# Patient Record
Sex: FEMALE | Race: ASIAN | Employment: FULL TIME | URBAN - METROPOLITAN AREA
[De-identification: names, ages, dates, MRNs, and addresses within clinical notes are randomized per-mention and may not be internally consistent; named-entity substitution may affect disease eponyms.]

---

## 2017-02-27 ENCOUNTER — OFFICE VISIT (OUTPATIENT)
Dept: INTERNAL MEDICINE CLINIC | Facility: CLINIC | Age: 32
End: 2017-02-27

## 2017-02-27 VITALS
DIASTOLIC BLOOD PRESSURE: 82 MMHG | HEIGHT: 63 IN | SYSTOLIC BLOOD PRESSURE: 128 MMHG | WEIGHT: 248 LBS | HEART RATE: 93 BPM | BODY MASS INDEX: 43.94 KG/M2 | RESPIRATION RATE: 18 BRPM | TEMPERATURE: 99.5 F

## 2017-02-27 DIAGNOSIS — E66.01 MORBID OBESITY WITH BMI OF 40.0-44.9, ADULT (HCC): ICD-10-CM

## 2017-02-27 DIAGNOSIS — R52 BODY ACHES: Primary | ICD-10-CM

## 2017-02-27 LAB
FLUAV+FLUBV AG NOSE QL IA.RAPID: NEGATIVE POS/NEG
FLUAV+FLUBV AG NOSE QL IA.RAPID: NEGATIVE POS/NEG
VALID INTERNAL CONTROL?: YES

## 2017-02-27 NOTE — PROGRESS NOTES
HISTORY OF PRESENT ILLNESS  Wyatt Holliday is a 27 y.o. female. HPI  1) Sick. Today is day 3 of body aches - lots of sinus pressure and pain. Cough. Mucus was clear at first and is now yellow. Cough is starting to be productive all day. Temp was normal at the office this morning. Taking Mucinex regularly. 2) Pt has lost 7 lbs in the past 2 months! Has been doing Whole 30 off and on for weight loss. Wt Readings from Last 3 Encounters:   02/27/17 248 lb (112.5 kg)   12/07/16 255 lb (115.7 kg)   07/27/16 241 lb (109.3 kg)         Review of Systems   Constitutional: Positive for malaise/fatigue. Negative for fever. HENT: Positive for congestion. Negative for ear pain. Respiratory: Positive for cough and sputum production. Negative for shortness of breath. Neurological: Positive for headaches. Endo/Heme/Allergies: Negative for environmental allergies. Physical Exam   Constitutional: She is oriented to person, place, and time. She appears well-developed and well-nourished. No distress. HENT:   Head: Normocephalic and atraumatic. Mouth/Throat: Oropharynx is clear and moist.   Bilateral TMs with fluid. No erythema. Nasal mucosa red, inflamed. Eyes: Conjunctivae are normal.   Neck: Neck supple. Cardiovascular: Normal rate, regular rhythm and normal heart sounds. Pulmonary/Chest: Effort normal and breath sounds normal.   Lymphadenopathy:     She has no cervical adenopathy. Neurological: She is alert and oriented to person, place, and time. Skin: Skin is warm and dry. Nursing note and vitals reviewed. ASSESSMENT and PLAN    ICD-10-CM ICD-9-CM    1. Body aches R52 780.96 AMB POC KEVYN INFLUENZA A/B TEST negative  Suspect Sinusitis. Rest, fluids, Mucinex. Call INI Thurs/Friday INI for antibiotics. 2. Morbid obesity with BMI of 40.0-44.9, adult ([facility] [address] E66.01 278.01 Improving! Encouraged pt to continue with her diet changes.      Z68.41 V85.41

## 2017-02-27 NOTE — PROGRESS NOTES
Chief Complaint   Patient presents with    Flu     Pt stated day 3 of body aches, sore throat, headaches     1. Have you been to the ER, urgent care clinic since your last visit? Hospitalized since your last visit? No    2. Have you seen or consulted any other health care providers outside of the [facility] since your last visit? Include any pap smears or colon screening.   No

## 2017-04-19 ENCOUNTER — OFFICE VISIT (OUTPATIENT)
Dept: INTERNAL MEDICINE CLINIC | Facility: CLINIC | Age: 32
End: 2017-04-19

## 2017-04-19 ENCOUNTER — APPOINTMENT (OUTPATIENT)
Dept: CT IMAGING | Age: 32
End: 2017-04-19
Attending: NURSE PRACTITIONER
Payer: COMMERCIAL

## 2017-04-19 ENCOUNTER — HOSPITAL ENCOUNTER (EMERGENCY)
Age: 32
Discharge: HOME | End: 2017-04-19
Attending: EMERGENCY MEDICINE
Payer: COMMERCIAL

## 2017-04-19 VITALS
HEART RATE: 103 BPM | SYSTOLIC BLOOD PRESSURE: 130 MMHG | RESPIRATION RATE: 18 BRPM | DIASTOLIC BLOOD PRESSURE: 83 MMHG | WEIGHT: 246.25 LBS | BODY MASS INDEX: 43.63 KG/M2 | OXYGEN SATURATION: 98 % | HEIGHT: 63 IN | TEMPERATURE: 98.6 F

## 2017-04-19 VITALS
SYSTOLIC BLOOD PRESSURE: 137 MMHG | OXYGEN SATURATION: 98 % | HEIGHT: 63 IN | RESPIRATION RATE: 18 BRPM | WEIGHT: 246.6 LBS | BODY MASS INDEX: 43.7 KG/M2 | HEART RATE: 111 BPM | DIASTOLIC BLOOD PRESSURE: 84 MMHG | TEMPERATURE: 97.1 F

## 2017-04-19 DIAGNOSIS — F41.9 ANXIETY: ICD-10-CM

## 2017-04-19 DIAGNOSIS — M54.16 LUMBAR RADICULOPATHY: Primary | ICD-10-CM

## 2017-04-19 DIAGNOSIS — M25.551 PAIN OF RIGHT HIP JOINT: Primary | ICD-10-CM

## 2017-04-19 LAB
APPEARANCE UR: CLEAR
BACTERIA URNS QL MICRO: NEGATIVE /HPF
BILIRUB UR QL: NEGATIVE
COLOR UR: ABNORMAL
EPITH CASTS URNS QL MICRO: ABNORMAL /LPF
GLUCOSE UR STRIP.AUTO-MCNC: NEGATIVE MG/DL
HCG UR QL: NEGATIVE
HGB UR QL STRIP: NEGATIVE
HYALINE CASTS URNS QL MICRO: ABNORMAL /LPF (ref 0–5)
KETONES UR QL STRIP.AUTO: NEGATIVE MG/DL
LEUKOCYTE ESTERASE UR QL STRIP.AUTO: NEGATIVE
NITRITE UR QL STRIP.AUTO: NEGATIVE
PH UR STRIP: 6 [PH] (ref 5–8)
PROT UR STRIP-MCNC: NEGATIVE MG/DL
RBC #/AREA URNS HPF: ABNORMAL /HPF (ref 0–5)
SP GR UR REFRACTOMETRY: >1.03 (ref 1–1.03)
UROBILINOGEN UR QL STRIP.AUTO: 0.2 EU/DL (ref 0.2–1)
WBC URNS QL MICRO: ABNORMAL /HPF (ref 0–4)

## 2017-04-19 PROCEDURE — 72131 CT LUMBAR SPINE W/O DYE: CPT

## 2017-04-19 PROCEDURE — 74011250636 HC RX REV CODE- 250/636: Performed by: NURSE PRACTITIONER

## 2017-04-19 PROCEDURE — 81025 URINE PREGNANCY TEST: CPT

## 2017-04-19 PROCEDURE — 96374 THER/PROPH/DIAG INJ IV PUSH: CPT

## 2017-04-19 PROCEDURE — 81001 URINALYSIS AUTO W/SCOPE: CPT | Performed by: NURSE PRACTITIONER

## 2017-04-19 PROCEDURE — 99283 EMERGENCY DEPT VISIT LOW MDM: CPT

## 2017-04-19 PROCEDURE — 96361 HYDRATE IV INFUSION ADD-ON: CPT

## 2017-04-19 PROCEDURE — 96375 TX/PRO/DX INJ NEW DRUG ADDON: CPT

## 2017-04-19 RX ORDER — DIAZEPAM 10 MG/2ML
2 INJECTION INTRAMUSCULAR
Status: COMPLETED | OUTPATIENT
Start: 2017-04-19 | End: 2017-04-19

## 2017-04-19 RX ORDER — HYDROMORPHONE HYDROCHLORIDE 1 MG/ML
0.5 INJECTION, SOLUTION INTRAMUSCULAR; INTRAVENOUS; SUBCUTANEOUS ONCE
Status: COMPLETED | OUTPATIENT
Start: 2017-04-19 | End: 2017-04-19

## 2017-04-19 RX ORDER — ESCITALOPRAM OXALATE 20 MG/1
20 TABLET ORAL DAILY
Qty: 90 TAB | Refills: 1 | Status: SHIPPED | OUTPATIENT
Start: 2017-04-19 | End: 2017-04-22 | Stop reason: SDUPTHER

## 2017-04-19 RX ORDER — DEXAMETHASONE SODIUM PHOSPHATE 4 MG/ML
10 INJECTION, SOLUTION INTRA-ARTICULAR; INTRALESIONAL; INTRAMUSCULAR; INTRAVENOUS; SOFT TISSUE
Status: COMPLETED | OUTPATIENT
Start: 2017-04-19 | End: 2017-04-19

## 2017-04-19 RX ORDER — NAPROXEN 500 MG/1
500 TABLET ORAL 2 TIMES DAILY WITH MEALS
Qty: 20 TAB | Refills: 0 | Status: SHIPPED | OUTPATIENT
Start: 2017-04-19 | End: 2017-04-29

## 2017-04-19 RX ORDER — CYCLOBENZAPRINE HCL 10 MG
10 TABLET ORAL
Qty: 15 TAB | Refills: 0 | Status: SHIPPED | OUTPATIENT
Start: 2017-04-19 | End: 2017-04-24

## 2017-04-19 RX ORDER — ONDANSETRON 2 MG/ML
4 INJECTION INTRAMUSCULAR; INTRAVENOUS
Status: DISCONTINUED | OUTPATIENT
Start: 2017-04-19 | End: 2017-04-19 | Stop reason: HOSPADM

## 2017-04-19 RX ORDER — HYDROCODONE BITARTRATE AND ACETAMINOPHEN 7.5; 325 MG/1; MG/1
1 TABLET ORAL
Qty: 20 TAB | Refills: 0 | Status: SHIPPED | OUTPATIENT
Start: 2017-04-19 | End: 2017-08-18

## 2017-04-19 RX ADMIN — DIAZEPAM 2 MG: 5 INJECTION, SOLUTION INTRAMUSCULAR; INTRAVENOUS at 11:42

## 2017-04-19 RX ADMIN — DEXAMETHASONE SODIUM PHOSPHATE 10 MG: 4 INJECTION, SOLUTION INTRAMUSCULAR; INTRAVENOUS at 11:42

## 2017-04-19 RX ADMIN — HYDROMORPHONE HYDROCHLORIDE 0.5 MG: 1 INJECTION, SOLUTION INTRAMUSCULAR; INTRAVENOUS; SUBCUTANEOUS at 11:42

## 2017-04-19 RX ADMIN — SODIUM CHLORIDE 1000 ML: 900 INJECTION, SOLUTION INTRAVENOUS at 11:49

## 2017-04-19 NOTE — DISCHARGE INSTRUCTIONS
Back Pain: Care Instructions  Your Care Instructions    Back pain has many possible causes. It is often related to problems with muscles and ligaments of the back. It may also be related to problems with the nerves, discs, or bones of the back. Moving, lifting, standing, sitting, or sleeping in an awkward way can strain the back. Sometimes you don't notice the injury until later. Arthritis is another common cause of back pain. Although it may hurt a lot, back pain usually improves on its own within several weeks. Most people recover in 12 weeks or less. Using good home treatment and being careful not to stress your back can help you feel better sooner. Follow-up care is a key part of your treatment and safety. Be sure to make and go to all appointments, and call your doctor if you are having problems. Its also a good idea to know your test results and keep a list of the medicines you take. How can you care for yourself at home? · Sit or lie in positions that are most comfortable and reduce your pain. Try one of these positions when you lie down:  ¨ Lie on your back with your knees bent and supported by large pillows. ¨ Lie on the floor with your legs on the seat of a sofa or chair. [place] on your side with your knees and hips bent and a pillow between your legs. ¨ Lie on your stomach if it does not make pain worse. · Do not sit up in bed, and avoid soft couches and twisted positions. Bed rest can help relieve pain at first, but it delays healing. Avoid bed rest after the first day of back pain. · Change positions every 30 minutes. If you must sit for long periods of time, take breaks from sitting. Get up and walk around, or lie in a comfortable position. · Try using a heating pad on a low or medium setting for 15 to 20 minutes every 2 or 3 hours. Try a warm shower in place of one session with the heating pad. · You can also try an ice pack for 10 to 15 minutes every 2 to 3 hours.  Put a thin cloth between the ice pack and your skin. · Take pain medicines exactly as directed. ¨ If the doctor gave you a prescription medicine for pain, take it as prescribed. ¨ If you are not taking a prescription pain medicine, ask your doctor if you can take an over-the-counter medicine. · Take short walks several times a day. You can start with 5 to 10 minutes, 3 or 4 times a day, and work up to longer walks. Walk on level surfaces and avoid hills and stairs until your back is better. · Return to work and other activities as soon as you can. Continued rest without activity is usually not good for your back. · To prevent future back pain, do exercises to stretch and strengthen your back and stomach. Learn how to use good posture, safe lifting techniques, and proper body mechanics. When should you call for help? Call your doctor now or seek immediate medical care if:  · You have new or worsening numbness in your legs. · You have new or worsening weakness in your legs. (This could make it hard to stand up.)  · You lose control of your bladder or bowels. Watch closely for changes in your health, and be sure to contact your doctor if:  · Your pain gets worse. · You are not getting better after 2 weeks. Where can you learn more? Go to http://salvador-tia.info/. Enter I205 in the search box to learn more about \"Back Pain: Care Instructions. \"  Current as of: May 23, 2016  Content Version: 11.2  © 8637-5230 [employer]. Care instructions adapted under license by [employer] (which disclaims liability or warranty for this information). If you have questions about a medical condition or this instruction, always ask your healthcare professional. [address] any warranty or liability for your use of this information. We hope that we have addressed all of your medical concerns.  The examination and treatment you received in the Emergency Department were for an emergent problem and were not intended as complete care. It is important that you follow up with your healthcare provider(s) for ongoing care. If your symptoms worsen or do not improve as expected, and you are unable to reach your usual health care provider(s), you should return to the Emergency Department. Today's healthcare is undergoing tremendous change, and patient satisfaction surveys are one of the many tools to assess the quality of medical care. You may receive a survey from the [facility] regarding your experience in the Emergency Department. I hope that your experience has been completely positive, particularly the medical care that I provided. As such, please participate in the survey; anything less than excellent does not meet my expectations or intentions. [facility] and [facility] participate in nationally recognized quality of care measures. If your blood pressure is greater than 120/80, as reported below, we urge that you seek medical care to address the potential of high blood pressure, commonly known as hypertension. Hypertension can be hereditary or can be caused by certain medical conditions, pain, stress, or \"white coat syndrome. \"       Please make an appointment with your health care provider(s) for follow up of your Emergency Department visit. VITALS:   Patient Vitals for the past 8 hrs:   Temp Pulse Resp BP SpO2   04/19/17 1112 98.9 °F (37.2 °C) (!) 112 18 (!) 146/101 98 %          Thank you for allowing us to provide you with medical care today. We realize that you have many choices for your emergency care needs. Please choose us in the future for any continued health care needs. [address].   Office: [phone]            Recent Results (from the past 24 hour(s))   URINALYSIS W/MICROSCOPIC    Collection Time: 04/19/17 12:36 PM Result Value Ref Range    Color YELLOW/STRAW      Appearance CLEAR CLEAR      Specific gravity >1.030 (H) 1.003 - 1.030    pH (UA) 6.0 5.0 - 8.0      Protein NEGATIVE  NEG mg/dL    Glucose NEGATIVE  NEG mg/dL    Ketone NEGATIVE  NEG mg/dL    Bilirubin NEGATIVE  NEG      Blood NEGATIVE  NEG      Urobilinogen 0.2 0.2 - 1.0 EU/dL    Nitrites NEGATIVE  NEG      Leukocyte Esterase NEGATIVE  NEG      WBC 0-4 0 - 4 /hpf    RBC 0-5 0 - 5 /hpf    Epithelial cells FEW FEW /lpf    Bacteria NEGATIVE  NEG /hpf    Hyaline cast 0-2 0 - 5 /lpf   HCG URINE, QL. - POC    Collection Time: 04/19/17 12:37 PM   Result Value Ref Range    Pregnancy test,urine (POC) NEGATIVE  NEG         Ct Spine Lumb Wo Cont    Result Date: 4/19/2017  INDICATION: Back pain and right hip pain for 3 days. TECHNIQUE: 2.5 mm axial images were obtained through the lumbar spine and reconstructed in the sagittal and coronal planes. CT dose reduction was achieved through use of a standardized protocol tailored for this examination and automatic exposure control for dose modulation. Adaptive statistical iterative reconstruction (ASIR) was utilized. FINDINGS: The lumbar spine alignment is normal. The vertebral bodies are intact. There is no evidence of fracture. The intervertebral disc spaces are well maintained. There is no evidence of spinal stenosis or osseous neural foraminal narrowing at any level. No disc herniation is seen. IMPRESSION: Normal lumbar spine CT.

## 2017-04-19 NOTE — Clinical Note
Follow up with Primary Care Physician in 2 - 3 days Return to the ED if condition worsens and/or new symptoms develop

## 2017-04-19 NOTE — ED TRIAGE NOTES
Triage note: pt started with right hip/back pain on Sunday. Pt took 3 Advil and pain went away. Last night pain began again, going down right leg and feeling like her right foot is cooler than left. Pt saw PCP this am and referred to ED for further workup.

## 2017-04-19 NOTE — PROGRESS NOTES
HISTORY OF PRESENT ILLNESS  Ulisses Mercado is a 27 y.o. female. Chief Complaint   Patient presents with    Hip Pain     Patient states that the pain started on Sunday night. Psatient states the pain went away after she walked for about 1/2 hour. Reoccurred yesterday evening radiating  to leg     HPI  1) Right Hip pain - \"sharp\" pain - worse with movement. Took 3 Advil on Sunday night - walked for 30 minutes - resolved. Yesterday evening, hip pain started while shopping - continuously got worse. Pain is severe right now: 10/10 pain. Hasn't taken anything for pain today. Pain radiating down leg to knee. No injury to hip. No urinary pain/urg/freq. Other joints have been painful: R knuckle and L shoulder. Those pains started at the same time. Today, finger and shoulder don't hurt. No FH RA, but strong FH gout and osteoarthritis. Pt reports that R foot feels colder than L foot. No foot swelling. 2) Pt needs refill of Lexapro. It is working well. Review of Systems   Constitutional: Positive for malaise/fatigue. Genitourinary: Negative for dysuria, flank pain, frequency and urgency. Musculoskeletal: Positive for joint pain and myalgias. Negative for falls. Physical Exam   Constitutional: She is oriented to person, place, and time. She appears well-developed and well-nourished. She appears distressed. HENT:   Head: Normocephalic and atraumatic. Neck: Neck supple. No JVD present. Cardiovascular: Normal rate, regular rhythm and normal heart sounds. Pulmonary/Chest: Effort normal and breath sounds normal. No respiratory distress. Musculoskeletal: She exhibits no edema. ROM hip intact, but very painful. Extremely antalgic gait. Neurological: She is alert and oriented to person, place, and time. Skin: Skin is warm and dry.    Psychiatric: Her behavior is normal. Judgment and thought content normal.   Tearful and obviously in pain   Nursing note and vitals reviewed. ASSESSMENT and PLAN    ICD-10-CM ICD-9-CM    1. Pain of right hip joint M25.551 719.45 Offered to order labs including RF, MELINDA, Sed Rate, x-ray of R hip, and possibly doppler ultrasound R leg, but given the extreme severity of patients symptoms and her understandable reluctance to travel to x-ray location & pharmacy, her need for immediate evaluation and pain relief, and her lack of prior joint pain, pt elects to go to ER. Agree that this is reasonable. Suggested [facility] or [facility] [place].     2. Anxiety F41.9 300.00 escitalopram oxalate (LEXAPRO) 20 mg tablet

## 2017-04-19 NOTE — PROGRESS NOTES
Room 7    Chief Complaint   Patient presents with    Hip Pain     Right Hip Pain. Patient states that the pain started on Sunday night. Psatient states the pain went away after she walked for about 1/2 hour. Reoccurred yesterday evening radiating  to leg     1. Have you been to the ER, urgent care clinic since your last visit? Hospitalized since your last visit? No    2. Have you seen or consulted any other health care providers outside of the [address] [place] [address] since your last visit? Include any pap smears or colon screening.  No     Health Maintenance Due   Topic Date Due    INFLUENZA AGE 9 TO ADULT  08/01/2016

## 2017-04-19 NOTE — ED PROVIDER NOTES
HPI Comments: The patient is a 28-year-old female who presents ambulatory to the emergency room with complaints of right-sided lower back pain radiating down the left hip to the knee. Pain initially began on Sunday although when away after 3 Advil and excessive ambulation. The pain returned last night while she was sleeping. No known trauma. No excessive heavy lifting pushing pulling. No saddle anesthesia. No numbness or tingling. No loss of bowel or bladder function. Pt denies fevers, chills, night sweats, chest pain, pressure, SOB, SEALS, PND, orthopnea, abdominal pain, n/v/d, melena, hematuria, dysuria, constipation, HA, dizziness, and syncope      Past Medical History:  No date: Anxiety  No date: Hives      Comment: as a child - had 1 year of steroids. No date: Hypertension  No date: PCOS (polycystic ovarian syndrome)    Past Surgical History:  No date: HX OTHER SURGICAL      Comment: rectal polyp at 9 yo  No date: HX OTHER SURGICAL      Comment: Sigmoidoscopy - WNL pp ~2013    PCP:  Georgie Lora PA-C      Patient is a 27 y.o. female presenting with back pain. Back Pain    Pertinent negatives include no chest pain, no fever, no headaches, no abdominal pain, no dysuria and no weakness. Past Medical History:   Diagnosis Date    Anxiety     Hives     as a child - had 1 year of steroids.      Hypertension     PCOS (polycystic ovarian syndrome)        Past Surgical History:   Procedure Laterality Date    HX OTHER SURGICAL      rectal polyp at 9 yo    HX OTHER SURGICAL      Sigmoidoscopy - WNL pp ~2013         Family History:   Problem Relation Age of Onset    Diabetes Father     Heart Disease Father      cardiomyopathy, heart transplant    Diabetes Mother     High Cholesterol Mother     Diabetes Maternal Grandmother     Diabetes Maternal Grandfather     Diabetes Paternal Grandmother        Social History     Social History    Marital status: SINGLE     Spouse name: N/A    Number of children: N/A    Years of education: N/A     Occupational History    Optometrist       Social History Main Topics    Smoking status: Light Tobacco Smoker     Last attempt to quit: 1/1/2016    Smokeless tobacco: Never Used      Comment: 6 cig in 2016 (3/2016)    Alcohol use 0.0 oz/week     0 Standard drinks or equivalent per week      Comment: 2-3x/week 1-2 drinks    Drug use: No    Sexual activity: Yes     Partners: Male     Birth control/ protection: IUD     Other Topics Concern    Exercise No     Social History Narrative    Opened new practice in Fall/Winter 2013. Lives with boyfriend Duane Perdomo. No pets. Dating Boyfriend x 8 months in 3/2016. ALLERGIES: Review of patient's allergies indicates no known allergies. Review of Systems   Constitutional: Negative for activity change, appetite change, chills, diaphoresis, fatigue, fever and unexpected weight change. HENT: Negative for congestion, ear pain, rhinorrhea, sinus pressure, sore throat and tinnitus. Eyes: Negative for photophobia, pain, discharge and visual disturbance. Respiratory: Negative for apnea, cough, choking, chest tightness, shortness of breath, wheezing and stridor. Cardiovascular: Negative for chest pain, palpitations and leg swelling. Gastrointestinal: Negative for abdominal pain, constipation, diarrhea, nausea and vomiting. Endocrine: Negative for polydipsia, polyphagia and polyuria. Genitourinary: Negative for decreased urine volume, dyspareunia, dysuria, enuresis, flank pain, frequency, hematuria and urgency. Musculoskeletal: Positive for back pain. Negative for arthralgias, gait problem, myalgias and neck pain. Skin: Negative for color change, pallor, rash and wound. Allergic/Immunologic: Negative for immunocompromised state. Neurological: Negative for dizziness, seizures, syncope, weakness, light-headedness and headaches. Hematological: Does not bruise/bleed easily.    Psychiatric/Behavioral: Negative for agitation and confusion. The patient is not nervous/anxious. Vitals:    04/19/17 1112 04/19/17 1333   BP: (!) 146/101 130/83   Pulse: (!) 112 (!) 103   Resp: 18 18   Temp: 98.9 °F (37.2 °C) 98.6 °F (37 °C)   SpO2: 98% 98%   Weight: 111.7 kg (246 lb 4 oz)    Height: 5' 3\" (1.6 m)             Physical Exam   Constitutional: She is oriented to person, place, and time. She appears well-developed and well-nourished. No distress. HENT:   Head: Normocephalic. Right Ear: External ear normal.   Left Ear: External ear normal.   Mouth/Throat: Oropharynx is clear and moist. No oropharyngeal exudate. Eyes: Conjunctivae and EOM are normal. Pupils are equal, round, and reactive to light. Right eye exhibits no discharge. Left eye exhibits no discharge. No scleral icterus. Neck: Normal range of motion. Neck supple. No JVD present. No tracheal deviation present. No thyromegaly present. Cardiovascular: Normal rate, regular rhythm, normal heart sounds and intact distal pulses. Exam reveals no gallop and no friction rub. No murmur heard. Pulmonary/Chest: Effort normal and breath sounds normal. No stridor. No respiratory distress. She has no wheezes. She has no rales. She exhibits no tenderness. Abdominal: Soft. Bowel sounds are normal. She exhibits no distension and no mass. There is no tenderness. There is no rebound and no guarding. Musculoskeletal: Normal range of motion. She exhibits no edema. Lumbar back: She exhibits tenderness, bony tenderness and pain. Back:    + straight leg test   Lymphadenopathy:     She has no cervical adenopathy. Neurological: She is alert and oriented to person, place, and time. She has normal strength. She displays normal reflexes. No cranial nerve deficit or sensory deficit. She displays a negative Romberg sign. Coordination normal. GCS eye subscore is 4. GCS verbal subscore is 5. GCS motor subscore is 6.    Reflex Scores:       Patellar reflexes are 2+ on the right side and 2+ on the left side. Achilles reflexes are 2+ on the right side and 2+ on the left side. Skin: Skin is warm and dry. No rash noted. She is not diaphoretic. No erythema. No pallor. Psychiatric: She has a normal mood and affect. Her behavior is normal. Judgment and thought content normal.   Nursing note and vitals reviewed. MDM  Number of Diagnoses or Management Options  Lumbar radiculopathy:   Diagnosis management comments:       * analgesia, decadron, and valium   * CT L spine as pain is seemingly out of proportion with exam   * UA       Amount and/or Complexity of Data Reviewed  Clinical lab tests: ordered and reviewed  Tests in the radiology section of CPT®: ordered and reviewed  Review and summarize past medical records: yes    Risk of Complications, Morbidity, and/or Mortality  General comments:    - stable, ambulatory in NAD with pain improved    Patient Progress  Patient progress: stable    ED Course       Procedures           1335  Pt has been reevaluated. There are no new complaints, changes, or physical findings at this time. Medications have been reviewed w/ pt and/or family. Pt and/or family's questions have been answered. Pt and/or family expressed good understanding of the dx/tx/rx and is in agreement with plan of care. Pt instructed and agreed to f/u w/ PCP and to return to ED upon further deterioration. Pt is ready for discharge.     LABORATORY TESTS:  Recent Results (from the past 12 hour(s))   URINALYSIS W/MICROSCOPIC    Collection Time: 04/19/17 12:36 PM   Result Value Ref Range    Color YELLOW/STRAW      Appearance CLEAR CLEAR      Specific gravity >1.030 (H) 1.003 - 1.030    pH (UA) 6.0 5.0 - 8.0      Protein NEGATIVE  NEG mg/dL    Glucose NEGATIVE  NEG mg/dL    Ketone NEGATIVE  NEG mg/dL    Bilirubin NEGATIVE  NEG      Blood NEGATIVE  NEG      Urobilinogen 0.2 0.2 - 1.0 EU/dL    Nitrites NEGATIVE  NEG      Leukocyte Esterase NEGATIVE  NEG      WBC 0-4 0 - 4 /hpf RBC 0-5 0 - 5 /hpf    Epithelial cells FEW FEW /lpf    Bacteria NEGATIVE  NEG /hpf    Hyaline cast 0-2 0 - 5 /lpf   HCG URINE, QL. - POC    Collection Time: 04/19/17 12:37 PM   Result Value Ref Range    Pregnancy test,urine (POC) NEGATIVE  NEG         IMAGING RESULTS:  CT SPINE LUMB WO CONT   Final Result        Ct Spine Lumb Wo Cont    Result Date: 4/19/2017  INDICATION: Back pain and right hip pain for 3 days. TECHNIQUE: 2.5 mm axial images were obtained through the lumbar spine and reconstructed in the sagittal and coronal planes. CT dose reduction was achieved through use of a standardized protocol tailored for this examination and automatic exposure control for dose modulation. Adaptive statistical iterative reconstruction (ASIR) was utilized. FINDINGS: The lumbar spine alignment is normal. The vertebral bodies are intact. There is no evidence of fracture. The intervertebral disc spaces are well maintained. There is no evidence of spinal stenosis or osseous neural foraminal narrowing at any level. No disc herniation is seen. IMPRESSION: Normal lumbar spine CT. MEDICATIONS GIVEN:  Medications   ondansetron (ZOFRAN) injection 4 mg (4 mg IntraVENous Refused 4/19/17 1151)   HYDROmorphone (PF) (DILAUDID) injection 0.5 mg (0.5 mg IntraVENous Given 4/19/17 1142)   dexamethasone (DECADRON) 4 mg/mL injection 10 mg (10 mg IntraVENous Given 4/19/17 1142)   sodium chloride 0.9 % bolus infusion 1,000 mL (0 mL IntraVENous IV Completed 4/19/17 1239)   diazePAM (VALIUM) injection 2 mg (2 mg IntraVENous Given 4/19/17 1142)       IMPRESSION:  1. Lumbar radiculopathy        PLAN:  1. Discharge Medication List as of 4/19/2017  1:33 PM      START taking these medications    Details   HYDROcodone-acetaminophen (NORCO) 7.5-325 mg per tablet Take 1 Tab by mouth every six (6) hours as needed for Pain.  Max Daily Amount: 4 Tabs., Print, Disp-20 Tab, R-0      cyclobenzaprine (FLEXERIL) 10 mg tablet Take 1 Tab by mouth three (3) times daily as needed for Muscle Spasm(s) for up to 5 days. , Print, Disp-15 Tab, R-0      naproxen (NAPROSYN) 500 mg tablet Take 1 Tab by mouth two (2) times daily (with meals) for 10 days. , Print, Disp-20 Tab, R-0         CONTINUE these medications which have NOT CHANGED    Details   escitalopram oxalate (LEXAPRO) 20 mg tablet Take 1 Tab by mouth daily. , Normal, Disp-90 Tab, R-1      levonorgestrel (MIRENA) 20 mcg/24 hr (5 years) IUD 1 Each by IntraUTERine route once., Historical Med      buPROPion XL (WELLBUTRIN XL) 300 mg XL tablet Take 1 Tab by mouth every morning., Normal, Disp-90 Tab, R-2      phentermine (ADIPEX-P) 37.5 mg tablet Take 1 Tab by mouth every morning. Max Daily Amount: 37.5 mg. Take 1 in the morning, then 1/2 tab at 3pm, Print, Disp-45 Tab, R-5      amLODIPine (NORVASC) 5 mg tablet Take 1 Tab by mouth daily. , Normal, Disp-90 Tab, R-1      metoprolol tartrate (LOPRESSOR) 25 mg tablet Take 1 Tab by mouth daily. , Normal, Disp-90 Tab, R-1      triamterene-hydrochlorothiazide (DYAZIDE) 37.5-25 mg per capsule Take 1 Cap by mouth daily. , Normal, Disp-90 Cap, R-1      metFORMIN (GLUCOPHAGE) 500 mg tablet Take 1,000 mg by mouth two (2) times daily (with meals). , Historical Med      ALPRAZolam (XANAX) 0.25 mg tablet Take 0.25 mg by mouth three (3) times daily as needed., Historical Med           2. Follow-up Information     Follow up With Details Comments [address], [place] In 2 days  [address] [facility] [address] [phone]      [facility] [place] [facility]  As needed, If symptoms worsen [address] [facility]  [phone]        3.  Supportive care     Return to ED if worse             Cammie Goins NP  3:34 PM

## 2017-04-22 DIAGNOSIS — F41.9 ANXIETY: ICD-10-CM

## 2017-04-24 RX ORDER — ESCITALOPRAM OXALATE 20 MG/1
20 TABLET ORAL DAILY
Qty: 90 TAB | Refills: 1 | Status: SHIPPED | OUTPATIENT
Start: 2017-04-24 | End: 2017-10-09 | Stop reason: SDUPTHER

## 2017-04-24 NOTE — TELEPHONE ENCOUNTER
From: Kianna Gomez  To: Charmayne Sizer, PA-C  Sent: 4/22/2017 5:45 PM EDT  Subject: Medication Renewal Request    Original authorizing provider: Charmayne Sizer, PA-C Vickie Boehringer would like a refill of the following medications:  escitalopram oxalate (LEXAPRO) 20 mg tablet Charmayne Sizer, PA-C]    Preferred pharmacy: [facility] [address] [place] [address]     Comment:

## 2017-08-18 ENCOUNTER — OFFICE VISIT (OUTPATIENT)
Dept: INTERNAL MEDICINE CLINIC | Facility: CLINIC | Age: 32
End: 2017-08-18

## 2017-08-18 VITALS
TEMPERATURE: 98 F | WEIGHT: 255.2 LBS | SYSTOLIC BLOOD PRESSURE: 139 MMHG | OXYGEN SATURATION: 98 % | DIASTOLIC BLOOD PRESSURE: 84 MMHG | HEART RATE: 79 BPM | BODY MASS INDEX: 45.22 KG/M2 | HEIGHT: 63 IN | RESPIRATION RATE: 18 BRPM

## 2017-08-18 DIAGNOSIS — I10 ESSENTIAL HYPERTENSION: ICD-10-CM

## 2017-08-18 DIAGNOSIS — F41.9 ANXIETY: ICD-10-CM

## 2017-08-18 DIAGNOSIS — E66.01 MORBID OBESITY DUE TO EXCESS CALORIES (HCC): ICD-10-CM

## 2017-08-18 DIAGNOSIS — Z00.00 ANNUAL PHYSICAL EXAM: Primary | ICD-10-CM

## 2017-08-18 DIAGNOSIS — Z23 ENCOUNTER FOR IMMUNIZATION: ICD-10-CM

## 2017-08-18 RX ORDER — PHENTERMINE HYDROCHLORIDE 37.5 MG/1
37.5 TABLET ORAL
Qty: 90 TAB | Refills: 1 | Status: SHIPPED | OUTPATIENT
Start: 2017-08-18 | End: 2018-05-25

## 2017-08-18 RX ORDER — ALPRAZOLAM 0.25 MG/1
0.25 TABLET ORAL
Qty: 30 TAB | Refills: 1 | Status: SHIPPED | OUTPATIENT
Start: 2017-08-18 | End: 2018-05-25

## 2017-08-18 RX ORDER — BUPROPION HYDROCHLORIDE 150 MG/1
150 TABLET ORAL
Qty: 90 TAB | Refills: 1 | Status: SHIPPED | OUTPATIENT
Start: 2017-08-18 | End: 2018-04-20 | Stop reason: SDUPTHER

## 2017-08-18 NOTE — MR AVS SNAPSHOT
Visit Information Date & Time Provider Department Dept. Phone Encounter #  
 8/18/2017  8:15 AM Wilmon Landau, PA-C [facility] Internal Medicine [phone] [id number] Follow-up Instructions Return in about 6 months (around 2/18/2018) for 30 minute Anxiety, HTN, Weight, Cholesterol follow up. Upcoming Health Maintenance Date Due INFLUENZA AGE 9 TO ADULT 8/1/2017 PAP AKA CERVICAL CYTOLOGY 12/1/2017 DTaP/Tdap/Td series (2 - Td) 6/1/2026 Allergies as of 8/18/2017  Review Complete On: 8/18/2017 By: Tyler Yang LPN No Known Allergies Current Immunizations  Never Reviewed Name Date Influenza Vaccine (Quad) PF  Incomplete Not reviewed this visit You Were Diagnosed With   
  
 Codes Comments Annual physical exam    -  Primary ICD-10-CM: Z00.00 ICD-9-CM: V70.0 Encounter for immunization     ICD-10-CM: K89 ICD-9-CM: V03.89 Anxiety     ICD-10-CM: F41.9 ICD-9-CM: 300.00 Morbid obesity due to excess calories (HCC)     ICD-10-CM: E66.01 
ICD-9-CM: 278.01 Essential hypertension     ICD-10-CM: I10 
ICD-9-CM: 401.9 Vitals BP Pulse Temp Resp Height(growth percentile) Weight(growth percentile) 139/84 (BP 1 Location: Left arm, BP Patient Position: Sitting) 79 98 °F (36.7 °C) (Oral) 18 5' 3\" (1.6 m) 255 lb 3.2 oz (115.8 kg) SpO2 BMI OB Status Smoking Status 98% 45.21 kg/m2 Having regular periods Light Tobacco Smoker Vitals History BMI and BSA Data Body Mass Index Body Surface Area  
 45.21 kg/m 2 2.27 m 2 Preferred Pharmacy Pharmacy Name Phone [facility] [address] [place] [address] 78 Your Updated Medication List  
  
   
This list is accurate as of: 8/18/17  8:47 AM.  Always use your most recent med list.  
  
  
  
  
 ALPRAZolam 0.25 mg tablet Commonly known as:  Johnice Salts Take 1 Tab by mouth two (2) times daily as needed. Max Daily Amount: 0.5 mg.  
  
 amLODIPine 5 mg tablet Commonly known as:  Kenton Euceda Take 1 Tab by mouth daily. buPROPion  mg tablet Commonly known as:  Yaw Jeans Take 1 Tab by mouth every morning. escitalopram oxalate 20 mg tablet Commonly known as:  Darya Rodgers Take 1 Tab by mouth daily. HYDROcodone-acetaminophen 7.5-325 mg per tablet Commonly known as:  Cecilia Muñoz Take 1 Tab by mouth every six (6) hours as needed for Pain. Max Daily Amount: 4 Tabs. metFORMIN 1,000 mg tablet Commonly known as:  GLUCOPHAGE Take 1 Tab by mouth two (2) times daily (with meals). metoprolol tartrate 25 mg tablet Commonly known as:  LOPRESSOR Take 1 Tab by mouth daily. MIRENA 20 mcg/24 hr (5 years) IUD Generic drug:  levonorgestrel 1 Each by IntraUTERine route once. phentermine 37.5 mg tablet Commonly known as:  ADIPEX-P Take 1 Tab by mouth every morning. Max Daily Amount: 37.5 mg.  
  
 triamterene-hydroCHLOROthiazide 37.5-25 mg per capsule Commonly known as:  [place] Ranks Take 1 Cap by mouth daily. Prescriptions Printed Refills ALPRAZolam (XANAX) 0.25 mg tablet 1 Sig: Take 1 Tab by mouth two (2) times daily as needed. Max Daily Amount: 0.5 mg.  
 Class: Print Route: Oral  
 phentermine (ADIPEX-P) 37.5 mg tablet 1 Sig: Take 1 Tab by mouth every morning. Max Daily Amount: 37.5 mg.  
 Class: Print Route: Oral  
  
Prescriptions Sent to Pharmacy Refills buPROPion XL (WELLBUTRIN XL) 150 mg tablet 1 Sig: Take 1 Tab by mouth every morning. Class: Normal  
 Pharmacy: [address], [facility] Shayna [facility] #: [phone] Route: Oral  
  
We Performed the Following INFLUENZA VIRUS VAC QUAD,SPLIT,PRESV FREE SYRINGE 3/> YRS IM [id number] CPT(R)] Follow-up Instructions  Return in about 6 months (around 2/18/2018) for 30 minute Anxiety, HTN, Weight, Cholesterol follow up. To-Do List   
 Around 08/19/2017 Lab:  HEMOGLOBIN A1C W/O EAG Around 08/19/2017 Lab:  THYROID CASCADE PROFILE Around 08/21/2017 Lab:  CBC WITH AUTOMATED DIFF Around 08/21/2017 Lab:  LIPID PANEL Around 08/21/2017 Lab:  METABOLIC PANEL, COMPREHENSIVE Introducing [facility]! Dear Mireille Khan: Thank you for requesting a [employer] account. Our records indicate that you already have an active [employer] account. You can access your account anytime at [email or web address] Did you know that you can access your hospital and ER discharge instructions at any time in [employer]? You can also review all of your test results from your hospital stay or ER visit. Additional Information If you have questions, please visit the Frequently Asked Questions section of the [employer] website at [email or web address] Remember, [employer] is NOT to be used for urgent needs. For medical emergencies, dial 911. Now available from your iPhone and Android! Please provide this summary of care documentation to your next provider. Your primary care clinician is listed as Benita Méndez. If you have any questions after today's visit, please call [phone].

## 2017-08-18 NOTE — PROGRESS NOTES
HISTORY OF PRESENT ILLNESS  Elaina Mireles is a 34 y.o. female. HPI   1) CE today - fasting labs deferred to next week because had beer and pizza last night. 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 She is planning to start job hunting now. Wt Readings from Last 3 Encounters:   08/18/17 255 lb 3.2 oz (115.8 kg)   04/19/17 246 lb 4 oz (111.7 kg)   04/19/17 246 lb 9.6 oz (111.9 kg)     Lots of stress with this major occupational change. Stopped taking Wellbutrin  mg this summer because she felt too \"flat\". Now with all this stress, she feels she should restart at least 150 mg. She has continued to take Lexapro 20 mg and feels happy with this. She does not feel that Phentermine made her anxious in the past and would like to restart this as well. She felt more motivated to make healthy diet choices and exercise on Phentermine. Review of Systems   Constitutional: Positive for malaise/fatigue. Negative for fever and weight loss. HENT: Positive for congestion and sore throat. Negative for hearing loss. Eyes: Negative for blurred vision. Respiratory: Negative for cough and shortness of breath. Cardiovascular: Negative for chest pain, palpitations and leg swelling. Gastrointestinal: Negative for abdominal pain, blood in stool, constipation, diarrhea, heartburn, melena, nausea and vomiting. Musculoskeletal: Negative for back pain, joint pain and neck pain. Neurological: Positive for headaches. Negative for dizziness, seizures, loss of consciousness and weakness. Endo/Heme/Allergies: Positive for environmental allergies. Psychiatric/Behavioral: Negative for depression and substance abuse. The patient is nervous/anxious. The patient does not have insomnia.         Physical Exam   Constitutional: She is oriented to person, place, and time. She appears well-developed and well-nourished. No distress. HENT:   Head: Normocephalic and atraumatic. Mouth/Throat: No oropharyngeal exudate. Bilateral TMs with fluid. No erythema. Nasal mucosa red, inflamed. Posterior pharynx slightly red, inflamed. Eyes: Conjunctivae are normal.   Neck: Neck supple. No JVD present. No thyromegaly present. Cardiovascular: Normal rate, regular rhythm and normal heart sounds. Pulmonary/Chest: Effort normal and breath sounds normal. No respiratory distress. Abdominal: Soft. Bowel sounds are normal. She exhibits no distension and no mass. There is no tenderness. There is no rebound and no guarding. Musculoskeletal: She exhibits no edema. Lymphadenopathy:     She has no cervical adenopathy. Neurological: She is alert and oriented to person, place, and time. Skin: Skin is warm and dry. Psychiatric: She has a normal mood and affect. Her behavior is normal. Judgment and thought content normal.   Nursing note and vitals reviewed. ASSESSMENT and PLAN    ICD-10-CM ICD-9-CM    1. Annual physical exam Z00.00 V70.0 Fasting labs ordered:   HEMOGLOBIN A1C W/O EAG      CBC WITH AUTOMATED DIFF      METABOLIC PANEL, COMPREHENSIVE      LIPID PANEL      THYROID CASCADE PROFILE   2. Encounter for immunization Z23 V03.89 INFLUENZA VIRUS VAC QUAD,SPLIT,PRESV FREE SYRINGE 3/> YRS IM   3. Anxiety F41.9 300.00 Rx'd buPROPion XL (WELLBUTRIN XL) 150 mg tablet      ALPRAZolam (XANAX) 0.25 mg tablet   4. Morbid obesity due to excess calories (HCC) E66.01 278.01 phentermine (ADIPEX-P) 37.5 mg tablet   5. Essential hypertension I10 401.9 Controlled   6. Sinusitis vs A.R. - early. INI with allergy medication next week, call for antibiotic (Amoxil).

## 2017-08-18 NOTE — PROGRESS NOTES
Room 8    Chief Complaint   Patient presents with    Complete Physical     Pap with OB/GYN     1. Have you been to the ER, urgent care clinic since your last visit? Hospitalized since your last visit? No    2. Have you seen or consulted any other health care providers outside of the [address] [place] [address] since your last visit? Include any pap smears or colon screening.  No     Health Maintenance Due   Topic Date Due    INFLUENZA AGE 9 TO ADULT  08/01/2017    PAP AKA CERVICAL CYTOLOGY  12/01/2017

## 2017-08-19 DIAGNOSIS — Z00.00 ANNUAL PHYSICAL EXAM: ICD-10-CM

## 2017-08-21 DIAGNOSIS — Z00.00 ANNUAL PHYSICAL EXAM: ICD-10-CM

## 2017-09-01 ENCOUNTER — LAB ONLY (OUTPATIENT)
Dept: INTERNAL MEDICINE CLINIC | Facility: CLINIC | Age: 32
End: 2017-09-01

## 2017-09-02 LAB
ALBUMIN SERPL-MCNC: 4.2 G/DL (ref 3.5–5.5)
ALBUMIN/GLOB SERPL: 1.4 {RATIO} (ref 1.2–2.2)
ALP SERPL-CCNC: 81 IU/L (ref 39–117)
ALT SERPL-CCNC: 16 IU/L (ref 0–32)
AST SERPL-CCNC: 13 IU/L (ref 0–40)
BASOPHILS # BLD AUTO: 0 X10E3/UL (ref 0–0.2)
BASOPHILS NFR BLD AUTO: 0 %
BILIRUB SERPL-MCNC: 0.4 MG/DL (ref 0–1.2)
BUN SERPL-MCNC: 12 MG/DL (ref 6–20)
BUN/CREAT SERPL: 22 (ref 9–23)
CALCIUM SERPL-MCNC: 9.3 MG/DL (ref 8.7–10.2)
CHLORIDE SERPL-SCNC: 98 MMOL/L (ref 96–106)
CHOLEST SERPL-MCNC: 205 MG/DL (ref 100–199)
CO2 SERPL-SCNC: 26 MMOL/L (ref 18–29)
CREAT SERPL-MCNC: 0.55 MG/DL (ref 0.57–1)
EOSINOPHIL # BLD AUTO: 0.4 X10E3/UL (ref 0–0.4)
EOSINOPHIL NFR BLD AUTO: 5 %
ERYTHROCYTE [DISTWIDTH] IN BLOOD BY AUTOMATED COUNT: 14.1 % (ref 12.3–15.4)
GLOBULIN SER CALC-MCNC: 3.1 G/DL (ref 1.5–4.5)
GLUCOSE SERPL-MCNC: 81 MG/DL (ref 65–99)
HBA1C MFR BLD: 5 % (ref 4.8–5.6)
HCT VFR BLD AUTO: 41.9 % (ref 34–46.6)
HDLC SERPL-MCNC: 42 MG/DL
HGB BLD-MCNC: 13.7 G/DL (ref 11.1–15.9)
IMM GRANULOCYTES # BLD: 0 X10E3/UL (ref 0–0.1)
IMM GRANULOCYTES NFR BLD: 0 %
LDLC SERPL CALC-MCNC: 129 MG/DL (ref 0–99)
LYMPHOCYTES # BLD AUTO: 2.4 X10E3/UL (ref 0.7–3.1)
LYMPHOCYTES NFR BLD AUTO: 31 %
MCH RBC QN AUTO: 27.9 PG (ref 26.6–33)
MCHC RBC AUTO-ENTMCNC: 32.7 G/DL (ref 31.5–35.7)
MCV RBC AUTO: 85 FL (ref 79–97)
MONOCYTES # BLD AUTO: 0.4 X10E3/UL (ref 0.1–0.9)
MONOCYTES NFR BLD AUTO: 6 %
NEUTROPHILS # BLD AUTO: 4.4 X10E3/UL (ref 1.4–7)
NEUTROPHILS NFR BLD AUTO: 58 %
PLATELET # BLD AUTO: 377 X10E3/UL (ref 150–379)
POTASSIUM SERPL-SCNC: 4.6 MMOL/L (ref 3.5–5.2)
PROT SERPL-MCNC: 7.3 G/DL (ref 6–8.5)
RBC # BLD AUTO: 4.91 X10E6/UL (ref 3.77–5.28)
SODIUM SERPL-SCNC: 138 MMOL/L (ref 134–144)
TRIGL SERPL-MCNC: 172 MG/DL (ref 0–149)
TSH SERPL DL<=0.005 MIU/L-ACNC: 1.75 UIU/ML (ref 0.45–4.5)
VLDLC SERPL CALC-MCNC: 34 MG/DL (ref 5–40)
WBC # BLD AUTO: 7.6 X10E3/UL (ref 3.4–10.8)

## 2017-10-09 DIAGNOSIS — I10 ESSENTIAL HYPERTENSION WITH GOAL BLOOD PRESSURE LESS THAN 140/90: ICD-10-CM

## 2017-10-09 DIAGNOSIS — F41.9 ANXIETY: ICD-10-CM

## 2017-10-09 RX ORDER — ESCITALOPRAM OXALATE 20 MG/1
TABLET ORAL
Qty: 90 TAB | Refills: 1 | Status: SHIPPED | OUTPATIENT
Start: 2017-10-09 | End: 2018-11-30 | Stop reason: SDUPTHER

## 2017-10-09 RX ORDER — AMLODIPINE BESYLATE 5 MG/1
TABLET ORAL
Qty: 90 TAB | Refills: 1 | Status: SHIPPED | OUTPATIENT
Start: 2017-10-09 | End: 2019-01-02 | Stop reason: SDUPTHER

## 2017-10-12 DIAGNOSIS — I10 ESSENTIAL HYPERTENSION WITH GOAL BLOOD PRESSURE LESS THAN 140/90: ICD-10-CM

## 2017-10-12 RX ORDER — TRIAMTERENE AND HYDROCHLOROTHIAZIDE 37.5; 25 MG/1; MG/1
1 CAPSULE ORAL DAILY
Qty: 90 CAP | Refills: 1 | Status: SHIPPED | OUTPATIENT
Start: 2017-10-12 | End: 2018-08-14 | Stop reason: SDUPTHER

## 2018-03-31 DIAGNOSIS — I10 ESSENTIAL HYPERTENSION WITH GOAL BLOOD PRESSURE LESS THAN 140/90: ICD-10-CM

## 2018-04-02 RX ORDER — METOPROLOL TARTRATE 25 MG/1
TABLET, FILM COATED ORAL
Qty: 90 TAB | Refills: 1 | Status: SHIPPED | OUTPATIENT
Start: 2018-04-02 | End: 2019-01-02 | Stop reason: SDUPTHER

## 2018-04-20 DIAGNOSIS — F41.9 ANXIETY: ICD-10-CM

## 2018-04-23 RX ORDER — BUPROPION HYDROCHLORIDE 150 MG/1
TABLET ORAL
Qty: 90 TAB | Refills: 1 | Status: SHIPPED | OUTPATIENT
Start: 2018-04-23 | End: 2018-05-25

## 2018-05-25 ENCOUNTER — OFFICE VISIT (OUTPATIENT)
Dept: INTERNAL MEDICINE CLINIC | Facility: CLINIC | Age: 33
End: 2018-05-25

## 2018-05-25 VITALS
RESPIRATION RATE: 18 BRPM | DIASTOLIC BLOOD PRESSURE: 85 MMHG | BODY MASS INDEX: 43.59 KG/M2 | SYSTOLIC BLOOD PRESSURE: 132 MMHG | TEMPERATURE: 99.1 F | HEIGHT: 63 IN | HEART RATE: 85 BPM | WEIGHT: 246 LBS

## 2018-05-25 DIAGNOSIS — F41.9 ANXIETY: ICD-10-CM

## 2018-05-25 DIAGNOSIS — I10 ESSENTIAL HYPERTENSION WITH GOAL BLOOD PRESSURE LESS THAN 140/90: ICD-10-CM

## 2018-05-25 DIAGNOSIS — M25.511 ACUTE PAIN OF RIGHT SHOULDER: Primary | ICD-10-CM

## 2018-05-25 DIAGNOSIS — E66.01 MORBID OBESITY DUE TO EXCESS CALORIES (HCC): ICD-10-CM

## 2018-05-25 RX ORDER — OXYCODONE AND ACETAMINOPHEN 5; 325 MG/1; MG/1
1 TABLET ORAL
Qty: 10 TAB | Refills: 0 | Status: SHIPPED | OUTPATIENT
Start: 2018-05-25 | End: 2019-01-02

## 2018-05-25 RX ORDER — CYCLOBENZAPRINE HCL 10 MG
10 TABLET ORAL
Qty: 30 TAB | Refills: 1 | Status: SHIPPED | OUTPATIENT
Start: 2018-05-25 | End: 2019-01-02

## 2018-05-25 NOTE — PROGRESS NOTES
HISTORY OF PRESENT ILLNESS  Prashant Dougherty is a 34 y.o. female. Chief Complaint   Patient presents with    Arm Pain     thinks she may have a pinched nerve in her right arm. HPI  1) Yesterday - lifted arm and immediately had \"shooting pain\" radiating down R arm  No unusual movement  Pain 4/10 right now. 10/10 pain last night. Tried [employer], Ibuprofen 800 mg PRN with food. 2) Anxiety - doing well on Lexapro only. Not needing Xanax. 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 Working in [employer] and pt & her fiance are living with her sister. 3) HTN - well controlled. 4) Weight - considering gastric sleeve procedure. Not yet ready for referral, but will let me know when she is. Review of Systems   Constitutional: Negative for fever and malaise/fatigue. Respiratory: Negative for shortness of breath. Cardiovascular: Negative for chest pain and palpitations. Musculoskeletal: Negative for back pain. Psychiatric/Behavioral: Negative for depression. The patient is not nervous/anxious. Physical Exam   Constitutional: She is oriented to person, place, and time. She appears well-developed and well-nourished. No distress. HENT:   Head: Normocephalic and atraumatic. Neck: Neck supple. No JVD present. Cardiovascular: Normal rate, regular rhythm and normal heart sounds. Pulmonary/Chest: Effort normal and breath sounds normal. No respiratory distress. Musculoskeletal: She exhibits no edema. R arm ROM severely limited by pain in all directions - both active and passive movement. Spasm noted B upper back/neck musculature: R>L   Neurological: She is alert and oriented to person, place, and time. Skin: Skin is warm and dry. Psychiatric: She has a normal mood and affect. Her behavior is normal. Judgment and thought content normal.   Nursing note and vitals reviewed. ASSESSMENT and PLAN    ICD-10-CM ICD-9-CM    1.  Acute pain of right shoulder M25.511 719.41 REFERRAL TO ORTHOPEDIC SURGERY      cyclobenzaprine (FLEXERIL) 10 mg tablet      oxyCODONE-acetaminophen (PERCOCET) 5-325 mg per tablet   2. Morbid obesity due to excess calories (HCC) E66.01 278.01 Discussed diet/exercise and options for/importance of losing weight. 3. Anxiety F41.9 300.00 Encouraged pt's excellent lifestyle changes   4. Essential hypertension with goal blood pressure less than 140/90 I10 401.9 Controlled.

## 2018-05-25 NOTE — PROGRESS NOTES
Chief Complaint   Patient presents with    Arm Pain     thinks she may have a pinched nerve in her right arm. .1. Have you been to the ER, urgent care clinic since your last visit? Hospitalized since your last visit? Yes When: February  Where: urgent care Reason for visit: Possible flu    2. Have you seen or consulted any other health care providers outside of the [facility] since your last visit? Include any pap smears or colon screening.  No

## 2018-07-09 ENCOUNTER — OFFICE VISIT (OUTPATIENT)
Dept: INTERNAL MEDICINE CLINIC | Facility: CLINIC | Age: 33
End: 2018-07-09

## 2018-07-09 VITALS
BODY MASS INDEX: 43.94 KG/M2 | WEIGHT: 248 LBS | SYSTOLIC BLOOD PRESSURE: 128 MMHG | HEIGHT: 63 IN | DIASTOLIC BLOOD PRESSURE: 82 MMHG | OXYGEN SATURATION: 99 % | HEART RATE: 97 BPM | TEMPERATURE: 96.7 F | RESPIRATION RATE: 18 BRPM

## 2018-07-09 DIAGNOSIS — J02.9 PHARYNGITIS, UNSPECIFIED ETIOLOGY: Primary | ICD-10-CM

## 2018-07-09 LAB
S PYO AG THROAT QL: NEGATIVE
VALID INTERNAL CONTROL?: YES

## 2018-07-09 NOTE — LETTER
7/9/2018 2:24 PM 
 
Ms. Xiomara Conte 
[address] [place] [id number] To Whom It May Concern, Please excuse Xiomara Conte from work for medical reasons 07/09/18 through 7/10/18. Xiomara Conte is cleared to return to work on: 7/11/18. Sincerely, Anjana Whitley PA-C

## 2018-07-09 NOTE — PROGRESS NOTES
HISTORY OF PRESENT ILLNESS  Blaine Boyer is a 34 y.o. female. Chief Complaint   Patient presents with    Sore Throat     States she has fever from yesterday and left ear pain. Room 7     Health Maintenance Due   Topic Date Due    PAP AKA CERVICAL CYTOLOGY  12/01/2017     HPI  Sore throat and L ear pain. Had fever yesterday.,   Today is day #3. Today still feeling \"awful\" - having chills/sweats. No fever currently, but last dose of Tylenol was 3 hours ago. Pregnant Sister went to ER with similar illness a few days ago. Pt does not know sister's dx. Review of Systems   Constitutional: Positive for chills, diaphoresis and fever. HENT: Positive for congestion, ear pain, sinus pain and sore throat. Respiratory: Negative for cough and shortness of breath. Neurological: Positive for headaches. Physical Exam   Constitutional: She is oriented to person, place, and time. She appears well-developed and well-nourished. No distress. HENT:   Head: Normocephalic and atraumatic. Mouth/Throat: Oropharynx is clear and moist.   Bilateral TMs with fluid. No erythema. Nasal mucosa red, inflamed. Eyes: Conjunctivae are normal.   Neck: Neck supple. Cardiovascular: Normal rate, regular rhythm and normal heart sounds. Pulmonary/Chest: Effort normal and breath sounds normal.   Lymphadenopathy:     She has no cervical adenopathy. Neurological: She is alert and oriented to person, place, and time. Skin: Skin is warm and dry. Nursing note and vitals reviewed. ASSESSMENT and PLAN    ICD-10-CM ICD-9-CM    1. Pharyngitis, unspecified etiology J02.9 462 Likely viral.   Work note written for today & tomorrow. AMB POC RAPID STREP A negative. Mucinex, rest, fluids.

## 2018-07-09 NOTE — PROGRESS NOTES
Chief Complaint   Patient presents with    Sore Throat     States she has fever from yesterday and left ear pain. Room 7     1. Have you been to the ER, urgent care clinic since your last visit? Hospitalized since your last visit? No    2. Have you seen or consulted any other health care providers outside of the [address] [place] [address] since your last visit? Include any pap smears or colon screening.  No     Health Maintenance Due   Topic Date Due    PAP AKA CERVICAL CYTOLOGY  12/01/2017

## 2018-07-11 ENCOUNTER — PATIENT MESSAGE (OUTPATIENT)
Dept: INTERNAL MEDICINE CLINIC | Facility: CLINIC | Age: 33
End: 2018-07-11

## 2018-07-11 DIAGNOSIS — J02.9 PHARYNGITIS, UNSPECIFIED ETIOLOGY: Primary | ICD-10-CM

## 2018-07-12 ENCOUNTER — TELEPHONE (OUTPATIENT)
Dept: INTERNAL MEDICINE CLINIC | Age: 33
End: 2018-07-12

## 2018-07-12 RX ORDER — AZITHROMYCIN 250 MG/1
TABLET, FILM COATED ORAL
Qty: 6 TAB | Refills: 0 | Status: SHIPPED | OUTPATIENT
Start: 2018-07-12 | End: 2018-07-17

## 2018-07-12 RX ORDER — LIDOCAINE HYDROCHLORIDE 20 MG/ML
SOLUTION OROPHARYNGEAL
Qty: 100 ML | Refills: 0 | Status: SHIPPED | OUTPATIENT
Start: 2018-07-12 | End: 2019-01-02

## 2018-07-12 NOTE — TELEPHONE ENCOUNTER
eRx'd Lidocaine gargle now and sent Z-pack this morning. Please call pt to inform her of this and assess situation.

## 2018-07-12 NOTE — TELEPHONE ENCOUNTER
[phone]  Patient would like a call back re tonsils. She stating that she is having a lot of swelling and pain. Also when spitting, there is some blood. Please call and advise

## 2018-07-12 NOTE — TELEPHONE ENCOUNTER
Patient states she had called after she sent the  my chart message before receiving  my chart response. States that she was on her way to  prescriptions. Nothing different than her throat being sore as previously stated.

## 2018-08-14 DIAGNOSIS — I10 ESSENTIAL HYPERTENSION WITH GOAL BLOOD PRESSURE LESS THAN 140/90: ICD-10-CM

## 2018-08-14 RX ORDER — TRIAMTERENE AND HYDROCHLOROTHIAZIDE 37.5; 25 MG/1; MG/1
1 CAPSULE ORAL DAILY
Qty: 90 CAP | Refills: 1 | Status: SHIPPED | OUTPATIENT
Start: 2018-08-14 | End: 2019-01-02 | Stop reason: SDUPTHER

## 2018-11-30 DIAGNOSIS — F41.9 ANXIETY: ICD-10-CM

## 2018-11-30 RX ORDER — ESCITALOPRAM OXALATE 20 MG/1
TABLET ORAL
Qty: 90 TAB | Refills: 1 | Status: SHIPPED | OUTPATIENT
Start: 2018-11-30 | End: 2019-01-02 | Stop reason: SDUPTHER

## 2019-01-02 ENCOUNTER — OFFICE VISIT (OUTPATIENT)
Dept: INTERNAL MEDICINE CLINIC | Age: 34
End: 2019-01-02

## 2019-01-02 VITALS
BODY MASS INDEX: 47.13 KG/M2 | OXYGEN SATURATION: 98 % | SYSTOLIC BLOOD PRESSURE: 152 MMHG | TEMPERATURE: 98.4 F | HEIGHT: 63 IN | RESPIRATION RATE: 16 BRPM | WEIGHT: 266 LBS | DIASTOLIC BLOOD PRESSURE: 92 MMHG | HEART RATE: 94 BPM

## 2019-01-02 DIAGNOSIS — F41.9 ANXIETY: ICD-10-CM

## 2019-01-02 DIAGNOSIS — M25.511 PAIN OF BOTH SHOULDER JOINTS: ICD-10-CM

## 2019-01-02 DIAGNOSIS — E78.00 HYPERCHOLESTEREMIA: ICD-10-CM

## 2019-01-02 DIAGNOSIS — I10 ESSENTIAL HYPERTENSION: ICD-10-CM

## 2019-01-02 DIAGNOSIS — E66.01 MORBID OBESITY DUE TO EXCESS CALORIES (HCC): ICD-10-CM

## 2019-01-02 DIAGNOSIS — M25.512 PAIN OF BOTH SHOULDER JOINTS: ICD-10-CM

## 2019-01-02 DIAGNOSIS — Z00.00 ANNUAL PHYSICAL EXAM: Primary | ICD-10-CM

## 2019-01-02 RX ORDER — METFORMIN HYDROCHLORIDE 1000 MG/1
1000 TABLET ORAL 2 TIMES DAILY WITH MEALS
Qty: 180 TAB | Refills: 5 | Status: SHIPPED | OUTPATIENT
Start: 2019-01-02 | End: 2019-01-02 | Stop reason: SINTOL

## 2019-01-02 RX ORDER — TRIAMTERENE AND HYDROCHLOROTHIAZIDE 37.5; 25 MG/1; MG/1
1 CAPSULE ORAL DAILY
Qty: 90 CAP | Refills: 1 | Status: SHIPPED | OUTPATIENT
Start: 2019-01-02 | End: 2019-11-22

## 2019-01-02 RX ORDER — METOPROLOL TARTRATE 25 MG/1
TABLET, FILM COATED ORAL
Qty: 90 TAB | Refills: 1 | Status: SHIPPED | OUTPATIENT
Start: 2019-01-02 | End: 2019-07-18 | Stop reason: SDUPTHER

## 2019-01-02 RX ORDER — ESCITALOPRAM OXALATE 20 MG/1
TABLET ORAL
Qty: 90 TAB | Refills: 1 | Status: SHIPPED | OUTPATIENT
Start: 2019-01-02 | End: 2020-01-30

## 2019-01-02 RX ORDER — METFORMIN HYDROCHLORIDE 1000 MG/1
2000 TABLET, FILM COATED, EXTENDED RELEASE ORAL DAILY
Qty: 180 TAB | Refills: 3 | Status: SHIPPED | OUTPATIENT
Start: 2019-01-02 | End: 2019-02-13

## 2019-01-02 RX ORDER — AMLODIPINE BESYLATE 5 MG/1
TABLET ORAL
Qty: 90 TAB | Refills: 1 | Status: SHIPPED | OUTPATIENT
Start: 2019-01-02 | End: 2019-07-18 | Stop reason: SDUPTHER

## 2019-01-02 NOTE — PROGRESS NOTES
Sampson Abbasi is a 28 y.o. female Chief Complaint Patient presents with  Complete Physical  
  
 
Health Maintenance Due Topic Date Due  
 PAP AKA CERVICAL CYTOLOGY  12/01/2017  Influenza Age 5 to Adult  08/01/2018 Influenza: Done elsewhere this year. Paps - seeing GYN 
 
HPI 
CE today would like fasting lab order. Weight has continued to increase this year: Wt Readings from Last 9 Encounters:  
01/02/19 266 lb (120.7 kg) 07/09/18 248 lb (112.5 kg) 05/25/18 246 lb (111.6 kg) 08/18/17 255 lb 3.2 oz (115.8 kg) 04/19/17 246 lb 4 oz (111.7 kg) 04/19/17 246 lb 9.6 oz (111.9 kg) 02/27/17 248 lb (112.5 kg) 12/07/16 255 lb (115.7 kg) 07/27/16 241 lb (109.3 kg) Going to [employer] loss seminar this evening. Considering gastric sleeve. Due for routine labs. Hx High cholesterol. Lab Results Component Value Date/Time Cholesterol, total 205 (H) 09/01/2017 09:50 AM  
 HDL Cholesterol 42 09/01/2017 09:50 AM  
 LDL, calculated 129 (H) 09/01/2017 09:50 AM  
 VLDL, calculated 34 09/01/2017 09:50 AM  
 Triglyceride 172 (H) 09/01/2017 09:50 AM  
 
Lab Results Component Value Date/Time Glucose 81 09/01/2017 09:50 AM  
 
BP uncontrolled today and not improved at recheck, but pt is not currently on Metoprolol. Needs refill. Joint pain - constant soreness in both shoulders and R middle knuckle Would like Vit D checked - taking MVI with D daily. Off Metformin, . Restarted Metformin last week. Did better on ER. Currently taking 1000 mg daily, but even on this dose, FBS: 110s. Anxiety - doing well on Lexapro and would like refill. Life is more stable now that has moved into new house and sister has had her baby. Review of Systems Constitutional: Negative for fever and weight loss. HENT: Negative for congestion, hearing loss and sore throat. Eyes: Negative for blurred vision. Respiratory: Negative for cough and shortness of breath. Cardiovascular: Negative for chest pain, palpitations and leg swelling. Gastrointestinal: Negative for abdominal pain, blood in stool, constipation, diarrhea, heartburn, melena, nausea and vomiting. Genitourinary: Negative for dysuria, frequency, hematuria and urgency. Musculoskeletal: Negative for back pain, joint pain and neck pain. Neurological: Negative for dizziness, seizures, loss of consciousness, weakness and headaches. Endo/Heme/Allergies: Negative for environmental allergies. Psychiatric/Behavioral: Negative for depression and substance abuse. The patient is not nervous/anxious and does not have insomnia. Physical Exam  
Constitutional: She is oriented to person, place, and time. She appears well-developed and well-nourished. No distress. HENT:  
Head: Normocephalic and atraumatic. Right Ear: External ear normal.  
Left Ear: External ear normal.  
Nose: Nose normal.  
Mouth/Throat: Oropharynx is clear and moist.  
Eyes: Conjunctivae are normal.  
Neck: Neck supple. No JVD present. No thyromegaly present. Cardiovascular: Normal rate, regular rhythm and normal heart sounds. Pulmonary/Chest: Effort normal and breath sounds normal. No respiratory distress. Abdominal: Soft. Bowel sounds are normal. She exhibits no distension and no mass. There is no tenderness. There is no rebound and no guarding. Musculoskeletal: She exhibits no edema. Lymphadenopathy:  
  She has no cervical adenopathy. Neurological: She is alert and oriented to person, place, and time. Skin: Skin is warm and dry. Psychiatric: She has a normal mood and affect. Her behavior is normal. Judgment and thought content normal.  
Nursing note and vitals reviewed. Diagnoses and all orders for this visit: 
 
1. Annual physical exam - labs as below 2. Morbid obesity due to excess calories ([address] -     metFORMIN (GLUMETZA) 1,000 mg TG24 24 hour tablet; Take 2,000 mg by mouth daily. 3. Essential hypertension - uncontrolled off of metoprolol today. Follow up here or with Bariatric Surg while on medication soon. If not controlled: follow up here for medication management. -     triamterene-hydroCHLOROthiazide (DYAZIDE) 37.5-25 mg per capsule; Take 1 Cap by mouth daily. -     amLODIPine (NORVASC) 5 mg tablet; TAKE ONE TABLET BY MOUTH ONCE DAILY 
-     metoprolol tartrate (LOPRESSOR) 25 mg tablet; TAKE ONE TABLET BY MOUTH ONCE DAILY 4. Hypercholesteremia -     METABOLIC PANEL, COMPREHENSIVE 
-     LIPID PANEL 
-     HEMOGLOBIN A1C W/O EAG 
-     TSH RFX ON ABNORMAL TO FREE T4 
 
5. Anxiety 
-     CBC WITH AUTOMATED DIFF 
-     escitalopram oxalate (LEXAPRO) 20 mg tablet; TAKE ONE TABLET BY MOUTH ONCE DAILY 6. Pain of both shoulder joints 
-     URIC ACID 
-     VITAMIN D, 1, 25 DIHYDROXY

## 2019-01-02 NOTE — PROGRESS NOTES
1. Have you been to the ER, urgent care clinic since your last visit? Hospitalized since your last visit? No 
 
2. Have you seen or consulted any other health care providers outside of the [address] [place] [address] since your last visit? Include any pap smears or colon screening. No  
Chief Complaint Patient presents with  Complete Physical  
 
Not fasting

## 2019-01-31 ENCOUNTER — OFFICE VISIT (OUTPATIENT)
Dept: SURGERY | Age: 34
End: 2019-01-31

## 2019-01-31 VITALS
HEART RATE: 91 BPM | WEIGHT: 263 LBS | OXYGEN SATURATION: 98 % | HEIGHT: 63 IN | SYSTOLIC BLOOD PRESSURE: 118 MMHG | TEMPERATURE: 97.6 F | DIASTOLIC BLOOD PRESSURE: 78 MMHG | BODY MASS INDEX: 46.6 KG/M2 | RESPIRATION RATE: 18 BRPM

## 2019-01-31 DIAGNOSIS — E66.01 MORBID OBESITY WITH BMI OF 45.0-49.9, ADULT (HCC): Primary | ICD-10-CM

## 2019-01-31 DIAGNOSIS — R06.83 SNORES: ICD-10-CM

## 2019-01-31 DIAGNOSIS — E28.2 PCOS (POLYCYSTIC OVARIAN SYNDROME): ICD-10-CM

## 2019-01-31 DIAGNOSIS — I10 ESSENTIAL HYPERTENSION: ICD-10-CM

## 2019-01-31 DIAGNOSIS — G47.10 HYPERSOMNIA: ICD-10-CM

## 2019-01-31 NOTE — PROGRESS NOTES
1. Have you been to the ER, urgent care clinic since your last visit? Hospitalized since your last visit? No    2. Have you seen or consulted any other health care providers outside of the [address] [place] [address] since your last visit? Include any pap smears or colon screening. No    Sudha Florence  Body composition    female  28 y.o. Vitals:    01/31/19 1434   BP: 118/78   Pulse: 91   Resp: 18   Temp: 97.6 °F (36.4 °C)   TempSrc: Oral   SpO2: 98%   Weight: 263 lb (119.3 kg)   Height: 5' 3\" (1.6 m)     Body mass index is 46.59 kg/m². Neck-15.5 in   [address] in.   Hips-58in

## 2019-01-31 NOTE — PATIENT INSTRUCTIONS
Dexter All will contact you about the 6 month diet and dietician evaluation     I will also recommend a repeat sleep evaluation and she will help facilitate that appointment

## 2019-02-13 ENCOUNTER — TELEPHONE (OUTPATIENT)
Dept: INTERNAL MEDICINE CLINIC | Age: 34
End: 2019-02-13

## 2019-02-13 DIAGNOSIS — E28.2 PCOS (POLYCYSTIC OVARIAN SYNDROME): Primary | ICD-10-CM

## 2019-02-13 RX ORDER — METFORMIN HYDROCHLORIDE 500 MG/1
2000 TABLET, EXTENDED RELEASE ORAL
Qty: 360 TAB | Refills: 3 | Status: SHIPPED | OUTPATIENT
Start: 2019-02-13 | End: 2019-11-22

## 2019-03-05 ENCOUNTER — CLINICAL SUPPORT (OUTPATIENT)
Dept: SURGERY | Age: 34
End: 2019-03-05

## 2019-03-05 VITALS — WEIGHT: 260 LBS | BODY MASS INDEX: 46.06 KG/M2

## 2019-03-05 DIAGNOSIS — E66.01 MORBID OBESITY WITH BMI OF 45.0-49.9, ADULT (HCC): Primary | ICD-10-CM

## 2019-03-05 NOTE — PROGRESS NOTES
Pre-operative Bariatric Nutrition Evaluation ( of )     Date: 3/5/2019   Shayna eRndon M.D. Name: Vanessa Altman  :  1985  Age:  28  Gender: Female   Type of Surgery: [x]           Gastric Bypass  []           LAGB  []           Sleeve Gastrectomy    ASSESSMENT:    Past Medical History:HTN, anxiety, high cholesterol, PCOS      Medications/Supplements:   Prior to Admission medications    Medication Sig Start Date End Date Taking? Authorizing Provider   metFORMIN ER (GLUCOPHAGE XR) 500 mg tablet Take 4 Tabs by mouth daily (with dinner). 19   Radha Session, PA-C   alprazolam (XANAX PO) Take  by mouth. Provider, Historical   escitalopram oxalate (LEXAPRO) 20 mg tablet TAKE ONE TABLET BY MOUTH ONCE DAILY 19   Radha Session, PA-C   triamterene-hydroCHLOROthiazide (DYAZIDE) 37.5-25 mg per capsule Take 1 Cap by mouth daily. 19   Radha Session, PA-C   amLODIPine (NORVASC) 5 mg tablet TAKE ONE TABLET BY MOUTH ONCE DAILY 19   Radha Session, PA-C   metoprolol tartrate (LOPRESSOR) 25 mg tablet TAKE ONE TABLET BY MOUTH ONCE DAILY 19   Radha Session, PA-C   levonorgestrel (MIRENA) 20 mcg/24 hr (5 years) IUD 1 Each by IntraUTERine route once. Provider, Historical       Food Allergies/Intolerances:none    Anthropometrics:    Ht:63\"   Wt: 260#    IBW: 115#    %IBW: 226%     BMI:46    Category: obesity III     Reported wt history: Pt presents today for pre-op nutrition evaluation for wt loss surgery. Reports lowest adult BW of 202# at age 20 and highest adult BW of 266# within the past few months. Attributes wt gain over the years r/t emotional eating, physical inactivity and work schedule. Pt reports moving several times in the past few years, being in optometry school and long commute to and from work have also contributed to poor eating habits with high frequency of eating out (5-10 times per week) .  Has attempted wt loss through various methods with most successful wt loss of 30#. Has been unable to maintain long term or significant wt loss and is now seeking approval for weight loss surgery. Pt will need to complete 6 months supervised weight loss for insurance requirements. Exercise/Physical Activity:none at present; enjoys hiking, bike riding, swimming and [place]     Reported Diet History:Weight Watchers, physician prescribed diets (Dr. Guero Galo), exercise, medication     24 Hour Diet Recall  Breakfast  Coffee with creamer; eggs, vu, toast OR bagel with cream cheese OR pastries OR fast food - egg sandwich    Lunch  Tuna or turkey sandwich OR soup OR burger, salad or [place] food OR [place] with meat OR chicken and veggies OR pizza OR Chinese food OR [employer]    Snacks  Cake, ice cream, candy, fruit, cookies, nuts, cheese    Beverages  Coffee, Crystal Light, diet soda      A pre-op nutrition checklist was reviewed. Patient checked off 4 of 15 items. Environment/Psychosocial/Support:pt lists her fiance as main support system and rates level of support a 10 out of 10. Pt works full time as an optometrist. Current practice is out of town. Is planning on starting a new job soon which will result in shorter commute. Pt and fiance recently bought a house and reports no plans to move in the near future. NUTRITION DIAGNOSIS:  1. Self-monitoring deficit r/t perception that time constraints prevent self-monitoring evidenced by pt reports the need to improve meal planning and prepping. 2. Excessive energy intake r/t lack of meal planning evidenced by diet recall. High frequency of restaurant meals/fast food. Snack choices are high calorie/high sugar. 3. Physical inactivity r/t perception that time constraints prevent exercise evidenced by no current exercise regimen. NUTRITION INTERVENTION:  Pt educated on nutrition recommendations for weight loss surgery, specifically gastric bypass.     Instructed on consuming 3 meals per day starting now. Use the balanced plate method to plan meals, include 3 oz of lean source of protein, 1/2 cup whole grains, unlimited non-starchy vegetables, 1/2 cup fruit and 1 serving of low fat dairy. Utilize handouts listing healthy snack and meal ideas to limit restaurant meals. After surgery measure all meals to 1/2 cup. Each meal will contain a 1/4 cup lean protein and 1/4 cup fruit, non-starchy vegetable or starch (limiting to once per day). Aim for 60 g protein per day. Sip on 48-64 oz of sugar free, calorie free, non-carbonated beverages each day. Do not use a straw. Do not consume beverages 30 minutes before, during or 30 minutes after meals. Read all nutrition labels. Demonstrated and emphasized identifying serving size, total fat, sugar and protein content. Defined low fat as </= 3 g per serving. Discussed lean and extra lean sources of protein. Provided list of low fat cooking methods. Avoid foods with sugar listed in the first 3 ingredients and >/15 g sugar per serving. Excess sugar/fat intake may lead to dumping syndrome. Discussed signs and symptoms of dumping syndrome. Practice mindful eating habits; take small bites, chew thoroughly, avoid distractions, utilize hunger/fullness scale. Consume meals over 20-30 minutes. Attend Bariatric Support Group and increase physical activity (approved per MD) for long term weight maintenance. NUTRITION MONITORING AND EVALUATION:    The following goals were established with patient;  1. Meal prepping and planning for the week ahead. We discussed using Sunday as a time to plan ahead for the week and at least map out specific days/meals for eating out and packing more meals from home to take to work. 2. Decrease frequency of eating out. Eventually limit to no more than 2-3 meals per day to promote more long term/significant wt loss.    3. Improve overall food and beverage choices to include more lean protein, fruits, vegetables, whole grains and low-fat dairy. Replace current snack options with healthier alternatives. 4. Start making a plan for exercise. We discussed multiple benefits to developing an exercise regimen prior to weight loss surgery. Eventually work towards 150 minutes per week to promote weight loss and other health benefits. 5. Follow up next month for supervised weight loss. Specific tips and techniques to facilitate compliance with above recommendations were provided and discussed. Nutrition evaluation reveals significant lifestyle changes are indicated to better prepare for and comply with nutrition guidelines for weight loss surgery. Goals set and recommendations made. Will continue to assess. If further details are desired please feel free to contact me at [phone]. This phone number was also provided to the patient for any further questions or concerns.            Lance Mcghee RD

## 2019-03-12 LAB
1,25(OH)2D3 SERPL-MCNC: 50.1 PG/ML (ref 19.9–79.3)
ALBUMIN SERPL-MCNC: 4.1 G/DL (ref 3.5–5.5)
ALBUMIN/GLOB SERPL: 1.2 {RATIO} (ref 1.2–2.2)
ALP SERPL-CCNC: 77 IU/L (ref 39–117)
ALT SERPL-CCNC: 22 IU/L (ref 0–32)
AST SERPL-CCNC: 15 IU/L (ref 0–40)
BASOPHILS # BLD AUTO: 0 X10E3/UL (ref 0–0.2)
BASOPHILS NFR BLD AUTO: 0 %
BILIRUB SERPL-MCNC: 0.5 MG/DL (ref 0–1.2)
BUN SERPL-MCNC: 10 MG/DL (ref 6–20)
BUN/CREAT SERPL: 18 (ref 9–23)
CALCIUM SERPL-MCNC: 9.4 MG/DL (ref 8.7–10.2)
CHLORIDE SERPL-SCNC: 98 MMOL/L (ref 96–106)
CHOLEST SERPL-MCNC: 233 MG/DL (ref 100–199)
CO2 SERPL-SCNC: 23 MMOL/L (ref 20–29)
CREAT SERPL-MCNC: 0.55 MG/DL (ref 0.57–1)
EOSINOPHIL # BLD AUTO: 0.4 X10E3/UL (ref 0–0.4)
EOSINOPHIL NFR BLD AUTO: 5 %
ERYTHROCYTE [DISTWIDTH] IN BLOOD BY AUTOMATED COUNT: 14.6 % (ref 12.3–15.4)
GLOBULIN SER CALC-MCNC: 3.3 G/DL (ref 1.5–4.5)
GLUCOSE SERPL-MCNC: 83 MG/DL (ref 65–99)
HBA1C MFR BLD: 4.7 % (ref 4.8–5.6)
HCT VFR BLD AUTO: 42.3 % (ref 34–46.6)
HDLC SERPL-MCNC: 48 MG/DL
HGB BLD-MCNC: 13.8 G/DL (ref 11.1–15.9)
IMM GRANULOCYTES # BLD AUTO: 0 X10E3/UL (ref 0–0.1)
IMM GRANULOCYTES NFR BLD AUTO: 0 %
INTERPRETATION, 910389: NORMAL
LDLC SERPL CALC-MCNC: 151 MG/DL (ref 0–99)
LYMPHOCYTES # BLD AUTO: 3.2 X10E3/UL (ref 0.7–3.1)
LYMPHOCYTES NFR BLD AUTO: 33 %
MCH RBC QN AUTO: 27.8 PG (ref 26.6–33)
MCHC RBC AUTO-ENTMCNC: 32.6 G/DL (ref 31.5–35.7)
MCV RBC AUTO: 85 FL (ref 79–97)
MONOCYTES # BLD AUTO: 0.6 X10E3/UL (ref 0.1–0.9)
MONOCYTES NFR BLD AUTO: 6 %
NEUTROPHILS # BLD AUTO: 5.3 X10E3/UL (ref 1.4–7)
NEUTROPHILS NFR BLD AUTO: 56 %
PLATELET # BLD AUTO: 352 X10E3/UL (ref 150–379)
POTASSIUM SERPL-SCNC: 4.2 MMOL/L (ref 3.5–5.2)
PROT SERPL-MCNC: 7.4 G/DL (ref 6–8.5)
RBC # BLD AUTO: 4.97 X10E6/UL (ref 3.77–5.28)
SODIUM SERPL-SCNC: 137 MMOL/L (ref 134–144)
TRIGL SERPL-MCNC: 171 MG/DL (ref 0–149)
TSH SERPL DL<=0.005 MIU/L-ACNC: 2.27 UIU/ML (ref 0.45–4.5)
URATE SERPL-MCNC: 6.4 MG/DL (ref 2.5–7.1)
VLDLC SERPL CALC-MCNC: 34 MG/DL (ref 5–40)
WBC # BLD AUTO: 9.4 X10E3/UL (ref 3.4–10.8)

## 2019-03-13 NOTE — PROGRESS NOTES
Blood Count, thyroid, A1c, liver enzymes, Vitamin D, kidney function, and electrolytes are all normal. Good! Uric acid (gout) level is normal. Good! LDL (\"Bad Cholesterol\") is high. Decrease fried/fatty foods, red meat, butter, oils, and increase cardio exercise.

## 2019-04-17 ENCOUNTER — CLINICAL SUPPORT (OUTPATIENT)
Dept: SURGERY | Age: 34
End: 2019-04-17

## 2019-04-17 VITALS — BODY MASS INDEX: 46.94 KG/M2 | WEIGHT: 265 LBS

## 2019-04-17 DIAGNOSIS — E66.01 MORBID OBESITY WITH BMI OF 45.0-49.9, ADULT (HCC): Primary | ICD-10-CM

## 2019-04-17 NOTE — PROGRESS NOTES
[address] [facility]  Supervised Weight Loss     Date:   2019    Patient's Name: Karely Robles  : 1985    Insurance:  [facility]          Session:   Surgery: Gastric Bypass  Surgeon:  Romulo Duvall M.D. Height: 63\"  Weight:    265      Lbs. BMI: 46   Pounds Lost since last month: 0               Pounds Gained since last month: 5#    Starting Weight: 260#   Previous Months Weight: 260#  Overall Pounds Lost: 0  Overall Pounds Gained: 5#    Other Pertinent Information: Attributes wt gain d/t increased stress in the past week. Smoking Status:  none  Alcohol Intake: 2 drinks, once per week    I have reviewed with pt the guidelines of the supervised wt loss program.  Pt understands the expectations of some wt loss during the program and that wt gain could delay the process. I have also explained that classes need to be consecutive. Missing a class may result in starting over. Pt has received this information in writing. Changes that patient has made since last month include:  Eating more protein, more meals at home, increased water intake, decreased fast food. Eating Habits and Behaviors  General healthy eating guidelines were also discussed. Pts were instructed that their plate should be made up 1/2 plate coming from non-starchy vegetables, 1/4 coming from lean meat, and 1/4 of their plate coming from carbohydrates, including fruits, starches, or milk. We discussed measuring meals to 1/2 cup total per meal after surgery. Drinking only calorie-free, sugar-free and non-carbonated beverages. We discussed the importance of drinking 64 ounces of fluid per day to prevent dehydration post-operatively. Patient's current diet habits include: eating 3 meals a day. Snacking on almonds, cheese and fruit. Eating bread, pasta, rice and cereal 1-2 times per day. Eating ice cream daily. Eating baked, grilled, broiled foods.  Eating out is 4-6 times per week. Drinking 40 oz water, 16 oz unsweetened tea, 8-16 oz caffeine and 40 oz Crystal Light. Reports yes to emotional eating an uses cleaning, exercise and socializing as non-food coping mechanisms. Sometimes packing meals when away from home. Eating most meals while watching television and takes 10 minutes to finish the meal. Reports overeating, food choices, portion sizes are biggest barriers to weight loss. Physical Activity/Exercise  An exercise presentation was provided including information about exercise programs available both before and after surgery. We talked about the importance of increasing daily physical activity and beginning to develop an exercise regimen/routine. We talked about exercise as being an important part of long term weight loss after surgery. Comments:  During class, I discussed with patient the importance of getting into an exercise routine. Pt is currently walking 20 minutes, 3-4 times per week for activity. Pt has been encouraged to maintain and continue to increase frequency, duration and/or intensity as tolerated. Behavior Modification       We talked about how to eat more mindfully. Tips and recommendations for how to make these changes were provided. Pt was encouraged to keep a food journal and record what they were taking in daily. Overall Assessment: Patient demonstrates some small/appropriate lifestyle changes in preparation for wt loss surgery evidenced by reported changes. Continued changes are indicated. Goals set and recommendations made. Will continue to assess. Patient-Set Goals:   1. Nutrition - continue to prepare more meals at home  2. Exercise - continue to increase and eventually work towards 150 minutes per week  3.  Behavior -practice not drinking with meals     Alcon Fam, JEFF  4/17/2019

## 2019-04-29 ENCOUNTER — OFFICE VISIT (OUTPATIENT)
Dept: SLEEP MEDICINE | Age: 34
End: 2019-04-29

## 2019-04-29 VITALS
DIASTOLIC BLOOD PRESSURE: 83 MMHG | HEIGHT: 63 IN | SYSTOLIC BLOOD PRESSURE: 129 MMHG | OXYGEN SATURATION: 98 % | HEART RATE: 105 BPM | BODY MASS INDEX: 47.13 KG/M2 | WEIGHT: 266 LBS

## 2019-04-29 DIAGNOSIS — G47.33 OSA (OBSTRUCTIVE SLEEP APNEA): Primary | ICD-10-CM

## 2019-04-29 RX ORDER — BISMUTH SUBSALICYLATE 262 MG
1 TABLET,CHEWABLE ORAL DAILY
COMMUNITY

## 2019-04-29 NOTE — PATIENT INSTRUCTIONS

## 2019-04-29 NOTE — PROGRESS NOTES
217 [facility]. [place], [address]  Tel.  [phone]  Fax. 100 [facility] 60  [place], [address] [facility]  Tel.  [phone]  Fax. [phone] 9250 [facility] Sandra Billingsley   Tel.  [phone]  Fax. [phone]       Chief Complaint       Chief Complaint   Patient presents with    Sleep Problem     NP refd by Chacorta Singletary NP for sleep consult       HPI      Sultana Cardoza is 28 y.o. female seen for evaluation of a sleep disorder. She is being evaluated for bariatric surgery. She notes a previous sleep evaluation at [place] [address] in 2010 which was interpreted as normal . She has gained at least 30 lbs since that assessment. The patient reports she has experienced nocturnal awakening, snoring, non-restorative sleep and daytime fatigue. She has been told of snoring more evident supine. She notes frequent dreams. She is fatigued on awakening and during the day. The patient retires at 10 pm and awakens at 7 am. The patient notes that she will experience frequent awakening from sleep. In general she is able to return to sleep after awakening. she tends to awaken spontaneously. She may briefly awaken 2-3 times during the night. The patient has not undergone recent diagnostic testing for the current problems. Dexter Sleepiness Score: 8       No Known Allergies    Current Outpatient Medications   Medication Sig Dispense Refill    multivitamin (ONE A DAY) tablet Take 1 Tab by mouth daily.  metFORMIN ER (GLUCOPHAGE XR) 500 mg tablet Take 4 Tabs by mouth daily (with dinner). 360 Tab 3    escitalopram oxalate (LEXAPRO) 20 mg tablet TAKE ONE TABLET BY MOUTH ONCE DAILY 90 Tab 1    triamterene-hydroCHLOROthiazide (DYAZIDE) 37.5-25 mg per capsule Take 1 Cap by mouth daily.  90 Cap 1    amLODIPine (NORVASC) 5 mg tablet TAKE ONE TABLET BY MOUTH ONCE DAILY 90 Tab 1    metoprolol tartrate (LOPRESSOR) 25 mg tablet TAKE ONE TABLET BY MOUTH ONCE DAILY 90 Tab 1    alprazolam (XANAX PO) Take  by mouth.  levonorgestrel (MIRENA) 20 mcg/24 hr (5 years) IUD 1 Each by IntraUTERine route once. She  has a past medical history of Anxiety, Back pain, H/O hemorrhoids, Hives, Hypertension, Joint pain, and PCOS (polycystic ovarian syndrome). She  has a past surgical history that includes hx other surgical; hx other surgical; and hx wisdom teeth extraction. She family history includes Diabetes in her father, maternal grandfather, maternal grandmother, mother, and paternal grandmother; Heart Disease in her father; High Cholesterol in her mother; Hypertension in her father. She  reports that she quit smoking about 8 months ago. Her smoking use included cigarettes. She has never used smokeless tobacco. She reports that she drinks alcohol. She reports that she has current or past drug history. Drug: Marijuana. Review of Systems:  Review of Systems   Constitutional: Negative for chills and fever. HENT: Negative for hearing loss and tinnitus. Eyes: Negative for blurred vision and double vision. Respiratory: Negative for cough and shortness of breath. Cardiovascular: Negative for chest pain and palpitations. Gastrointestinal: Negative for abdominal pain and heartburn. Genitourinary: Positive for urgency. Negative for frequency. Musculoskeletal: Positive for back pain and joint pain. Negative for neck pain. Skin: Negative for itching and rash. Neurological: Negative for dizziness and headaches. Psychiatric/Behavioral: Negative for depression. The patient is nervous/anxious. Objective:     Visit Vitals  /83 (BP 1 Location: Left arm, BP Patient Position: Sitting)   Pulse (!) 105   Ht 5' 3\" (1.6 m)   Wt 266 lb (120.7 kg)   SpO2 98%   BMI 47.12 kg/m²     Body mass index is 47.12 kg/m².     General:   Conversant, cooperative   Eyes:  Pupils equal and reactive, no nystagmus   Oropharynx:   Mallampati score II, tongue scalloped   Tonsils:   tonsils large   Neck:   No carotid bruits; Neck circ. in \"inches\": 14.5   Chest/Lungs:  Clear on auscultation    CVS:  Normal rate, regular rhythm   Skin:  Warm to touch; no obvious rashes   Neuro:  Speech fluent, face symmetrical, tongue movement normal   Psych:  Normal affect,  normal countenance        Assessment:       ICD-10-CM ICD-9-CM    1. RAYO (obstructive sleep apnea) G47.33 327.23 SLEEP STUDY UNATTENDED, 4 CHANNEL     History of snoring, non-restorative sleep , daytime fatigue consistent with sleep disordered breathing. She will be evaluated with a home sleep test. Results will be reviewed with her. Plan:     Orders Placed This Encounter    SLEEP STUDY UNATTENDED, 4 CHANNEL     Order Specific Question:   Reason for Exam     Answer:   snoring       * Patient has a history and examination consistent with the diagnosis of sleep apnea. * home Sleep testing was ordered for initial evaluation. * She was provided information on sleep apnea including corresponding risk factors and the importance of proper treatment. * Treatment options if indicated were reviewed today. Instructions:  o   o The patient would benefit from weight reduction measures. o Do not engage in activities requiring a normal degree of alertness if fatigue is present. o The patient understands that untreated or undertreated sleep apnea could impair judgement and the ability to function normally during the day.  o Call or return if symptoms worsen or persist.          Juan Carlos Staples MD, FAA  Electronically signed 04/29/19       This note was created using voice recognition software. Despite editing, there may be syntax errors. This note will not be viewable in [address]

## 2019-05-15 ENCOUNTER — HOSPITAL ENCOUNTER (OUTPATIENT)
Dept: SLEEP MEDICINE | Age: 34
Discharge: HOME | End: 2019-05-15
Payer: COMMERCIAL

## 2019-05-15 ENCOUNTER — OFFICE VISIT (OUTPATIENT)
Dept: SLEEP MEDICINE | Age: 34
End: 2019-05-15

## 2019-05-15 ENCOUNTER — CLINICAL SUPPORT (OUTPATIENT)
Dept: SURGERY | Age: 34
End: 2019-05-15

## 2019-05-15 VITALS — WEIGHT: 264 LBS | BODY MASS INDEX: 46.77 KG/M2

## 2019-05-15 DIAGNOSIS — G47.33 OBSTRUCTIVE SLEEP APNEA (ADULT) (PEDIATRIC): Primary | ICD-10-CM

## 2019-05-15 DIAGNOSIS — E66.01 MORBID OBESITY WITH BMI OF 45.0-49.9, ADULT (HCC): Primary | ICD-10-CM

## 2019-05-15 PROCEDURE — 95806 SLEEP STUDY UNATT&RESP EFFT: CPT | Performed by: SPECIALIST

## 2019-05-15 NOTE — PROGRESS NOTES
217 [facility] [place]. [place], [address]  Tel.  [phone]  Fax. 100 [facility] 60  [place], [address] [facility]  Tel.  [phone]  Fax. [phone] 9250 [facility] Sandra Billings[place]   Tel.  [phone]  Fax. [phone]       S>Sudha Florence is a 28 y.o. female seen today to receive a home sleep testing unit (HST). · Patient was educated on proper hookup and operation of the HST. · Instruction forms and documentation were reviewed and signed. · The patient demonstrated good understanding of the HST.    O>    There were no vitals taken for this visit. A>  No diagnosis found. P>  · General information regarding operations and maintenance of the device was provided. · She was provided information on sleep apnea including coresponding risk factors and the importance of proper treatment. · Follow-up appointment was made to return the HST. She will be contacted once the results have been reviewed. · She was asked to contact our office for any problems regarding her home sleep test study.

## 2019-05-15 NOTE — PROGRESS NOTES
[address] [facility] at [facility]  Supervised Weight Loss     Date:   5/15/2019    Patient's Name: Jaz Arshad  : 1985    Insurance:  [facility]          Session: 3 of  6  Surgery: Gastric Bypass  Surgeon:  Marlyn Cortes M.D. Height: 63\"  Weight:    264      Lbs. BMI: 46   Pounds Lost since last month: 1#               Pounds Gained since last month: 0    Starting Weight: 260#   Previous Months Weight: 265#  Overall Pounds Lost: 0  Overall Pounds Gained: 4#    Other Pertinent Information: Reports minimal lifestyle changes made this past month d/t busy schedule. Smoking Status:  none  Alcohol Intake: 1 drink, once per week    I have reviewed with pt the guidelines of the supervised wt loss program.  Pt understands the expectations of some wt loss during the program and that wt gain could delay the process. I have also explained that classes need to be consecutive. Missing a class may result in starting over. Pt has received this information in writing. Changes that patient has made since last month include:  Decreased stress, more walking/tracking daily steps. Eating Habits and Behaviors  General healthy eating guidelines were discussed. A nutrition lesson specific to reading food labels was provided. We discussed the importance of limiting added sugars to promote weight loss, general healthy eating and prevent dumping syndrome after surgery. Pts were instructed that their plate should be made up 1/2 plate coming from non-starchy vegetables, 1/4 coming from lean meat, and 1/4 of their plate coming from carbohydrates, including fruits, starches, or milk. We discussed measuring meals to 1/2 cup total per meal after surgery. Drinking only calorie-free, sugar-free and non-carbonated beverages. We discussed the importance of drinking 64 ounces of fluid per day to prevent dehydration post-operatively.                        Patient's current diet habits include: eating 3 meals a day. Snacking on fruit, cake and nuts. Eating bread, rice and cereal 1-2 times per day. Eating cake and donuts every other day. Eating baked, grilled, broiled foods. Eating out is 4-6 times per week. Drinking 40 oz water and 20 oz unsweetened tea and occasional lemonade. Sometimes emotional eating and reports \"moving more\" as a non-food coping strategy. Eating most meals while watching television and takes 10 minutes or less to finish the meal. Reports food choices, portion sizes are biggest barriers to weight loss. Physical Activity/Exercise  We talked about the importance of increasing daily physical activity and beginning to develop an exercise regimen/routine. We talked about exercise as being an important part of long term weight loss after surgery. Comments:  During class, I discussed with patient the importance of getting into an exercise routine. Pt is currently walking 1-2 times per week for activity. Pt has been encouraged to maintain and increase to 150 minutes per week to promote weight loss. Behavior Modification       We talked about how to eat more mindfully. Tips and recommendations for how to make these changes were provided. Pt was encouraged to keep a food journal and record what they were taking in daily. Overall Assessment: Patient has demonstrated some small lifestyle changes in preparation for wt loss surgery with continued changes still indicated. Pt would benefit from decreased frequency of eating out with recommended limit of 2-3 times per week. Pt would also benefit from continued and more consistent exercise. Will continue to assess as pt works to complete supervised weight loss requirements. Patient-Set Goals:   1. Nutrition - reduce intake of eating out to no more than 2-3 times per week  2. Exercise - maintain and increase walking to 150 minutes per week  3.  Behavior -slow down eating pace, avoid distractions while eating     Yanique Muñoz RD  5/15/2019

## 2019-05-16 ENCOUNTER — DOCUMENTATION ONLY (OUTPATIENT)
Dept: SLEEP MEDICINE | Age: 34
End: 2019-05-16

## 2019-05-24 ENCOUNTER — TELEPHONE (OUTPATIENT)
Dept: SLEEP MEDICINE | Age: 34
End: 2019-05-24

## 2019-05-24 NOTE — TELEPHONE ENCOUNTER
The patient phoned the office to inquire about her testing results. She was informed that our technologist was out of the office, and will give her a call on Tuesday. After the [facility] Day holiday.

## 2019-06-11 ENCOUNTER — CLINICAL SUPPORT (OUTPATIENT)
Dept: SURGERY | Age: 34
End: 2019-06-11

## 2019-06-11 VITALS — WEIGHT: 266 LBS | BODY MASS INDEX: 47.12 KG/M2

## 2019-06-11 DIAGNOSIS — E66.01 MORBID OBESITY WITH BMI OF 45.0-49.9, ADULT (HCC): Primary | ICD-10-CM

## 2019-06-11 NOTE — PROGRESS NOTES
[address] [facility] Surgery at [facility]  Supervised Weight Loss     Date:   2019    Patient's Name: Karina Swain  : 1985    Insurance:  [facility]          Session:   Surgery: Gastric Bypass  Surgeon:  Tyler Hdez M.D. Height: 63\"  Weight:    266      Lbs. BMI: 47   Pounds Lost since last month: 0               Pounds Gained since last month: 2#    Starting Weight: 260#   Previous Months Weight: 264#  Overall Pounds Lost: 0  Overall Pounds Gained: 6#    Other Pertinent Information: Pt attributes wt gain d/t being sick for a few weeks and changes in menstrual cycle after removal of Mirena. Also reports anticipation of starting a new job in the next few weeks. Is also still getting settled from previous move to a new home. Smoking Status:  none  Alcohol Intake: 2 drinks, once per week. I have reviewed with pt the guidelines of the supervised wt loss program.  Pt understands the expectations of some wt loss during the program and that wt gain could delay the process. I have also explained that classes need to be consecutive. Missing a class may result in starting over. Pt has received this information in writing. Changes that patient has made since last month include:  Mindful eating, increased daily steps, portion control. Eating Habits and Behaviors  General healthy eating guidelines were discussed. A nutrition lesson specific to the importance of protein intake after surgery was provided. We discussed food sources of protein, protein supplements and multiple reasons as to why protein is important after bariatric surgery. Pts were instructed to focus on including protein at every meal and practice eating protein first at the meal. Pts were encouraged to sample a protein shake for tolerance. Patients were also instructed to use the balanced plate method for help with portion control and general healthy eating prior to surgery.  We discussed measuring meals to 1/2 cup total per meal after surgery. Drinking only calorie-free, sugar-free and non-carbonated beverages. We discussed the importance of drinking 64 ounces of fluid per day to prevent dehydration post-operatively. Patient's current diet habits include: eating 3 meals a day. Snacking on coffee, cookies, nuts and cheese. Eating chips, bread, pasta and rice 2-3 times per day. Eating ice cream and donuts 3-4 times per week. Eating baked, grilled, broiled foods. Eating out is 4-6 times per week. Drinking 40 oz water, 20 oz unsweetened tea and 1-2 cups coffee daily. Sometimes emotional eating. Sometimes packing meals when away from home. Eating most meals at a table and takes 10 minutes or less to finish the meal. Reports overeating, food choices and lack of activity are biggest barriers to wt loss. Physical Activity/Exercise  We talked about the importance of increasing daily physical activity and beginning to develop an exercise regimen/routine. We talked about exercise as being an important part of long term weight loss after surgery. Comments:  During class, I discussed with patient the importance of getting into an exercise routine. Pt is currently not exercising stating \"lack of time and energy\" for activity. Pt has been encouraged to start making a plan for exercise and implement short segments spaced throughout the day. Behavior Modification       We talked about how to eat more mindfully. Tips and recommendations for how to make these changes were provided. Pt was encouraged to keep a food journal and record what they were taking in daily. Overall Assessment: Pt has demonstrated some small changes in preparation for weight loss surgery but more significant and consistent changes are still indicated evidenced by weight gain and pt reports minimal changes made this past month.  Appears that time constraints, work schedule and recent illness are biggest barriers to making more significant and consistent changes. Pt reports starting a new job next month which should allow more time for implementing healthy lifestyle habits. Have encouraged her to focus on some specific changes this next month and demonstrate weight loss. Will continue to assess. Patient-Set Goals:   1. Nutrition - Resume meal prepping and cooking at home. Pick specific nights of the week to cook dinner at home. 2. Exercise - walking   3.  Behavior -sipping water throughout the day, slow down eating pace     Rosemary Hancock, RD  6/11/2019

## 2019-07-09 ENCOUNTER — CLINICAL SUPPORT (OUTPATIENT)
Dept: SURGERY | Age: 34
End: 2019-07-09

## 2019-07-09 VITALS — BODY MASS INDEX: 46.59 KG/M2 | WEIGHT: 263 LBS

## 2019-07-09 DIAGNOSIS — E66.01 MORBID OBESITY WITH BMI OF 45.0-49.9, ADULT (HCC): Primary | ICD-10-CM

## 2019-07-09 NOTE — PROGRESS NOTES
[facility] at [facility]  Supervised Weight Loss     Date:   2019    Patient's Name: Manuelito Luke  : 1985    Insurance:  [facility]          Session:   Surgery: Gastric Bypass  Surgeon:  Courtney Bowers M.D. Height: 63\"  Weight:    263      Lbs. BMI: 46   Pounds Lost since last month: 3#               Pounds Gained since last month: 0    Starting Weight: 260#   Previous Months Weight: 266#  Overall Pounds Lost: 0  Overall Pounds Gained: 3#    Other Pertinent Information: Pt reports stress, lack of time and work schedule as continued barriers to making more significant and consistent lifestyle changes in preparation for wt loss surgery. Smoking Status:  none  Alcohol Intake: 1 drink, once per week    I have reviewed with pt the guidelines of the supervised wt loss program.  Pt understands the expectations of some wt loss during the program and that wt gain could delay the process. I have also explained that classes need to be consecutive. Missing a class may result in starting over. Pt has received this information in writing. Changes that patient has made since last month include:  Using smaller plates, meal prepping. Eating Habits and Behaviors  A nutrition lesson specific to vitamins was provided. We discussed the various reasons for needing vitamins and different types and doses. General healthy eating guidelines were also discussed. Pts were instructed that their plate should be made up 1/2 plate coming from non-starchy vegetables, 1/4 coming from lean meat, and 1/4 of their plate coming from carbohydrates, including fruits, starches, or milk. We discussed measuring meals to 1/2 cup total per meal after surgery. Drinking only calorie-free, sugar-free and non-carbonated beverages. We discussed the importance of drinking 64 ounces of fluid per day to prevent dehydration post-operatively.                        Patient's current diet habits include: eating 3 meals a day. Snacking on fruit, yogurt, cottage cheese and crackers. Eating chips, crackers, bread, pasta and rice 2-3 times per day. Eating ice cream a few times per week. Eating baked, grilled, broiled foods. Eating out is 4-6 times per week. Drinking 40 oz water, 16 oz unsweetened tea, 12 oz caffeine. Denies emotional and situational eating. Sometimes packing meals when away from home. Eating most meals at a table and takes 10-15 minutes to finish the meal. Reports food choices and lack of activity are biggest barriers to weight loss. Physical Activity/Exercise  We talked about the importance of increasing daily physical activity and beginning to develop an exercise regimen/routine. We talked about exercise as being an important part of long term weight loss after surgery. Comments:  During class, I discussed with patient the importance of getting into an exercise routine. Pt is currently not exercising for activity. Pt has been encouraged to start making a plan for exercise. We discussed multiple benefits to starting a routine prior to surgery. Behavior Modification       We talked about how to eat more mindfully and identify emotional eating triggers. Tips and recommendations for how to make these changes were provided. Pt was encouraged to keep a food journal and record what they were taking in daily. Overall Assessment: Pt has demonstrated some small lifestyle changes in preparation for bariatric surgery evidenced by reported changes and wt loss this past month. Continued changes are still indicated. Appears that time constraints and work schedule (which has changed multiple times in the past 5 months) are biggest barriers to making more consistent and significant changes. We discussed multiple benefits of implementing more changes in the next month to promote additional wt loss as well as being better prepared for surgery. Will continue to assess.      Patient-Set Goals: 1. Nutrition - more meals from home, less reliance on eating out   2. Exercise - walking for exercise, start making a plan   3.  Behavior -time management     Christa Fry, RD  7/9/2019

## 2019-08-14 ENCOUNTER — OFFICE VISIT (OUTPATIENT)
Dept: INTERNAL MEDICINE CLINIC | Age: 34
End: 2019-08-14

## 2019-08-14 ENCOUNTER — CLINICAL SUPPORT (OUTPATIENT)
Dept: SURGERY | Age: 34
End: 2019-08-14

## 2019-08-14 VITALS — WEIGHT: 264 LBS | BODY MASS INDEX: 46.77 KG/M2

## 2019-08-14 VITALS
WEIGHT: 264.2 LBS | SYSTOLIC BLOOD PRESSURE: 129 MMHG | DIASTOLIC BLOOD PRESSURE: 80 MMHG | HEART RATE: 85 BPM | TEMPERATURE: 98.4 F | BODY MASS INDEX: 46.8 KG/M2 | OXYGEN SATURATION: 98 %

## 2019-08-14 DIAGNOSIS — F41.9 ANXIETY: ICD-10-CM

## 2019-08-14 DIAGNOSIS — E28.2 PCOS (POLYCYSTIC OVARIAN SYNDROME): ICD-10-CM

## 2019-08-14 DIAGNOSIS — E66.01 MORBID OBESITY WITH BMI OF 45.0-49.9, ADULT (HCC): Primary | ICD-10-CM

## 2019-08-14 DIAGNOSIS — I10 ESSENTIAL HYPERTENSION: Primary | ICD-10-CM

## 2019-08-14 DIAGNOSIS — E78.00 HYPERCHOLESTEREMIA: ICD-10-CM

## 2019-08-14 DIAGNOSIS — E66.01 MORBID OBESITY DUE TO EXCESS CALORIES (HCC): ICD-10-CM

## 2019-08-14 RX ORDER — ALPRAZOLAM 0.25 MG/1
0.25 TABLET ORAL
Qty: 30 TAB | Refills: 2 | Status: SHIPPED | OUTPATIENT
Start: 2019-08-14 | End: 2020-09-09 | Stop reason: SDUPTHER

## 2019-08-14 RX ORDER — NORETHINDRONE 0.35 MG/1
TABLET ORAL
Refills: 3 | COMMUNITY
Start: 2019-08-03 | End: 2020-09-16 | Stop reason: ALTCHOICE

## 2019-08-14 NOTE — PROGRESS NOTES
1. Have you been to the ER, urgent care clinic since your last visit? Hospitalized since your last visit? No    2. Have you seen or consulted any other health care providers outside of the [address] [place] [address] since your last visit? Include any pap smears or colon screening.  No  Chief Complaint   Patient presents with    Hypertension     6 months follow up     Fasting

## 2019-08-14 NOTE — PROGRESS NOTES
Melly Bueno is a 27 y.o. female  Chief Complaint   Patient presents with    Hypertension     6 months follow up     Visit Vitals  /80 (BP 1 Location: Left arm, BP Patient Position: At rest)   Pulse 85   Temp 98.4 °F (36.9 °C)   Wt 264 lb 3.2 oz (119.8 kg)   SpO2 98%   BMI 46.80 kg/m²      Health Maintenance Due   Topic Date Due    PAP AKA CERVICAL CYTOLOGY  12/01/2017    Influenza Age 5 to Adult  08/01/2019       Influenza: she will plan on getting it this fall. HPI  HTN, Cholesterol, & weight Follow up - BP controlled:  BP Readings from Last 3 Encounters:   04/29/19 129/83   01/31/19 118/78   01/02/19 (!) 152/92     Currently taking:   Key CAD CHF Meds             amLODIPine (NORVASC) 5 mg tablet TAKE 1 TABLET BY MOUTH EVERY DAY    metoprolol tartrate (LOPRESSOR) 25 mg tablet TAKE 1 TABLET BY MOUTH EVERY DAY    triamterene-hydroCHLOROthiazide (DYAZIDE) 37.5-25 mg per capsule Take 1 Cap by mouth daily. Wt Readings from Last 3 Encounters:   08/14/19 264 lb 3.2 oz (119.8 kg)   07/09/19 263 lb (119.3 kg)   06/11/19 266 lb (120.7 kg)     Hx elevated chol -   Lab Results   Component Value Date/Time    Cholesterol, total 233 (H) 03/11/2019 12:47 PM    HDL Cholesterol 48 03/11/2019 12:47 PM    LDL, calculated 151 (H) 03/11/2019 12:47 PM    VLDL, calculated 34 03/11/2019 12:47 PM    Triglyceride 171 (H) 03/11/2019 12:47 PM     Going through 6 month bariatric surg clearance. Needs form completed.      Wt Readings from Last 20 Encounters:   08/14/19 264 lb 3.2 oz (119.8 kg)   07/09/19 263 lb (119.3 kg)   06/11/19 266 lb (120.7 kg)   05/15/19 264 lb (119.7 kg)   04/29/19 266 lb (120.7 kg)   04/17/19 265 lb (120.2 kg)   03/05/19 260 lb (117.9 kg)   01/31/19 263 lb (119.3 kg)   01/02/19 266 lb (120.7 kg)   07/09/18 248 lb (112.5 kg)   05/25/18 246 lb (111.6 kg)   08/18/17 255 lb 3.2 oz (115.8 kg)   04/19/17 246 lb 4 oz (111.7 kg)   04/19/17 246 lb 9.6 oz (111.9 kg)   02/27/17 248 lb (112.5 kg)   12/07/16 255 lb (115.7 kg)   07/27/16 241 lb (109.3 kg)   06/29/16 235 lb (106.6 kg)   03/28/16 235 lb (106.6 kg)       Hx Anx/Dep - Taking Xanax 2-3x/month. Needs refill. ?PMDD - had IUD removed and menses were heavy. Feeling \"irrational\" 1-2 weeks prior to menses. Has never done counseling, but would like to try that. Review of Systems   Respiratory: Negative for shortness of breath. Cardiovascular: Negative for chest pain and palpitations. Neurological: Negative for dizziness, loss of consciousness and weakness. Psychiatric/Behavioral: Negative for depression. The patient is not nervous/anxious. Physical Exam   Constitutional: She is oriented to person, place, and time. She appears well-developed and well-nourished. No distress. HENT:   Head: Normocephalic and atraumatic. Neck: Neck supple. No JVD present. No bruit bilateral carotid arteries. Cardiovascular: Normal rate, regular rhythm and normal heart sounds. Pulmonary/Chest: Effort normal and breath sounds normal. No respiratory distress. Musculoskeletal: She exhibits no edema. Neurological: She is alert and oriented to person, place, and time. Skin: Skin is warm and dry. Psychiatric: She has a normal mood and affect. Her behavior is normal. Judgment and thought content normal.   Nursing note and vitals reviewed. Diagnoses and all orders for this visit:    1. Essential hypertension  Controlled    2. Morbid obesity due to excess calories ([address]  Continue routine follow ups with Bariatric surg    3. PCOS (polycystic ovarian syndrome)  Continue routine follow ups with Gyn    4. Anxiety  -     ALPRAZolam (XANAX) 0.25 mg tablet; Take 1 Tab by mouth two (2) times daily as needed for Anxiety. Max Daily Amount: 0.5 mg.  -     REFERRAL TO SOCIAL WORK  Discussed Prozac - pt will call back if she wants to change Lexapro to Prozac.     5. Hypercholesteremia  Recheck next spring

## 2019-08-14 NOTE — PROGRESS NOTES
[id number] [facility] at [facility]  Supervised Weight Loss     Date:   2019    Patient's Name: Iris Proctor  : 1985    Insurance:  [facility]          Session:   Surgery: Gastric Bypass  Surgeon:  Barbara Mcarthur M.D. Height: 63\"  Weight:    264      Lbs. BMI: 46   Pounds Lost since last month: 0               Pounds Gained since last month: 1#    Starting Weight: 260#   Previous Months Weight: 263#  Overall Pounds Lost: 0  Overall Pounds Gained: 4#    Other Pertinent Information: none    Smoking Status:  none  Alcohol Intake: 2 drinks, once per week    I have reviewed with pt the guidelines of the supervised wt loss program.  Pt understands the expectations of some wt loss during the program and that wt gain could delay the process. I have also explained that classes need to be consecutive. Missing a class may result in starting over. Pt has received this information in writing. Changes that patient has made since last month include:  Decreased portions sizes, decreased bread and sugar intake, increased activity. Eating Habits and Behaviors  General healthy eating guidelines were also discussed. Pts were instructed that their plate should be made up 1/2 plate coming from non-starchy vegetables, 1/4 coming from lean meat, and 1/4 of their plate coming from carbohydrates, including fruits, starches, or milk. We discussed measuring meals to 1/2 cup total per meal after surgery. Drinking only calorie-free, sugar-free and non-carbonated beverages. We discussed the importance of drinking 64 ounces of fluid per day to prevent dehydration post-operatively. Patient's current diet habits include: eating 3 meals a day. Snacking on cheese, fruit, nuts, pickles and yogurt. Eating chips and bread daily. Eating ice cream 3 times per week. Eating baked, grilled, broiled foods. Eating out is 1-3 times per week.  Drinking 30 oz water, 12 oz caffeine and 12 oz Crystal Light. Denies emotional and situational eating. Sometimes packing meals when away from home. Eating most meals at a table and takes 10-15 minutes to finish the meal. Reports food choices are biggest barrier to wt loss at this time. Physical Activity/Exercise  We talked about the importance of increasing daily physical activity and beginning to develop an exercise regimen/routine. We talked about exercise as being an important part of long term weight loss after surgery. Comments:  During class, I discussed with patient the importance of getting into an exercise routine. Pt is currently walking 2-3 times per week for activity. Pt has been encouraged to maintain and increase. We discussed the need to get 150 minutes intentional exercise weekly to result in wt loss. Behavior Modification       A behavior modification lesson was provided with an emphasis on developing mindful eating behaviors. We talked about how to eat more mindfully and identify emotional eating triggers. Tips and recommendations for how to make these changes were provided. Pt was encouraged to keep a food journal and record what they were taking in daily. Overall Assessment: Pt demonstrates small lifestyle changes in preparation for wt loss surgery evidenced mostly by reported changes. In previous visits pt has reported difficulty with making significant changes to promote wt loss d/t busy schedule, change in job, moving. Will continue to assess as pt completes an additional monthly session to attempt to achieve wt loss back to initial starting wt. Patient-Set Goals:   1. Nutrition - decrease carbs and sweets   2. Exercise - 45 minutes, 3 times per week  3.  Behavior -plan meals and work outs    Selena Strickland, [address]  8/14/2019

## 2019-09-12 ENCOUNTER — CLINICAL SUPPORT (OUTPATIENT)
Dept: SURGERY | Age: 34
End: 2019-09-12

## 2019-09-12 VITALS — WEIGHT: 260 LBS | BODY MASS INDEX: 46.06 KG/M2

## 2019-09-12 DIAGNOSIS — E66.01 MORBID OBESITY WITH BMI OF 45.0-49.9, ADULT (HCC): Primary | ICD-10-CM

## 2019-09-12 NOTE — PROGRESS NOTES
on [address] [facility] at [facility]  Supervised Weight Loss     Date:   2019    Patient's Name: Ken Garland  : 1985    Insurance:  [facility]          Session:   Surgery: Gastric Bypass  Surgeon:  Valerio Chew M.D. Height: 63\"   Weight:    260      Lbs. BMI: 46   Pounds Lost since last month: 4#               Pounds Gained since last month: 0    Starting Weight: 260#   Previous Months Weight: 264#  Overall Pounds Lost: 0  Overall Pounds Gained: 0    Other Pertinent Information: n/a     Smoking Status:  none  Alcohol Intake: 2 drinks, once per week    I have reviewed with pt the guidelines of the supervised wt loss program.  Pt understands the expectations of some wt loss during the program and that wt gain could delay the process. I have also explained that classes need to be consecutive. Missing a class may result in starting over. Pt has received this information in writing. Changes that patient has made since last month include:  Meal prepping, portion control, less carbs and sugar, more walking. Eating Habits and Behaviors  General healthy eating guidelines were discussed. A nutrition lesson was presented on portion control. Patients were instructed implement portion control now using the balanced plate method (1/2 plate non-starchy vegetables, 1/4 plate lean meat, and 1/4 plate whole grains and to include fruit and/or milk at meals or snack). We discussed measuring meals to 1/2 cup total per meal after surgery and appropriate portion progression long term. Patient's current diet habits include: eating 3 meals a day. Snacking on fruit, yogurt, nuts, cottage cheese, protein shakes. Eating bread and rice 1-2 times per day. No sweets/desserts. Eating baked, grilled, broiled foods. Eating out is 1-3 times per week. Drinking 60 oz water, 12 oz caffeine and 12 oz Crystal Light. Denies emotional eating.  Reports portion sizes and lack of activity are biggest barriers to wt loss. Physical Activity/Exercise  We talked about the importance of increasing daily physical activity and beginning to develop an exercise regimen/routine. We talked about exercise as being an important part of long term weight loss after surgery. Comments:  During class, I discussed with patient the importance of getting into an exercise routine. Pt is currently walking and hiking for 30 minutes, 3 times per week for activity. Pt has been encouraged to increase frequency, duration and/or intensity as tolerated to promote continued wt loss. Behavior Modification       We talked about how to eat more mindfully. Tips and recommendations for how to make these changes were provided. Pt was encouraged to keep a food journal and record what they were taking in daily. Overall Assessment: Patient demonstrates small/appropriate lifestyle changes in preparation for wt loss surgery evidenced by reported changes and weight maintenance. Demonstrates good understanding of general nutrition guidelines and appears to be an appropriate candidate for surgery at this time. Patient-Set Goals:   1. Nutrition - increase vegetables, decreased processed foods  2. Exercise - 1 hour daily, 5 times per week  3.  Behavior -decrease stress, wake up earlier     Lorna Stafford, [address]  9/12/2019

## 2019-10-01 ENCOUNTER — OFFICE VISIT (OUTPATIENT)
Dept: SURGERY | Age: 34
End: 2019-10-01

## 2019-10-01 VITALS
SYSTOLIC BLOOD PRESSURE: 130 MMHG | RESPIRATION RATE: 20 BRPM | BODY MASS INDEX: 46.42 KG/M2 | TEMPERATURE: 99.1 F | DIASTOLIC BLOOD PRESSURE: 90 MMHG | HEART RATE: 101 BPM | WEIGHT: 262 LBS | HEIGHT: 63 IN | OXYGEN SATURATION: 96 %

## 2019-10-01 DIAGNOSIS — E28.2 PCOS (POLYCYSTIC OVARIAN SYNDROME): ICD-10-CM

## 2019-10-01 DIAGNOSIS — E66.01 MORBID OBESITY DUE TO EXCESS CALORIES (HCC): Primary | ICD-10-CM

## 2019-10-01 DIAGNOSIS — E78.00 HYPERCHOLESTEREMIA: ICD-10-CM

## 2019-10-01 DIAGNOSIS — I10 ESSENTIAL HYPERTENSION: ICD-10-CM

## 2019-10-01 NOTE — PATIENT INSTRUCTIONS
Learning About How to Prepare for Weight-Loss Surgery  How can you prepare for weight-loss surgery? Having weight-loss surgery (also called bariatric surgery) is a big step. You can prepare for surgery by having a plan. Your plan may include your goals for losing weight and how to makes changes in your diet, activity, and lifestyle to help raise your chances of success. One way to prepare for surgery is to think about your goal or reason why you want to reach a healthy weight. Do you want to lower your blood pressure, cholesterol, or blood sugar? Do you want to be able to sleep better, play with your kids, or walk around the block? Having a reason can help you stay with your plan and meet your goals. Your weight-loss surgery team can help you meet your goals and get ready for surgery. Dunia Lange work with a team that's trained to help you lose weight and make healthy changes in your life. This team may include:  · A medical doctor or nurse to help manage your care and schedule tests before surgery. · A surgeon who specializes in weight-loss surgery. · A registered dietitian to help you plan meals and make changes in the way you eat. · An exercise specialist to help you be more active and get stronger. · A therapist or counselor to help you learn why you eat and teach you ways to deal with stress and your emotions. Your team will also be there to help you prepare for life after surgery. They will help you adjust to new ways of eating and changes to your body. How will weight-loss surgery affect your life? You have likely thought a lot about how surgery may affect your life--how you will eat, how your body will look, or how you will feel. Some people feel overwhelmed with these changes. But planning can help you prepare for the changes and meet your weight-loss goals. One important step in your plan is to learn about the ways surgery will affect your life. These may include:  · A slimmer you.  You probably will lose weight very quickly in the first few months after surgery. As time goes on, your weight loss will slow down. How much weight you lose depends on what type of surgery you had and how well your new eating and activity plans are working for you. · A new way of eating. Success in reaching and keeping a healthy weight depends on making lifelong changes in how you eat. After surgery, you raise your chances of success if you:  ? Eat just a few ounces of food at a time. ? Eat very slowly and chew your food to mush. ? Don't drink for 30 minutes before you eat, during your meal, and for 30 minutes after you eat. ? Are careful about drinking alcohol. ? Avoid foods that are high in fat or sugar. ? Take vitamin and mineral supplements. · A healthier you. Weight-loss surgery can have some real health benefits. Problems like diabetes, high blood pressure, and sleep apnea may go away--or at least become easier to manage. · A more active you. After surgery, being active on most days of the week will help you reach your weight goal and avoid gaining back the weight you lose. · A lot of extra skin. When you lose weight quickly, you may have a lot of extra skin. That's normal. You can have surgery to remove the extra skin if it bothers you. There are going to be some ups and downs while you get used to these changes. So another way to adjust is to identify who can help support you. Getting support from friends and family can help. And joining a support group for people who have had the surgery can be a big help too, because they know what you're going through. As you know, it's a big decision to have weight-loss surgery. But when you have a plan, you can focus on losing weight and living a healthier life. So what steps can you take to prepare for weight-loss surgery? Will you set some goals? Will you learn about how surgery can affect your life? How about asking family or friends for help?  Write out your plan. Then get ready. Where can you learn more? Go to http://salvador-tia.info/. Enter Y847 in the search box to learn more about \"Learning About How to Prepare for Weight-Loss Surgery. \"  Current as of: March 28, 2019  Content Version: 12.2  © 0274-4799 [employer]. Care instructions adapted under license by [employer] (which disclaims liability or warranty for this information). If you have questions about a medical condition or this instruction, always ask your healthcare professional. [address] any warranty or liability for your use of this information.

## 2019-10-01 NOTE — PROGRESS NOTES
1. Have you been to the ER, urgent care clinic since your last visit? Hospitalized since your last visit? No    2. Have you seen or consulted any other health care providers outside of the [address] [place] [address] since your last visit? Include any pap smears or colon screening.  No

## 2019-10-02 NOTE — PROGRESS NOTES
Subjective: The patient is a 27 y.o. obese female seeking approval for bariatric surgery. Body mass index is 46.41 kg/m². Jennifer Bliss has tried multiple diets in her  lifetime most recently trying unsupervised diets during which she was able to lose small amounts of weight. Bariatric comorbidities present are   Past Medical History:   Diagnosis Date    Anxiety     Back pain     H/O hemorrhoids     Hives     as a child - had 1 year of steroids.  Hypertension     Joint pain     PCOS (polycystic ovarian syndrome)        Patient Active Problem List    Diagnosis Date Noted    Morbid obesity due to excess calories ([address] 2017    Hypercholesteremia 2016    Essential hypertension 2016    PCOS (polycystic ovarian syndrome) 2016    Allergic rhinitis due to allergen 2016    Vaginal low risk HPV DNA test positive 2016    Anxiety 2016     Past Medical History:   Diagnosis Date    Anxiety     Back pain     H/O hemorrhoids     Hives     as a child - had 1 year of steroids.  Hypertension     Joint pain     PCOS (polycystic ovarian syndrome)       Past Surgical History:   Procedure Laterality Date    HX OTHER SURGICAL      rectal polyp at 9 yo    HX OTHER SURGICAL      Sigmoidoscopy - WNL pp ~    HX WISDOM TEETH EXTRACTION        Social History     Tobacco Use    Smoking status: Former Smoker     Types: Cigarettes     Last attempt to quit: 2018     Years since quittin.1    Smokeless tobacco: Never Used   Substance Use Topics    Alcohol use:  Yes     Alcohol/week: 0.0 standard drinks     Frequency: 2-4 times a month     Drinks per session: 1 or 2     Comment: 2-3x/week 1-2 drinks      Family History   Problem Relation Age of Onset    Diabetes Father     Heart Disease Father         cardiomyopathy, heart transplant    Hypertension Father     Diabetes Mother     High Cholesterol Mother     Diabetes Maternal Grandmother     Diabetes Maternal Grandfather     Diabetes Paternal Grandmother       Prior to Admission medications    Medication Sig Start Date End Date Taking? Authorizing Provider   PEYMAN 0.35 mg tab TAKE 1 TABLET BY MOUTH EVERY DAY 8/3/19  Yes Provider, Historical   ALPRAZolam (XANAX) 0.25 mg tablet Take 1 Tab by mouth two (2) times daily as needed for Anxiety. Max Daily Amount: 0.5 mg. 8/14/19  Yes Yvette Liao PA-C   amLODIPine (NORVASC) 5 mg tablet TAKE 1 TABLET BY MOUTH EVERY DAY 7/18/19  Yes Shabana Baker PA-C   metoprolol tartrate (LOPRESSOR) 25 mg tablet TAKE 1 TABLET BY MOUTH EVERY DAY 7/18/19  Yes Shabana Baker PA-C   multivitamin (ONE A DAY) tablet Take 1 Tab by mouth daily. Yes Provider, Historical   metFORMIN ER (GLUCOPHAGE XR) 500 mg tablet Take 4 Tabs by mouth daily (with dinner). 2/13/19  Yes Shabana Baker PA-C   escitalopram oxalate (LEXAPRO) 20 mg tablet TAKE ONE TABLET BY MOUTH ONCE DAILY 1/2/19  Yes Shabana Baker PA-C   triamterene-hydroCHLOROthiazide (DYAZIDE) 37.5-25 mg per capsule Take 1 Cap by mouth daily. 1/2/19  Yes Shabana Baker PA-C     No Known Allergies      Objective:     Visit Vitals  /90   Pulse (!) 101   Temp 99.1 °F (37.3 °C)   Resp 20   Ht 5' 3\" (1.6 m)   Wt 262 lb (118.8 kg)   SpO2 96%   BMI 46.41 kg/m²         Assessment:     Morbid obesity with comorbidities; no success with medical management. She has completed all pre-op prerequisites and has been found to be an excellent candidate for bariatric surgery. Plan:     1. Continue diet, exercise plan. 2. Will submit for pre-authorization for laparoscopic gastric bypass. 20 minutes spent with patient (greater than 50% of time in face-face consultation reviewing choice of procedure, anticipated hospital course, post-op diet, activity restrictions).       Signed By: Dorothy Bull MD     October 2, 2019

## 2019-10-19 ENCOUNTER — APPOINTMENT (OUTPATIENT)
Dept: CT IMAGING | Age: 34
End: 2019-10-19
Attending: EMERGENCY MEDICINE
Payer: COMMERCIAL

## 2019-10-19 ENCOUNTER — APPOINTMENT (OUTPATIENT)
Dept: GENERAL RADIOLOGY | Age: 34
End: 2019-10-19
Attending: EMERGENCY MEDICINE
Payer: COMMERCIAL

## 2019-10-19 ENCOUNTER — HOSPITAL ENCOUNTER (EMERGENCY)
Age: 34
Discharge: HOME | End: 2019-10-19
Attending: EMERGENCY MEDICINE | Admitting: EMERGENCY MEDICINE
Payer: COMMERCIAL

## 2019-10-19 VITALS
TEMPERATURE: 99.5 F | DIASTOLIC BLOOD PRESSURE: 70 MMHG | SYSTOLIC BLOOD PRESSURE: 129 MMHG | HEIGHT: 64 IN | BODY MASS INDEX: 45.41 KG/M2 | WEIGHT: 266 LBS | HEART RATE: 131 BPM | OXYGEN SATURATION: 96 % | RESPIRATION RATE: 18 BRPM

## 2019-10-19 DIAGNOSIS — R65.10 SIRS (SYSTEMIC INFLAMMATORY RESPONSE SYNDROME) (HCC): ICD-10-CM

## 2019-10-19 DIAGNOSIS — J18.9 PNEUMONIA OF LEFT LOWER LOBE DUE TO INFECTIOUS ORGANISM: Primary | ICD-10-CM

## 2019-10-19 DIAGNOSIS — I10 ESSENTIAL HYPERTENSION: ICD-10-CM

## 2019-10-19 LAB
ALBUMIN SERPL-MCNC: 3 G/DL (ref 3.5–5)
ALBUMIN/GLOB SERPL: 0.7 {RATIO} (ref 1.1–2.2)
ALP SERPL-CCNC: 85 U/L (ref 45–117)
ALT SERPL-CCNC: 21 U/L (ref 12–78)
ANION GAP SERPL CALC-SCNC: 7 MMOL/L (ref 5–15)
APPEARANCE UR: CLEAR
AST SERPL-CCNC: 8 U/L (ref 15–37)
BACTERIA URNS QL MICRO: NEGATIVE /HPF
BASOPHILS # BLD: 0 K/UL (ref 0–0.1)
BASOPHILS NFR BLD: 0 % (ref 0–1)
BILIRUB SERPL-MCNC: 0.3 MG/DL (ref 0.2–1)
BILIRUB UR QL: NEGATIVE
BUN SERPL-MCNC: 10 MG/DL (ref 6–20)
BUN/CREAT SERPL: 12 (ref 12–20)
CALCIUM SERPL-MCNC: 8.7 MG/DL (ref 8.5–10.1)
CHLORIDE SERPL-SCNC: 105 MMOL/L (ref 97–108)
CO2 SERPL-SCNC: 26 MMOL/L (ref 21–32)
COLOR UR: ABNORMAL
COMMENT, HOLDF: NORMAL
CREAT SERPL-MCNC: 0.84 MG/DL (ref 0.55–1.02)
DIFFERENTIAL METHOD BLD: ABNORMAL
EOSINOPHIL # BLD: 0 K/UL (ref 0–0.4)
EOSINOPHIL NFR BLD: 0 % (ref 0–7)
EPITH CASTS URNS QL MICRO: ABNORMAL /LPF
ERYTHROCYTE [DISTWIDTH] IN BLOOD BY AUTOMATED COUNT: 13.9 % (ref 11.5–14.5)
GLOBULIN SER CALC-MCNC: 4.5 G/DL (ref 2–4)
GLUCOSE SERPL-MCNC: 189 MG/DL (ref 65–100)
GLUCOSE UR STRIP.AUTO-MCNC: 500 MG/DL
HCG UR QL: NEGATIVE
HCT VFR BLD AUTO: 38.1 % (ref 35–47)
HGB BLD-MCNC: 12.6 G/DL (ref 11.5–16)
HGB UR QL STRIP: NEGATIVE
IMM GRANULOCYTES # BLD AUTO: 0 K/UL (ref 0–0.04)
IMM GRANULOCYTES NFR BLD AUTO: 1 % (ref 0–0.5)
KETONES UR QL STRIP.AUTO: ABNORMAL MG/DL
LACTATE BLD-SCNC: 1.93 MMOL/L (ref 0.4–2)
LEUKOCYTE ESTERASE UR QL STRIP.AUTO: NEGATIVE
LYMPHOCYTES # BLD: 1.6 K/UL (ref 0.8–3.5)
LYMPHOCYTES NFR BLD: 19 % (ref 12–49)
MCH RBC QN AUTO: 26.9 PG (ref 26–34)
MCHC RBC AUTO-ENTMCNC: 33.1 G/DL (ref 30–36.5)
MCV RBC AUTO: 81.4 FL (ref 80–99)
MONOCYTES # BLD: 0.6 K/UL (ref 0–1)
MONOCYTES NFR BLD: 7 % (ref 5–13)
NEUTS SEG # BLD: 6.2 K/UL (ref 1.8–8)
NEUTS SEG NFR BLD: 73 % (ref 32–75)
NITRITE UR QL STRIP.AUTO: NEGATIVE
NRBC # BLD: 0 K/UL (ref 0–0.01)
NRBC BLD-RTO: 0 PER 100 WBC
PH UR STRIP: 6.5 [PH] (ref 5–8)
PLATELET # BLD AUTO: 277 K/UL (ref 150–400)
PMV BLD AUTO: 10.8 FL (ref 8.9–12.9)
POTASSIUM SERPL-SCNC: 3.3 MMOL/L (ref 3.5–5.1)
PROT SERPL-MCNC: 7.5 G/DL (ref 6.4–8.2)
PROT UR STRIP-MCNC: 30 MG/DL
RBC # BLD AUTO: 4.68 M/UL (ref 3.8–5.2)
RBC #/AREA URNS HPF: ABNORMAL /HPF (ref 0–5)
SAMPLES BEING HELD,HOLD: NORMAL
SODIUM SERPL-SCNC: 138 MMOL/L (ref 136–145)
SP GR UR REFRACTOMETRY: 1.02 (ref 1–1.03)
UR CULT HOLD, URHOLD: NORMAL
UROBILINOGEN UR QL STRIP.AUTO: 0.2 EU/DL (ref 0.2–1)
WBC # BLD AUTO: 8.5 K/UL (ref 3.6–11)
WBC URNS QL MICRO: ABNORMAL /HPF (ref 0–4)

## 2019-10-19 PROCEDURE — 83605 ASSAY OF LACTIC ACID: CPT

## 2019-10-19 PROCEDURE — 71275 CT ANGIOGRAPHY CHEST: CPT

## 2019-10-19 PROCEDURE — 96365 THER/PROPH/DIAG IV INF INIT: CPT

## 2019-10-19 PROCEDURE — 71046 X-RAY EXAM CHEST 2 VIEWS: CPT

## 2019-10-19 PROCEDURE — 93005 ELECTROCARDIOGRAM TRACING: CPT

## 2019-10-19 PROCEDURE — 36415 COLL VENOUS BLD VENIPUNCTURE: CPT

## 2019-10-19 PROCEDURE — 80053 COMPREHEN METABOLIC PANEL: CPT

## 2019-10-19 PROCEDURE — 85025 COMPLETE CBC W/AUTO DIFF WBC: CPT

## 2019-10-19 PROCEDURE — 74011636320 HC RX REV CODE- 636/320: Performed by: RADIOLOGY

## 2019-10-19 PROCEDURE — 74011000258 HC RX REV CODE- 258: Performed by: RADIOLOGY

## 2019-10-19 PROCEDURE — 74011250637 HC RX REV CODE- 250/637: Performed by: EMERGENCY MEDICINE

## 2019-10-19 PROCEDURE — 81025 URINE PREGNANCY TEST: CPT

## 2019-10-19 PROCEDURE — 87040 BLOOD CULTURE FOR BACTERIA: CPT

## 2019-10-19 PROCEDURE — 99285 EMERGENCY DEPT VISIT HI MDM: CPT

## 2019-10-19 PROCEDURE — 81001 URINALYSIS AUTO W/SCOPE: CPT

## 2019-10-19 PROCEDURE — 74011250636 HC RX REV CODE- 250/636: Performed by: EMERGENCY MEDICINE

## 2019-10-19 RX ORDER — IBUPROFEN 600 MG/1
600 TABLET ORAL ONCE
Status: COMPLETED | OUTPATIENT
Start: 2019-10-19 | End: 2019-10-19

## 2019-10-19 RX ORDER — LEVOFLOXACIN 750 MG/1
750 TABLET ORAL DAILY
Qty: 6 TAB | Refills: 0 | Status: SHIPPED | OUTPATIENT
Start: 2019-10-19 | End: 2019-10-25

## 2019-10-19 RX ORDER — LEVOFLOXACIN 5 MG/ML
750 INJECTION, SOLUTION INTRAVENOUS
Status: COMPLETED | OUTPATIENT
Start: 2019-10-19 | End: 2019-10-19

## 2019-10-19 RX ORDER — SODIUM CHLORIDE 0.9 % (FLUSH) 0.9 %
10 SYRINGE (ML) INJECTION
Status: COMPLETED | OUTPATIENT
Start: 2019-10-19 | End: 2019-10-19

## 2019-10-19 RX ORDER — METOPROLOL TARTRATE 25 MG/1
25 TABLET, FILM COATED ORAL
Status: COMPLETED | OUTPATIENT
Start: 2019-10-19 | End: 2019-10-19

## 2019-10-19 RX ORDER — AMLODIPINE BESYLATE 5 MG/1
5 TABLET ORAL
Status: COMPLETED | OUTPATIENT
Start: 2019-10-19 | End: 2019-10-19

## 2019-10-19 RX ADMIN — SODIUM CHLORIDE 1000 ML: 900 INJECTION, SOLUTION INTRAVENOUS at 19:34

## 2019-10-19 RX ADMIN — SODIUM CHLORIDE 1000 ML: 900 INJECTION, SOLUTION INTRAVENOUS at 17:43

## 2019-10-19 RX ADMIN — METOPROLOL TARTRATE 25 MG: 25 TABLET ORAL at 19:32

## 2019-10-19 RX ADMIN — AMLODIPINE BESYLATE 5 MG: 5 TABLET ORAL at 19:32

## 2019-10-19 RX ADMIN — IOPAMIDOL 100 ML: 755 INJECTION, SOLUTION INTRAVENOUS at 20:58

## 2019-10-19 RX ADMIN — IBUPROFEN 600 MG: 600 TABLET, FILM COATED ORAL at 17:33

## 2019-10-19 RX ADMIN — Medication 10 ML: at 20:58

## 2019-10-19 RX ADMIN — LEVOFLOXACIN 750 MG: 5 INJECTION, SOLUTION INTRAVENOUS at 18:04

## 2019-10-19 RX ADMIN — SODIUM CHLORIDE 100 ML: 900 INJECTION, SOLUTION INTRAVENOUS at 20:58

## 2019-10-19 NOTE — ED TRIAGE NOTES
Triage note: Pt arrives with c/o fevers as high as 104. Pt taking tyelonol with some relief. Pt seen by [facility] Tuesday and Friday. Pt dx with pneumonia Friday and started on doxycycline. Pt admits to some SOB and dry cough.

## 2019-10-19 NOTE — ED PROVIDER NOTES
27 y.o. female with past medical history significant for hypertension, PCOS, hives, back pain, hemorrhoids and joint pain who presents from home via personal vehicle with chief complaint of fever. Pt states 10 days ago she was given her third shot for Hep B and a flu shot. 5 days later pt started to have a low grade fever and 4 days ago pt started to have a fever with an accompanied cough and pt went to [facility]. Pt states she was tested for the flu which came back negative and was instructed to come back if symptoms worsened. Pt states throughout the rest of the week the fever started to get higher, with the highest being 105.0 and the cough worsening prompting her to return to [facility] yesterday. Pt states she had a flu test which was negative, a chest X-ray which came back positive for pneumonia in the lower right lung and was given deoxycycline. Pt states she has taken 2 doses of doxycycline, one yesterday and one today. Pt states she took 2 doses of 650mg Tylenol 2 hours ago. Pt states she takes 3 different blood pressure medications and has not taken them today. Pt states she has taken 2 650mg of Tylenol today. Pt states her last period was 9/30/2019 and she takes Barbar[place]outh birth control. Currently, pt states she has slight SOB and is having a difficult time taking a full deep breath. Pt denies vomiting, nausea and diarrhea. There are no other acute medical concerns at this time. Social hx: Former tobacco smoker (Quit last year), EtOH use (1-2 drinks/week)    PCP: Shabana Baker PA-C    Note written by Roxanne Coffey, as dictated by Hany Mast MD 5:15 PM      The history is provided by the patient. No  was used. Past Medical History:   Diagnosis Date    Anxiety     Back pain     H/O hemorrhoids     Hives     as a child - had 1 year of steroids.      Hypertension     Joint pain     PCOS (polycystic ovarian syndrome)        Past Surgical History: Procedure Laterality Date    HX OTHER SURGICAL      rectal polyp at 11 yo    HX OTHER SURGICAL      Sigmoidoscopy - WNL pp ~2013    HX WISDOM TEETH EXTRACTION           Family History:   Problem Relation Age of Onset    Diabetes Father     Heart Disease Father         cardiomyopathy, heart transplant    Hypertension Father     Diabetes Mother     High Cholesterol Mother     Diabetes Maternal Grandmother     Diabetes Maternal Grandfather     Diabetes Paternal Grandmother        Social History     Socioeconomic History    Marital status: SINGLE     Spouse name: Marichuy Luo Number of children: 0    Years of education: Not on file    Highest education level: Not on file   Occupational History    Occupation: Optometrist      Comment: [address] Financial resource strain: Not on file    Food insecurity:     Worry: Not on file     Inability: Not on file   goCatch needs:     Medical: Not on file     Non-medical: Not on file   Tobacco Use    Smoking status: Former Smoker     Types: Cigarettes     Last attempt to quit: 2018     Years since quittin.2    Smokeless tobacco: Never Used   Substance and Sexual Activity    Alcohol use:  Yes     Alcohol/week: 0.0 standard drinks     Frequency: 2-4 times a month     Drinks per session: 1 or 2     Comment: 2-3x/week 1-2 drinks    Drug use: Yes     Types: Marijuana     Comment: remote past     Sexual activity: Yes     Partners: Male     Birth control/protection: IUD   Lifestyle    Physical activity:     Days per week: Not on file     Minutes per session: Not on file    Stress: Not on file   Relationships    Social connections:     Talks on phone: Not on file     Gets together: Not on file     Attends Sabianist service: Not on file     Active member of club or organization: Not on file     Attends meetings of clubs or organizations: Not on file     Relationship status: Not on file    Intimate partner violence:     Fear of current or ex partner: Not on file     Emotionally abused: Not on file     Physically abused: Not on file     Forced sexual activity: Not on file   Other Topics Concern     Service Not Asked    Blood Transfusions Not Asked    Caffeine Concern Not Asked    Occupational Exposure Not Asked   [employer] Hazards Not Asked    Sleep Concern Not Asked    Stress Concern Not Asked    Weight Concern Not Asked    Special Diet Not Asked    Back Care Not Asked    Exercise No    Bike Helmet Not Asked   [address] Not Asked    Self-Exams Not Asked   Social History Narrative    05/25/18    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 Engaged and living with Neale Harada Tod Shiner) at [place] [address] for now during the move. Dog - 3 yo in 2017.         01/02/19    Working at [employer]"         ALLERGIES: Patient has no known allergies. Review of Systems   Constitutional: Positive for fever. Negative for chills. Respiratory: Positive for cough and shortness of breath. Cardiovascular: Negative for chest pain. Gastrointestinal: Negative for abdominal pain, constipation, diarrhea, nausea and vomiting. Neurological: Negative for dizziness and light-headedness. All other systems reviewed and are negative. Vitals:    10/19/19 1618   BP: (!) 148/102   Pulse: (!) 131   Resp: 18   Temp: (!) 101.5 °F (38.6 °C)   SpO2: 97%   Weight: 120.7 kg (266 lb)   Height: 5' 4\" (1.626 m)            Physical Exam   Constitutional: She is oriented to person, place, and time. She appears well-developed and well-nourished. HENT:   Head: Normocephalic and atraumatic. Right Ear: External ear normal.   Left Ear: External ear normal.   Mouth/Throat: Oropharynx is clear and moist. No oropharyngeal exudate. Eyes: Conjunctivae are normal.   Neck: Normal range of motion.    Cardiovascular: Regular rhythm, normal heart sounds and intact distal pulses. No murmur heard. Tachycardic   Pulmonary/Chest: Effort normal and breath sounds normal. No stridor. No respiratory distress. She has no wheezes. She has no rales. Dry cough   Abdominal: Soft. Bowel sounds are normal. She exhibits no distension and no mass. There is no tenderness. There is no guarding. Musculoskeletal: Normal range of motion. She exhibits no edema. Neurological: She is alert and oriented to person, place, and time. Skin: Skin is warm and dry. Nursing note and vitals reviewed. MDM  Number of Diagnoses or Management Options  Essential hypertension:   Pneumonia of left lower lobe due to infectious organism [facility]):   SIRS (systemic inflammatory response syndrome) [facility]):   Diagnosis management comments: Patient with likely pneumonia we will repeat chest x-ray. She is at risk for PE given the fact that she is on OCPs however that is unlikely cause a temperature of 104. She reports she is Kun had 2 flu test.  At this point I would not say that she failed outpatient therapy given she is only taken 2 doses of doxycycline however I will check some labs including a lactate give the patient some IV fluids antibiotics repeat her chest x-ray and attempt to get her heart rate down and reevaluate       Amount and/or Complexity of Data Reviewed  Clinical lab tests: ordered and reviewed  Tests in the radiology section of CPT®: ordered and reviewed  Independent visualization of images, tracings, or specimens: yes (EKG)    Patient Progress  Patient progress: stable         Procedures      ED EKG interpretation:  Rhythm: sinus tachycardia; and regular . Rate (approx.): 120; Axis: normal; P wave: normal; QRS interval: normal ; ST/T wave: non-specific changes;  EKG documented by Brock Badillo MD, scribe, as interpreted by Wm Ramos MD, ED MD.         9:30 PM  Patient's results have been reviewed with them.   Patient and/or family have verbally conveyed their understanding and agreement of the patient's signs, symptoms, diagnosis, treatment and prognosis and additionally agree to follow up as recommended or return to the Emergency Room should their condition change prior to follow-up. Discharge instructions have also been provided to the patient with some educational information regarding their diagnosis as well a list of reasons why they would want to return to the ER prior to their follow-up appointment should their condition change. Patient with pneumonia on CTA. No PE. We will switch her to Levaquin as has better coverage compared with doxycycline for pneumonia. I discussed the side effects with her. She an EKG and has no prolonged QT. We also discussed holding her metformin for 48 hours. She agrees to return if worsening symptoms.

## 2019-10-20 LAB
ATRIAL RATE: 117 BPM
CALCULATED P AXIS, ECG09: 16 DEGREES
CALCULATED R AXIS, ECG10: 11 DEGREES
CALCULATED T AXIS, ECG11: 23 DEGREES
DIAGNOSIS, 93000: NORMAL
P-R INTERVAL, ECG05: 162 MS
Q-T INTERVAL, ECG07: 336 MS
QRS DURATION, ECG06: 96 MS
QTC CALCULATION (BEZET), ECG08: 468 MS
VENTRICULAR RATE, ECG03: 117 BPM

## 2019-10-20 NOTE — DISCHARGE INSTRUCTIONS
Patient Education        High Blood Pressure: Care Instructions  Overview    It's normal for blood pressure to go up and down throughout the day. But if it stays up, you have high blood pressure. Another name for high blood pressure is hypertension. Despite what a lot of people think, high blood pressure usually doesn't cause headaches or make you feel dizzy or lightheaded. It usually has no symptoms. But it does increase your risk of stroke, heart attack, and other problems. You and your doctor will talk about your risks of these problems based on your blood pressure. Your doctor will give you a goal for your blood pressure. Your goal will be based on your health and your age. Lifestyle changes, such as eating healthy and being active, are always important to help lower blood pressure. You might also take medicine to reach your blood pressure goal.  Follow-up care is a key part of your treatment and safety. Be sure to make and go to all appointments, and call your doctor if you are having problems. It's also a good idea to know your test results and keep a list of the medicines you take. How can you care for yourself at home? Medical treatment  · If you stop taking your medicine, your blood pressure will go back up. You may take one or more types of medicine to lower your blood pressure. Be safe with medicines. Take your medicine exactly as prescribed. Call your doctor if you think you are having a problem with your medicine. · Talk to your doctor before you start taking aspirin every day. Aspirin can help certain people lower their risk of a heart attack or stroke. But taking aspirin isn't right for everyone, because it can cause serious bleeding. · See your doctor regularly. You may need to see the doctor more often at first or until your blood pressure comes down.   · If you are taking blood pressure medicine, talk to your doctor before you take decongestants or anti-inflammatory medicine, such as ibuprofen. Some of these medicines can raise blood pressure. · Learn how to check your blood pressure at home. Lifestyle changes  · Stay at a healthy weight. This is especially important if you put on weight around the waist. Losing even 10 pounds can help you lower your blood pressure. · If your doctor recommends it, get more exercise. Walking is a good choice. Bit by bit, increase the amount you walk every day. Try for at least 30 minutes on most days of the week. You also may want to swim, bike, or do other activities. · Avoid or limit alcohol. Talk to your doctor about whether you can drink any alcohol. · Try to limit how much sodium you eat to less than 2,300 milligrams (mg) a day. Your doctor may ask you to try to eat less than 1,500 mg a day. · Eat plenty of fruits (such as bananas and oranges), vegetables, legumes, whole grains, and low-fat dairy products. · Lower the amount of saturated fat in your diet. Saturated fat is found in animal products such as milk, cheese, and meat. Limiting these foods may help you lose weight and also lower your risk for heart disease. · Do not smoke. Smoking increases your risk for heart attack and stroke. If you need help quitting, talk to your doctor about stop-smoking programs and medicines. These can increase your chances of quitting for good. When should you call for help? Call  911 anytime you think you may need emergency care. This may mean having symptoms that suggest that your blood pressure is causing a serious heart or blood vessel problem. Your blood pressure may be over 180/120.   For example, call  911 if:    · You have symptoms of a heart attack. These may include:  ? Chest pain or pressure, or a strange feeling in the chest.  ? Sweating. ? Shortness of breath. ? Nausea or vomiting. ? Pain, pressure, or a strange feeling in the back, neck, jaw, or upper belly or in one or both shoulders or arms. ? Lightheadedness or sudden weakness.   ? A fast or irregular heartbeat.     · You have symptoms of a stroke. These may include:  ? Sudden numbness, tingling, weakness, or loss of movement in your face, arm, or leg, especially on only one side of your body. ? Sudden vision changes. ? Sudden trouble speaking. ? Sudden confusion or trouble understanding simple statements. ? Sudden problems with walking or balance. ? A sudden, severe headache that is different from past headaches.     · You have severe back or belly pain.    Do not wait until your blood pressure comes down on its own. Get help right away.   Call your doctor now or seek immediate care if:    · Your blood pressure is much higher than normal (such as 180/120 or higher), but you don't have symptoms.     · You think high blood pressure is causing symptoms, such as:  ? Severe headache.  ? Blurry vision.    Watch closely for changes in your health, and be sure to contact your doctor if:    · Your blood pressure measures higher than your doctor recommends at least 2 times. That means the top number is higher or the bottom number is higher, or both.     · You think you may be having side effects from your blood pressure medicine. Where can you learn more? Go to http://salvador-tia.info/. Enter Q687 in the search box to learn more about \"High Blood Pressure: Care Instructions. \"  Current as of: April 9, 2019  Content Version: 12.2  © 0419-1523 Healthwise, Incorporated. Care instructions adapted under license by [employer] (which disclaims liability or warranty for this information). If you have questions about a medical condition or this instruction, always ask your healthcare professional. [place] [address] any warranty or liability for your use of this information. Patient Education        Pneumonia: Care Instructions  Your Care Instructions    Pneumonia is an infection of the lungs.  Most cases are caused by infections from bacteria or viruses. Pneumonia may be mild or very severe. If it is caused by bacteria, you will be treated with antibiotics. It may take a few weeks to a few months to recover fully from pneumonia, depending on how sick you were and whether your overall health is good. Follow-up care is a key part of your treatment and safety. Be sure to make and go to all appointments, and call your doctor if you are having problems. It's also a good idea to know your test results and keep a list of the medicines you take. How can you care for yourself at home? · Take your antibiotics exactly as directed. Do not stop taking the medicine just because you are feeling better. You need to take the full course of antibiotics. · Take your medicines exactly as prescribed. Call your doctor if you think you are having a problem with your medicine. · Get plenty of rest and sleep. You may feel weak and tired for a while, but your energy level will improve with time. · To prevent dehydration, drink plenty of fluids, enough so that your urine is light yellow or clear like water. Choose water and other caffeine-free clear liquids until you feel better. If you have kidney, heart, or liver disease and have to limit fluids, talk with your doctor before you increase the amount of fluids you drink. · Take care of your cough so you can rest. A cough that brings up mucus from your lungs is common with pneumonia. It is one way your body gets rid of the infection. But if coughing keeps you from resting or causes severe fatigue and chest-wall pain, talk to your doctor. He or she may suggest that you take a medicine to reduce the cough. · Use a vaporizer or humidifier to add moisture to your bedroom. Follow the directions for cleaning the machine. · Do not smoke or allow others to smoke around you. Smoke will make your cough last longer. If you need help quitting, talk to your doctor about stop-smoking programs and medicines.  These can increase your chances of quitting for good. · Take an over-the-counter pain medicine, such as acetaminophen (Tylenol), ibuprofen (Advil, Motrin), or naproxen (Aleve). Read and follow all instructions on the label. · Do not take two or more pain medicines at the same time unless the doctor told you to. Many pain medicines have acetaminophen, which is Tylenol. Too much acetaminophen (Tylenol) can be harmful. · If you were given a spirometer to measure how well your lungs are working, use it as instructed. This can help your doctor tell how your recovery is going. · To prevent pneumonia in the future, talk to your doctor about getting a flu vaccine (once a year) and a pneumococcal vaccine (one time only for most people). When should you call for help? Call 911 anytime you think you may need emergency care. For example, call if:    · You have severe trouble breathing.    Call your doctor now or seek immediate medical care if:    · You cough up dark brown or bloody mucus (sputum).     · You have new or worse trouble breathing.     · You are dizzy or lightheaded, or you feel like you may faint.    Watch closely for changes in your health, and be sure to contact your doctor if:    · You have a new or higher fever.     · You are coughing more deeply or more often.     · You are not getting better after 2 days (48 hours).     · You do not get better as expected. Where can you learn more? Go to http://salvador-tia.info/. Enter [id number] in the search box to learn more about \"Pneumonia: Care Instructions. \"  Current as of: June 9, 2019  Content Version: 12.2  © 8711-0770 Healthwise, Incorporated. Care instructions adapted under license by "[employer]" (which disclaims liability or warranty for this information).  If you have questions about a medical condition or this instruction, always ask your healthcare professional. [address] any warranty or liability for your use of this information.

## 2019-10-22 ENCOUNTER — TELEPHONE (OUTPATIENT)
Dept: SURGERY | Age: 34
End: 2019-10-22

## 2019-10-22 NOTE — TELEPHONE ENCOUNTER
Spoke with patient regarding surgery next month. Informed patient she will be evaluated prior to surgery.

## 2019-10-22 NOTE — TELEPHONE ENCOUNTER
Please call pt back regarding her surgery and pneumonia that may cause her to change her surgery date.

## 2019-10-24 LAB
BACTERIA SPEC CULT: NORMAL
SERVICE CMNT-IMP: NORMAL

## 2019-11-05 ENCOUNTER — HOSPITAL ENCOUNTER (OUTPATIENT)
Dept: PREADMISSION TESTING | Age: 34
Discharge: HOME | End: 2019-11-05
Payer: COMMERCIAL

## 2019-11-05 ENCOUNTER — OFFICE VISIT (OUTPATIENT)
Dept: SURGERY | Age: 34
End: 2019-11-05

## 2019-11-05 VITALS
BODY MASS INDEX: 44.56 KG/M2 | WEIGHT: 261 LBS | SYSTOLIC BLOOD PRESSURE: 120 MMHG | TEMPERATURE: 98 F | RESPIRATION RATE: 20 BRPM | HEART RATE: 76 BPM | HEIGHT: 64 IN | DIASTOLIC BLOOD PRESSURE: 81 MMHG

## 2019-11-05 VITALS
DIASTOLIC BLOOD PRESSURE: 81 MMHG | HEART RATE: 76 BPM | WEIGHT: 261 LBS | RESPIRATION RATE: 20 BRPM | TEMPERATURE: 98 F | HEIGHT: 64 IN | OXYGEN SATURATION: 98 % | BODY MASS INDEX: 44.56 KG/M2 | SYSTOLIC BLOOD PRESSURE: 120 MMHG

## 2019-11-05 VITALS
OXYGEN SATURATION: 98 % | HEART RATE: 76 BPM | DIASTOLIC BLOOD PRESSURE: 81 MMHG | HEIGHT: 64 IN | RESPIRATION RATE: 20 BRPM | TEMPERATURE: 98 F | BODY MASS INDEX: 44.56 KG/M2 | SYSTOLIC BLOOD PRESSURE: 120 MMHG | WEIGHT: 261 LBS

## 2019-11-05 DIAGNOSIS — E66.01 MORBID OBESITY DUE TO EXCESS CALORIES (HCC): Primary | ICD-10-CM

## 2019-11-05 DIAGNOSIS — G89.18 POST-OP PAIN: ICD-10-CM

## 2019-11-05 DIAGNOSIS — E28.2 PCOS (POLYCYSTIC OVARIAN SYNDROME): ICD-10-CM

## 2019-11-05 DIAGNOSIS — Z01.818 PRE-OP EXAM: Primary | ICD-10-CM

## 2019-11-05 DIAGNOSIS — I10 ESSENTIAL HYPERTENSION: ICD-10-CM

## 2019-11-05 DIAGNOSIS — E78.00 HYPERCHOLESTEREMIA: ICD-10-CM

## 2019-11-05 LAB
25(OH)D3 SERPL-MCNC: 17.9 NG/ML (ref 30–100)
ALBUMIN SERPL-MCNC: 3.6 G/DL (ref 3.5–5)
ALBUMIN/GLOB SERPL: 0.9 {RATIO} (ref 1.1–2.2)
ALP SERPL-CCNC: 84 U/L (ref 45–117)
ALT SERPL-CCNC: 26 U/L (ref 12–78)
ANION GAP SERPL CALC-SCNC: 6 MMOL/L (ref 5–15)
APPEARANCE UR: CLEAR
AST SERPL-CCNC: 14 U/L (ref 15–37)
BACTERIA URNS QL MICRO: NEGATIVE /HPF
BASOPHILS # BLD: 0.1 K/UL (ref 0–0.1)
BASOPHILS NFR BLD: 1 % (ref 0–1)
BILIRUB SERPL-MCNC: 0.6 MG/DL (ref 0.2–1)
BILIRUB UR QL: NEGATIVE
BUN SERPL-MCNC: 18 MG/DL (ref 6–20)
BUN/CREAT SERPL: 28 (ref 12–20)
CALCIUM SERPL-MCNC: 9.4 MG/DL (ref 8.5–10.1)
CHLORIDE SERPL-SCNC: 102 MMOL/L (ref 97–108)
CHOLEST SERPL-MCNC: 269 MG/DL
CO2 SERPL-SCNC: 28 MMOL/L (ref 21–32)
COLOR UR: NORMAL
CREAT SERPL-MCNC: 0.65 MG/DL (ref 0.55–1.02)
DIFFERENTIAL METHOD BLD: ABNORMAL
EOSINOPHIL # BLD: 0.3 K/UL (ref 0–0.4)
EOSINOPHIL NFR BLD: 3 % (ref 0–7)
EPITH CASTS URNS QL MICRO: NORMAL /LPF
ERYTHROCYTE [DISTWIDTH] IN BLOOD BY AUTOMATED COUNT: 14.4 % (ref 11.5–14.5)
GLOBULIN SER CALC-MCNC: 4.2 G/DL (ref 2–4)
GLUCOSE SERPL-MCNC: 91 MG/DL (ref 65–100)
GLUCOSE UR STRIP.AUTO-MCNC: NEGATIVE MG/DL
HCT VFR BLD AUTO: 38.9 % (ref 35–47)
HGB BLD-MCNC: 12.5 G/DL (ref 11.5–16)
HGB UR QL STRIP: NEGATIVE
HYALINE CASTS URNS QL MICRO: NORMAL /LPF (ref 0–5)
IMM GRANULOCYTES # BLD AUTO: 0 K/UL (ref 0–0.04)
IMM GRANULOCYTES NFR BLD AUTO: 0 % (ref 0–0.5)
KETONES UR QL STRIP.AUTO: NEGATIVE MG/DL
LEUKOCYTE ESTERASE UR QL STRIP.AUTO: NEGATIVE
LYMPHOCYTES # BLD: 2.8 K/UL (ref 0.8–3.5)
LYMPHOCYTES NFR BLD: 34 % (ref 12–49)
MAGNESIUM SERPL-MCNC: 2 MG/DL (ref 1.6–2.4)
MCH RBC QN AUTO: 26.7 PG (ref 26–34)
MCHC RBC AUTO-ENTMCNC: 32.1 G/DL (ref 30–36.5)
MCV RBC AUTO: 82.9 FL (ref 80–99)
MONOCYTES # BLD: 0.6 K/UL (ref 0–1)
MONOCYTES NFR BLD: 8 % (ref 5–13)
NEUTS SEG # BLD: 4.5 K/UL (ref 1.8–8)
NEUTS SEG NFR BLD: 54 % (ref 32–75)
NITRITE UR QL STRIP.AUTO: NEGATIVE
NRBC # BLD: 0 K/UL (ref 0–0.01)
NRBC BLD-RTO: 0 PER 100 WBC
PH UR STRIP: 7.5 [PH] (ref 5–8)
PLATELET # BLD AUTO: 436 K/UL (ref 150–400)
PMV BLD AUTO: 10.5 FL (ref 8.9–12.9)
POTASSIUM SERPL-SCNC: 4 MMOL/L (ref 3.5–5.1)
PROT SERPL-MCNC: 7.8 G/DL (ref 6.4–8.2)
PROT UR STRIP-MCNC: NEGATIVE MG/DL
RBC # BLD AUTO: 4.69 M/UL (ref 3.8–5.2)
RBC #/AREA URNS HPF: NORMAL /HPF (ref 0–5)
SODIUM SERPL-SCNC: 136 MMOL/L (ref 136–145)
SP GR UR REFRACTOMETRY: 1.01 (ref 1–1.03)
T4 FREE SERPL-MCNC: 1.1 NG/DL (ref 0.8–1.5)
TSH SERPL DL<=0.05 MIU/L-ACNC: 1.74 UIU/ML (ref 0.36–3.74)
UA: UC IF INDICATED,UAUC: NORMAL
UROBILINOGEN UR QL STRIP.AUTO: 0.2 EU/DL (ref 0.2–1)
WBC # BLD AUTO: 8.2 K/UL (ref 3.6–11)
WBC URNS QL MICRO: NORMAL /HPF (ref 0–4)

## 2019-11-05 PROCEDURE — 81001 URINALYSIS AUTO W/SCOPE: CPT

## 2019-11-05 PROCEDURE — 84439 ASSAY OF FREE THYROXINE: CPT

## 2019-11-05 PROCEDURE — 83735 ASSAY OF MAGNESIUM: CPT

## 2019-11-05 PROCEDURE — 36415 COLL VENOUS BLD VENIPUNCTURE: CPT

## 2019-11-05 PROCEDURE — 80053 COMPREHEN METABOLIC PANEL: CPT

## 2019-11-05 PROCEDURE — 84443 ASSAY THYROID STIM HORMONE: CPT

## 2019-11-05 PROCEDURE — 82465 ASSAY BLD/SERUM CHOLESTEROL: CPT

## 2019-11-05 PROCEDURE — 82306 VITAMIN D 25 HYDROXY: CPT

## 2019-11-05 PROCEDURE — 85025 COMPLETE CBC W/AUTO DIFF WBC: CPT

## 2019-11-05 RX ORDER — GABAPENTIN 100 MG/1
100 CAPSULE ORAL 3 TIMES DAILY
Qty: 15 CAP | Refills: 0 | Status: SHIPPED | OUTPATIENT
Start: 2019-11-05 | End: 2019-11-10

## 2019-11-05 RX ORDER — CALCIUM CARBONATE 500(1250)
2 TABLET ORAL DAILY
COMMUNITY
End: 2022-09-27

## 2019-11-05 RX ORDER — OMEPRAZOLE 20 MG/1
20 CAPSULE, DELAYED RELEASE ORAL DAILY
Qty: 90 CAP | Refills: 1 | Status: SHIPPED | OUTPATIENT
Start: 2019-11-05 | End: 2020-04-02 | Stop reason: SDUPTHER

## 2019-11-05 RX ORDER — LANOLIN ALCOHOL/MO/W.PET/CERES
1000 CREAM (GRAM) TOPICAL DAILY
COMMUNITY
End: 2020-09-16 | Stop reason: ALTCHOICE

## 2019-11-05 RX ORDER — ONDANSETRON 4 MG/1
4 TABLET, ORALLY DISINTEGRATING ORAL
Qty: 30 TAB | Refills: 0 | Status: SHIPPED | OUTPATIENT
Start: 2019-11-05 | End: 2020-04-02 | Stop reason: SDUPTHER

## 2019-11-05 NOTE — PROGRESS NOTES
Bariatric Surgery History and Physical    Subjective: The patient is a 27 y.o. obese female scheduled for LRYGBP . Body mass index is 44.8 kg/m². Ken Hoover has tried multiple diets in her  lifetime most recently trying unsupervised diets during which she was able to lose small amounts of weight. Bariatric comorbidities present are   Past Medical History:   Diagnosis Date    Adverse effect of anesthesia     NEVER HAD ANESTHESIA    Anxiety     Arthritis     Back pain     H/O hemorrhoids     Hives     as a child - had 1 year of steroids.  Hypertension     Ill-defined condition     PCOS    Joint pain     PCOS (polycystic ovarian syndrome)     Psychiatric disorder     ANXIETY       Patient Active Problem List    Diagnosis Date Noted    Morbid obesity due to excess calories ([address] 2017    Hypercholesteremia 2016    Essential hypertension 2016    PCOS (polycystic ovarian syndrome) 2016    Allergic rhinitis due to allergen 2016    Vaginal low risk HPV DNA test positive 2016    Anxiety 2016     Past Medical History:   Diagnosis Date    Adverse effect of anesthesia     NEVER HAD ANESTHESIA    Anxiety     Arthritis     Back pain     H/O hemorrhoids     Hives     as a child - had 1 year of steroids.      Hypertension     Ill-defined condition     PCOS    Joint pain     PCOS (polycystic ovarian syndrome)     Psychiatric disorder     ANXIETY      Past Surgical History:   Procedure Laterality Date    HX GI      RECTAL POLYP    HX HEENT      WISDOM TEETH REMOVED    HX OTHER SURGICAL      rectal polyp at 9 yo    HX OTHER SURGICAL      Sigmoidoscopy - WNL pp ~    HX WISDOM TEETH EXTRACTION        Social History     Tobacco Use    Smoking status: Former Smoker     Packs/day: 0.25     Years: 15.00     Pack years: 3.75     Types: Cigarettes     Last attempt to quit: 2018     Years since quittin.2    Smokeless tobacco: Never Used   Substance Use Topics    Alcohol use: Yes     Alcohol/week: 1.0 standard drinks     Types: 1 Shots of liquor per week     Frequency: 2-4 times a month     Drinks per session: 1 or 2      Family History   Problem Relation Age of Onset    Diabetes Father     Heart Disease Father         cardiomyopathy, heart transplant    Hypertension Father     Heart Surgery Father     High Cholesterol Father     Diabetes Mother     High Cholesterol Mother     Hypertension Mother     Diabetes Maternal Grandmother     Diabetes Maternal Grandfather     Diabetes Paternal Grandmother     No Known Problems Sister     Anesth Problems Neg Hx       Prior to Admission medications    Medication Sig Start Date End Date Taking? Authorizing Provider   PEYMAN 0.35 mg tab TAKE 1 TABLET BY MOUTH EVERY DAY 8/3/19  Yes Provider, Historical   ALPRAZolam (XANAX) 0.25 mg tablet Take 1 Tab by mouth two (2) times daily as needed for Anxiety. Max Daily Amount: 0.5 mg. 8/14/19  Yes Leticia Hawthorne PA-C   amLODIPine (NORVASC) 5 mg tablet TAKE 1 TABLET BY MOUTH EVERY DAY 7/18/19  Yes Jarvis Martinez PA-C   metoprolol tartrate (LOPRESSOR) 25 mg tablet TAKE 1 TABLET BY MOUTH EVERY DAY 7/18/19  Yes Jarvis Martinez PA-C   multivitamin (ONE A DAY) tablet Take 1 Tab by mouth daily. Yes Provider, Historical   metFORMIN ER (GLUCOPHAGE XR) 500 mg tablet Take 4 Tabs by mouth daily (with dinner). Patient taking differently: Take 1,500 mg by mouth daily. Indications: polycystic ovarian syndrome, a disease with cysts in the ovaries 2/13/19  Yes Jarvis Martinez PA-C   escitalopram oxalate (LEXAPRO) 20 mg tablet TAKE ONE TABLET BY MOUTH ONCE DAILY 1/2/19  Yes Jarvis Martinez PA-C   triamterene-hydroCHLOROthiazide (DYAZIDE) 37.5-25 mg per capsule Take 1 Cap by mouth daily. 1/2/19  Yes Jarvis Martinez PA-C   calcium carbonate (OS-PEBBLES) 500 mg calcium (1,250 mg) tablet Take  by mouth daily.     Provider, Historical   cyanocobalamin 1,000 mcg tablet Take 1,000 mcg by mouth daily. Provider, Historical   omeprazole (PRILOSEC) 20 mg capsule Take 1 Cap by mouth daily. 11/5/19   Romero Goldberg NP   ondansetron (ZOFRAN ODT) 4 mg disintegrating tablet Take 1 Tab by mouth every eight (8) hours as needed for Nausea. 11/5/19   Romero Goldberg NP   gabapentin (NEURONTIN) 100 mg capsule Take 1 Cap by mouth three (3) times daily for 5 days. Max Daily Amount: 300 mg. 11/5/19 11/10/19  Romero Goldberg NP     No Known Allergies        Objective:     Visit Vitals  /81   Pulse 76   Temp 98 °F (36.7 °C)   Resp 20   Ht 5' 4\" (1.626 m)   Wt 261 lb (118.4 kg)   LMP 10/29/2019 (Approximate)   SpO2 98%   BMI 44.80 kg/m²       Lab Review:    Recent Results (from the past 24 hour(s))   T4, FREE    Collection Time: 11/05/19  8:30 AM   Result Value Ref Range    T4, Free 1.1 0.8 - 1.5 NG/DL   TSH 3RD GENERATION    Collection Time: 11/05/19  8:30 AM   Result Value Ref Range    TSH 1.74 0.36 - 3.74 uIU/mL   CBC WITH AUTOMATED DIFF    Collection Time: 11/05/19  8:45 AM   Result Value Ref Range    WBC 8.2 3.6 - 11.0 K/uL    RBC 4.69 3.80 - 5.20 M/uL    HGB 12.5 11.5 - 16.0 g/dL    HCT 38.9 35.0 - 47.0 %    MCV 82.9 80.0 - 99.0 FL    MCH 26.7 26.0 - 34.0 PG    MCHC 32.1 30.0 - 36.5 g/dL    RDW 14.4 11.5 - 14.5 %    PLATELET 165 (H) 563 - 400 K/uL    MPV 10.5 8.9 - 12.9 FL    NRBC 0.0 0  WBC    ABSOLUTE NRBC 0.00 0.00 - 0.01 K/uL    NEUTROPHILS 54 32 - 75 %    LYMPHOCYTES 34 12 - 49 %    MONOCYTES 8 5 - 13 %    EOSINOPHILS 3 0 - 7 %    BASOPHILS 1 0 - 1 %    IMMATURE GRANULOCYTES 0 0.0 - 0.5 %    ABS. NEUTROPHILS 4.5 1.8 - 8.0 K/UL    ABS. LYMPHOCYTES 2.8 0.8 - 3.5 K/UL    ABS. MONOCYTES 0.6 0.0 - 1.0 K/UL    ABS. EOSINOPHILS 0.3 0.0 - 0.4 K/UL    ABS. BASOPHILS 0.1 0.0 - 0.1 K/UL    ABS. IMM.  GRANS. 0.0 0.00 - 0.04 K/UL    DF AUTOMATED     CHOLESTEROL, TOTAL    Collection Time: 11/05/19  8:45 AM   Result Value Ref Range    Cholesterol, total 269 (H) <200 MG/DL   MAGNESIUM Collection Time: 11/05/19  8:45 AM   Result Value Ref Range    Magnesium 2.0 1.6 - 2.4 mg/dL   METABOLIC PANEL, COMPREHENSIVE    Collection Time: 11/05/19  8:45 AM   Result Value Ref Range    Sodium 136 136 - 145 mmol/L    Potassium 4.0 3.5 - 5.1 mmol/L    Chloride 102 97 - 108 mmol/L    CO2 28 21 - 32 mmol/L    Anion gap 6 5 - 15 mmol/L    Glucose 91 65 - 100 mg/dL    BUN 18 6 - 20 MG/DL    Creatinine 0.65 0.55 - 1.02 MG/DL    BUN/Creatinine ratio 28 (H) 12 - 20      GFR est AA >60 >60 ml/min/1.73m2    GFR est non-AA >60 >60 ml/min/1.73m2    Calcium 9.4 8.5 - 10.1 MG/DL    Bilirubin, total 0.6 0.2 - 1.0 MG/DL    ALT (SGPT) 26 12 - 78 U/L    AST (SGOT) 14 (L) 15 - 37 U/L    Alk. phosphatase 84 45 - 117 U/L    Protein, total 7.8 6.4 - 8.2 g/dL    Albumin 3.6 3.5 - 5.0 g/dL    Globulin 4.2 (H) 2.0 - 4.0 g/dL    A-G Ratio 0.9 (L) 1.1 - 2.2     URINALYSIS W/ REFLEX CULTURE    Collection Time: 11/05/19  8:45 AM   Result Value Ref Range    Color YELLOW/STRAW      Appearance CLEAR CLEAR      Specific gravity 1.008 1.003 - 1.030      pH (UA) 7.5 5.0 - 8.0      Protein NEGATIVE  NEG mg/dL    Glucose NEGATIVE  NEG mg/dL    Ketone NEGATIVE  NEG mg/dL    Bilirubin NEGATIVE  NEG      Blood NEGATIVE  NEG      Urobilinogen 0.2 0.2 - 1.0 EU/dL    Nitrites NEGATIVE  NEG      Leukocyte Esterase NEGATIVE  NEG      WBC 0-4 0 - 4 /hpf    RBC 0-5 0 - 5 /hpf    Epithelial cells FEW FEW /lpf    Bacteria NEGATIVE  NEG /hpf    UA:UC IF INDICATED CULTURE NOT INDICATED BY UA RESULT CNI      Hyaline cast 0-2 0 - 5 /lpf   VITAMIN D, 25 HYDROXY    Collection Time: 11/05/19  8:45 AM   Result Value Ref Range    Vitamin D 25-Hydroxy 17.9 (L) 30 - 100 ng/mL       Assessment:     Morbid obesity with comorbidities; no success with medical management. Plan:     laparoscopic gastric bypass surgery    All of her questions have been answered and she has signed a consent for this operation following review of the detailed informed consent document.  She was counseled on the 2 week preoperative liver shrinking diet. The postoperative dietary changes and discharge information has been given to her in the form of a booklet. 28 minutes spent with patient (greater than 50% of time in face-face consultation reviewing potential risks, anticipated hospital course, post-op diet, activity restrictions).       Signed By: Germain Mcmullen MD     November 5, 2019

## 2019-11-05 NOTE — PROGRESS NOTES
HISTORY OF PRESENT ILLNESS  History and Physical.     The patient is a 27 y.o. obese female here for history and physical for Gastric bypass surgery. Body mass index is 44.8 kg/m². Visit Vitals  /81   Pulse 76   Temp 98 °F (36.7 °C) (Oral)   Resp 20   Ht 5' 4\" (1.626 m)   Wt 261 lb (118.4 kg)   LMP 10/29/2019 (Approximate)   SpO2 98%   BMI 44.80 kg/m²         Patient has an advanced directive: not on file     Ms. Florence has a reminder for a \"due or due soon\" health maintenance. I have asked that she contact her primary care provider for follow-up on this health maintenance. Patient Active Problem List    Diagnosis Date Noted    Morbid obesity due to excess calories ([facility] [address] 2017    Hypercholesteremia 2016    Essential hypertension 2016    PCOS (polycystic ovarian syndrome) 2016    Allergic rhinitis due to allergen 2016    Vaginal low risk HPV DNA test positive 2016    Anxiety 2016     Past Medical History:   Diagnosis Date    Adverse effect of anesthesia     NEVER HAD ANESTHESIA    Anxiety     Arthritis     Back pain     H/O hemorrhoids     Hives     as a child - had 1 year of steroids.  Hypertension     Ill-defined condition     PCOS    Joint pain     PCOS (polycystic ovarian syndrome)     Psychiatric disorder     ANXIETY      Past Surgical History:   Procedure Laterality Date    HX GI      RECTAL POLYP    HX HEENT      WISDOM TEETH REMOVED    HX OTHER SURGICAL      rectal polyp at 11 yo    HX OTHER SURGICAL      Sigmoidoscopy - WNL pp ~    HX WISDOM TEETH EXTRACTION        Social History     Tobacco Use    Smoking status: Former Smoker     Packs/day: 0.25     Years: 15.00     Pack years: 3.75     Types: Cigarettes     Last attempt to quit: 2018     Years since quittin.2    Smokeless tobacco: Never Used   Substance Use Topics    Alcohol use:  Yes     Alcohol/week: 1.0 standard drinks     Types: 1 Shots of liquor per week Frequency: 2-4 times a month     Drinks per session: 1 or 2      Family History   Problem Relation Age of Onset    Diabetes Father     Heart Disease Father         cardiomyopathy, heart transplant    Hypertension Father     Heart Surgery Father     High Cholesterol Father     Diabetes Mother     High Cholesterol Mother     Hypertension Mother     Diabetes Maternal Grandmother     Diabetes Maternal Grandfather     Diabetes Paternal Grandmother     No Known Problems Sister     Anesth Problems Neg Hx       Prior to Admission medications    Medication Sig Start Date End Date Taking? Authorizing Provider   calcium carbonate (OS-PEBBLES) 500 mg calcium (1,250 mg) tablet Take  by mouth daily. Yes Provider, Historical   cyanocobalamin 1,000 mcg tablet Take 1,000 mcg by mouth daily. Yes Provider, Historical   PEYMAN 0.35 mg tab TAKE 1 TABLET BY MOUTH EVERY DAY 8/3/19  Yes Provider, Historical   ALPRAZolam (XANAX) 0.25 mg tablet Take 1 Tab by mouth two (2) times daily as needed for Anxiety. Max Daily Amount: 0.5 mg. 8/14/19  Yes Yvette Liao PA-C   amLODIPine (NORVASC) 5 mg tablet TAKE 1 TABLET BY MOUTH EVERY DAY 7/18/19  Yes Shabana Baker PA-C   metoprolol tartrate (LOPRESSOR) 25 mg tablet TAKE 1 TABLET BY MOUTH EVERY DAY 7/18/19  Yes Shabana Baker PA-C   multivitamin (ONE A DAY) tablet Take 1 Tab by mouth daily. Yes Provider, Historical   metFORMIN ER (GLUCOPHAGE XR) 500 mg tablet Take 4 Tabs by mouth daily (with dinner). Patient taking differently: Take 1,500 mg by mouth daily. Indications: polycystic ovarian syndrome, a disease with cysts in the ovaries 2/13/19  Yes Shabana Baker PA-C   escitalopram oxalate (LEXAPRO) 20 mg tablet TAKE ONE TABLET BY MOUTH ONCE DAILY 1/2/19  Yes Shabana Baker PA-C   triamterene-hydroCHLOROthiazide (DYAZIDE) 37.5-25 mg per capsule Take 1 Cap by mouth daily.  1/2/19  Yes Shabana Baker PA-C     No Known Allergies    HPI    Review of Systems Constitutional: Negative for chills, fever and malaise/fatigue. HENT: Negative for congestion, ear pain, hearing loss, sinus pain, sore throat and tinnitus. Eyes: Negative for blurred vision, double vision, pain, discharge and redness. Respiratory: Negative for cough, sputum production, shortness of breath and wheezing. Cardiovascular: Negative for chest pain, palpitations and leg swelling. Gastrointestinal: Negative for abdominal pain, constipation, diarrhea, heartburn, nausea and vomiting. Genitourinary: Negative for dysuria, frequency and urgency. Musculoskeletal: Positive for joint pain (right knee). Negative for back pain and neck pain. Skin: Negative for itching and rash. Neurological: Negative for dizziness, seizures and headaches. Psychiatric/Behavioral: Negative for depression. The patient does not have insomnia. Physical Exam   Constitutional: She is oriented to person, place, and time. She appears well-developed and well-nourished. Cardiovascular: Normal rate and regular rhythm. Exam reveals no gallop and no friction rub. No murmur heard. Pulmonary/Chest: Effort normal and breath sounds normal.   Abdominal: Soft. Bowel sounds are normal. She exhibits no distension. There is no tenderness. No masses or hernias noted. Neurological: She is alert and oriented to person, place, and time. Skin: Skin is warm and dry. Psychiatric: She has a normal mood and affect. ASSESSMENT and PLAN    Assessment:     Morbid obesity with body mass index of 44 Co-morbids listed below    ICD-10-CM ICD-9-CM    1. Morbid obesity due to excess calories (HCC) E66.01 278.01    2. BMI 40.0-44.9, adult ([facility] Z68.41 V85.41    3. Essential hypertension I10 401.9    4. Hypercholesteremia E78.00 272.0    5. Post-op pain G89.18 338.18        Plan:   1& 2.  Morbid Obesity- Patient is schedule for Gastric bypass with Dr.Brennan Carrillo on 11/20/2019 at [address] patient in regard to post diet restrictions, follow- up office visits, follow- up care. Patient as received educational booklet and vitamin list. Reviewed liver shrinking diet. Post op medications prescribed: Zofran, Prilosec and Neurtontin  Reviewed ERAS protocol: Take 1000mg of Tylenol with lunch and dinner the day before surgery. You may drink clear liquids up to 3 hours before surgery. Do NOT start medications prescribed until after surgery. Reviewed with patient that lifelong vitamin supplementation is recommended by provider as well as risks of non-compliance. 3. HTN- Follow up with PCP after surgery  4. Hypercholesterolemia- follow up with PCP after surgery  5. Post-op pain. - Neurontin prescribed for post-op pain after surgery. Advised patient to stop Birth control now and she may resume it 4 weeks post-op. Stop Metformin 7 days before surgery. 17 minutes spent face to face with patient, >50% of time spent cousneling. Pt verbalized understanding and questions were answered to the best of my knowledge and ability.             Signed By: Bonnie Ayoub NP     November 5, 2019

## 2019-11-05 NOTE — PERIOP NOTES
Patient given surgical site infection FAQ handout and CHG soap. Preop instructions reviewed and patient verbalizes understanding of instructions. Patient has been given the opportunity to ask additional questions.

## 2019-11-05 NOTE — PATIENT INSTRUCTIONS
Learning About How to Prepare for Weight-Loss Surgery  How can you prepare for weight-loss surgery? Having weight-loss surgery (also called bariatric surgery) is a big step. You can prepare for surgery by having a plan. Your plan may include your goals for losing weight and how to makes changes in your diet, activity, and lifestyle to help raise your chances of success. One way to prepare for surgery is to think about your goal or reason why you want to reach a healthy weight. Do you want to lower your blood pressure, cholesterol, or blood sugar? Do you want to be able to sleep better, play with your kids, or walk around the block? Having a reason can help you stay with your plan and meet your goals. Your weight-loss surgery team can help you meet your goals and get ready for surgery. Dolores Rodrigez work with a team that's trained to help you lose weight and make healthy changes in your life. This team may include:  · A medical doctor or nurse to help manage your care and schedule tests before surgery. · A surgeon who specializes in weight-loss surgery. · A registered dietitian to help you plan meals and make changes in the way you eat. · An exercise specialist to help you be more active and get stronger. · A therapist or counselor to help you learn why you eat and teach you ways to deal with stress and your emotions. Your team will also be there to help you prepare for life after surgery. They will help you adjust to new ways of eating and changes to your body. How will weight-loss surgery affect your life? You have likely thought a lot about how surgery may affect your life--how you will eat, how your body will look, or how you will feel. Some people feel overwhelmed with these changes. But planning can help you prepare for the changes and meet your weight-loss goals. One important step in your plan is to learn about the ways surgery will affect your life. These may include:  · A slimmer you.  You probably will lose weight very quickly in the first few months after surgery. As time goes on, your weight loss will slow down. How much weight you lose depends on what type of surgery you had and how well your new eating and activity plans are working for you. · A new way of eating. Success in reaching and keeping a healthy weight depends on making lifelong changes in how you eat. After surgery, you raise your chances of success if you:  ? Eat just a few ounces of food at a time. ? Eat very slowly and chew your food to mush. ? Don't drink for 30 minutes before you eat, during your meal, and for 30 minutes after you eat. ? Are careful about drinking alcohol. ? Avoid foods that are high in fat or sugar. ? Take vitamin and mineral supplements. · A healthier you. Weight-loss surgery can have some real health benefits. Problems like diabetes, high blood pressure, and sleep apnea may go away--or at least become easier to manage. · A more active you. After surgery, being active on most days of the week will help you reach your weight goal and avoid gaining back the weight you lose. · A lot of extra skin. When you lose weight quickly, you may have a lot of extra skin. That's normal. You can have surgery to remove the extra skin if it bothers you. There are going to be some ups and downs while you get used to these changes. So another way to adjust is to identify who can help support you. Getting support from friends and family can help. And joining a support group for people who have had the surgery can be a big help too, because they know what you're going through. As you know, it's a big decision to have weight-loss surgery. But when you have a plan, you can focus on losing weight and living a healthier life. So what steps can you take to prepare for weight-loss surgery? Will you set some goals? Will you learn about how surgery can affect your life? How about asking family or friends for help?  Write out your plan. Then get ready. Where can you learn more? Go to http://salvador-tia.info/. Enter M859 in the search box to learn more about \"Learning About How to Prepare for Weight-Loss Surgery. \"  Current as of: March 28, 2019  Content Version: 12.2  © 5860-1248 [employer]. Care instructions adapted under license by [employer] (which disclaims liability or warranty for this information). If you have questions about a medical condition or this instruction, always ask your healthcare professional. [address] any warranty or liability for your use of this information.

## 2019-11-05 NOTE — PATIENT INSTRUCTIONS
Learning About Bariatric Surgery  What is bariatric surgery? Bariatric surgery is surgery to help you lose weight. This type of surgery is only used for people who are very overweight and have not been able to lose weight with diet and exercise. This surgery makes the stomach smaller. Some types of surgery also change the connection between your stomach and intestines. How is bariatric surgery done? Bariatric surgery may be either \"open\" or \"laparoscopic. \" Open surgery is done through a large cut (incision) in the belly. Laparoscopic surgery is done through several small cuts. The doctor puts a lighted tube, or scope, and other surgical tools through small cuts in your belly. The doctor is able to see your organs with the scope. There are different types of bariatric surgery. Gastric sleeve surgery  The surgery is usually done through several small incisions in the belly. The doctor removes more than half of your stomach. This leaves a thin sleeve, or tube, that is about the size of a banana. Because part of your stomach has been removed, this can't be reversed. Jann-en-Y gastric bypass surgery  Jann-en-Y (say \"marcell-en-why\") surgery changes the connection between the stomach and the intestines. The doctor separates a section of your stomach from the rest of your stomach. This makes a small pouch. The new pouch will hold the food you eat. The doctor connects the stomach pouch to the middle part of the small intestine. Gastric banding surgery  The surgery is usually done through several small incisions in the belly. The doctor wraps a band around the upper part of the stomach. This creates a small pouch. The small size of the pouch means that you will get full after you eat just a small amount of food. The doctor can inflate or deflate the band to adjust the size. This lets the doctor adjust how quickly food passes from the new pouch into the stomach.  It does not change the connection between the stomach and the intestines. What can you expect after the surgery? You may stay in the hospital for one or more days after the surgery. How long you stay depends on the type of surgery you had. Most people need 2 to 4 weeks before they are ready to get back to their usual routine. For the first 2 to 6 weeks after surgery, you probably will need to follow a liquid or soft diet. Bit by bit, you will be able to eat more solid foods. Your doctor may advise you to work with a dietitian. This way you'll be sure to get enough protein, vitamins, and minerals while you are losing weight. Even with a healthy diet, you may need to take vitamin and mineral supplements. After surgery, you will not be able to eat very much at one time. You will get full quickly. Try not to eat too much at one time or eat foods that are high in fat or sugar. If you do, you may vomit, get stomach pain, or have diarrhea. You probably will lose weight very quickly in the first few months after surgery. As time goes on, your weight loss will slow down. You will have regular doctor visits to check how you are doing. Think of bariatric surgery as a tool to help you lose weight. It isn't an instant fix. You will still need to eat a healthy diet and get regular exercise. This will help you reach your weight goal and avoid regaining the weight you lose. Follow-up care is a key part of your treatment and safety. Be sure to make and go to all appointments, and call your doctor if you are having problems. It's also a good idea to know your test results and keep a list of the medicines you take. Where can you learn more? Go to http://salvador-tia.info/. Enter G469 in the search box to learn more about \"Learning About Bariatric Surgery. \"  Current as of: March 28, 2019  Content Version: 12.2  © 8501-0692 [employer].  Care instructions adapted under license by [employer] (which disclaims liability or warranty for this information). If you have questions about a medical condition or this instruction, always ask your healthcare professional. [place] [address] any warranty or liability for your use of this information.

## 2019-11-05 NOTE — PROGRESS NOTES
1. Have you been to the ER, urgent care clinic since your last visit? Hospitalized since your last visit? No    2. Have you seen or consulted any other health care providers outside of the [address] [place] [address] since your last visit? Include any pap smears or colon screening.  No

## 2019-11-05 NOTE — PROGRESS NOTES
1. Have you been to the ER, urgent care clinic since your last visit? Hospitalized since your last visit? 10/19/2019 [facility]- ED for pneumonia. 2. Have you seen or consulted any other health care providers outside of the [address] [place] [address] since your last visit? Include any pap smears or colon screening.  no

## 2019-11-06 ENCOUNTER — TELEPHONE (OUTPATIENT)
Dept: SURGERY | Age: 34
End: 2019-11-06

## 2019-11-06 NOTE — TELEPHONE ENCOUNTER
----- Message from Panfilo Kwon RN sent at 11/6/2019 11:53 AM EST -----  Regarding: ABNORMAL LABS  PLEASE NOTE THE FOLLOWING ABNORMAL LABS:  [facility]  VITAMIN D-17.9  BUN/CREATININE RATIO-28  SURGERY IS SCHEDULED FOR 11/20/19.

## 2019-11-06 NOTE — PERIOP NOTES
THE FOLLOWING MESSAGE WAS SENT TO [address] [place] [address] DR. BRYANT'S OFFICE VIA [employer]:  PLEASE NOTE THE FOLLOWING ABNORMAL LABS:  [facility]  VITAMIN D-17.9  BUN/CREATININE RATIO-28  SURGERY IS SCHEDULED FOR 11/20/19.

## 2019-11-07 ENCOUNTER — TELEPHONE (OUTPATIENT)
Dept: SURGERY | Age: 34
End: 2019-11-07

## 2019-11-07 RX ORDER — ERGOCALCIFEROL 1.25 MG/1
50000 CAPSULE ORAL 2 TIMES DAILY
Qty: 8 CAP | Refills: 2 | Status: SHIPPED | OUTPATIENT
Start: 2019-11-07 | End: 2019-11-11

## 2019-11-07 NOTE — TELEPHONE ENCOUNTER
I called the patient back and she said she got a call from [facility] about her low Vitamin D and then she said she has not been able to get her ultrasound scheduled as yet she said they call her yesterday and she call them back but has not gotten a return call as yet. I told her to call them back today. Pt in agreement.

## 2019-11-07 NOTE — TELEPHONE ENCOUNTER
Patient returning phone call. I did state to the patient that a mychart message was sent to her and prescription was sent to her pharmacy. I stated to the patient I would let nurse know to give her a return call.

## 2019-11-11 RX ORDER — ERGOCALCIFEROL 1.25 MG/1
50000 CAPSULE ORAL
Qty: 8 CAP | Refills: 2 | Status: SHIPPED | OUTPATIENT
Start: 2019-11-11 | End: 2020-06-17 | Stop reason: ALTCHOICE

## 2019-11-13 ENCOUNTER — HOSPITAL ENCOUNTER (OUTPATIENT)
Dept: ULTRASOUND IMAGING | Age: 34
Discharge: HOME | End: 2019-11-13
Attending: NURSE PRACTITIONER
Payer: COMMERCIAL

## 2019-11-13 DIAGNOSIS — Z01.818 PRE-OP EXAM: ICD-10-CM

## 2019-11-13 PROCEDURE — 76700 US EXAM ABDOM COMPLETE: CPT

## 2019-11-19 ENCOUNTER — ANESTHESIA EVENT (OUTPATIENT)
Dept: SURGERY | Age: 34
DRG: 621 | End: 2019-11-19
Payer: COMMERCIAL

## 2019-11-20 ENCOUNTER — HOSPITAL ENCOUNTER (INPATIENT)
Age: 34
LOS: 2 days | Discharge: HOME | DRG: 621 | End: 2019-11-22
Attending: SURGERY | Admitting: SURGERY
Payer: COMMERCIAL

## 2019-11-20 ENCOUNTER — ANESTHESIA (OUTPATIENT)
Dept: SURGERY | Age: 34
DRG: 621 | End: 2019-11-20
Payer: COMMERCIAL

## 2019-11-20 DIAGNOSIS — Z98.84 S/P GASTRIC BYPASS: Primary | ICD-10-CM

## 2019-11-20 PROBLEM — E66.01 MORBID OBESITY (HCC): Status: ACTIVE | Noted: 2019-11-20

## 2019-11-20 LAB — HCG UR QL: NEGATIVE

## 2019-11-20 PROCEDURE — 77030039266 HC ADH SKN EXOFIN S2SG -A: Performed by: SURGERY

## 2019-11-20 PROCEDURE — 77030020747 HC TU INSUF ENDOSC TELE -A: Performed by: SURGERY

## 2019-11-20 PROCEDURE — 77030034667 HC ACC PLATFRM ENDO GELPNT AMR -E: Performed by: SURGERY

## 2019-11-20 PROCEDURE — 77030009957 HC RELD ENDOSTCH COVD -C: Performed by: SURGERY

## 2019-11-20 PROCEDURE — 74011000258 HC RX REV CODE- 258: Performed by: SURGERY

## 2019-11-20 PROCEDURE — 81025 URINE PREGNANCY TEST: CPT

## 2019-11-20 PROCEDURE — 74011000250 HC RX REV CODE- 250: Performed by: SURGERY

## 2019-11-20 PROCEDURE — 77030026438 HC STYL ET INTUB CARD -A: Performed by: NURSE ANESTHETIST, CERTIFIED REGISTERED

## 2019-11-20 PROCEDURE — 77030036732 HC RELD STPLR VASC J&J -F: Performed by: SURGERY

## 2019-11-20 PROCEDURE — 77030009956 HC RELD ENDOSTCH COVD -B: Performed by: SURGERY

## 2019-11-20 PROCEDURE — 74011250636 HC RX REV CODE- 250/636: Performed by: NURSE ANESTHETIST, CERTIFIED REGISTERED

## 2019-11-20 PROCEDURE — 76060000037 HC ANESTHESIA 3 TO 3.5 HR: Performed by: SURGERY

## 2019-11-20 PROCEDURE — 88300 SURGICAL PATH GROSS: CPT

## 2019-11-20 PROCEDURE — 77030020053 HC ELECTRD LAPSCP COVD -B: Performed by: SURGERY

## 2019-11-20 PROCEDURE — 77030040922 HC BLNKT HYPOTHRM STRY -A

## 2019-11-20 PROCEDURE — 77030008771 HC TU NG SALEM SUMP -A: Performed by: NURSE ANESTHETIST, CERTIFIED REGISTERED

## 2019-11-20 PROCEDURE — 77030008608 HC TRCR ENDOSC SMTH AMR -B: Performed by: SURGERY

## 2019-11-20 PROCEDURE — 77030002933 HC SUT MCRYL J&J -A: Performed by: SURGERY

## 2019-11-20 PROCEDURE — 74011000250 HC RX REV CODE- 250: Performed by: NURSE ANESTHETIST, CERTIFIED REGISTERED

## 2019-11-20 PROCEDURE — 77030009968 HC RELD STPLR ENDOSC J&J -D: Performed by: SURGERY

## 2019-11-20 PROCEDURE — 77030008023 HC RELD SUT ENDOSC COVD -B: Performed by: SURGERY

## 2019-11-20 PROCEDURE — 77030008684 HC TU ET CUF COVD -B: Performed by: NURSE ANESTHETIST, CERTIFIED REGISTERED

## 2019-11-20 PROCEDURE — 77030036731 HC STPLR ENDOSC J&J -F: Performed by: SURGERY

## 2019-11-20 PROCEDURE — 76210000016 HC OR PH I REC 1 TO 1.5 HR: Performed by: SURGERY

## 2019-11-20 PROCEDURE — 77030031139 HC SUT VCRL2 J&J -A: Performed by: SURGERY

## 2019-11-20 PROCEDURE — 77030008756 HC TU IRR SUC STRY -B: Performed by: SURGERY

## 2019-11-20 PROCEDURE — 74011250636 HC RX REV CODE- 250/636: Performed by: ANESTHESIOLOGY

## 2019-11-20 PROCEDURE — P9045 ALBUMIN (HUMAN), 5%, 250 ML: HCPCS | Performed by: NURSE ANESTHETIST, CERTIFIED REGISTERED

## 2019-11-20 PROCEDURE — 77030016151 HC PROTCTR LNS DFOG COVD -B: Performed by: SURGERY

## 2019-11-20 PROCEDURE — 77030008437 HC REINF STRP REINF SEMGD WLGO -C: Performed by: SURGERY

## 2019-11-20 PROCEDURE — 77030008728 HC TU GAST LAPSCP APOL -C: Performed by: SURGERY

## 2019-11-20 PROCEDURE — 77030025303 HC STPLR ENDOSC J&J -G: Performed by: SURGERY

## 2019-11-20 PROCEDURE — 77030018846 HC SOL IRR STRL H20 ICUM -A: Performed by: SURGERY

## 2019-11-20 PROCEDURE — 77030040956 HC DSCTR SONICISION MEDT -I: Performed by: SURGERY

## 2019-11-20 PROCEDURE — 77030040361 HC SLV COMPR DVT MDII -B: Performed by: SURGERY

## 2019-11-20 PROCEDURE — 77030037032 HC INSRT SCIS CLICKLLINE DISP STOR -B: Performed by: SURGERY

## 2019-11-20 PROCEDURE — 77030040506 HC DRN WND MDII -A: Performed by: SURGERY

## 2019-11-20 PROCEDURE — 74011250636 HC RX REV CODE- 250/636

## 2019-11-20 PROCEDURE — 77030018836 HC SOL IRR NACL ICUM -A: Performed by: SURGERY

## 2019-11-20 PROCEDURE — 74011250637 HC RX REV CODE- 250/637: Performed by: SURGERY

## 2019-11-20 PROCEDURE — 77030014090 HC TRCR OPT AMR -B: Performed by: SURGERY

## 2019-11-20 PROCEDURE — 77030022764 HC INTRO ENDOSC EEA COVD -B: Performed by: SURGERY

## 2019-11-20 PROCEDURE — 77030034821 HC DEV ENDOSC DST ORVIL COVD -C: Performed by: SURGERY

## 2019-11-20 PROCEDURE — 77030007955 HC PCH ENDOSC SPEC J&J -B: Performed by: SURGERY

## 2019-11-20 PROCEDURE — 77030020829: Performed by: SURGERY

## 2019-11-20 PROCEDURE — 77030011640 HC PAD GRND REM COVD -A: Performed by: SURGERY

## 2019-11-20 PROCEDURE — 77030002916 HC SUT ETHLN J&J -A: Performed by: SURGERY

## 2019-11-20 PROCEDURE — 74011250636 HC RX REV CODE- 250/636: Performed by: SURGERY

## 2019-11-20 PROCEDURE — 65660000000 HC RM CCU STEPDOWN

## 2019-11-20 PROCEDURE — 77030020263 HC SOL INJ SOD CL0.9% LFCR 1000ML: Performed by: SURGERY

## 2019-11-20 PROCEDURE — 77030012770 HC TRCR OPT FX AMR -B: Performed by: SURGERY

## 2019-11-20 PROCEDURE — 76010000132 HC OR TIME 2.5 TO 3 HR: Performed by: SURGERY

## 2019-11-20 PROCEDURE — 77030013079 HC BLNKT BAIR HGGR 3M -A: Performed by: ANESTHESIOLOGY

## 2019-11-20 RX ORDER — SODIUM CHLORIDE 9 MG/ML
25 INJECTION, SOLUTION INTRAVENOUS CONTINUOUS
Status: DISCONTINUED | OUTPATIENT
Start: 2019-11-20 | End: 2019-11-20 | Stop reason: HOSPADM

## 2019-11-20 RX ORDER — ROCURONIUM BROMIDE 10 MG/ML
INJECTION, SOLUTION INTRAVENOUS AS NEEDED
Status: DISCONTINUED | OUTPATIENT
Start: 2019-11-20 | End: 2019-11-20 | Stop reason: HOSPADM

## 2019-11-20 RX ORDER — FENTANYL CITRATE 50 UG/ML
INJECTION, SOLUTION INTRAMUSCULAR; INTRAVENOUS AS NEEDED
Status: DISCONTINUED | OUTPATIENT
Start: 2019-11-20 | End: 2019-11-20 | Stop reason: HOSPADM

## 2019-11-20 RX ORDER — ALBUMIN HUMAN 50 G/1000ML
SOLUTION INTRAVENOUS AS NEEDED
Status: DISCONTINUED | OUTPATIENT
Start: 2019-11-20 | End: 2019-11-20 | Stop reason: HOSPADM

## 2019-11-20 RX ORDER — HYDROMORPHONE HYDROCHLORIDE 1 MG/ML
1 INJECTION, SOLUTION INTRAMUSCULAR; INTRAVENOUS; SUBCUTANEOUS
Status: DISCONTINUED | OUTPATIENT
Start: 2019-11-20 | End: 2019-11-22 | Stop reason: HOSPADM

## 2019-11-20 RX ORDER — EPHEDRINE SULFATE/0.9% NACL/PF 50 MG/5 ML
5 SYRINGE (ML) INTRAVENOUS AS NEEDED
Status: DISCONTINUED | OUTPATIENT
Start: 2019-11-20 | End: 2019-11-20 | Stop reason: HOSPADM

## 2019-11-20 RX ORDER — SODIUM CHLORIDE, SODIUM LACTATE, POTASSIUM CHLORIDE, CALCIUM CHLORIDE 600; 310; 30; 20 MG/100ML; MG/100ML; MG/100ML; MG/100ML
100 INJECTION, SOLUTION INTRAVENOUS CONTINUOUS
Status: DISCONTINUED | OUTPATIENT
Start: 2019-11-20 | End: 2019-11-20 | Stop reason: HOSPADM

## 2019-11-20 RX ORDER — ACETAMINOPHEN 500 MG
1000 TABLET ORAL EVERY 6 HOURS
Status: DISCONTINUED | OUTPATIENT
Start: 2019-11-20 | End: 2019-11-22 | Stop reason: HOSPADM

## 2019-11-20 RX ORDER — MIDAZOLAM HYDROCHLORIDE 1 MG/ML
INJECTION, SOLUTION INTRAMUSCULAR; INTRAVENOUS AS NEEDED
Status: DISCONTINUED | OUTPATIENT
Start: 2019-11-20 | End: 2019-11-20 | Stop reason: HOSPADM

## 2019-11-20 RX ORDER — HYDROMORPHONE HYDROCHLORIDE 1 MG/ML
0.2 INJECTION, SOLUTION INTRAMUSCULAR; INTRAVENOUS; SUBCUTANEOUS
Status: ACTIVE | OUTPATIENT
Start: 2019-11-20 | End: 2019-11-20

## 2019-11-20 RX ORDER — DEXMEDETOMIDINE HYDROCHLORIDE 100 UG/ML
INJECTION, SOLUTION INTRAVENOUS AS NEEDED
Status: DISCONTINUED | OUTPATIENT
Start: 2019-11-20 | End: 2019-11-20 | Stop reason: HOSPADM

## 2019-11-20 RX ORDER — HYDROMORPHONE HYDROCHLORIDE 2 MG/1
2-4 TABLET ORAL
Status: DISCONTINUED | OUTPATIENT
Start: 2019-11-20 | End: 2019-11-22 | Stop reason: HOSPADM

## 2019-11-20 RX ORDER — GLYCOPYRROLATE 0.2 MG/ML
INJECTION INTRAMUSCULAR; INTRAVENOUS AS NEEDED
Status: DISCONTINUED | OUTPATIENT
Start: 2019-11-20 | End: 2019-11-20 | Stop reason: HOSPADM

## 2019-11-20 RX ORDER — LORAZEPAM 2 MG/ML
INJECTION INTRAMUSCULAR
Status: DISPENSED
Start: 2019-11-20 | End: 2019-11-20

## 2019-11-20 RX ORDER — SODIUM CHLORIDE, SODIUM LACTATE, POTASSIUM CHLORIDE, CALCIUM CHLORIDE 600; 310; 30; 20 MG/100ML; MG/100ML; MG/100ML; MG/100ML
INJECTION, SOLUTION INTRAVENOUS
Status: DISCONTINUED | OUTPATIENT
Start: 2019-11-20 | End: 2019-11-20 | Stop reason: HOSPADM

## 2019-11-20 RX ORDER — LIDOCAINE HYDROCHLORIDE 10 MG/ML
0.1 INJECTION, SOLUTION EPIDURAL; INFILTRATION; INTRACAUDAL; PERINEURAL AS NEEDED
Status: DISCONTINUED | OUTPATIENT
Start: 2019-11-20 | End: 2019-11-20 | Stop reason: HOSPADM

## 2019-11-20 RX ORDER — ACETAMINOPHEN 325 MG/1
650 TABLET ORAL ONCE
Status: DISCONTINUED | OUTPATIENT
Start: 2019-11-20 | End: 2019-11-20 | Stop reason: HOSPADM

## 2019-11-20 RX ORDER — DIPHENHYDRAMINE HYDROCHLORIDE 50 MG/ML
12.5 INJECTION, SOLUTION INTRAMUSCULAR; INTRAVENOUS AS NEEDED
Status: DISCONTINUED | OUTPATIENT
Start: 2019-11-20 | End: 2019-11-20 | Stop reason: HOSPADM

## 2019-11-20 RX ORDER — SCOLOPAMINE TRANSDERMAL SYSTEM 1 MG/1
1 PATCH, EXTENDED RELEASE TRANSDERMAL ONCE
Status: DISCONTINUED | OUTPATIENT
Start: 2019-11-20 | End: 2019-11-22 | Stop reason: HOSPADM

## 2019-11-20 RX ORDER — ONDANSETRON 2 MG/ML
4 INJECTION INTRAMUSCULAR; INTRAVENOUS
Status: DISCONTINUED | OUTPATIENT
Start: 2019-11-20 | End: 2019-11-22 | Stop reason: HOSPADM

## 2019-11-20 RX ORDER — KETAMINE HYDROCHLORIDE 10 MG/ML
INJECTION, SOLUTION INTRAMUSCULAR; INTRAVENOUS AS NEEDED
Status: DISCONTINUED | OUTPATIENT
Start: 2019-11-20 | End: 2019-11-20 | Stop reason: HOSPADM

## 2019-11-20 RX ORDER — METOPROLOL TARTRATE 5 MG/5ML
5 INJECTION INTRAVENOUS EVERY 6 HOURS
Status: DISCONTINUED | OUTPATIENT
Start: 2019-11-20 | End: 2019-11-20 | Stop reason: SDUPTHER

## 2019-11-20 RX ORDER — MAGNESIUM SULFATE HEPTAHYDRATE 40 MG/ML
INJECTION, SOLUTION INTRAVENOUS AS NEEDED
Status: DISCONTINUED | OUTPATIENT
Start: 2019-11-20 | End: 2019-11-20 | Stop reason: HOSPADM

## 2019-11-20 RX ORDER — HYOSCYAMINE SULFATE 0.12 MG/1
0.12 TABLET SUBLINGUAL
Status: DISCONTINUED | OUTPATIENT
Start: 2019-11-20 | End: 2019-11-22 | Stop reason: HOSPADM

## 2019-11-20 RX ORDER — MIDAZOLAM HYDROCHLORIDE 1 MG/ML
1 INJECTION, SOLUTION INTRAMUSCULAR; INTRAVENOUS AS NEEDED
Status: DISCONTINUED | OUTPATIENT
Start: 2019-11-20 | End: 2019-11-20 | Stop reason: HOSPADM

## 2019-11-20 RX ORDER — DEXAMETHASONE SODIUM PHOSPHATE 4 MG/ML
INJECTION, SOLUTION INTRA-ARTICULAR; INTRALESIONAL; INTRAMUSCULAR; INTRAVENOUS; SOFT TISSUE AS NEEDED
Status: DISCONTINUED | OUTPATIENT
Start: 2019-11-20 | End: 2019-11-20 | Stop reason: HOSPADM

## 2019-11-20 RX ORDER — LIDOCAINE HYDROCHLORIDE ANHYDROUS AND DEXTROSE MONOHYDRATE .8; 5 G/100ML; G/100ML
1 INJECTION, SOLUTION INTRAVENOUS CONTINUOUS
Status: DISPENSED | OUTPATIENT
Start: 2019-11-20 | End: 2019-11-21

## 2019-11-20 RX ORDER — OXYCODONE HYDROCHLORIDE 5 MG/1
5 TABLET ORAL AS NEEDED
Status: DISCONTINUED | OUTPATIENT
Start: 2019-11-20 | End: 2019-11-20 | Stop reason: HOSPADM

## 2019-11-20 RX ORDER — SODIUM CHLORIDE, SODIUM LACTATE, POTASSIUM CHLORIDE, CALCIUM CHLORIDE 600; 310; 30; 20 MG/100ML; MG/100ML; MG/100ML; MG/100ML
125 INJECTION, SOLUTION INTRAVENOUS CONTINUOUS
Status: DISCONTINUED | OUTPATIENT
Start: 2019-11-20 | End: 2019-11-22 | Stop reason: HOSPADM

## 2019-11-20 RX ORDER — SODIUM CHLORIDE, SODIUM LACTATE, POTASSIUM CHLORIDE, CALCIUM CHLORIDE 600; 310; 30; 20 MG/100ML; MG/100ML; MG/100ML; MG/100ML
1000 INJECTION, SOLUTION INTRAVENOUS CONTINUOUS
Status: DISCONTINUED | OUTPATIENT
Start: 2019-11-20 | End: 2019-11-20 | Stop reason: HOSPADM

## 2019-11-20 RX ORDER — ROPIVACAINE HYDROCHLORIDE 5 MG/ML
150 INJECTION, SOLUTION EPIDURAL; INFILTRATION; PERINEURAL AS NEEDED
Status: DISCONTINUED | OUTPATIENT
Start: 2019-11-20 | End: 2019-11-20 | Stop reason: HOSPADM

## 2019-11-20 RX ORDER — METOPROLOL TARTRATE 5 MG/5ML
5 INJECTION INTRAVENOUS
Status: DISCONTINUED | OUTPATIENT
Start: 2019-11-20 | End: 2019-11-21

## 2019-11-20 RX ORDER — FENTANYL CITRATE 50 UG/ML
25 INJECTION, SOLUTION INTRAMUSCULAR; INTRAVENOUS
Status: DISCONTINUED | OUTPATIENT
Start: 2019-11-20 | End: 2019-11-20 | Stop reason: HOSPADM

## 2019-11-20 RX ORDER — SODIUM CHLORIDE 0.9 % (FLUSH) 0.9 %
5-40 SYRINGE (ML) INJECTION AS NEEDED
Status: DISCONTINUED | OUTPATIENT
Start: 2019-11-20 | End: 2019-11-20 | Stop reason: HOSPADM

## 2019-11-20 RX ORDER — PROPOFOL 10 MG/ML
INJECTION, EMULSION INTRAVENOUS AS NEEDED
Status: DISCONTINUED | OUTPATIENT
Start: 2019-11-20 | End: 2019-11-20 | Stop reason: HOSPADM

## 2019-11-20 RX ORDER — GABAPENTIN 250 MG/5ML
500 SOLUTION ORAL ONCE
Status: COMPLETED | OUTPATIENT
Start: 2019-11-20 | End: 2019-11-20

## 2019-11-20 RX ORDER — ONDANSETRON 2 MG/ML
4 INJECTION INTRAMUSCULAR; INTRAVENOUS AS NEEDED
Status: DISCONTINUED | OUTPATIENT
Start: 2019-11-20 | End: 2019-11-20 | Stop reason: HOSPADM

## 2019-11-20 RX ORDER — SODIUM CHLORIDE 0.9 % (FLUSH) 0.9 %
5-40 SYRINGE (ML) INJECTION AS NEEDED
Status: DISCONTINUED | OUTPATIENT
Start: 2019-11-20 | End: 2019-11-22 | Stop reason: HOSPADM

## 2019-11-20 RX ORDER — NEOSTIGMINE METHYLSULFATE 1 MG/ML
INJECTION, SOLUTION INTRAVENOUS AS NEEDED
Status: DISCONTINUED | OUTPATIENT
Start: 2019-11-20 | End: 2019-11-20 | Stop reason: HOSPADM

## 2019-11-20 RX ORDER — ENOXAPARIN SODIUM 100 MG/ML
40 INJECTION SUBCUTANEOUS
Status: DISCONTINUED | OUTPATIENT
Start: 2019-11-20 | End: 2019-11-20

## 2019-11-20 RX ORDER — NALOXONE HYDROCHLORIDE 0.4 MG/ML
0.4 INJECTION, SOLUTION INTRAMUSCULAR; INTRAVENOUS; SUBCUTANEOUS AS NEEDED
Status: DISCONTINUED | OUTPATIENT
Start: 2019-11-20 | End: 2019-11-22 | Stop reason: HOSPADM

## 2019-11-20 RX ORDER — BUPIVACAINE HYDROCHLORIDE 5 MG/ML
INJECTION, SOLUTION EPIDURAL; INTRACAUDAL AS NEEDED
Status: DISCONTINUED | OUTPATIENT
Start: 2019-11-20 | End: 2019-11-20 | Stop reason: HOSPADM

## 2019-11-20 RX ORDER — ONDANSETRON 2 MG/ML
INJECTION INTRAMUSCULAR; INTRAVENOUS AS NEEDED
Status: DISCONTINUED | OUTPATIENT
Start: 2019-11-20 | End: 2019-11-20 | Stop reason: HOSPADM

## 2019-11-20 RX ORDER — SODIUM CHLORIDE 0.9 % (FLUSH) 0.9 %
5-40 SYRINGE (ML) INJECTION EVERY 8 HOURS
Status: DISCONTINUED | OUTPATIENT
Start: 2019-11-20 | End: 2019-11-22 | Stop reason: HOSPADM

## 2019-11-20 RX ORDER — PHENYLEPHRINE HCL IN 0.9% NACL 0.4MG/10ML
SYRINGE (ML) INTRAVENOUS AS NEEDED
Status: DISCONTINUED | OUTPATIENT
Start: 2019-11-20 | End: 2019-11-20 | Stop reason: HOSPADM

## 2019-11-20 RX ORDER — PROCHLORPERAZINE EDISYLATE 5 MG/ML
INJECTION INTRAMUSCULAR; INTRAVENOUS
Status: COMPLETED
Start: 2019-11-20 | End: 2019-11-20

## 2019-11-20 RX ORDER — LORAZEPAM 2 MG/ML
1 INJECTION INTRAMUSCULAR
Status: DISCONTINUED | OUTPATIENT
Start: 2019-11-20 | End: 2019-11-22 | Stop reason: HOSPADM

## 2019-11-20 RX ORDER — LIDOCAINE HYDROCHLORIDE ANHYDROUS AND DEXTROSE MONOHYDRATE .8; 5 G/100ML; G/100ML
INJECTION, SOLUTION INTRAVENOUS
Status: DISCONTINUED | OUTPATIENT
Start: 2019-11-20 | End: 2019-11-20 | Stop reason: HOSPADM

## 2019-11-20 RX ORDER — GABAPENTIN 100 MG/1
200 CAPSULE ORAL 2 TIMES DAILY
Status: DISCONTINUED | OUTPATIENT
Start: 2019-11-20 | End: 2019-11-22 | Stop reason: HOSPADM

## 2019-11-20 RX ORDER — MORPHINE SULFATE 10 MG/ML
2 INJECTION, SOLUTION INTRAMUSCULAR; INTRAVENOUS
Status: DISCONTINUED | OUTPATIENT
Start: 2019-11-20 | End: 2019-11-20 | Stop reason: HOSPADM

## 2019-11-20 RX ORDER — LIDOCAINE HYDROCHLORIDE 20 MG/ML
INJECTION, SOLUTION EPIDURAL; INFILTRATION; INTRACAUDAL; PERINEURAL AS NEEDED
Status: DISCONTINUED | OUTPATIENT
Start: 2019-11-20 | End: 2019-11-20 | Stop reason: HOSPADM

## 2019-11-20 RX ORDER — MIDAZOLAM HYDROCHLORIDE 1 MG/ML
0.5 INJECTION, SOLUTION INTRAMUSCULAR; INTRAVENOUS
Status: DISCONTINUED | OUTPATIENT
Start: 2019-11-20 | End: 2019-11-20 | Stop reason: HOSPADM

## 2019-11-20 RX ORDER — SUCCINYLCHOLINE CHLORIDE 20 MG/ML
INJECTION INTRAMUSCULAR; INTRAVENOUS AS NEEDED
Status: DISCONTINUED | OUTPATIENT
Start: 2019-11-20 | End: 2019-11-20 | Stop reason: HOSPADM

## 2019-11-20 RX ORDER — FENTANYL CITRATE 50 UG/ML
50 INJECTION, SOLUTION INTRAMUSCULAR; INTRAVENOUS AS NEEDED
Status: DISCONTINUED | OUTPATIENT
Start: 2019-11-20 | End: 2019-11-20 | Stop reason: HOSPADM

## 2019-11-20 RX ORDER — SODIUM CHLORIDE 0.9 % (FLUSH) 0.9 %
5-40 SYRINGE (ML) INJECTION EVERY 8 HOURS
Status: DISCONTINUED | OUTPATIENT
Start: 2019-11-20 | End: 2019-11-20 | Stop reason: HOSPADM

## 2019-11-20 RX ADMIN — Medication 80 MCG: at 09:12

## 2019-11-20 RX ADMIN — ROCURONIUM BROMIDE 20 MG: 10 SOLUTION INTRAVENOUS at 09:13

## 2019-11-20 RX ADMIN — LIDOCAINE HYDROCHLORIDE 2 MG/KG/HR: 8 INJECTION, SOLUTION INTRAVENOUS at 07:40

## 2019-11-20 RX ADMIN — GABAPENTIN 500 MG: 250 SOLUTION ORAL at 06:33

## 2019-11-20 RX ADMIN — DEXAMETHASONE SODIUM PHOSPHATE 4 MG: 4 INJECTION, SOLUTION INTRAMUSCULAR; INTRAVENOUS at 07:40

## 2019-11-20 RX ADMIN — LORAZEPAM 1 MG: 2 INJECTION INTRAMUSCULAR; INTRAVENOUS at 18:33

## 2019-11-20 RX ADMIN — SUCCINYLCHOLINE CHLORIDE 160 MG: 20 INJECTION, SOLUTION INTRAMUSCULAR; INTRAVENOUS at 07:36

## 2019-11-20 RX ADMIN — PROCHLORPERAZINE EDISYLATE: 5 INJECTION INTRAMUSCULAR; INTRAVENOUS at 10:25

## 2019-11-20 RX ADMIN — ONDANSETRON HYDROCHLORIDE 4 MG: 2 INJECTION, SOLUTION INTRAMUSCULAR; INTRAVENOUS at 07:40

## 2019-11-20 RX ADMIN — GLYCOPYRROLATE 0.6 MG: 0.2 INJECTION, SOLUTION INTRAMUSCULAR; INTRAVENOUS at 09:53

## 2019-11-20 RX ADMIN — PROPOFOL 200 MG: 10 INJECTION, EMULSION INTRAVENOUS at 07:35

## 2019-11-20 RX ADMIN — DEXMEDETOMIDINE HYDROCHLORIDE 10 MCG: 100 INJECTION, SOLUTION, CONCENTRATE INTRAVENOUS at 08:03

## 2019-11-20 RX ADMIN — HYDROMORPHONE HYDROCHLORIDE 1 MG: 1 INJECTION, SOLUTION INTRAMUSCULAR; INTRAVENOUS; SUBCUTANEOUS at 16:32

## 2019-11-20 RX ADMIN — DEXMEDETOMIDINE HYDROCHLORIDE 10 MCG: 100 INJECTION, SOLUTION, CONCENTRATE INTRAVENOUS at 08:07

## 2019-11-20 RX ADMIN — FENTANYL CITRATE 25 MCG: 50 INJECTION, SOLUTION INTRAMUSCULAR; INTRAVENOUS at 12:00

## 2019-11-20 RX ADMIN — CEFOTETAN DISODIUM 2 G: 2 INJECTION, POWDER, FOR SOLUTION INTRAMUSCULAR; INTRAVENOUS at 21:12

## 2019-11-20 RX ADMIN — Medication 80 MCG: at 09:01

## 2019-11-20 RX ADMIN — ACETAMINOPHEN 1000 MG: 650 SOLUTION ORAL at 06:34

## 2019-11-20 RX ADMIN — Medication 80 MCG: at 09:50

## 2019-11-20 RX ADMIN — Medication 80 MCG: at 08:59

## 2019-11-20 RX ADMIN — MAGNESIUM SULFATE HEPTAHYDRATE 2 G: 40 INJECTION, SOLUTION INTRAVENOUS at 07:40

## 2019-11-20 RX ADMIN — ACETAMINOPHEN 1000 MG: 500 TABLET ORAL at 16:26

## 2019-11-20 RX ADMIN — SODIUM CHLORIDE 5 MG: 9 INJECTION INTRAMUSCULAR; INTRAVENOUS; SUBCUTANEOUS at 10:26

## 2019-11-20 RX ADMIN — FENTANYL CITRATE 100 MCG: 50 INJECTION, SOLUTION INTRAMUSCULAR; INTRAVENOUS at 07:34

## 2019-11-20 RX ADMIN — Medication 120 MCG: at 08:01

## 2019-11-20 RX ADMIN — GABAPENTIN 200 MG: 100 CAPSULE ORAL at 21:13

## 2019-11-20 RX ADMIN — Medication 10 ML: at 16:26

## 2019-11-20 RX ADMIN — ROCURONIUM BROMIDE 45 MG: 10 SOLUTION INTRAVENOUS at 07:55

## 2019-11-20 RX ADMIN — FENTANYL CITRATE 25 MCG: 50 INJECTION, SOLUTION INTRAMUSCULAR; INTRAVENOUS at 11:55

## 2019-11-20 RX ADMIN — Medication 80 MCG: at 09:53

## 2019-11-20 RX ADMIN — SODIUM CHLORIDE, POTASSIUM CHLORIDE, SODIUM LACTATE AND CALCIUM CHLORIDE: 600; 310; 30; 20 INJECTION, SOLUTION INTRAVENOUS at 07:23

## 2019-11-20 RX ADMIN — ALBUMIN (HUMAN) 250 ML: 12.5 INJECTION, SOLUTION INTRAVENOUS at 07:40

## 2019-11-20 RX ADMIN — ONDANSETRON 4 MG: 2 INJECTION INTRAMUSCULAR; INTRAVENOUS at 16:27

## 2019-11-20 RX ADMIN — Medication 10 ML: at 22:00

## 2019-11-20 RX ADMIN — CEFOTETAN DISODIUM 2 G: 2 INJECTION, POWDER, FOR SOLUTION INTRAMUSCULAR; INTRAVENOUS at 07:28

## 2019-11-20 RX ADMIN — ROCURONIUM BROMIDE 5 MG: 10 SOLUTION INTRAVENOUS at 07:34

## 2019-11-20 RX ADMIN — ONDANSETRON 4 MG: 2 INJECTION INTRAMUSCULAR; INTRAVENOUS at 10:41

## 2019-11-20 RX ADMIN — SODIUM CHLORIDE, SODIUM LACTATE, POTASSIUM CHLORIDE, AND CALCIUM CHLORIDE 1000 ML: 600; 310; 30; 20 INJECTION, SOLUTION INTRAVENOUS at 06:44

## 2019-11-20 RX ADMIN — Medication 3 MG: at 09:53

## 2019-11-20 RX ADMIN — SODIUM CHLORIDE, POTASSIUM CHLORIDE, SODIUM LACTATE AND CALCIUM CHLORIDE: 600; 310; 30; 20 INJECTION, SOLUTION INTRAVENOUS at 09:47

## 2019-11-20 RX ADMIN — LIDOCAINE HYDROCHLORIDE 100 MG: 20 INJECTION, SOLUTION EPIDURAL; INFILTRATION; INTRACAUDAL; PERINEURAL at 07:34

## 2019-11-20 RX ADMIN — KETAMINE HYDROCHLORIDE 50 MG: 10 INJECTION, SOLUTION INTRAMUSCULAR; INTRAVENOUS at 08:01

## 2019-11-20 RX ADMIN — LORAZEPAM 1 MG: 2 INJECTION INTRAMUSCULAR; INTRAVENOUS at 10:42

## 2019-11-20 RX ADMIN — MIDAZOLAM 2 MG: 1 INJECTION INTRAMUSCULAR; INTRAVENOUS at 07:24

## 2019-11-20 RX ADMIN — DIPHENHYDRAMINE HYDROCHLORIDE 12.5 MG: 50 INJECTION, SOLUTION INTRAMUSCULAR; INTRAVENOUS at 11:08

## 2019-11-20 NOTE — ANESTHESIA POSTPROCEDURE EVALUATION
Post-Anesthesia Evaluation and Assessment    Patient: Terry Villanueva MRN: [id number]  SSN: [id number]    YOB: 1985  Age: 27 y.o. Sex: female      I have evaluated the patient and they are stable and ready for discharge from the PACU. Cardiovascular Function/Vital Signs  Visit Vitals  /81   Pulse 98   Temp 36.5 °C (97.7 °F)   Resp 20   Ht 5' 4\" (1.626 m)   Wt 115.1 kg (253 lb 12 oz)   SpO2 94%   BMI 43.56 kg/m²       Patient is status post General anesthesia for Procedure(s):  LAPAROSCOPIC GASTRIC BYPASS WITH EGD (E R A S)  ESOPHAGOGASTRODUODENOSCOPY (EGD). Nausea/Vomiting: None    Postoperative hydration reviewed and adequate. Pain:  Pain Scale 1: Visual (11/20/19 1027)  Pain Intensity 1: 0 (11/20/19 1027)   Managed    Neurological Status:   Neuro (WDL): Exceptions to WDL (11/20/19 1027)  Neuro  Neurologic State: Drowsy; Restless (11/20/19 1027)   At baseline    Mental Status, Level of Consciousness: Alert and  oriented to person, place, and time    Pulmonary Status:   O2 Device: CO2 nasal cannula (11/20/19 1027)   Adequate oxygenation and airway patent    Complications related to anesthesia: None    Post-anesthesia assessment completed. No concerns    Signed By: Monik Penaloza MD     November 20, 2019              Procedure(s):  LAPAROSCOPIC GASTRIC BYPASS WITH EGD (E R A S)  ESOPHAGOGASTRODUODENOSCOPY (EGD). general    <BSHSIANPOST>    Vitals Value Taken Time   /81 11/20/2019 11:30 AM   Temp 36.5 °C (97.7 °F) 11/20/2019 10:27 AM   Pulse 98 11/20/2019 11:30 AM   Resp 20 11/20/2019 11:30 AM   SpO2 95 % 11/20/2019 11:47 AM   Vitals shown include unvalidated device data.

## 2019-11-20 NOTE — BRIEF OP NOTE
BRIEF OPERATIVE NOTE    Date of Procedure: 11/20/2019   Preoperative Diagnosis: MORBID OBESITY  Postoperative Diagnosis: MORBID OBESITY    Procedure(s):  LAPAROSCOPIC GASTRIC BYPASS WITH EGD (E R A S)    Surgeon(s) and Role:     Junaid Brennan MD - Primary         Surgical Assistant: Dre Davison    Surgical Staff:  Candi Faria RN; Christophe Crook RN  Circ-Relief: Christophe Crook RN  Circ-Intern: Rivera Snowden RN  Physician Assistant: JOY Osborne  Scrub Tech-1: Garrett HEART  Surg Asst-1: Oscar Severin  Event Time In Time Out   Incision Start 0802    Incision Close       Anesthesia: General   Estimated Blood Loss: 100 cc  Specimens:   ID Type Source Tests Collected by Time Destination   1 : Portion of small bowel and anastomotic rings  Fresh Abdomen  Edwardo Siegel MD 11/20/2019 0712 Pathology      Findings: 30 cc gastric pouch with 100 cm AC/AG Jann; no air leak or hemorrhage on endoscopy   Complications: none  Implants:   Implant Name Type Inv.  Item Serial No.  Lot No. LRB No. Used Action   Seam Guard   N/A  [id number] N/A 3 Implanted

## 2019-11-20 NOTE — ROUTINE PROCESS
Patient: Alexis Callahan MRN: [id number]  SSN: [id number]   YOB: 1985  Age: 27 y.o. Sex: female     Patient is status post Procedure(s):  LAPAROSCOPIC GASTRIC BYPASS WITH EGD (E R A S)  ESOPHAGOGASTRODUODENOSCOPY (EGD).     Surgeon(s) and Role:     Yves Ross MD - Primary    Local/Dose/Irrigation:  See [facility]                  Peripheral IV 11/20/19 Right Hand (Active)   Site Assessment Clean, dry, & intact 11/20/2019  6:38 AM   Dressing Status Clean, dry, & intact 11/20/2019  6:38 AM   Dressing Type Transparent 11/20/2019  6:38 AM   Hub Color/Line Status Infusing 11/20/2019  6:38 AM          Vonita Evans Drain #1 11/20/19 Right Abdomen (Active)                     Dressing/Packing:  Wound Abdomen-Dressing Type: Topical skin adhesive/glue (11/20/19 1002)    Splint/Cast:  ]    Other:

## 2019-11-20 NOTE — PROGRESS NOTES
Up to bathroom with assistance of PCT to urinate. Pt voided in toilet, speci-hat in room but was not used. Pt's family states that pt c/o having dizziness when she was up to bathroom. Pt returned to bed and was c/o pain and nausea. Pharmacy has not yet verified meds. Pt placed on remote tele box #1 and continuous pulse ox as per protocol. 1645 -  Medicated with zofran IV and Dilaudid IV for complaints of both pain and nausea when pt was assisted up to bathroom. Pt voiced understanding that she needs to be up walking in halls and up in chair but states that she is dizzy when up and having nausea. Will give pt a bit longer post pain and nausea meds but reminded that she must be up walking tonight.

## 2019-11-20 NOTE — PERIOP NOTES
Attempted to contact surgical contact of start of procedure. Has not checked in to waiting area at this time. Unable to reach by phone and unable to leave voicemail at this time. Called surgical waiting again and left message with volunteer for contact.

## 2019-11-20 NOTE — H&P
HISTORY & PHYSICAL     The patient is a 27 y.o.  female with morbid obesity; no success with medical management.           Patient Active Problem List     Diagnosis Date Noted    Morbid obesity due to excess calories ([address] 2017    Hypercholesteremia 2016    Essential hypertension 2016    PCOS (polycystic ovarian syndrome) 2016    Allergic rhinitis due to allergen 2016    Vaginal low risk HPV DNA test positive 2016    Anxiety 2016           Past Medical History:   Diagnosis Date    Adverse effect of anesthesia       NEVER HAD ANESTHESIA    Anxiety      Arthritis      Back pain      H/O hemorrhoids      Hives       as a child - had 1 year of steroids.  Hypertension      Ill-defined condition       PCOS    Joint pain      PCOS (polycystic ovarian syndrome)      Psychiatric disorder       ANXIETY            Past Surgical History:   Procedure Laterality Date    HX GI        RECTAL POLYP    HX HEENT         WISDOM TEETH REMOVED    HX OTHER SURGICAL         rectal polyp at 11 yo    HX OTHER SURGICAL         Sigmoidoscopy - WNL pp ~    HX WISDOM TEETH EXTRACTION          Social History            Tobacco Use    Smoking status: Former Smoker       Packs/day: 0.25       Years: 15.00       Pack years: 3.75       Types: Cigarettes       Last attempt to quit: 2018       Years since quittin.2    Smokeless tobacco: Never Used   Substance Use Topics    Alcohol use:  Yes       Alcohol/week: 1.0 standard drinks       Types: 1 Shots of liquor per week       Frequency: 2-4 times a month       Drinks per session: 1 or 2            Family History   Problem Relation Age of Onset    Diabetes Father      Heart Disease Father           cardiomyopathy, heart transplant    Hypertension Father      Heart Surgery Father      High Cholesterol Father      Diabetes Mother      High Cholesterol Mother      Hypertension Mother      Diabetes Maternal Grandmother      Diabetes Maternal Grandfather      Diabetes Paternal Grandmother      No Known Problems Sister      Anesth Problems Neg Hx                Prior to Admission medications    Medication Sig Start Date End Date Taking? Authorizing Provider   calcium carbonate (OS-PEBBLES) 500 mg calcium (1,250 mg) tablet Take  by mouth daily.     Yes Provider, Historical   cyanocobalamin 1,000 mcg tablet Take 1,000 mcg by mouth daily.     Yes Provider, Historical   PEYMAN 0.35 mg tab TAKE 1 TABLET BY MOUTH EVERY DAY 8/3/19   Yes Provider, Historical   ALPRAZolam (XANAX) 0.25 mg tablet Take 1 Tab by mouth two (2) times daily as needed for Anxiety. Max Daily Amount: 0.5 mg. 8/14/19   Yes Per Gleason PA-C   amLODIPine (NORVASC) 5 mg tablet TAKE 1 TABLET BY MOUTH EVERY DAY 7/18/19   Yes Per Gleason PA-C   metoprolol tartrate (LOPRESSOR) 25 mg tablet TAKE 1 TABLET BY MOUTH EVERY DAY 7/18/19   Yes Per Gleason PA-C   multivitamin (ONE A DAY) tablet Take 1 Tab by mouth daily.     Yes Provider, Historical   metFORMIN ER (GLUCOPHAGE XR) 500 mg tablet Take 4 Tabs by mouth daily (with dinner). Patient taking differently: Take 1,500 mg by mouth daily. Indications: polycystic ovarian syndrome, a disease with cysts in the ovaries 2/13/19   Yes Per Gleason PA-C   escitalopram oxalate (LEXAPRO) 20 mg tablet TAKE ONE TABLET BY MOUTH ONCE DAILY 1/2/19   Yes Per Gleason PA-C   triamterene-hydroCHLOROthiazide (DYAZIDE) 37.5-25 mg per capsule Take 1 Cap by mouth daily. 1/2/19   Yes Per Gleason PA-C      No Known Allergies      Review of Systems   Constitutional: Negative for chills, fever and malaise/fatigue. HENT: Negative for congestion, ear pain, hearing loss, sinus pain, sore throat and tinnitus. Eyes: Negative for blurred vision, double vision, pain, discharge and redness. Respiratory: Negative for cough, sputum production, shortness of breath and wheezing.     Cardiovascular: Negative for chest pain, palpitations and leg swelling. Gastrointestinal: Negative for abdominal pain, constipation, diarrhea, heartburn, nausea and vomiting. Genitourinary: Negative for dysuria, frequency and urgency. Musculoskeletal: Positive for joint pain (right knee). Negative for back pain and neck pain. Skin: Negative for itching and rash. Neurological: Negative for dizziness, seizures and headaches. Psychiatric/Behavioral: Negative for depression. The patient does not have insomnia.        Physical Exam     Visit Vitals  /66 (BP 1 Location: Left arm, BP Patient Position: At rest)   Pulse 71   Temp 98.5 °F (36.9 °C)   Resp 16   Ht 5' 4\" (1.626 m)   Wt 253 lb 12 oz (115.1 kg)   LMP 10/29/2019 (Approximate)   SpO2 99%   BMI 43.56 kg/m²       Constitutional: She is oriented to person, place, and time. She appears well-developed and well-nourished. Cardiovascular: Normal rate and regular rhythm. Exam reveals no gallop and no friction rub. No murmur heard. Pulmonary/Chest: Effort normal and breath sounds normal.   Abdominal: Soft. Bowel sounds are normal. She exhibits no distension. There is no tenderness. No masses or hernias noted. Neurological: She is alert and oriented to person, place, and time. Skin: Skin is warm and dry. Psychiatric: She has a normal mood and affect.            Assessment:      Morbid obesity with body mass index of 43.56. Co-morbids listed below.        ICD-10-CM ICD-9-CM     1. Morbid obesity due to excess calories (HCC) E66.01 278.01     2. BMI 40.0-44.9, adult ([facility]) Z68.41 V85.41     3. Essential hypertension I10 401. 9     4. Hypercholesteremia E78.00 272.0     5.  Post-op pain G89.18 338.18           Plan:     Patient is scheduled for Gastric bypass with Noy Serna on 11/20/2019 at [facility]. Technical aspects of procedure reviewed along with risks (to include but not limited to bleeding, infection, VTE, leak, open procedure, GERD/ulcer/stricture, poor weight loss/weight regain); also discussed post-op diet, activity restrictions, and expected results. She understands and desires to proceed. All questions answered.

## 2019-11-20 NOTE — ANESTHESIA PREPROCEDURE EVALUATION
Relevant Problems   No relevant active problems       Anesthetic History   No history of anesthetic complications            Review of Systems / Medical History  Patient summary reviewed, nursing notes reviewed and pertinent labs reviewed    Pulmonary  Within defined limits                 Neuro/Psych         Psychiatric history     Cardiovascular    Hypertension                   GI/Hepatic/Renal  Within defined limits              Endo/Other        Morbid obesity and arthritis     Other Findings              Physical Exam    Airway  Mallampati: II  TM Distance: > 6 cm  Neck ROM: normal range of motion   Mouth opening: Normal     Cardiovascular  Regular rate and rhythm,  S1 and S2 normal,  no murmur, click, rub, or gallop             Dental  No notable dental hx       Pulmonary  Breath sounds clear to auscultation               Abdominal  GI exam deferred       Other Findings            Anesthetic Plan    ASA: 3  Anesthesia type: general          Induction: Intravenous  Anesthetic plan and risks discussed with: Patient

## 2019-11-21 LAB
ANION GAP SERPL CALC-SCNC: 5 MMOL/L (ref 5–15)
BASOPHILS # BLD: 0 K/UL (ref 0–0.1)
BASOPHILS NFR BLD: 0 % (ref 0–1)
BUN SERPL-MCNC: 7 MG/DL (ref 6–20)
BUN/CREAT SERPL: 9 (ref 12–20)
CALCIUM SERPL-MCNC: 8.7 MG/DL (ref 8.5–10.1)
CHLORIDE SERPL-SCNC: 106 MMOL/L (ref 97–108)
CO2 SERPL-SCNC: 26 MMOL/L (ref 21–32)
CREAT SERPL-MCNC: 0.76 MG/DL (ref 0.55–1.02)
DIFFERENTIAL METHOD BLD: ABNORMAL
EOSINOPHIL # BLD: 0 K/UL (ref 0–0.4)
EOSINOPHIL NFR BLD: 0 % (ref 0–7)
ERYTHROCYTE [DISTWIDTH] IN BLOOD BY AUTOMATED COUNT: 14.8 % (ref 11.5–14.5)
GLUCOSE SERPL-MCNC: 109 MG/DL (ref 65–100)
HCT VFR BLD AUTO: 37 % (ref 35–47)
HGB BLD-MCNC: 12.2 G/DL (ref 11.5–16)
IMM GRANULOCYTES # BLD AUTO: 0.1 K/UL (ref 0–0.04)
IMM GRANULOCYTES NFR BLD AUTO: 0 % (ref 0–0.5)
LYMPHOCYTES # BLD: 1.8 K/UL (ref 0.8–3.5)
LYMPHOCYTES NFR BLD: 13 % (ref 12–49)
MCH RBC QN AUTO: 26.5 PG (ref 26–34)
MCHC RBC AUTO-ENTMCNC: 33 G/DL (ref 30–36.5)
MCV RBC AUTO: 80.4 FL (ref 80–99)
MONOCYTES # BLD: 1 K/UL (ref 0–1)
MONOCYTES NFR BLD: 7 % (ref 5–13)
NEUTS SEG # BLD: 11.2 K/UL (ref 1.8–8)
NEUTS SEG NFR BLD: 80 % (ref 32–75)
NRBC # BLD: 0 K/UL (ref 0–0.01)
NRBC BLD-RTO: 0 PER 100 WBC
PLATELET # BLD AUTO: 295 K/UL (ref 150–400)
PMV BLD AUTO: 10.9 FL (ref 8.9–12.9)
POTASSIUM SERPL-SCNC: 3.6 MMOL/L (ref 3.5–5.1)
RBC # BLD AUTO: 4.6 M/UL (ref 3.8–5.2)
SODIUM SERPL-SCNC: 137 MMOL/L (ref 136–145)
WBC # BLD AUTO: 14 K/UL (ref 3.6–11)

## 2019-11-21 PROCEDURE — 85025 COMPLETE CBC W/AUTO DIFF WBC: CPT

## 2019-11-21 PROCEDURE — 0D164ZA BYPASS STOMACH TO JEJUNUM, PERCUTANEOUS ENDOSCOPIC APPROACH: ICD-10-PCS | Performed by: SURGERY

## 2019-11-21 PROCEDURE — 65660000000 HC RM CCU STEPDOWN

## 2019-11-21 PROCEDURE — 74011250637 HC RX REV CODE- 250/637: Performed by: SURGERY

## 2019-11-21 PROCEDURE — 36415 COLL VENOUS BLD VENIPUNCTURE: CPT

## 2019-11-21 PROCEDURE — 74011250636 HC RX REV CODE- 250/636: Performed by: SURGERY

## 2019-11-21 PROCEDURE — 0DJ08ZZ INSPECTION OF UPPER INTESTINAL TRACT, VIA NATURAL OR ARTIFICIAL OPENING ENDOSCOPIC: ICD-10-PCS | Performed by: SURGERY

## 2019-11-21 PROCEDURE — 80048 BASIC METABOLIC PNL TOTAL CA: CPT

## 2019-11-21 PROCEDURE — 74011000250 HC RX REV CODE- 250: Performed by: SURGERY

## 2019-11-21 RX ORDER — ESCITALOPRAM OXALATE 10 MG/1
20 TABLET ORAL DAILY
Status: DISCONTINUED | OUTPATIENT
Start: 2019-11-21 | End: 2019-11-22 | Stop reason: HOSPADM

## 2019-11-21 RX ORDER — AMLODIPINE BESYLATE 5 MG/1
5 TABLET ORAL DAILY
Status: DISCONTINUED | OUTPATIENT
Start: 2019-11-21 | End: 2019-11-22 | Stop reason: HOSPADM

## 2019-11-21 RX ORDER — METOPROLOL TARTRATE 25 MG/1
25 TABLET, FILM COATED ORAL DAILY
Status: DISCONTINUED | OUTPATIENT
Start: 2019-11-21 | End: 2019-11-22 | Stop reason: HOSPADM

## 2019-11-21 RX ORDER — HYDROMORPHONE HYDROCHLORIDE 2 MG/1
2 TABLET ORAL
Qty: 25 TAB | Refills: 0 | Status: SHIPPED | OUTPATIENT
Start: 2019-11-21 | End: 2019-11-28

## 2019-11-21 RX ADMIN — HYDROMORPHONE HYDROCHLORIDE 4 MG: 2 TABLET ORAL at 18:31

## 2019-11-21 RX ADMIN — GABAPENTIN 200 MG: 100 CAPSULE ORAL at 09:25

## 2019-11-21 RX ADMIN — METOPROLOL TARTRATE 25 MG: 25 TABLET ORAL at 09:25

## 2019-11-21 RX ADMIN — ONDANSETRON 4 MG: 2 INJECTION INTRAMUSCULAR; INTRAVENOUS at 18:31

## 2019-11-21 RX ADMIN — HYOSCYAMINE SULFATE 0.12 MG: 0.12 TABLET ORAL; SUBLINGUAL at 21:57

## 2019-11-21 RX ADMIN — ONDANSETRON 4 MG: 2 INJECTION INTRAMUSCULAR; INTRAVENOUS at 14:37

## 2019-11-21 RX ADMIN — GABAPENTIN 200 MG: 100 CAPSULE ORAL at 18:31

## 2019-11-21 RX ADMIN — ACETAMINOPHEN 1000 MG: 500 TABLET ORAL at 20:15

## 2019-11-21 RX ADMIN — HYOSCYAMINE SULFATE 0.12 MG: 0.12 TABLET ORAL; SUBLINGUAL at 12:12

## 2019-11-21 RX ADMIN — AMLODIPINE BESYLATE 5 MG: 5 TABLET ORAL at 09:25

## 2019-11-21 RX ADMIN — HYOSCYAMINE SULFATE 0.12 MG: 0.12 TABLET ORAL; SUBLINGUAL at 16:53

## 2019-11-21 RX ADMIN — HYDROMORPHONE HYDROCHLORIDE 4 MG: 2 TABLET ORAL at 10:28

## 2019-11-21 RX ADMIN — HYDROMORPHONE HYDROCHLORIDE 4 MG: 2 TABLET ORAL at 00:10

## 2019-11-21 RX ADMIN — Medication 10 ML: at 06:21

## 2019-11-21 RX ADMIN — ONDANSETRON 4 MG: 2 INJECTION INTRAMUSCULAR; INTRAVENOUS at 06:20

## 2019-11-21 RX ADMIN — ONDANSETRON 4 MG: 2 INJECTION INTRAMUSCULAR; INTRAVENOUS at 00:10

## 2019-11-21 RX ADMIN — HYOSCYAMINE SULFATE 0.12 MG: 0.12 TABLET ORAL; SUBLINGUAL at 07:36

## 2019-11-21 RX ADMIN — SODIUM CHLORIDE 5 MG: 9 INJECTION INTRAMUSCULAR; INTRAVENOUS; SUBCUTANEOUS at 21:57

## 2019-11-21 RX ADMIN — ACETAMINOPHEN 1000 MG: 500 TABLET ORAL at 14:36

## 2019-11-21 RX ADMIN — ACETAMINOPHEN 1000 MG: 500 TABLET ORAL at 07:36

## 2019-11-21 RX ADMIN — HYOSCYAMINE SULFATE 0.12 MG: 0.12 TABLET ORAL; SUBLINGUAL at 03:10

## 2019-11-21 RX ADMIN — ACETAMINOPHEN 1000 MG: 500 TABLET ORAL at 03:10

## 2019-11-21 RX ADMIN — HYDROMORPHONE HYDROCHLORIDE 2 MG: 2 TABLET ORAL at 06:05

## 2019-11-21 RX ADMIN — Medication 10 ML: at 22:00

## 2019-11-21 RX ADMIN — ONDANSETRON 4 MG: 2 INJECTION INTRAMUSCULAR; INTRAVENOUS at 10:40

## 2019-11-21 RX ADMIN — ESCITALOPRAM OXALATE 20 MG: 10 TABLET ORAL at 09:24

## 2019-11-21 RX ADMIN — HYDROMORPHONE HYDROCHLORIDE 4 MG: 2 TABLET ORAL at 14:37

## 2019-11-21 NOTE — PROGRESS NOTES
Progress Note    Patient: Kaykay Benoit MRN: [id number]  SSN: [id number]    YOB: 1985  Age: 27 y.o. Sex: female      Admit Date: 2019    1 Day Post-Op    Procedure:  Procedure(s):  LAPAROSCOPIC GASTRIC BYPASS WITH EGD (E R A S)    Subjective:     Patient notes improvement in pain management and nausea; she has started bariatric liquids; ambulating, performing spirometry. Objective:     Visit Vitals  /87 (BP 1 Location: Left arm, BP Patient Position: Sitting)   Pulse 96   Temp 99.3 °F (37.4 °C)   Resp 16   Ht 5' 4\" (1.626 m)   Wt 253 lb 12 oz (115.1 kg)   SpO2 97%   BMI 43.56 kg/m²       Temp (24hrs), Av.5 °F (36.9 °C), Min:97.7 °F (36.5 °C), Max:99.4 °F (37.4 °C)    Date 19 07 - 19 0659 19 07 - 19 0659   Shift 5864-2887 6450-4499 24 Hour Total 3690-8390 7531-4156 24 Hour Total   INTAKE   I.V.(mL/kg/hr) 1100(0.8)  1100(0.4)        Volume (lactated Ringers infusion) 1100  1100      Shift Total(mL/kg) 1100(9.6)  1100(9.6)      OUTPUT   Urine(mL/kg/hr) 700(0.5) 1550(1.1) 2250(0.8)        Urine Voided 700 1550 2250      Drains 70  70        Output (ml) (Tonny Drain #1 19 Right Abdomen) 70  70      Blood 20  20        Estimated Blood Loss 20  20      Shift Total(mL/kg) 790(6.9) 1550(13.5) 2340(20.3)       -1550 -1240      Weight (kg) 115.1 115.1 115.1 115.1 115.1 115.1       Physical Exam:    LUNG: clear to auscultation bilaterally, HEART: regular rate and rhythm, S1, S2 normal, no murmur. ABDOMEN: Obese, non-distended, soft. Wounds dry and intact. Serous drain fluid. Appropriate incisional pain with palpation.     Data Review: VS, I/O's, Labs    Lab Review:   Recent Results (from the past 12 hour(s))   METABOLIC PANEL, BASIC    Collection Time: 19  4:20 AM   Result Value Ref Range    Sodium 137 136 - 145 mmol/L    Potassium 3.6 3.5 - 5.1 mmol/L    Chloride 106 97 - 108 mmol/L    CO2 26 21 - 32 mmol/L    Anion gap 5 5 - 15 mmol/L    Glucose 109 (H) 65 - 100 mg/dL    BUN 7 6 - 20 MG/DL    Creatinine 0.76 0.55 - 1.02 MG/DL    BUN/Creatinine ratio 9 (L) 12 - 20      GFR est AA >60 >60 ml/min/1.73m2    GFR est non-AA >60 >60 ml/min/1.73m2    Calcium 8.7 8.5 - 10.1 MG/DL   CBC WITH AUTOMATED DIFF    Collection Time: 19  4:20 AM   Result Value Ref Range    WBC 14.0 (H) 3.6 - 11.0 K/uL    RBC 4.60 3.80 - 5.20 M/uL    HGB 12.2 11.5 - 16.0 g/dL    HCT 37.0 35.0 - 47.0 %    MCV 80.4 80.0 - 99.0 FL    MCH 26.5 26.0 - 34.0 PG    MCHC 33.0 30.0 - 36.5 g/dL    RDW 14.8 (H) 11.5 - 14.5 %    PLATELET 895 023 - 757 K/uL    MPV 10.9 8.9 - 12.9 FL    NRBC 0.0 0  WBC    ABSOLUTE NRBC 0.00 0.00 - 0.01 K/uL    NEUTROPHILS 80 (H) 32 - 75 %    LYMPHOCYTES 13 12 - 49 %    MONOCYTES 7 5 - 13 %    EOSINOPHILS 0 0 - 7 %    BASOPHILS 0 0 - 1 %    IMMATURE GRANULOCYTES 0 0.0 - 0.5 %    ABS. NEUTROPHILS 11.2 (H) 1.8 - 8.0 K/UL    ABS. LYMPHOCYTES 1.8 0.8 - 3.5 K/UL    ABS. MONOCYTES 1.0 0.0 - 1.0 K/UL    ABS. EOSINOPHILS 0.0 0.0 - 0.4 K/UL    ABS. BASOPHILS 0.0 0.0 - 0.1 K/UL    ABS. IMM. GRANS. 0.1 (H) 0.00 - 0.04 K/UL    DF AUTOMATED           Assessment:     Hospital Problems  Date Reviewed: 2019          Codes Class Noted POA    [facility] ICD-10-CM: E66.01  ICD-9-CM: 278.01  2019 Unknown              Plan/Recommendations/Medical Decision Makin. Bariatric liquids-goal 4 oz/hr. 2. Oral analgesics, PTA medications. 3. Ambulation, spirometry. 4. DVT prophylaxis (mechanical).

## 2019-11-21 NOTE — PROGRESS NOTES
NUTRITION     Chart reviewed. Post-op bariatric diet instruction completed. Questions regarding vit D3 supplement answered since pt on [id number] for repletion at this time. Good understanding. Will gladly follow up for additional questions as needed. Thank you.      Holly Edward RD

## 2019-11-21 NOTE — PROGRESS NOTES
Pt has been in chair multiple times during the day and has been up walking in halls three to four times this shift. Tolerating diet well but still with some nausea periodically , medicated with zofran once. 2100 pt did a total of 9 one hour rounds today to completion, still needing to get pain and nausea meds ever four hours, also using levsin. States she is belching but no flatus yet. Voiding large quantities and has been ambulating often in halls and up in chair today.

## 2019-11-21 NOTE — DISCHARGE INSTRUCTIONS
Gastric Bypass  Patient Discharge Instructions  [facility]  [phone]    1. Activities: You may be active walking, stair climbing, and doing light weight activities with one to two-pound weights, sitting in a chair using different arm motions. Major restrictions include driving until your first office visit and lifting anything heavier than ten pounds. It is very important that you walk frequently. In addition to walking, you should continue to use your incentive spirometer (breathing exercises) throughout the day. 2. Caring for vour incision (s}: Your surgical incision(s) will be covered with Derma bond (super glue). You may shower as desired and simply pat dry the area over the incision(s). There may occasionally be seepage of yellowish to yellowish-maroon dissolved fat from your wound, which is of no major concern as long as the wound is not red, hardened in the area of the drainage, or if the drainage has a foul smell. If there are any questions, call our office for further instructions. If there is this type of dissolved fat drainage,  the shower and gently express the fluid from your wound. Then keep gauze pads over the area to protect both the wound and your clothes. 3. When to call the office immediately:      *Chest pain (not associated with eating/drinking)  *Shortness of breath (more than normal)  *Sudden pain and/or redness in calf  *Fever greater than 101F  *Persistent nausea and/or vomiting (unable to keep down any liquids)  *Bleeding from incision(s)  *Severe abdominal pain  *Any other concerning symptoms    4. Diet: There are three main priorities as far as your diet is concerned---    a. Clear Liquids-drink from 1 oz. cups or standard shot glass. These liquids are non-carbonated, no added sugar, and not irritating to your stomach.  They may include water, tea, coffee\" 100% no added sugar juices (avoid citrus) , clear broth, blenderized clear soups such as [employer] Healthy Request Chicken Noodle and Chicken & Rice, Sugar-free products including popsicles, Mauro-Aid, and Crystal Lite. b. Vitamins: Multi-vitamin with iron in the morning and evening, making sure  it's not the first thing or the last thing taken. The other Vitamin B-12 and  vitamin A & D capsule may be taken once during the day anytime that you  can make a routine of it.    c. Protein Intake: During your initial two-three weeks when your body is switching from burning glucose to directly burning fat, there is an attempt by your body to use protein as a fuel. To protect that protein, we like you to exercise as listed above, and to try to take in 50-60 grams of protein per day. This can be done with four 8 oz. serving of skim milk and Low Carb [employer] John Instant Breakfast. Each 8 oz. should be considered a meal-breakfast, lunch, or supper, and during this mealtime it should be the sole ingested substance. Stop your clear liquids for 20 minutes before your start on the instant breakfast, which is sipped 1 oz. at a time. You may also try the following substitute for [employer] John Instant Breakfast: skim milk-fat free powdered milk + Egg Beaters + flavor extract. If desired, ice may be added and blenderized in the . If the milk upsets your stomach, you may purchase Lactaid tablets from any drug store. Lactaid skim milk may be purchased at most major supermarkets. Another alternative is Boost Diabetic, which is Lactose-free and ready to drink. There are many protein supplements on the market. Whey and Soy based protein powders/drinks are acceptable. If you have any questions about specific products please call the office. If you are having difficulty with your diet, please call the office. 5. Medications: Take no more than 2 pills at a time. Wait 20 minutes between pills. a. Vitamins as listed above---multi-vitamin with iron twice a day, B-12 once a day, vitamin A& D once a day.     b. Acid reducing medicine--Will be prescribed by your surgeon at discharge. c. Mylanta Plus--one to two teaspoons as needed for belching or gas (other gas relieving medicines are also acceptable Beano, GasX, etc). d. Bowel Regulation--mild laxatives are permissible such as Milk of Magnesia, Dulcolax (either by mouth or suppository). If you are accustomed to using a [id number] laxative not mentioned, you may continue to use it. Fibercon tablets or Benefiber may be taken as a fiber supplement to regulate bowel movements. Fibercon tablets should be broken in half and taken in half and taken one half in the morning and one half in the evening. Benefiber (1 tsp.) can be added to your protein drink(s). It has no taste or added thickness. Imodium AD may be taken as needed for diarrhea.    e. Pain medication-one to two every four hours as needed for pain control. If pain is mild, try extra-strength Tylenol first.    f. Do not take any pain or arthritis medication such as Aspirin, Advil, Aleve, Naprosyn, or any other nonsteroidal anti-inflammatory medication unless approved by your surgeon.    A Tylenol product is OK.    g. Preadmission medications may be resumed, but this should be discussed without your doctor before your discharge from the hospital.    Future Appointments   Date Time Provider Lali Younger   12/4/2019  9:40 AM FLAVIO Oliver   12/18/2019 10:00 AM FLAVIO Oliver   12/30/2019  9:20 AM FLAVIO Oliver

## 2019-11-21 NOTE — PROGRESS NOTES
DIANA:    Possible discharge tomorrow. Care Management Interventions  PCP Verified by CM: Yes  Mode of Transport at Discharge: Other (see comment)(Patient's friend Lalito Mina will take her home at discharge.)  Transition of Care Consult (CM Consult): Discharge Planning  MyChart Signup: Yes  Discharge Durable Medical Equipment: No  Physical Therapy Consult: No  Occupational Therapy Consult: No  Speech Therapy Consult: No  Current Support Network: Other(Lives with friend Lalito Mina.)  Confirm Follow Up Transport: Family  Plan discussed with Pt/Family/Caregiver: Yes  Discharge Location  Discharge Placement: Home with family assistance    Reason for Admission:  LAPAROSCOPIC GASTRIC BYPASS WITH EGD (E R A S)                    RRAT Score:    2                 Plan for utilizing home health:   No home health orders at this time. Current Advanced Directive/Advance Care Plan:                Adv. Care plan not on file. Transition of Care Plan:     CM met with patient to introduce CM role, verify demographics and begin discharge planning. Patient lives in a 2 story home with her friend Lalito Mina. Patient's sister lives nearby and will provide additional support as needed. Patient stated that Lalito Mina will take her home when she is discharged.     Patient gets her prescriptions filled at [facility].    Kirk Heard, RN/CRM

## 2019-11-21 NOTE — OP NOTES
[address]   OPERATIVE REPORT    Name:  Saeed Quezada  MR#:  [id number]  :  1985  ACCOUNT #:  [de-identified]  DATE OF SERVICE:  2019      PRIMARY PREOPERATIVE DIAGNOSIS:  Morbid obesity. SECONDARY PREOPERATIVE DIAGNOSES:  1. Hypertension. 2.  Hypercholesterolemia. 3.  Polycystic ovarian syndrome. POSTOPERATIVE DIAGNOSES:  1.  Morbid obesity. 2.  Hypertension. 3.  Hypercholesterolemia. 4.  Polycystic ovarian syndrome. PROCEDURES PERFORMED:  1. Laparoscopic Jann-en-Y gastric bypass. 2.  Intraoperative upper endoscopy. SURGEON:  Maira Seymour MD    ASSISTANT:  JOY Galvan    ANESTHESIA:  General endotracheal.    COMPLICATIONS:  None. SPECIMENS REMOVED:  Segment of bowel with anastomotic donuts. IMPLANTS:  None. ESTIMATED BLOOD LOSS:  100 mL. DRAIN:  19 mm Tonny drain. COUNTS:  Sponge count correct. Needle count correct. INDICATION:  The patient is a 26-year-old  female with a height of 64 inches, weight of 253 pounds, with a resultant body mass index of 43.6 kg/sq m on a medium frame. She has the above-listed obesity-related conditions. All medical efforts at weight loss have been unsuccessful. After extensive preoperative counseling, patient education, and medical screening, it was felt she would be a good candidate for weight reduction surgery. She presents to [facility] today for laparoscopic gastric bypass. I have asked JOY Galvan, to assist with the procedure given the technical complexity of laparoscopic bariatric surgery. She will run the laparoscope, assist in creation of the gastric pouch, assist in creation of the jejunojejunostomy, and assist in creation of the gastrojejunostomy. FINDINGS:  1.  A 30 mL gastric pouch with 266 cm antecolic, antegastric Jann limb. 2.  No intraluminal hemorrhage or insufflation air leak on upper endoscopy.     PROCEDURE:  The patient was identified as the correct patient in the preoperative holding area and informed consent was confirmed. After answering the patient's remaining questions, she was taken to the operating room and placed on the operating room table in the supine position. Sequential compression devices were placed on both lower extremities. Following the uneventful initiation of general anesthesia, she was carefully secured to the operating room table with footboard and safety strap in place. All potential pressure points were padded with egg crate. Her abdomen was prepped and draped in usual sterile fashion. Final time-out was performed, and it was confirmed she had received intravenous antibiotics. A 5 mm trocar was inserted through a small right subcostal skin incision using an Optiview technique. After confirming intraperitoneal location of the trocar tip, insufflation with carbon dioxide gas was initiated. Once adequate working sites had been developed, a 5-mm, 30-degree laparoscope was inserted. No signs of trocar injury were present. A 5-mm trocar was then inserted through a small epigastric incision using visual guidance with the laparoscope. A left upper quadrant GelPort was inserted using identical technique. A right upper quadrant 12 mm trocar was inserted using visual guidance with the laparoscope. The omentum was retracted into the upper abdomen, and the omentum was serially ligated and divided from its free edge to the antimesenteric border of the transverse colon using the Harmonic scalpel. With the transverse colon elevated anteriorly and cephalad, the ligament of Treitz was identified. 100 cm of bowel was measured distal to this landmark. The jejunum was divided at this point using a tan load linear stapler firing. The distal edge of the transected bowel was marked with a 0 Surgidac suture. An additional 115 cm of bowel measured distal to this transection site.   This segment of bowel was brought alongside the biliopancreatic limb in side-to-side fashion. This orientation was maintained with 0 Surgidac stay sutures. The Harmonic scalpel was used to make an opening in each segment of bowel, through which a 60 mm tan load linear stapler was carefully inserted into each segment of bowel. After confirming correct insertion of the stapler, it was closed and fired. The staple line was noted to be hemostatic following the removal of the staple load, and the remaining opening in the bowel was closed with a running 2-0 Polysorb suture. A tension relieving, anti-obstruction suture was placed at the distal edge of the staple line. The mesenteric defect was closed with a running 0 Surgidac suture. The Jann limb was delivered into the upper abdomen with care to avoid twisting of the bowel or its blood supply. The patient was placed in a steep reverse Trendelenburg position. The [facility] liver retractor was inserted through a small subxiphoid incision. With the liver retracted anteriorly and cephalad, the esophageal hiatus was visualized. No signs of hiatal hernia were present. The fat pad and angle of His were freed from the left jessica of the diaphragm using Harmonic scalpel and blunt dissection. The Harmonic scalpel was used to make an opening in the pars flaccida portion of the gastrohepatic ligament, allowing entry into the retrogastric area and lesser sac. A site 4 cm distal to the gastroesophageal junction was chosen. The gastrohepatic ligament was serially ligated and divided at this level using the Harmonic scalpel. A 30 mL gastric pouch was constructed with multiple firings of purple load linear stapler firings, utilizing Seamguard reinforcement material.  The calibration tube was used to size pouch construction. Following pouch construction, calibration tube was removed.   A nasogastric tube attached to a 25 EEA stapler anvil was passed transorally, such that the tip was positioned just anterior to the transverse pouch staple line.  Electrocautery was used to make an opening at the site, and the tube was then used to position the anvil within the gastric pouch. The tube was  from the anvil and removed from the patient's body. The Jann limb was delivered into the upper abdomen with care to avoid twisting of the bowel or its blood supply. The Harmonic scalpel was used to make an opening in the Jann limb, and through this opening, a 25 EEA stapler was passed intraluminally to a healthy site of bowel. The stapler spike was deployed to the antimesenteric border of the Jann limb and mated with the anvil within the gastric pouch. Stapler was slowly and carefully closed with care to avoid twisting of the pouch or incorporation of the extraneous tissue. After confirming correct stapler closure, the stapler was fired and removed. Both proximal and distal donuts were noted to be intact. The gastric donut was noted to be thin on one aspect. The open, redundant segment of the Jann limb was amputated with a tan load linear stapler firing once the blood supply had been controlled using the Harmonic scalpel. This segment of bowel was placed within a retrieval bag and removed from the patient's body. Tension relieving sutures were placed between the antimesenteric border of the Jann limb, the gastric pouch, and the excluded stomach. An area at the lateral aspect of the gastrojejunal anastomosis was imbricated with a figure-of-eight 3-0 Vicryl suture. An intestinal clamp was placed on the Jann limb distal to the anastomosis. The patient was placed in the supine position. Sterile saline was instilled into the upper abdomen. Intraoperative upper endoscopy was performed. The gastroscope was passed transorally, through the gastroesophageal junction, and into the gastric pouch. With insufflation using the gastroscope, adequate pouch and Jann limb distention were noted. No intraluminal hemorrhage was identified.   No insufflation air leak occurred. The anastomosis was hemostatic and patent. The bowel was decompressed and the gastroscope was removed. Sterile saline was evacuated from the upper abdomen. Gerber's space was closed with a running 0 Surgidac suture. A 19 mm Tonny drain was inserted into the abdominal space. This was allowed to lie adjacent to the gastrojejunal anastomosis and brought out through the right subcostal 5-mm trocar wound. It was secured to the skin with a 2-0 nylon suture. After confirming adequate hemostasis, the Niki liver retractor was removed, followed by closure of the 15-mm left-sided fascial defect using a 0 Vicryl suture with a laparoscopic suture passer. Pneumoperitoneum was released, and all trocars were removed. All wounds were infiltrated with 0.5% Marcaine without epinephrine. All skin edges were reapproximated with a combination of subcuticular 4-0 Monocryl suture and Dermabond. The patient tolerated the procedure well. She was extubated in the operating room and transported to the recovery area in stable condition. The attending surgeon, Dr. Argenis Meek, was scrubbed and present for the entire procedure.       Holden Mobley MD      [id number]  D:  11/20/2019 16:54  T:  11/21/2019 3:50  JOB #:  [id number]

## 2019-11-22 VITALS
SYSTOLIC BLOOD PRESSURE: 110 MMHG | RESPIRATION RATE: 16 BRPM | TEMPERATURE: 99.1 F | OXYGEN SATURATION: 96 % | WEIGHT: 253.75 LBS | BODY MASS INDEX: 43.32 KG/M2 | DIASTOLIC BLOOD PRESSURE: 75 MMHG | HEIGHT: 64 IN | HEART RATE: 77 BPM

## 2019-11-22 LAB
ANION GAP SERPL CALC-SCNC: 6 MMOL/L (ref 5–15)
BASOPHILS # BLD: 0 K/UL (ref 0–0.1)
BASOPHILS NFR BLD: 0 % (ref 0–1)
BUN SERPL-MCNC: 6 MG/DL (ref 6–20)
BUN/CREAT SERPL: 10 (ref 12–20)
CALCIUM SERPL-MCNC: 8.7 MG/DL (ref 8.5–10.1)
CHLORIDE SERPL-SCNC: 107 MMOL/L (ref 97–108)
CO2 SERPL-SCNC: 25 MMOL/L (ref 21–32)
CREAT SERPL-MCNC: 0.62 MG/DL (ref 0.55–1.02)
DIFFERENTIAL METHOD BLD: ABNORMAL
EOSINOPHIL # BLD: 0 K/UL (ref 0–0.4)
EOSINOPHIL NFR BLD: 0 % (ref 0–7)
ERYTHROCYTE [DISTWIDTH] IN BLOOD BY AUTOMATED COUNT: 15 % (ref 11.5–14.5)
GLUCOSE SERPL-MCNC: 110 MG/DL (ref 65–100)
HCT VFR BLD AUTO: 36.2 % (ref 35–47)
HGB BLD-MCNC: 12.2 G/DL (ref 11.5–16)
IMM GRANULOCYTES # BLD AUTO: 0.1 K/UL (ref 0–0.04)
IMM GRANULOCYTES NFR BLD AUTO: 1 % (ref 0–0.5)
LYMPHOCYTES # BLD: 1.5 K/UL (ref 0.8–3.5)
LYMPHOCYTES NFR BLD: 11 % (ref 12–49)
MCH RBC QN AUTO: 27.3 PG (ref 26–34)
MCHC RBC AUTO-ENTMCNC: 33.7 G/DL (ref 30–36.5)
MCV RBC AUTO: 81 FL (ref 80–99)
MONOCYTES # BLD: 0.8 K/UL (ref 0–1)
MONOCYTES NFR BLD: 5 % (ref 5–13)
NEUTS SEG # BLD: 11.4 K/UL (ref 1.8–8)
NEUTS SEG NFR BLD: 83 % (ref 32–75)
NRBC # BLD: 0 K/UL (ref 0–0.01)
NRBC BLD-RTO: 0 PER 100 WBC
PLATELET # BLD AUTO: 261 K/UL (ref 150–400)
PMV BLD AUTO: 10.7 FL (ref 8.9–12.9)
POTASSIUM SERPL-SCNC: 3.7 MMOL/L (ref 3.5–5.1)
RBC # BLD AUTO: 4.47 M/UL (ref 3.8–5.2)
SODIUM SERPL-SCNC: 138 MMOL/L (ref 136–145)
WBC # BLD AUTO: 13.8 K/UL (ref 3.6–11)

## 2019-11-22 PROCEDURE — 85025 COMPLETE CBC W/AUTO DIFF WBC: CPT

## 2019-11-22 PROCEDURE — 74011250636 HC RX REV CODE- 250/636: Performed by: SURGERY

## 2019-11-22 PROCEDURE — 74011250637 HC RX REV CODE- 250/637: Performed by: SURGERY

## 2019-11-22 PROCEDURE — 36415 COLL VENOUS BLD VENIPUNCTURE: CPT

## 2019-11-22 PROCEDURE — 80048 BASIC METABOLIC PNL TOTAL CA: CPT

## 2019-11-22 RX ORDER — KETOROLAC TROMETHAMINE 30 MG/ML
30 INJECTION, SOLUTION INTRAMUSCULAR; INTRAVENOUS
Status: COMPLETED | OUTPATIENT
Start: 2019-11-22 | End: 2019-11-22

## 2019-11-22 RX ADMIN — KETOROLAC TROMETHAMINE 30 MG: 30 INJECTION, SOLUTION INTRAMUSCULAR at 08:23

## 2019-11-22 RX ADMIN — HYDROMORPHONE HYDROCHLORIDE 2 MG: 2 TABLET ORAL at 11:59

## 2019-11-22 RX ADMIN — ACETAMINOPHEN 1000 MG: 500 TABLET ORAL at 08:23

## 2019-11-22 RX ADMIN — METOPROLOL TARTRATE 25 MG: 25 TABLET ORAL at 09:55

## 2019-11-22 RX ADMIN — HYOSCYAMINE SULFATE 0.12 MG: 0.12 TABLET ORAL; SUBLINGUAL at 11:53

## 2019-11-22 RX ADMIN — AMLODIPINE BESYLATE 5 MG: 5 TABLET ORAL at 09:55

## 2019-11-22 RX ADMIN — HYDROMORPHONE HYDROCHLORIDE 4 MG: 2 TABLET ORAL at 07:15

## 2019-11-22 RX ADMIN — ESCITALOPRAM OXALATE 20 MG: 10 TABLET ORAL at 09:55

## 2019-11-22 RX ADMIN — GABAPENTIN 200 MG: 100 CAPSULE ORAL at 09:55

## 2019-11-22 RX ADMIN — HYOSCYAMINE SULFATE 0.12 MG: 0.12 TABLET ORAL; SUBLINGUAL at 04:02

## 2019-11-22 RX ADMIN — Medication 10 ML: at 08:24

## 2019-11-22 RX ADMIN — HYDROMORPHONE HYDROCHLORIDE 4 MG: 2 TABLET ORAL at 01:18

## 2019-11-22 RX ADMIN — ACETAMINOPHEN 1000 MG: 500 TABLET ORAL at 03:43

## 2019-11-22 RX ADMIN — HYDROMORPHONE HYDROCHLORIDE 2 MG: 2 TABLET ORAL at 11:53

## 2019-11-22 NOTE — PROGRESS NOTES
Bedside shift change report given to [facility] (oncoming nurse) by Sylvester Tolentino (offgoing nurse). Report included the following information SBAR, Kardex and MAR.

## 2019-11-22 NOTE — DISCHARGE SUMMARY
Physician Discharge Summary     Patient ID:  Tabatha Mejia  [id number]  27 y.o.  1985    Allergies: Patient has no known allergies. Admit Date: 11/20/2019    Discharge Date: 11/22/2019    * Admission Diagnoses: Morbid obesity ([facility] [E66.01]    * Discharge Diagnoses:    Hospital Problems as of 11/22/2019 Date Reviewed: 11/5/2019          Codes Class Noted - Resolved POA    Morbid obesity ([facility] ICD-10-CM: E66.01  ICD-9-CM: 278.01  11/20/2019 - Present Unknown               Admission Condition: Good    * Discharge Condition: good    * Procedures: Procedure(s):  LAPAROSCOPIC GASTRIC BYPASS WITH EGD (E R A S)  ESOPHAGOGASTRODUODENOSCOPY (EGD)    * Hospital Course:   Normal hospital course for this procedure. Consults: None    Significant Diagnostic Studies: N/A    * Disposition: Home    Discharge Medications:   Current Discharge Medication List      START taking these medications    Details   HYDROmorphone (DILAUDID) 2 mg tablet Take 1 Tab by mouth every four (4) hours as needed for Pain for up to 7 days. Max Daily Amount: 12 mg.  Qty: 25 Tab, Refills: 0    Associated Diagnoses: S/P gastric bypass         CONTINUE these medications which have NOT CHANGED    Details   PEYMAN 0.35 mg tab TAKE 1 TABLET BY MOUTH EVERY DAY  Refills: 3      ALPRAZolam (XANAX) 0.25 mg tablet Take 1 Tab by mouth two (2) times daily as needed for Anxiety. Max Daily Amount: 0.5 mg.  Qty: 30 Tab, Refills: 2    Associated Diagnoses: Anxiety      amLODIPine (NORVASC) 5 mg tablet TAKE 1 TABLET BY MOUTH EVERY DAY  Qty: 90 Tab, Refills: 0    Associated Diagnoses: Essential hypertension      metoprolol tartrate (LOPRESSOR) 25 mg tablet TAKE 1 TABLET BY MOUTH EVERY DAY  Qty: 90 Tab, Refills: 0    Associated Diagnoses: Essential hypertension      multivitamin (ONE A DAY) tablet Take 1 Tab by mouth daily.       escitalopram oxalate (LEXAPRO) 20 mg tablet TAKE ONE TABLET BY MOUTH ONCE DAILY  Qty: 90 Tab, Refills: 1    Associated Diagnoses: Anxiety ergocalciferol (ERGOCALCIFEROL) 50,000 unit capsule Take 1 Cap by mouth every Monday and Friday. Qty: 8 Cap, Refills: 2      calcium carbonate (OS-PEBBLES) 500 mg calcium (1,250 mg) tablet Take  by mouth daily. cyanocobalamin 1,000 mcg tablet Take 1,000 mcg by mouth daily. omeprazole (PRILOSEC) 20 mg capsule Take 1 Cap by mouth daily. Qty: 90 Cap, Refills: 1      ondansetron (ZOFRAN ODT) 4 mg disintegrating tablet Take 1 Tab by mouth every eight (8) hours as needed for Nausea. Qty: 30 Tab, Refills: 0         STOP taking these medications       metFORMIN ER (GLUCOPHAGE XR) 500 mg tablet Comments:   Reason for Stopping:         triamterene-hydroCHLOROthiazide (DYAZIDE) 37.5-25 mg per capsule Comments:   Reason for Stopping:               * Follow-up Care/Patient Instructions:   Activity: No heavy lifting, pushing, pulling x 4 weeks  Diet: bariatric liquids  Wound Care: Keep wounds clean and dry    Follow-up Information     Follow up With Specialties Details Why Contact Info    Jocelyn Baig Physician Assistant   [address] [facility] [address] [place] [facility] [address]      Jocelyn Baig Physician Assistant   [facility]  [phone]            Future Appointments   Date Time Provider Lali Younger   12/4/2019  9:40 AM FLAVIO Holder   12/18/2019 10:00 AM FLAVIO Holder SCHED   12/30/2019  9:20 AM FLAVIO Holder SCHED         Signed:  Melonie Tan MD  11/22/2019  7:47 AM

## 2019-11-22 NOTE — PROGRESS NOTES
Patient is doing very well this morning. Patient is up in the chair, using her incentive spirometer, and ambulating in the halls. Patient is tolerating her bariatric diet with limited complaints of nausea and pain.

## 2019-11-22 NOTE — PROGRESS NOTES
Progress Note    Patient: Audi Crane MRN: [id number]  SSN: [id number]    YOB: 1985  Age: 27 y.o. Sex: female      Admit Date: 2019    2 Days Post-Op    Procedure:  Procedure(s):  LAPAROSCOPIC GASTRIC BYPASS WITH EGD (E R A S)    Subjective:     Patient notes stable left-sided incisional pain; she is tolerating 4 oz/hr bariatric liquids; ambulating, performing spirometry. Objective:     Visit Vitals  /83 (BP 1 Location: Left arm, BP Patient Position: At rest)   Pulse (!) 101   Temp 99.5 °F (37.5 °C)   Resp 16   Ht 5' 4\" (1.626 m)   Wt 253 lb 12 oz (115.1 kg)   SpO2 95%   BMI 43.56 kg/m²       Temp (24hrs), Av.8 °F (37.1 °C), Min:97.8 °F (36.6 °C), Max:99.6 °F (37.6 °C)    Date 19 07 - 19 0659 19 07 - 19 0659   Shift 5568-7325 3610-4300 24 Hour Total 4021-6003 7660-6645 24 Hour Total   INTAKE   P.O.         P. O.       Shift Total(mL/kg) 840(7.3) 240(2.1) 1080(9.4)      OUTPUT   Urine(mL/kg/hr) 1650(1.2) 350(0.3) 2000(0.7)        Urine Voided 0985 302 7996      Drains 30 60 90        Output (ml) (Tonny Drain #1 19 Right Abdomen) 30 60 90      Shift Total(mL/kg) 4653(05.4) 410(3.6) 2090(18.2)      NET -840 -170 -1010      Weight (kg) 115.1 115.1 115.1 115.1 115.1 115.1       Physical Exam:    ABDOMEN: Obese, non-distended, soft. Wounds dry and intact. Serous drain fluid. Appropriate incisional pain with palpation.     Data Review: VS, I/O's, Labs    Lab Review:   Recent Results (from the past 12 hour(s))   CBC WITH AUTOMATED DIFF    Collection Time: 19  3:49 AM   Result Value Ref Range    WBC 13.8 (H) 3.6 - 11.0 K/uL    RBC 4.47 3.80 - 5.20 M/uL    HGB 12.2 11.5 - 16.0 g/dL    HCT 36.2 35.0 - 47.0 %    MCV 81.0 80.0 - 99.0 FL    MCH 27.3 26.0 - 34.0 PG    MCHC 33.7 30.0 - 36.5 g/dL    RDW 15.0 (H) 11.5 - 14.5 %    PLATELET 948 771 - 541 K/uL    MPV 10.7 8.9 - 12.9 FL    NRBC 0.0 0  WBC    ABSOLUTE NRBC 0.00 0.00 - 0.01 K/uL    NEUTROPHILS 83 (H) 32 - 75 %    LYMPHOCYTES 11 (L) 12 - 49 %    MONOCYTES 5 5 - 13 %    EOSINOPHILS 0 0 - 7 %    BASOPHILS 0 0 - 1 %    IMMATURE GRANULOCYTES 1 (H) 0.0 - 0.5 %    ABS. NEUTROPHILS 11.4 (H) 1.8 - 8.0 K/UL    ABS. LYMPHOCYTES 1.5 0.8 - 3.5 K/UL    ABS. MONOCYTES 0.8 0.0 - 1.0 K/UL    ABS. EOSINOPHILS 0.0 0.0 - 0.4 K/UL    ABS. BASOPHILS 0.0 0.0 - 0.1 K/UL    ABS. IMM. GRANS. 0.1 (H) 0.00 - 0.04 K/UL    DF AUTOMATED     METABOLIC PANEL, BASIC    Collection Time: 19  3:49 AM   Result Value Ref Range    Sodium 138 136 - 145 mmol/L    Potassium 3.7 3.5 - 5.1 mmol/L    Chloride 107 97 - 108 mmol/L    CO2 25 21 - 32 mmol/L    Anion gap 6 5 - 15 mmol/L    Glucose 110 (H) 65 - 100 mg/dL    BUN 6 6 - 20 MG/DL    Creatinine 0.62 0.55 - 1.02 MG/DL    BUN/Creatinine ratio 10 (L) 12 - 20      GFR est AA >60 >60 ml/min/1.73m2    GFR est non-AA >60 >60 ml/min/1.73m2    Calcium 8.7 8.5 - 10.1 MG/DL         Assessment:     Hospital Problems  Date Reviewed: 2019          Codes Class Noted POA    Mor[facility] ([facility] ICD-10-CM: E66.01  ICD-9-CM: 278.01  2019 Unknown              Plan/Recommendations/Medical Decision Makin. Remove drain. 2. Discharge to home on bariatric liquids.

## 2019-11-25 ENCOUNTER — TELEPHONE (OUTPATIENT)
Dept: SURGERY | Age: 34
End: 2019-11-25

## 2019-11-25 ENCOUNTER — PATIENT OUTREACH (OUTPATIENT)
Dept: FAMILY MEDICINE CLINIC | Age: 34
End: 2019-11-25

## 2019-11-25 RX ORDER — GABAPENTIN 100 MG/1
CAPSULE ORAL 3 TIMES DAILY
COMMUNITY
End: 2020-06-17 | Stop reason: ALTCHOICE

## 2019-11-25 NOTE — PROGRESS NOTES
Hospital Discharge Follow-Up      Date/Time:  2019 8:32 AM    Patient was admitted to [facility] on 19 and discharged on 19 for PLANNED lap gastric bypass. The physician discharge summary was available at the time of outreach. Patient was contacted within one business day of discharge. Top Challenges reviewed with the provider   Planned gastric bypass  Advance Care Planning:   Does patient have an Advance Directive:  not on file        Method of communication with provider :staff message    Inpatient RRAT score: N/A  Was this a readmission? no   Patient stated reason for the readmission: N/A    Care Transition Nurse (CTN) contacted the patient by telephone to perform post hospital discharge assessment. Verified name and  with patient as identifiers. Provided introduction to self, and explanation of the CTN role. Reports overall she feels pretty well. Reports one episode of nausea after drinking \"a little caffeine\" with her protein shake. Reports she took Zofran approximately around 1130 am. Still nauseous. Reports pain manage with Dilaudid. No bladder concerns. Has not had a bowel movement but +flatuence. Reports surgical sites without drainage, foul odor. Patient received hospital discharge instructions. CTN reviewed discharge instructions and red flags with patient who verbalized understanding. Patient given an opportunity to ask questions and does not have any further questions or concerns at this time. The patient agrees to contact the PCP office for questions related to their healthcare. CTN provided contact information for future reference.     Disease Specific:   N/A    Patients top risk factors for readmission:  None identified at this time    Home Health orders at discharge: none    Durable Medical Equipment ordered at discharge: none    Medication(s):   New Medications at Discharge:HYDROmorphone 2 mg tablet (DILAUDID)   Changed Medications at Discharge: NA  Discontinued Medications at Discharge:   metFORMIN  mg tablet (GLUCOPHAGE XR)  triamterene-hydroCHLOROthiazide 37.5-25 mg per  capsule (DYAZIDE)    Medication reconciliation was performed with patient, who verbalizes understanding of administration of home medications. There were no barriers to obtaining medications identified at this time. Gabapentin updated. Referral to Pharm D needed: no     Current Outpatient Medications   Medication Sig    HYDROmorphone (DILAUDID) 2 mg tablet Take 1 Tab by mouth every four (4) hours as needed for Pain for up to 7 days. Max Daily Amount: 12 mg.  ergocalciferol (ERGOCALCIFEROL) 50,000 unit capsule Take 1 Cap by mouth every Monday and Friday.  calcium carbonate (OS-PEBBLES) 500 mg calcium (1,250 mg) tablet Take  by mouth daily.  cyanocobalamin 1,000 mcg tablet Take 1,000 mcg by mouth daily.  omeprazole (PRILOSEC) 20 mg capsule Take 1 Cap by mouth daily.  ondansetron (ZOFRAN ODT) 4 mg disintegrating tablet Take 1 Tab by mouth every eight (8) hours as needed for Nausea.  PEYMAN 0.35 mg tab TAKE 1 TABLET BY MOUTH EVERY DAY    ALPRAZolam (XANAX) 0.25 mg tablet Take 1 Tab by mouth two (2) times daily as needed for Anxiety. Max Daily Amount: 0.5 mg.    amLODIPine (NORVASC) 5 mg tablet TAKE 1 TABLET BY MOUTH EVERY DAY    metoprolol tartrate (LOPRESSOR) 25 mg tablet TAKE 1 TABLET BY MOUTH EVERY DAY    multivitamin (ONE A DAY) tablet Take 1 Tab by mouth daily.  escitalopram oxalate (LEXAPRO) 20 mg tablet TAKE ONE TABLET BY MOUTH ONCE DAILY     No current facility-administered medications for this visit. There are no discontinued medications.     [facility] follow up appointment(s):   Future Appointments   Date Time Provider Lali Younger   12/4/2019  9:40 AM FLAVIO Knowles   12/18/2019 10:00 AM FLAVIO Knowles SCHED   12/30/2019  9:20 AM FLAVIO Knowles SCHED      Non-[facility] follow up appointment(s): NA  [facility]:  n/a       Goals    None

## 2019-11-25 NOTE — TELEPHONE ENCOUNTER
Bariatric Post-Operative Phone Calls: 48 hour phone call    Diet:Question of any nausea and/or vomiting. Protein intake (goal is 60 grams of protein daily)   Poor____Fair____Good____Great_x___     Comment:______________________________________________________________      ______________________________________________________________________    Hydration:Less than 32 ounces of water daily is fair to poor (Goal is 64 ounces per day)   Poor____ Fair____ Good____Great_x___    Comment:______________________________________________________________    ______________________________________________________________________      Ambulation:( walking at least 3 x week, for 15- 20 minutes)     Poor______ Fair______ Good______     Great___x___ Comment:__________________________________________________    ______________________________________________________________________      Urine Color: Question of any odor and color(should be juan, pale, and clear) Dark______ Amber______ Pale____x__      Clear______ Comment:___________________________________________________                           ________________________________________________________________    Bowel movements: Question of any constipation- haven't had any bowel movements for more than 3 days. This could be related to protein intake and/or narcotic pain medication usage. Comment:                                                      No Bm as yet will take a laxative this evening                                                                        Pain: Left sided abdominal pain is normal (should be less than 3)  Question if pain medication is helpful.  10___ 9___ 8___ 7___ 6___ 5___ 4___ 3___     2__x_1___0___Comment:_________________________________________________    ______________________________________________________________________      Incision: (No redness, pain, swelling or fever) Healing Well____x__     Healed______Redness_________ Pain_________     Swelling_________ Fever__________(greater than 101 needs evaluation)    Comment:____________________________________________________________    ______________________________________________________________________  Use of incentive spirometer: Yes_x___       No           Next Appointment:__12/4/19____________                 Support Group: Yes__x____No______    Additional Comments:__will do support group here at the hospital and on line__________________________________________________________    ____________________________________________________________________      If more than one parameter is not met or considered poor, nurse needs to discuss with provider recommend for patient to be seen in the office as soon as possible or refer to the provider for follow-up. Reinforce to patient to use bariatric educational booklet as guide. It is appropriate to refer patient to the nutritionist to discuss more in detail of diet and nutrition.

## 2019-11-25 NOTE — TELEPHONE ENCOUNTER
----- Message from Arti Crowell LPN sent at   8:56 AM EST -----  Regardin hour post op call  Admit Date: 2019       Discharge Date: 2019     Post op lap gastric bypass

## 2019-11-27 ENCOUNTER — TELEPHONE (OUTPATIENT)
Dept: SURGERY | Age: 34
End: 2019-11-27

## 2019-11-27 NOTE — TELEPHONE ENCOUNTER
I called the patient and she said last night she has a tickle in her throat which then made her cough 4-5 times in a row. She said she is having lower left sided abdominal pain, she denied any fever and she said she has been using Ice to the area but had to take Dilaudid twice today, she is rating her pain a 7/10 but she only has the pain when she moves. I suggested she try heat to the area, she has had a BM yesterday. I told her if the heat does not help and she is still requiring pain medication she should come to the DE here at [facility]. She then said she has noticed a \"knot: under her left incision and she said she did not know if it was there before and she will keep an eye on it. I told her dr Michele Moctezuma was on call tonight if she needed anything and to come to the ED if pain persists. Pt in agreement.

## 2019-11-29 ENCOUNTER — OFFICE VISIT (OUTPATIENT)
Dept: SURGERY | Age: 34
End: 2019-11-29

## 2019-11-29 VITALS
SYSTOLIC BLOOD PRESSURE: 124 MMHG | HEART RATE: 75 BPM | DIASTOLIC BLOOD PRESSURE: 84 MMHG | OXYGEN SATURATION: 98 % | TEMPERATURE: 98.1 F | RESPIRATION RATE: 16 BRPM | BODY MASS INDEX: 41.4 KG/M2 | WEIGHT: 242.5 LBS | HEIGHT: 64 IN

## 2019-11-29 DIAGNOSIS — E66.01 MORBID OBESITY DUE TO EXCESS CALORIES (HCC): Primary | ICD-10-CM

## 2019-11-29 DIAGNOSIS — Z09 SURGICAL FOLLOWUP: ICD-10-CM

## 2019-11-29 RX ORDER — CYCLOBENZAPRINE HCL 10 MG
10 TABLET ORAL
Qty: 30 TAB | Refills: 0 | Status: SHIPPED | OUTPATIENT
Start: 2019-11-29 | End: 2020-06-17 | Stop reason: ALTCHOICE

## 2019-11-29 NOTE — PROGRESS NOTES
8 days status post gastric bypass. Pt reports doing well on liquids. .    Patient complaints of at the left side incision. She is not   Pt reports no nausea and no vomiting  Sheis drinking approximately 64 oz of water daily  + BM  She is drinking and eating 60 grams of protein daily. She complains of being hungry. She is drinking 3 shakes a day and able to eat a whole yogurt. She is taking bariatric vitamins without issue. Total weight loss since surgery 18.5 lbs  Weight loss since last visit 18.5 lbs  Visit Vitals  /84 (BP 1 Location: Left arm, BP Patient Position: Sitting)   Pulse 75   Temp 98.1 °F (36.7 °C) (Oral)   Resp 16   Ht 5' 4\" (1.626 m)   Wt 242 lb 8 oz (110 kg)   SpO2 98%   BMI 41.63 kg/m²          Patient has an advanced directive:  Not on file. Ms. Gretel Rodríguez has a reminder for a \"due or due soon\" health maintenance. I have asked that she contact her primary care provider for follow-up on this health maintenance. Physical Examination: General appearance - alert, well appearing, and in no distress,  Chest - clear to auscultation bilaterally  Heart - normal rate, regular rhythm, normal S1, S2, no murmurs, rubs, clicks or gallops  Abdomen - soft, left side tenderness, nondistended  scars from previous incisions healing without erythema or induration    A/P    Doing well 8 days  status post laparoscopic Gastric Bypass  Flexeril prescribed for deep stitch pain. Discussed that she can continue the shakes daily and add more yogurt if needed. Small bites. Focus on 50-60 grams of protein daily. Encouraged water intake to 64 oz of non-carbonated/no calorie beverages daily. Supplement with unflavored protein powder daily. Continue PPI  No lifting greater than 20 lbs. Follow up in 1 week. Pt  verbalized understanding and questions were answered to the best of my knowledge and ability.         Lilly Pineda NP

## 2019-11-29 NOTE — PROGRESS NOTES
1. Have you been to the ER, urgent care clinic since your last visit? Hospitalized since your last visit? No    2. Have you seen or consulted any other health care providers outside of the [address] [place] [address] since your last visit? Include any pap smears or colon screening.  No

## 2019-12-03 DIAGNOSIS — I10 ESSENTIAL HYPERTENSION: ICD-10-CM

## 2019-12-03 RX ORDER — AMLODIPINE BESYLATE 5 MG/1
TABLET ORAL
Qty: 90 TAB | Refills: 1 | Status: SHIPPED | OUTPATIENT
Start: 2019-12-03 | End: 2019-12-30 | Stop reason: ALTCHOICE

## 2019-12-03 RX ORDER — METOPROLOL TARTRATE 25 MG/1
TABLET, FILM COATED ORAL
Qty: 90 TAB | Refills: 1 | Status: SHIPPED | OUTPATIENT
Start: 2019-12-03 | End: 2019-12-18 | Stop reason: ALTCHOICE

## 2019-12-04 ENCOUNTER — OFFICE VISIT (OUTPATIENT)
Dept: SURGERY | Age: 34
End: 2019-12-04

## 2019-12-04 VITALS
SYSTOLIC BLOOD PRESSURE: 107 MMHG | TEMPERATURE: 98.3 F | WEIGHT: 238.4 LBS | BODY MASS INDEX: 40.7 KG/M2 | HEART RATE: 74 BPM | OXYGEN SATURATION: 97 % | HEIGHT: 64 IN | DIASTOLIC BLOOD PRESSURE: 81 MMHG

## 2019-12-04 DIAGNOSIS — Z09 SURGICAL FOLLOWUP: ICD-10-CM

## 2019-12-04 DIAGNOSIS — E66.01 MORBID OBESITY DUE TO EXCESS CALORIES (HCC): Primary | ICD-10-CM

## 2019-12-04 NOTE — PROGRESS NOTES
1. Have you been to the ER, urgent care clinic since your last visit? Hospitalized since your last visit? No    2. Have you seen or consulted any other health care providers outside of the [address] [place] [address] since your last visit? Include any pap smears or colon screening.  No

## 2019-12-04 NOTE — PATIENT INSTRUCTIONS
Constipation: Care Instructions Your Care Instructions Constipation means that you have a hard time passing stools (bowel movements). People pass stools from 3 times a day to once every 3 days. What is normal for you may be different. Constipation may occur with pain in the rectum and cramping. The pain may get worse when you try to pass stools. Sometimes there are small amounts of bright red blood on toilet paper or the surface of stools. This is because of enlarged veins near the rectum (hemorrhoids). A few changes in your diet and lifestyle may help you avoid ongoing constipation. Your doctor may also prescribe medicine to help loosen your stool. Some medicines can cause constipation. These include pain medicines and antidepressants. Tell your doctor about all the medicines you take. Your doctor may want to make a medicine change to ease your symptoms. Follow-up care is a key part of your treatment and safety. Be sure to make and go to all appointments, and call your doctor if you are having problems. It's also a good idea to know your test results and keep a list of the medicines you take. How can you care for yourself at home? · Drink plenty of fluids, enough so that your urine is light yellow or clear like water. If you have kidney, heart, or liver disease and have to limit fluids, talk with your doctor before you increase the amount of fluids you drink. · Include high-fiber foods in your diet each day. These include fruits, vegetables, beans, and whole grains. · Get at least 30 minutes of exercise on most days of the week. Walking is a good choice. You also may want to do other activities, such as running, swimming, cycling, or playing tennis or team sports. · Take a fiber supplement, such as Citrucel or Metamucil, every day. Read and follow all instructions on the label. · Schedule time each day for a bowel movement. A daily routine may help. Take your time having your bowel movement. · Support your feet with a small step stool when you sit on the toilet. This helps flex your hips and places your pelvis in a squatting position. · Your doctor may recommend an over-the-counter laxative to relieve your constipation. Examples are Milk of Magnesia and MiraLax. Read and follow all instructions on the label. Do not use laxatives on a long-term basis. When should you call for help? Call your doctor now or seek immediate medical care if: 
  · You have new or worse belly pain.  
  · You have new or worse nausea or vomiting.  
  · You have blood in your stools.  
 Watch closely for changes in your health, and be sure to contact your doctor if: 
  · Your constipation is getting worse.  
  · You do not get better as expected. Where can you learn more? Go to http://salvador-tia.info/. Enter 21 [phone] in the search box to learn more about \"Constipation: Care Instructions. \" Current as of: June 26, 2019 Content Version: 12.2 © 6524-5781 [employer]. Care instructions adapted under license by [employer] (which disclaims liability or warranty for this information). If you have questions about a medical condition or this instruction, always ask your healthcare professional. [place] [address] any warranty or liability for your use of this information. What you need to know: 1. Advance your diet to soft foods. Follow the handout that you were given today in the office. 2.  Take the recommended vitamins daily 3. No lifting greater than 20 lbs. 4.  You can do light jogging and walking. 5  Follow up in 2 weeks. 6.  You may go into a pool. 7.  If you are not able to tolerate liquids or soft foods. Please call our office. [phone] 
8. If you have vomiting and persistent epigastric pain or chest pain. You should call our office, the doctor on-call or go to the emergency room. What to do if you are constipated: You may  take Milk of Magnesia. Take 2 Tablespoons followed by 16 oz of water then 2 hours later take another 2 tablespoons. If  milk of magnesia does not work then take [place] or Miralax over the counter. Keep in mind that the Benefiber or Miralax may take a day or two to work. If all of the above do not work try a Fleets enema and follow the directions on the box. Soft and Mushy: Phase 1 Below is a list of basic items to purchase for the first phase of the  
soft mushy diet. Your surgeon or nurse practitioner will inform you when it is okay  
to advance to the next phase. Soft and Mushy Foods: Prepare food to the appropriate texture. ? Everything on clear and full liquid diet ? Applesauce (no sugar added) ? Hot & cold cereals (Cream of Wheat, Plain Cheerios®, Special K with protein®, plain oatmeal, grits) ? Frozen or canned vegetables (carrots, acorn squash, butternut squash, string beans, spinach, broccoli, cauliflower  florets only!) ? Canned fruit (in natural juice or with Splenda®) ? Fat-free, cholesterol-free egg substitute (P) ? Low-fat or fat-free cottage cheese (P) ? Low-fat or fat-free yogurt (P) ? Low-fat or fat-free [place] yogurt (P) ? Fat-free milk or 1% milk (P) ? Lactaid fat-free or 1% low fat milk (P) ? Low-fat well-cooked/soft beans (the consistency of refried beans) (P) ? No sugar added, low fat pudding (no pistachio or other flavor containing nuts) ? low-fat cream soups ? Low-fat chicken noodle or chicken rice soup (P) ? Sugar-free fudgesicles ? Sugar-free cocoa ? Fat free whipped or mashed potatoes ? Herbs and spices ? Lite butter, margarine, canola oil, olive oil, reduced-fat or fat-free mayonnaise, reduced-fat or fat-free salad dressing, reduced-fat or fat-free cream cheese, reduced-fat or fat-free sour cream.  
 
 
 P designates food sources of protein.  Include a protein at each meal.  
 
 If a food does not contain protein, you may want to consider adding protein powder to the food to give it extra protein. For example, mix protein powder in with the following: oatmeal, mashed potatoes, sugar-free pudding, sugar-free gelatin (see recipes in book), no-sugar-added applesauce. Soft Mushy Diet: Phase 1 Time Meal or Snack Soft/Mushy Food Amount (ounces) Protein 
(g) Supplement 6:30 am Sip on Fluids Sip on non-carbonated, calorie-free, no sugar added liquids. 8 oz 
 0 g Take Multivitamin containing 18 mg ferrous sulfate (iron) 7:00 am   Stop drinking fluids 30 minutes before breakfast  
7:30 am Breakfast ½ cup sugar-free oatmeal with 1 scoop of protein powder. Add cinnamon, nutmeg, artificial sweeteners as desired for flavor. 4 oz  
 20-25 g   
9:00 Snack (optional) High Protein Gelatin (see recipe in book) 4oz 10 g   
11:30 am Stop drinking liquids 30 minutes before lunch 12:00 pm Lunch Sip low-fat cream of potato soup or low-fat cream of chicken soup mixed with 1 scoop of protein powder 8 oz soup 25 g Take 400 mg calcium citrate 2:00 Snack (optional) ½ cup high protein pudding (see recipe in book) or ½ cup low-fat cottage cheese or yogurt. Can also add protein powder as needed. 4 oz 14 g 
 
or 
 
5 g   
 
3:00  5:30 pm  
Sip on Fluids Sip on non-carbonated, calorie-free, no sugar added liquids. 24 - 32 oz  
0 g Take 400 mg calcium citrate. 6:00 pm Dinner ¼ cup low-fat, well cooked beans (ex. black beans, low-fat refried beans) ¼ cup no-sugar-added applesauce 4 oz 3.5 g Take 400 mg of calcium citrate. 7:00 - 10:00 pm Sip on Fluids Sip on non-carbonated, no sugar added liquids as needed  16-24 oz 0 g Take Multivitamin with 18 mg ferrous sulfate Total:  80 oz clear fluids 63-77 
grams 2 Multivitamins with 18 mg ferrous sulfate, 1860-6064 mg calcium citrate

## 2019-12-04 NOTE — PROGRESS NOTES
2 weeks status post gastric bypass. Pt reports doing well on liquids . Patient complaints of pain. Pt reports some  nausea and no vomiting  Sheis drinking approximately 64 oz of water daily  + BM  She is drinking and eating 60 grams of protein daily. She is taking bariatric vitamins without issue. Total weight loss since surgery 22.6 lbs  Weight loss since last visit 22.6 lbs  Visit Vitals  /81 (BP 1 Location: Left arm, BP Patient Position: Sitting)   Pulse 74   Temp 98.3 °F (36.8 °C) (Oral)   Ht 5' 4\" (1.626 m)   Wt 238 lb 6.4 oz (108.1 kg)   SpO2 97%   BMI 40.92 kg/m²          Patient has an advanced directive:  Not on file     Ms. Florence has a reminder for a \"due or due soon\" health maintenance. I have asked that she contact her primary care provider for follow-up on this health maintenance. Physical Examination: General appearance - alert, well appearing, and in no distress,  Chest - clear to auscultation bilaterally  Heart - normal rate, regular rhythm, normal S1, S2, no murmurs, rubs, clicks or gallops  Abdomen - soft, nontender, nondistended  scars from previous incisions healing without erythema or induration    A/P    Doing well 2  weeks status post laparoscopic Gastric Bypass  Diet advanced to soft foods. Focus on 50-60 grams of protein daily. Encouraged water intake to 64 oz of non-carbonated/no calorie beverages daily. Supplement with unflavored protein powder daily. Continue PPI  No lifting greater than 20 lbs. Follow up in 2 weeks. May RTW, denies needing a note. Pt verbalized understanding and questions were answered to the best of my knowledge and ability.     Sonia President, NP

## 2019-12-11 ENCOUNTER — TELEPHONE (OUTPATIENT)
Dept: SURGERY | Age: 34
End: 2019-12-11

## 2019-12-11 NOTE — TELEPHONE ENCOUNTER
----- Message from Daphney Gutiérrez LPN sent at [id number]/2117  8:55 AM EST -----  Regarding: 3 week post op call  Admit Date: 11/20/2019       Discharge Date: 11/22/2019     Post op lap gastric bypass

## 2019-12-12 ENCOUNTER — TELEPHONE (OUTPATIENT)
Dept: SURGERY | Age: 34
End: 2019-12-12

## 2019-12-12 NOTE — TELEPHONE ENCOUNTER
Bariatric Post-Operative Phone Calls: Week 3    Diet:Question of any nausea and/or vomiting. Question of tolerance to diet advancement from liquids to solids. Protein intake (goal is 60 grams of protein daily)   Poor____Fair____Good___x_Great____     Comment:______________________________________________________________      ______________________________________________________________________    Hydration:Less than 32 ounces of water daily is fair to poor (Goal is 64 ounces per day)   Poor____ Fair____ Good____Great__x__    Comment:______________________________________________________________    ______________________________________________________________________      Ambulation:( walking at least 3 x week, for at least 30 minutes)   Poor______ Fair______ Good______     Great__x____ Comment:__________________________________________________    ______________________________________________________________________      Urine Color: Question of any odor and color(should be juan, pale, and clear) Dark______ Amber______ Pale______      Clear___x___ Comment:___________________________________________________                           ________________________________________________________________    Bowel movements: Question of any constipation- haven't had any bowel movements for more than 3 days. This could be related to protein intake and/or narcotic pain medication usage. Comment:                                                      Moving her bowels every other day                                                                        Pain: Left sided abdominal pain is normal (should be less than 3)         Question if pain medication is helpful.  10___ 9___ 8___ 7___ 6___ 5___ 4___ 3___     2___1___0_x__Comment:_________________________________________________    ______________________________________________________________________      Incision: (No redness, pain, swelling or fever) Healing Well____x__ Healed______Redness_________ Pain_________     Swelling_________ Fever__________(greater than 101 needs evaluation)    Comment:____________________________________________________________    ______________________________________________________________________  Use of incentive spirometer: Yes__x__       No           Next Appointment:____12/18/19__________                 Support Group: Yes___x___No______    Additional Comments:____________________________________________________________    ____________________________________________________________________      If more than one parameter is not met or considered poor, nurse needs to discuss with provider recommend for patient to be seen in the office as soon as possible or refer to the provider for follow-up. Reinforce to patient to use bariatric educational booklet as guide. It is appropriate to refer patient to the nutritionist to discuss more in detail of diet and nutrition.

## 2019-12-18 ENCOUNTER — OFFICE VISIT (OUTPATIENT)
Dept: SURGERY | Age: 34
End: 2019-12-18

## 2019-12-18 ENCOUNTER — OFFICE VISIT (OUTPATIENT)
Dept: INTERNAL MEDICINE CLINIC | Age: 34
End: 2019-12-18

## 2019-12-18 VITALS
DIASTOLIC BLOOD PRESSURE: 81 MMHG | TEMPERATURE: 99 F | HEART RATE: 83 BPM | RESPIRATION RATE: 18 BRPM | HEIGHT: 64 IN | SYSTOLIC BLOOD PRESSURE: 117 MMHG | OXYGEN SATURATION: 98 % | WEIGHT: 235 LBS | BODY MASS INDEX: 40.12 KG/M2

## 2019-12-18 VITALS
WEIGHT: 235 LBS | TEMPERATURE: 99 F | HEART RATE: 77 BPM | RESPIRATION RATE: 18 BRPM | SYSTOLIC BLOOD PRESSURE: 118 MMHG | HEIGHT: 64 IN | BODY MASS INDEX: 40.12 KG/M2 | OXYGEN SATURATION: 98 % | DIASTOLIC BLOOD PRESSURE: 86 MMHG

## 2019-12-18 DIAGNOSIS — Z09 SURGICAL FOLLOWUP: ICD-10-CM

## 2019-12-18 DIAGNOSIS — I10 ESSENTIAL HYPERTENSION: Primary | ICD-10-CM

## 2019-12-18 DIAGNOSIS — E66.01 MORBID OBESITY (HCC): ICD-10-CM

## 2019-12-18 DIAGNOSIS — E66.01 MORBID OBESITY (HCC): Primary | ICD-10-CM

## 2019-12-18 NOTE — PROGRESS NOTES
Aviva Sheriff is a 27 y.o. female  Chief Complaint   Patient presents with    Blood Pressure Check     follow up     Visit Vitals  /81 (BP 1 Location: Left arm, BP Patient Position: At rest)   Pulse 83   Temp 99 °F (37.2 °C) (Oral)   Resp 18   Ht 5' 4\" (1.626 m)   Wt 235 lb (106.6 kg)   SpO2 98%   BMI 40.34 kg/m²      Health Maintenance Due   Topic Date Due    PAP AKA CERVICAL CYTOLOGY  12/01/2017       HPI    HTN Follow up - BP controlled:  BP Readings from Last 3 Encounters:   12/18/19 117/81   12/18/19 118/86   12/04/19 107/81   Pt notes that [employer] reports that her pulse is routinely in the 50-60's. No dizziness, but this is a significant change from pt's previous baseline pulse in the 80s. Currently taking:   Key CAD CHF Meds             metoprolol tartrate (LOPRESSOR) 25 mg tablet TAKE 1 TABLET BY MOUTH EVERY DAY    amLODIPine (NORVASC) 5 mg tablet TAKE 1 TABLET BY MOUTH EVERY DAY        Had gastric bypass recently. Doing very well. Feeling emotionally supported. Wt Readings from Last 9 Encounters:   12/18/19 235 lb (106.6 kg)   12/18/19 235 lb (106.6 kg)   12/04/19 238 lb 6.4 oz (108.1 kg)   11/29/19 242 lb 8 oz (110 kg)   11/20/19 253 lb 12 oz (115.1 kg)   11/05/19 261 lb (118.4 kg)   11/05/19 261 lb (118.4 kg)   11/05/19 261 lb (118.4 kg)   10/19/19 266 lb (120.7 kg)     Review of Systems   Constitutional: Negative for fever and malaise/fatigue. Respiratory: Negative for shortness of breath. Cardiovascular: Negative for chest pain and palpitations. Neurological: Negative for dizziness, loss of consciousness and weakness. Physical Exam  Vitals signs and nursing note reviewed. Constitutional:       General: She is not in acute distress. Appearance: She is well-developed. HENT:      Head: Normocephalic and atraumatic. Neck:      Musculoskeletal: Neck supple. Vascular: No JVD. Comments: No bruit bilateral carotid arteries.   Cardiovascular:      Rate and Rhythm: Normal rate and regular rhythm. Heart sounds: Normal heart sounds. Pulmonary:      Effort: Pulmonary effort is normal. No respiratory distress. Breath sounds: Normal breath sounds. Skin:     General: Skin is warm and dry. Neurological:      Mental Status: She is alert and oriented to person, place, and time. Psychiatric:         Mood and Affect: Mood normal.         Behavior: Behavior normal.         Thought Content: Thought content normal.         Judgment: Judgment normal.         Diagnoses and all orders for this visit:    1. Essential hypertension  Stop Metoprolol. Continue routine monitoring at Bariatric Surg. We may be able to stop the Amlodipine in the future. Follow up with me q 6months. Instructed pt to send message if BPs are consistently low at Bariatric Surg follow ups and we can try also stopping Amlodipine. Congratulated pt on weight loss success and encouraged continued efforts.

## 2019-12-18 NOTE — PROGRESS NOTES
1. Have you been to the ER, urgent care clinic since your last visit? Hospitalized since your last visit?no      2. Have you seen or consulted any other health care providers outside of the [address] [place] [address] since your last visit? Include any pap smears or colon screening.  No    Chief Complaint   Patient presents with    Blood Pressure Check     follow up     Not fasting

## 2019-12-18 NOTE — PROGRESS NOTES
4 weeks status post gastric bypass. Pt reports doing well on liquids and soft foods. .    Patient no complaints of pain  Pt reports no nausea and no vomiting  Sheis drinking approximately 30-60 oz of water daily  + BM  She is drinking and eating 50-60  grams of protein daily. She is taking bariatric vitamins without issue. Total weight loss since surgery 26.2lbs  Weight loss since last visit 3.6 lbs  Visit Vitals  /86 (BP 1 Location: Right arm, BP Patient Position: Sitting)   Pulse 77   Temp 99 °F (37.2 °C) (Oral)   Resp 18   Ht 5' 4\" (1.626 m)   Wt 235 lb (106.6 kg)   SpO2 98%   BMI 40.34 kg/m²          Patient has an advanced directive: not on file. Ms. Nikkie Maciel has a reminder for a \"due or due soon\" health maintenance. I have asked that she contact her primary care provider for follow-up on this health maintenance. Physical Examination: General appearance - alert, well appearing, and in no distress,  Chest - clear to auscultation bilaterally  Heart - normal rate, regular rhythm, normal S1, S2, no murmurs, rubs, clicks or gallops  Abdomen - soft, nontender, nondistended  scars from previous incisions healing without erythema or induration    A/P    Doing well 4 weeks status post laparoscopic Gastric Bypass  Diet advanced to soft meats   Focus on 50-60 grams of protein daily. Encouraged water intake to 64 oz of non-carbonated/no calorie beverages daily. Supplement with unflavored protein powder daily. Continue PPI  No lifting greater than 40 lbs. Follow up in 2 weeks. Continue walking for exercise. Pt verbalized understanding and questions were answered to the best of my knowledge and ability.         Maximino Ramirez, FLAVIO

## 2019-12-18 NOTE — PATIENT INSTRUCTIONS
Constipation: Care Instructions Your Care Instructions Constipation means that you have a hard time passing stools (bowel movements). People pass stools from 3 times a day to once every 3 days. What is normal for you may be different. Constipation may occur with pain in the rectum and cramping. The pain may get worse when you try to pass stools. Sometimes there are small amounts of bright red blood on toilet paper or the surface of stools. This is because of enlarged veins near the rectum (hemorrhoids). A few changes in your diet and lifestyle may help you avoid ongoing constipation. Your doctor may also prescribe medicine to help loosen your stool. Some medicines can cause constipation. These include pain medicines and antidepressants. Tell your doctor about all the medicines you take. Your doctor may want to make a medicine change to ease your symptoms. Follow-up care is a key part of your treatment and safety. Be sure to make and go to all appointments, and call your doctor if you are having problems. It's also a good idea to know your test results and keep a list of the medicines you take. How can you care for yourself at home? · Drink plenty of fluids, enough so that your urine is light yellow or clear like water. If you have kidney, heart, or liver disease and have to limit fluids, talk with your doctor before you increase the amount of fluids you drink. · Include high-fiber foods in your diet each day. These include fruits, vegetables, beans, and whole grains. · Get at least 30 minutes of exercise on most days of the week. Walking is a good choice. You also may want to do other activities, such as running, swimming, cycling, or playing tennis or team sports. · Take a fiber supplement, such as Citrucel or Metamucil, every day. Read and follow all instructions on the label. · Schedule time each day for a bowel movement. A daily routine may help. Take your time having your bowel movement. · Support your feet with a small step stool when you sit on the toilet. This helps flex your hips and places your pelvis in a squatting position. · Your doctor may recommend an over-the-counter laxative to relieve your constipation. Examples are Milk of Magnesia and MiraLax. Read and follow all instructions on the label. Do not use laxatives on a long-term basis. When should you call for help? Call your doctor now or seek immediate medical care if: 
  · You have new or worse belly pain.  
  · You have new or worse nausea or vomiting.  
  · You have blood in your stools.  
 Watch closely for changes in your health, and be sure to contact your doctor if: 
  · Your constipation is getting worse.  
  · You do not get better as expected. Where can you learn more? Go to http://salvador-tia.info/. Enter 21 [id number] in the search box to learn more about \"Constipation: Care Instructions. \" Current as of: June 26, 2019 Content Version: 12.2 © 6792-3174 [employer]. Care instructions adapted under license by [employer] (which disclaims liability or warranty for this information). If you have questions about a medical condition or this instruction, always ask your healthcare professional. [place] [address] any warranty or liability for your use of this information. What you need to know: 
1 . Advance your diet to moist meats. Follow the handout that you were given today in the office. 2. Take the recommended vitamins daily 3 No lifting greater than 40 lbs. 4. You can do light jogging, moderate walking and a recumbent bike. 5 Follow up in 2 weeks. 6. You may go into a pool. 7. If you are not able to tolerate liquids, soft foods or moist meats. Please call our office. [phone] 
8. If you have vomiting and persistent epigastric pain or chest pain. You should call our office, the doctor on-call or go to the emergency room. What to do if you are constipated: You may  take Milk of Magnesia. Take 2 Tablespoons followed by 16 oz of water then 2 hours later take another 2 tablespoons. If  milk of magnesia does not work then take Worship-Quinault or Miralax over the counter. Keep in mind that the Benefiber or Miralax may take a day or two to work. If all of the above do not work try a Fleets enema and follow the directions on the box. Shopping List Presley Buttery Soft Mushy Diet: Phase 2 - Moist Meats Below is a list of moist meats that you can now introduce into your diet. Moist Meats: Prepare food to the appropriate texture using low-fat cooking  
methods ? Tuna packed in water (strain before eating) ? White flaky fish (noah, cod, flounder, tilapia, salmon) ? White chicken breast packed in water (strain before eating) ? 96-99% fat free thinly sliced deli meat (ham, turkey, roast beef) ? Fat free non-stick spray ? Silken Tofu ? Low-fat or vegetarian refried beans ? Well-cooked beans and lentils ? Skinless turkey or chicken (prepare to a soft texture) ? 93% lean pureed beef (round or sirloin only) ? Lean pork (cooked until very tender, cut into small pieces) ? Eggs (preferable egg whites) ? Egg substitutes ? Herbs and spices ? Lite butter, margarine, canola oil, olive oil, reduced-fat or fat-free bradshaw, reduced-fat or fat-free salad dressing, reduced-fat or fat-free cream cheese, reduced-fat or fat-free sour cream, lemon juice, salt, pepper, mustard, ketchup, salsa. See patient handbook for more low-fat cooking ideas. ? Be sure to use moist methods of cooking. Avoid microwaving meats because it can dry out the food making it harder to tolerate. Soft Mushy Diet: Phase 2 Time Meal or Snack Soft/Mushy Food Amount (ounces) Protein 
(g) Supplement 6:30 am Sip on Fluids Sip on non-carbonated, calorie-free, no sugar added liquids. 8 oz 0 g Take Multivitamin containing 18 mg ferrous sulfate (iron) 7:00 am   Stop drinking fluids 30 minutes before breakfast  
7:30 am Breakfast ¼ cup soft scrambled egg or egg substitute ¼ cup canned fruit (packed in natural juice, strained)  4 oz  
 7 g   
9:00 Snack (optional) ½ cup low-fat or fat-free yogurt  
or ½ cup cottage cheese  4oz 4g 
or 14 g   
11:30 am Stop drinking liquids 30 minutes before lunch 12:00 pm Lunch ¼ cup low-fat or lean deli meat 
with ¼ well cooked green beans 4 oz  14 g Take 400 mg calcium citrate 2:00 Snack (optional) ½ cup high protein, sugar-free pudding with 1 scoop added protein powder (see recipe in book). 4 oz 14 g   
 
3:00  5:30 pm  
Sip on Fluids Sip on non-carbonated, calorie-free, no sugar added liquids. 24 - 32 oz  
0 g Take 400 mg calcium citrate. 6:00 pm Dinner ¼ cup soft/flaky fish (tilapia, flounder, tuna) ¼ cup mashed potatoes or pureed cauliflower mashed potatoes (consider adding protein powder)  4 oz 14 g Take 400 mg of calcium citrate. 7:00 - 10:00 pm Sip on Fluids Sip on non-carbonated, no sugar added liquids as needed  16-24 oz 0 g Take Multivitamin with 18 mg ferrous sulfate Total:  64 oz fluids 63 
grams 2 Multivitamins with 18 mg ferrous sulfate, 4484-3173 mg calcium citrate

## 2019-12-18 NOTE — PROGRESS NOTES
1. Have you been to the ER, urgent care clinic since your last visit? Hospitalized since your last visit? No    2. Have you seen or consulted any other health care providers outside of the [address] [place] [address] since your last visit? Include any pap smears or colon screening.  No

## 2019-12-23 ENCOUNTER — TELEPHONE (OUTPATIENT)
Dept: SURGERY | Age: 34
End: 2019-12-23

## 2019-12-30 ENCOUNTER — OFFICE VISIT (OUTPATIENT)
Dept: SURGERY | Age: 34
End: 2019-12-30

## 2019-12-30 VITALS
WEIGHT: 232.2 LBS | DIASTOLIC BLOOD PRESSURE: 65 MMHG | TEMPERATURE: 97.5 F | HEIGHT: 64 IN | HEART RATE: 90 BPM | OXYGEN SATURATION: 98 % | BODY MASS INDEX: 39.64 KG/M2 | SYSTOLIC BLOOD PRESSURE: 103 MMHG | RESPIRATION RATE: 18 BRPM

## 2019-12-30 DIAGNOSIS — Z09 SURGICAL FOLLOWUP: ICD-10-CM

## 2019-12-30 DIAGNOSIS — E66.01 MORBID OBESITY (HCC): Primary | ICD-10-CM

## 2019-12-30 NOTE — PROGRESS NOTES
1. Have you been to the ER, urgent care clinic since your last visit? Hospitalized since your last visit? No    2. Have you seen or consulted any other health care providers outside of the [address] [place] [address] since your last visit? Include any pap smears or colon screening.  No

## 2019-12-30 NOTE — PROGRESS NOTES
6 weeks status post gastric bypass. Pt reports doing well on liquids, soft foods and soft meats. .    Patient no complaints of pain  Pt reports no nausea and no vomiting  Sheis drinking approximately 50 oz of water daily  + BM  She is drinking and eating 40 grams of protein daily. She is off one of her blood pressure medications. Today her blood pressure is low. She is taking bariatric vitamins without issue. Total weight loss since surgery 28.9lbs  Weight loss since last visit 2.7lbs  Visit Vitals  /65 (BP 1 Location: Left arm, BP Patient Position: Sitting)   Pulse 90   Temp 97.5 °F (36.4 °C) (Oral)   Resp 18   Ht 5' 4\" (1.626 m)   Wt 232 lb 3.2 oz (105.3 kg)   SpO2 98%   BMI 39.86 kg/m²          Patient has an advanced directive:  Not on file. Ms. Anjum Fermin has a reminder for a \"due or due soon\" health maintenance. I have asked that she contact her primary care provider for follow-up on this health maintenance. Physical Examination: General appearance - alert, well appearing, and in no distress,  Chest - clear to auscultation bilaterally  Heart - normal rate, regular rhythm, normal S1, S2, no murmurs, rubs, clicks or gallops  Abdomen - soft, nontender, nondistended  scars from previous incisions healing without erythema or induration    A/P    Doing well 6 weeks status post laparoscopic Gastric Bypass  Diet advanced to solid foods. Focus on 50-60 grams of protein daily. Encouraged water intake to 64 oz of non-carbonated/no calorie beverages daily. Supplement with unflavored protein powder daily. Continue PPI  Monitor BP   Continue vitamins  May exercise. Follow up in 6 weeks. Pt verbalized understanding and questions were answered to the best of my knowledge and ability.         Chantell Goldberg NP

## 2019-12-30 NOTE — PATIENT INSTRUCTIONS

## 2020-01-30 DIAGNOSIS — F41.9 ANXIETY: ICD-10-CM

## 2020-01-30 RX ORDER — ESCITALOPRAM OXALATE 20 MG/1
TABLET ORAL
Qty: 90 TAB | Refills: 3 | Status: SHIPPED | OUTPATIENT
Start: 2020-01-30 | End: 2020-06-17 | Stop reason: ALTCHOICE

## 2020-02-12 ENCOUNTER — OFFICE VISIT (OUTPATIENT)
Dept: SURGERY | Age: 35
End: 2020-02-12

## 2020-02-12 VITALS
TEMPERATURE: 98.2 F | WEIGHT: 222.4 LBS | OXYGEN SATURATION: 98 % | RESPIRATION RATE: 19 BRPM | HEIGHT: 64 IN | DIASTOLIC BLOOD PRESSURE: 81 MMHG | BODY MASS INDEX: 37.97 KG/M2 | SYSTOLIC BLOOD PRESSURE: 110 MMHG | HEART RATE: 86 BPM

## 2020-02-12 DIAGNOSIS — E66.01 MORBID OBESITY (HCC): Primary | ICD-10-CM

## 2020-02-12 DIAGNOSIS — Z09 SURGICAL FOLLOWUP: ICD-10-CM

## 2020-02-12 NOTE — PROGRESS NOTES
12 weeks status post gastric bypass. Pt reports doing well on liquids, soft and solid foods. .    Patient has no complaints of pain. Pt reports no nausea and no vomiting  Sheis drinking approximately 40 oz of water daily   BM  She is drinking and eating 60+  grams of protein daily. She is taking bariatric vitamins without issue. Total weight loss since surgery 38.6 lbs  Weight loss since last visit 9.7lbs  Visit Vitals  /81 (BP 1 Location: Left arm, BP Patient Position: Sitting)   Pulse 86   Temp 98.2 °F (36.8 °C)   Resp 19   Ht 5' 4\" (1.626 m)   Wt 222 lb 6.4 oz (100.9 kg)   SpO2 98%   BMI 38.17 kg/m²          Patient has an advanced directive:  Not on file. Ms. Waylon Meraz has a reminder for a \"due or due soon\" health maintenance. I have asked that she contact her primary care provider for follow-up on this health maintenance. Physical Examination: General appearance - alert, well appearing, and in no distress,  Chest - clear to auscultation bilaterally  Heart - normal rate, regular rhythm, normal S1, S2, no murmurs, rubs, clicks or gallops  Abdomen - soft, nontender, nondistended  scars from previous incisions healing without erythema or induration    A/P    Doing well 12 weeks status post laparoscopic Gastric Bypass  Advised patient regard to diet that is high-protein, low-fat, low-sugar, limited carbohydrates. Strive for 50-60 grams of protein daily. If having a snack, foods that are protein or fiber rich. . No eating/drinking together, chew foods well, and portion control. Measure meals. Discussed snacking behavior and to [facility] pay attention to behavioral factor and habits. Drink at least 40-64 ounces of water or non-calorie/non-carbonated beverages daily. Continue vitamin regiment daily. Exercise at least 3 days a week with cardiovascular and strength training. Patient to follow up in 3 months. . Advised to call office if any questions/concerns.       Pt verbalized understanding and questions were answered to the best of my knowledge and ability.          Kathrin Goldstein NP

## 2020-04-02 ENCOUNTER — OFFICE VISIT (OUTPATIENT)
Dept: SURGERY | Age: 35
End: 2020-04-02

## 2020-04-02 ENCOUNTER — TELEPHONE (OUTPATIENT)
Dept: SURGERY | Age: 35
End: 2020-04-02

## 2020-04-02 VITALS — HEIGHT: 64 IN | BODY MASS INDEX: 35.85 KG/M2 | WEIGHT: 210 LBS

## 2020-04-02 DIAGNOSIS — R11.0 NAUSEA: ICD-10-CM

## 2020-04-02 DIAGNOSIS — E66.01 MORBID OBESITY (HCC): Primary | ICD-10-CM

## 2020-04-02 DIAGNOSIS — R10.13 EPIGASTRIC PAIN: ICD-10-CM

## 2020-04-02 RX ORDER — OMEPRAZOLE 20 MG/1
20 CAPSULE, DELAYED RELEASE ORAL DAILY
Qty: 90 CAP | Refills: 1 | Status: SHIPPED | OUTPATIENT
Start: 2020-04-02 | End: 2020-09-30

## 2020-04-02 RX ORDER — ONDANSETRON 4 MG/1
4 TABLET, ORALLY DISINTEGRATING ORAL
Qty: 30 TAB | Refills: 0 | Status: SHIPPED | OUTPATIENT
Start: 2020-04-02 | End: 2021-03-30

## 2020-04-02 RX ORDER — SUCRALFATE 1 G/10ML
1 SUSPENSION ORAL 4 TIMES DAILY
Qty: 1120 ML | Refills: 0 | Status: SHIPPED | OUTPATIENT
Start: 2020-04-02 | End: 2020-04-23

## 2020-04-02 RX ORDER — SUCRALFATE 1 G/10ML
1 SUSPENSION ORAL 4 TIMES DAILY
Qty: 1120 ML | Refills: 0 | OUTPATIENT
Start: 2020-04-02 | End: 2020-04-02 | Stop reason: SDUPTHER

## 2020-04-02 NOTE — PATIENT INSTRUCTIONS
Sucralfate (Carafate) - (By mouth)   Why this medicine is used:   Treats ulcers. Contact a nurse or doctor right away if you have:  · Chest pain, trouble breathing, coughing up blood  · Increased hunger or thirst, change in how much or how often you urinate, unusual weight loss  · Numbness or weakness in your arm or leg, or on one side of your body  · Pain in your lower leg (calf)  · Sudden or severe headache, problems with vision, speech, or walking  · Rash, hives, itching, swelling of the face or mouth     Common side effects:  · Constipation, stomach upset or cramps, passing gas, diarrhea  · Dry mouth, dizziness  © 2017 [address] Information is for End User's use only and may not be sold, redistributed or otherwise used for commercial purposes.

## 2020-04-02 NOTE — TELEPHONE ENCOUNTER
Patient called and stated that pharmacy did not receive the Rx for Carasate and needs that resent to her pharmacy.

## 2020-04-02 NOTE — PROGRESS NOTES
HISTORY OF PRESENT ILLNESS    I was in the office while conducting this encounter. Consent:  She and/or her healthcare decision maker is aware that this patient-initiated Telehealth encounter is a billable service, with coverage as determined by her insurance carrier. She is aware that she may receive a bill and has provided verbal consent to proceed: Yes    This virtual visit was conducted via [employer]. Pursuant to the emergency declaration under the [facility], [facility] waiver authority and the [employer] Act, this Virtual  Visit was conducted to reduce the patient's risk of exposure to COVID-19 and provide continuity of care for an established patient. Services were provided through a video synchronous discussion virtually to substitute for in-person clinic visit. Due to this being a TeleHealth evaluation, many elements of the physical examination are unable to be assessed. Total Time: minutes: 11-20 minutes. Jessica Colón is a 27 y.o. female with previous Malabsorptive Gastric  bypass surgery 4 months ago . She has lost a total of 50  pounds since surgery. She  Has lost 11 lbs since the last ov. Body mass index is 36.05 kg/m². Jolanta Salen nausea and no vomiting. She complains of stomach soreness for the past 3-4 days. She is no longer taking prilosec. She states that it is worse Drinking water daily. 50 grams protein intake daily. Dietary recall -  Breakfast- shake  Lunch- shake, sandwich  Dinner- protein    She is snacking between meals; protein crackers. .      Vitamins:  MVI : yes  Calcium : yes  B-Vit 12: yes        Ms. Florence has a reminder for a \"due or due soon\" health maintenance. I have asked that she contact her primary care provider for follow-up on this health maintenance.          COMORBIDITY     SLEEP APNEA                 NO        GERD  (req.meds)            NO  HYPERLIPIDEMIA            NO  HYPERTENSION NO         DIABETES                         NO           Current Outpatient Medications:     escitalopram oxalate (LEXAPRO) 20 mg tablet, TAKE 1 TABLET BY MOUTH EVERY DAY, Disp: 90 Tab, Rfl: 3    calcium carbonate (OS-PEBBLES) 500 mg calcium (1,250 mg) tablet, Take  by mouth daily. , Disp: , Rfl:     cyanocobalamin 1,000 mcg tablet, Take 1,000 mcg by mouth daily. , Disp: , Rfl:     ondansetron (ZOFRAN ODT) 4 mg disintegrating tablet, Take 1 Tab by mouth every eight (8) hours as needed for Nausea., Disp: 30 Tab, Rfl: 0    PEYMAN 0.35 mg tab, TAKE 1 TABLET BY MOUTH EVERY DAY, Disp: , Rfl: 3    multivitamin (ONE A DAY) tablet, Take 1 Tab by mouth daily. , Disp: , Rfl:     cyclobenzaprine (FLEXERIL) 10 mg tablet, Take 1 Tab by mouth three (3) times daily as needed for Muscle Spasm(s). , Disp: 30 Tab, Rfl: 0    gabapentin (NEURONTIN) 100 mg capsule, Take  by mouth three (3) times daily. , Disp: , Rfl:     ergocalciferol (ERGOCALCIFEROL) 50,000 unit capsule, Take 1 Cap by mouth every Monday and Friday., Disp: 8 Cap, Rfl: 2    omeprazole (PRILOSEC) 20 mg capsule, Take 1 Cap by mouth daily. , Disp: 90 Cap, Rfl: 1    ALPRAZolam (XANAX) 0.25 mg tablet, Take 1 Tab by mouth two (2) times daily as needed for Anxiety. Max Daily Amount: 0.5 mg., Disp: 30 Tab, Rfl: 2      Visit Vitals  Ht 5' 4\" (1.626 m)   Wt 210 lb (95.3 kg)   BMI 36.05 kg/m²     HPI    Review of Systems   Respiratory: Negative for shortness of breath. Cardiovascular: Negative for chest pain. Gastrointestinal: Positive for abdominal pain (epigastric pain) and nausea. Negative for heartburn and vomiting. Physical Exam  Cardiovascular:      Rate and Rhythm: Normal rate. Comments: Rate 72 on apple watch  Pulmonary:      Effort: Pulmonary effort is normal.   Abdominal:      Tenderness: There is abdominal tenderness (mid abdomen. ).          ASSESSMENT and PLAN  Isabelle Yancey is a 27 y.o. female with previous Malabsorptive Gastric  bypass surgery 4 months ago . She has lost a total of 50  pounds since surgery. She  Has lost 11 lbs since the last ov. Body mass index is 36.05 kg/m². Cheri Bound nausea and no vomiting. She complains of stomach soreness for the past 3-4 days. She is no longer taking prilosec. She states that it is worse Drinking water daily. 50 grams protein intake daily. Recommend starting Prilosec, Zofran and Carafate for a months. zofran as needed. Advised to limit acidic foods. Follow up if pain gets worse or go to the ER. Recommend eating texture meals to help with snacking. Advised patient regard to diet that is high-protein, low-fat, low-sugar, limited carbohydrates. Strive for 50-60 grams of protein daily. If having a snack, foods that are protein or fiber rich. . No eating/drinking together, chew foods well, and portion control. Measure meals. Discussed snacking behavior and to [facility] pay attention to behavioral factor and habits. Drink at least 40-64 ounces of water or non-calorie/non-carbonated beverages daily. Continue vitamin regiment daily. Exercise at least 3 days a week with cardiovascular and strength training. Patient to follow up in 4 months. . Advised to call office if any questions/concerns.

## 2020-04-02 NOTE — PROGRESS NOTES
1. Have you been to the ER, urgent care clinic since your last visit? Hospitalized since your last visit? No    2. Have you seen or consulted any other health care providers outside of the [address] [place] [address] since your last visit? Include any pap smears or colon screening.  No

## 2020-04-23 RX ORDER — SUCRALFATE 1 G/10ML
SUSPENSION ORAL
Qty: 1120 ML | Refills: 0 | Status: SHIPPED | OUTPATIENT
Start: 2020-04-23 | End: 2020-06-17 | Stop reason: ALTCHOICE

## 2020-05-13 ENCOUNTER — OFFICE VISIT (OUTPATIENT)
Dept: SURGERY | Age: 35
End: 2020-05-13

## 2020-05-13 VITALS — HEIGHT: 64 IN | WEIGHT: 206 LBS | BODY MASS INDEX: 35.17 KG/M2

## 2020-05-13 DIAGNOSIS — E66.01 MORBID OBESITY (HCC): Primary | ICD-10-CM

## 2020-05-13 DIAGNOSIS — D64.9 ANEMIA, UNSPECIFIED TYPE: ICD-10-CM

## 2020-05-13 DIAGNOSIS — E55.9 VITAMIN D DEFICIENCY: ICD-10-CM

## 2020-05-13 DIAGNOSIS — Z98.84 S/P GASTRIC BYPASS: ICD-10-CM

## 2020-05-13 DIAGNOSIS — E53.8 VITAMIN B12 DEFICIENCY: ICD-10-CM

## 2020-05-13 DIAGNOSIS — K91.2 POSTSURGICAL NONABSORPTION: ICD-10-CM

## 2020-05-13 NOTE — PROGRESS NOTES
1. Have you been to the ER, urgent care clinic since your last visit? Hospitalized since your last visit? No    2. Have you seen or consulted any other health care providers outside of the [address] [place] [address] since your last visit? Include any pap smears or colon screening.  No

## 2020-05-13 NOTE — PATIENT INSTRUCTIONS
Eating Healthy Foods: Care Instructions Your Care Instructions Eating healthy foods can help lower your risk for disease. Healthy food gives you energy and keeps your heart strong, your brain active, your muscles working, and your bones strong. A healthy diet includes a variety of foods from the basic food groups: grains, vegetables, fruits, milk and milk products, and meat and beans. Some people may eat more of their favorite foods from only one food group and, as a result, miss getting the nutrients they need. So, it is important to pay attention not only to what you eat but also to what you are missing from your diet. You can eat a healthy, balanced diet by making a few small changes. Follow-up care is a key part of your treatment and safety. Be sure to make and go to all appointments, and call your doctor if you are having problems. It's also a good idea to know your test results and keep a list of the medicines you take. How can you care for yourself at home? Look at what you eat · Keep a food diary for a week or two and record everything you eat or drink. Track the number of servings you eat from each food group. · For a balanced diet every day, eat a variety of: 
? 6 or more ounce-equivalents of grains, such as cereals, breads, crackers, rice, or pasta, every day. An ounce-equivalent is 1 slice of bread, 1 cup of ready-to-eat cereal, or ½ cup of cooked rice, cooked pasta, or cooked cereal. 
? 2½ cups of vegetables, especially: § Dark-green vegetables such as broccoli and spinach. § Orange vegetables such as carrots and sweet potatoes. § Dry beans (such as bergeron and kidney beans) and peas (such as lentils). ? 2 cups of fresh, frozen, or canned fruit. A small apple or 1 banana or orange equals 1 cup. ? 3 cups of nonfat or low-fat milk, yogurt, or other milk products.  
? 5½ ounces of meat and beans, such as chicken, fish, lean meat, beans, nuts, and seeds. One egg, 1 tablespoon of peanut butter, ½ ounce nuts or seeds, or ¼ cup of cooked beans equals 1 ounce of meat. · Learn how to read food labels for serving sizes and ingredients. Fast-food and convenience-food meals often contain few or no fruits or vegetables. Make sure you eat some fruits and vegetables to make the meal more nutritious. · Look at your food diary. For each food group, add up what you have eaten and then divide the total by the number of days. This will give you an idea of how much you are eating from each food group. See if you can find some ways to change your diet to make it more healthy. Start small · Do not try to make dramatic changes to your diet all at once. You might feel that you are missing out on your favorite foods and then be more likely to fail. · Start slowly, and gradually change your habits. Try some of the following: ? Use whole wheat bread instead of white bread. ? Use nonfat or low-fat milk instead of whole milk. ? Eat brown rice instead of white rice, and eat whole wheat pasta instead of white-flour pasta. ? Try low-fat cheeses and low-fat yogurt. ? Add more fruits and vegetables to meals and have them for snacks. ? Add lettuce, tomato, cucumber, and onion to sandwiches. ? Add fruit to yogurt and cereal. 
Enjoy food · You can still eat your favorite foods. You just may need to eat less of them. If your favorite foods are high in fat, salt, and sugar, limit how often you eat them, but do not cut them out entirely. · Eat a wide variety of foods. Make healthy choices when eating out · The type of restaurant you choose can help you make healthy choices. Even fast-food chains are now offering more low-fat or healthier choices on the menu. · Choose smaller portions, or take half of your meal home. · When eating out, try: ? A veggie pizza with a whole wheat crust or grilled chicken (instead of sausage or pepperoni). ? Pasta with roasted vegetables, grilled chicken, or marinara sauce instead of cream sauce. ? A vegetable wrap or grilled chicken wrap. ? Broiled or poached food instead of fried or breaded items. Make healthy choices easy · Buy packaged, prewashed, ready-to-eat fresh vegetables and fruits, such as baby carrots, salad mixes, and chopped or shredded broccoli and cauliflower. · Buy packaged, presliced fruits, such as melon or pineapple. · Choose 100% fruit or vegetable juice instead of soda. Limit juice intake to 4 to 6 oz (½ to ¾ cup) a day. · Blend low-fat yogurt, fruit juice, and canned or frozen fruit to make a smoothie for breakfast or a snack. Where can you learn more? Go to http://salvador-tia.info/ Enter T756 in the search box to learn more about \"Eating Healthy Foods: Care Instructions. \" Current as of: August 21, 2019Content Version: 12.4 © 8155-2264 [employer]. Care instructions adapted under license by [employer] (which disclaims liability or warranty for this information). If you have questions about a medical condition or this instruction, always ask your healthcare professional. [address] any warranty or liability for your use of this information.

## 2020-05-13 NOTE — PROGRESS NOTES
HISTORY OF PRESENT ILLNESS  I was in the office while conducting this encounter. Consent:  She and/or her healthcare decision maker is aware that this patient-initiated Telehealth encounter is a billable service, with coverage as determined by her insurance carrier. She is aware that she may receive a bill and has provided verbal consent to proceed: Yes    This virtual visit was conducted via [employer]. Pursuant to the emergency declaration under the [facility], [facility] waiver authority and the [employer] Act, this Virtual  Visit was conducted to reduce the patient's risk of exposure to COVID-19 and provide continuity of care for an established patient. Services were provided through a video synchronous discussion virtually to substitute for in-person clinic visit. Due to this being a TeleHealth evaluation, many elements of the physical examination are unable to be assessed. Total Time: minutes: 11-20 minutes. Brooke Peter is a 27 y.o. female with previous Malabsorptive Gastric bypass. surgery 5 months ago . She has lost a total of 54 pounds since surgery. She  has lost 4 since the last ov. Body mass index is 35.36 kg/m². Cassandra Dye no nausea and no vomiting . Denies Acid reflux/heartburn. . Drinking  40 ounces of water daily. 50  protein intake daily. . Pt is walking and hiking for exercise. She is feeling much better. She is frustrated because she has not lost a lot of weight in the past month and is hungry all the time. Dietary recall -    Breakfast- protein shake and coffee, peanuts or eggs or sausage  Lunch- sandwich, turkey cheese rolled ups, fruit with greek yogurt  Dinner- pasta prima vera zoodles with chicken. She is snacking between meals; fruit, strawberries and blueberries.  .      Vitamins:  MVI : yes  Calcium : yes  B-Vit 12: yes  Vit D: Yes    Patient has an advanced directive: NO      Ms. Theresa Denise has a reminder for a \"due or due soon\" health maintenance. I have asked that she contact her primary care provider for follow-up on this health maintenance. COMORBIDITY     SLEEP APNEA                 NO        GERD  (req.meds)            YES  HYPERLIPIDEMIA            NO  HYPERTENSION              NO         DIABETES                         NO           Current Outpatient Medications:     sucralfate (CARAFATE) 100 mg/mL suspension, TAKE 10 ML BY MOUTH 4 TIMES DAILY FOR 28 DAYS., Disp: 1120 mL, Rfl: 0    ondansetron (ZOFRAN ODT) 4 mg disintegrating tablet, Take 1 Tab by mouth every eight (8) hours as needed for Nausea or Vomiting., Disp: 30 Tab, Rfl: 0    omeprazole (PRILOSEC) 20 mg capsule, Take 1 Cap by mouth daily. , Disp: 90 Cap, Rfl: 1    escitalopram oxalate (LEXAPRO) 20 mg tablet, TAKE 1 TABLET BY MOUTH EVERY DAY, Disp: 90 Tab, Rfl: 3    calcium carbonate (OS-PEBBLES) 500 mg calcium (1,250 mg) tablet, Take  by mouth daily. , Disp: , Rfl:     PEYMAN 0.35 mg tab, TAKE 1 TABLET BY MOUTH EVERY DAY, Disp: , Rfl: 3    multivitamin (ONE A DAY) tablet, Take 1 Tab by mouth daily. Bariatric Advantage, Disp: , Rfl:     cyclobenzaprine (FLEXERIL) 10 mg tablet, Take 1 Tab by mouth three (3) times daily as needed for Muscle Spasm(s). , Disp: 30 Tab, Rfl: 0    gabapentin (NEURONTIN) 100 mg capsule, Take  by mouth three (3) times daily. , Disp: , Rfl:     ergocalciferol (ERGOCALCIFEROL) 50,000 unit capsule, Take 1 Cap by mouth every Monday and Friday., Disp: 8 Cap, Rfl: 2    cyanocobalamin 1,000 mcg tablet, Take 1,000 mcg by mouth daily. , Disp: , Rfl:     ALPRAZolam (XANAX) 0.25 mg tablet, Take 1 Tab by mouth two (2) times daily as needed for Anxiety. Max Daily Amount: 0.5 mg., Disp: 30 Tab, Rfl: 2      Visit Vitals  Ht 5' 4\" (1.626 m)   Wt 206 lb (93.4 kg)   BMI 35.36 kg/m²     HPI    Review of Systems   Respiratory: Negative for shortness of breath. Cardiovascular: Negative for chest pain.    Gastrointestinal: Negative for abdominal pain, heartburn, nausea and vomiting. Neurological: Negative for dizziness and headaches. Physical Exam  Pulmonary:      Effort: No respiratory distress. Abdominal:      Tenderness: There is no abdominal tenderness. Neurological:      Mental Status: She is alert. Coordination: Coordination normal (). ASSESSMENT and PLAN  Lexis Avalos is a 27 y.o. female with previous Malabsorptive Gastric bypass. surgery 5 months ago . She has lost a total of 54 pounds since surgery. She  has lost 4 since the last ov. Body mass index is 35.36 kg/m². Amy Astorgago no nausea and no vomiting . Denies Acid reflux/heartburn. . Drinking  40 ounces of water daily. 50  protein intake daily. . Pt is walking and hiking for exercise. She is feeling much better. She is frustrated because she has not lost a lot of weight in the past month and is hungry all the time. Discussed eating more texture proteins at breakfast, lunch and dinner. She may add protein snacks during the day if needed. Will have RD reach out to her as well. Advised patient regard to diet that is high-protein, low-fat, low-sugar, limited carbohydrates. Strive for 50-60 grams of protein daily. If having a snack, foods that are protein or fiber rich. . No eating/drinking together, chew foods well, and portion control. Measure meals. Discussed snacking behavior and to [facility] pay attention to behavioral factor and habits. Drink at least 40-64 ounces of water or non-calorie/non-carbonated beverages daily. Continue vitamin regiment daily. Exercise at least 3 days a week with cardiovascular and strength training. Patient to follow up in 3 months. Advised to call office if any questions/concerns. Check nutrition labs.

## 2020-05-20 ENCOUNTER — CLINICAL SUPPORT (OUTPATIENT)
Dept: SURGERY | Age: 35
End: 2020-05-20

## 2020-05-20 DIAGNOSIS — E66.01 MORBID OBESITY (HCC): Primary | ICD-10-CM

## 2020-05-20 NOTE — PROGRESS NOTES
Post-Operative Bariatric Nutrition Counseling - Phone Consult     Physician/Surgeon/Provider: Priyanka Gomez N.P.   Name: Harjeet Montague  : 1985        Reason for visit: Follow up nutrition education and counseling post gastric bypass       ASSESSMENT:  Date of surgery:6 months ago     Medications/Supplements:   Prior to Admission medications    Medication Sig Start Date End Date Taking? Authorizing Provider   sucralfate (CARAFATE) 100 mg/mL suspension TAKE 10 ML BY MOUTH 4 TIMES DAILY FOR 28 DAYS. 20   Osei Kate NP   ondansetron (ZOFRAN ODT) 4 mg disintegrating tablet Take 1 Tab by mouth every eight (8) hours as needed for Nausea or Vomiting. 20   Osei Kate NP   omeprazole (PRILOSEC) 20 mg capsule Take 1 Cap by mouth daily. 20   Osei Kate NP   escitalopram oxalate (LEXAPRO) 20 mg tablet TAKE 1 TABLET BY MOUTH EVERY DAY 20   Susana Stephens PA-C   cyclobenzaprine (FLEXERIL) 10 mg tablet Take 1 Tab by mouth three (3) times daily as needed for Muscle Spasm(s). 19   Osei Kate NP   gabapentin (NEURONTIN) 100 mg capsule Take  by mouth three (3) times daily. Provider, Historical   ergocalciferol (ERGOCALCIFEROL) 50,000 unit capsule Take 1 Cap by mouth every Monday and Friday. 19   Osei Kate NP   calcium carbonate (OS-PEBBLES) 500 mg calcium (1,250 mg) tablet Take  by mouth daily. Provider, Historical   cyanocobalamin 1,000 mcg tablet Take 1,000 mcg by mouth daily. Provider, Historical   PEYMAN 0.35 mg tab TAKE 1 TABLET BY MOUTH EVERY DAY 8/3/19   Provider, Historical   ALPRAZolam (XANAX) 0.25 mg tablet Take 1 Tab by mouth two (2) times daily as needed for Anxiety. Max Daily Amount: 0.5 mg. 19   Susana Stephens PA-C   multivitamin (ONE A DAY) tablet Take 1 Tab by mouth daily.  Bariatric Advantage    Provider, Historical       Pt takes recommended supplements as prescribed:   Multivitamin: yes (Bariatric Advantage)    Calcium Citrate:yes (Bariatric Advantage calcium chews)     Vitamin D3: yes   Vitamin B12: yes      Food Allergies/Intolerances: none reported    Anthropometrics:     Ht: 64\"      Reported Wt :205#     Pre-op wt: 266#     Net Wt Loss: 61#   BMI: 35 Category: obesity I     Reported wt history: Reports recent snacking, stress eating. States she started journaling this week and has already started making healthier choices and being more aware and accountable. Feels that her portions have increased. Eating closer to 6 oz per meal. Increased hunger. Does report drinking after meals, not waiting 30 minutes after meals. Pt reports 253# the day of surgery and 205# has been lowest post-operative wt. States wt loss has been slow and has recently hit a stall in wt loss. Exercise/Physical Actvity: walking for 1-2 hours per day; mowing the lawn, outdoor activities, previously strength training 3-4 times per week      Reported Diet History: A post op nutrition checklist and 24 hour diet recall were obtained from patient; Consumes 3 meals per day yes   Includes a lean source of protein with each meal yes   Measures all meals to 1/2-1 cup 6 oz per meal    Limits dining out and orders with special request yes   Consumes meals over 20-30 minutes yes   Monitors hunger and fullness cues Recently    Follows the 30-30-30 rule None    Sips 48-64 oz of sugar free, calorie free, non-carbonated beverages per day None - recently changed    Utilizes food journal for self-monitoring yes   Attends Bariatric Support Group None          NUTRITION DIAGNOSIS:  1. Self-monitoring deficit r/t multiple factors evidenced by pt reports previous lack of self monitoring. NUTRITION INTERVENTION:  Nutrition education and counseling to promote post-op weight loss. NUTRITION MONITORING AND EVALUATION:    The following goals were established with patient:  1. 6 oz food per meal - making 4 oz of the meal lean protein and remaining 2 oz with fruit, vegetables.    2. Continue to food journal self-monitoring and better accountability to monitor snacking. 3. Aim for 800 calories per day with 60-80 grams protein per day. 4. Continue exercise. Focus on higher ratio of cardio to strength training. 5. Continue weekly weight check. Specific tips and techniques to facilitate compliance with above recommendations were provided and discussed. Pt was strongly encouraged to begin making necessary changes now, attend support group, and re-visit the dietitian prn. If further details are desired please feel free to contact me at [phone]. This phone number was also provided to the patient for any further questions or concerns.              Alton Potter RD

## 2020-06-17 ENCOUNTER — VIRTUAL VISIT (OUTPATIENT)
Dept: INTERNAL MEDICINE CLINIC | Age: 35
End: 2020-06-17

## 2020-06-17 DIAGNOSIS — I10 ESSENTIAL HYPERTENSION: ICD-10-CM

## 2020-06-17 DIAGNOSIS — F41.9 ANXIETY: Primary | ICD-10-CM

## 2020-06-17 DIAGNOSIS — F32.81 PMDD (PREMENSTRUAL DYSPHORIC DISORDER): ICD-10-CM

## 2020-06-17 RX ORDER — FLUOXETINE HYDROCHLORIDE 40 MG/1
40 CAPSULE ORAL DAILY
Qty: 30 CAP | Refills: 5 | Status: SHIPPED | OUTPATIENT
Start: 2020-06-17 | End: 2020-07-14 | Stop reason: SDUPTHER

## 2020-06-17 NOTE — PROGRESS NOTES
Pablo Interiano is a 27 y.o. female who was seen by synchronous (real-time) audio-video technology on 6/17/2020    Consent: Pablo Interiano, who was seen by synchronous (real-time) audio-video technology, and/or her healthcare decision maker, is aware that this patient-initiated, Telehealth encounter on 6/17/2020 is a billable service, with coverage as determined by her insurance carrier. She is aware that she may receive a bill and has provided verbal consent to proceed: YES    Assessment & Plan:   Diagnoses and all orders for this visit:    1. Anxiety  -     FLUoxetine (PROzac) 40 mg capsule; Take 1 Cap by mouth daily. 2. PMDD (premenstrual dysphoric disorder)  -     FLUoxetine (PROzac) 40 mg capsule; Take 1 Cap by mouth daily. Change Lexapro to Prozac to help with PMDD symptoms as well. Do labs as ordered by GYN - reviewed orders and agree with them. Follow up with GYN as planned. 3. Essential hypertension    Controlled. 712  Subjective:   Pablo Interiano is a 27 y.o. female who was seen for Hypertension    PMDD concerns - seeing counselor - mood swings before period \"entire life\". Counselor suggested Prozac. Seeing OB next week - menses very heavy. HTN Follow up - BP controlled:  Patient-Reported Vitals 6/17/2020   Patient-Reported Weight 199lb   Patient-Reported Height 5f4i   Patient-Reported Pulse 75   Patient-Reported Systolic  851   Patient-Reported Diastolic 85      BP Readings from Last 3 Encounters:   02/12/20 110/81   12/30/19 103/65   12/18/19 117/81     Currently taking:   Key CAD CHF Meds     Patient is on no cardiovascular meds. Health Maintenance Due   Topic Date Due    PAP AKA CERVICAL CYTOLOGY  12/01/2017       Review of Systems   Respiratory: Negative for shortness of breath. Cardiovascular: Negative for chest pain and palpitations. Neurological: Negative for dizziness, loss of consciousness and weakness. Psychiatric/Behavioral: Negative for depression.  The patient is not nervous/anxious and does not have insomnia. Objective:     Patient-Reported Vitals 6/17/2020   Patient-Reported Weight 199lb   Patient-Reported Height 5f4i   Patient-Reported Pulse 75   Patient-Reported Systolic  538   Patient-Reported Diastolic 85      General: alert, cooperative, no distress   Mental  status: normal mood, behavior, speech, dress, motor activity, and thought processes, able to follow commands   HENT: NCAT   Neck: no visualized mass   Resp: no respiratory distress   Neuro: no gross deficits   Skin: no discoloration or lesions of concern on visible areas   Psychiatric: normal affect, consistent with stated mood, no evidence of hallucinations     We discussed the expected course, resolution and complications of the diagnosis(es) in detail. Medication risks, benefits, costs, interactions, and alternatives were discussed as indicated. I advised her to contact the office if her condition worsens, changes or fails to improve as anticipated. She expressed understanding with the diagnosis(es) and plan. Elly Sanders is a 27 y.o. female who was evaluated by a video visit encounter for concerns as above. Patient identification was verified prior to start of the visit. A caregiver was present when appropriate. Due to this being a TeleHealth encounter (During [facility]- public health emergency), evaluation of the following organ systems was limited: Vitals/Constitutional/EENT/Resp/CV/GI//MS/Neuro/Skin/Heme-Lymph-Imm. Pursuant to the emergency declaration under the [facility], 1135 waiver authority and the [employer] Act, this Virtual  Visit was conducted, with patient's (and/or legal guardian's) consent, to reduce the patient's risk of exposure to COVID-19 and provide necessary medical care.      Services were provided through a video synchronous discussion virtually to substitute for in-person clinic visit. Patient and provider were located at their individual homes.       Oanh Gonsalez PA-C

## 2020-06-25 ENCOUNTER — TELEPHONE (OUTPATIENT)
Dept: SURGERY | Age: 35
End: 2020-06-25

## 2020-06-25 LAB
25(OH)D3+25(OH)D2 SERPL-MCNC: 33.1 NG/ML (ref 30–100)
ALBUMIN SERPL-MCNC: 4.2 G/DL (ref 3.8–4.8)
ALBUMIN/GLOB SERPL: 1.4 {RATIO} (ref 1.2–2.2)
ALP SERPL-CCNC: 74 IU/L (ref 39–117)
ALT SERPL-CCNC: 12 IU/L (ref 0–32)
AST SERPL-CCNC: 18 IU/L (ref 0–40)
BILIRUB SERPL-MCNC: 0.5 MG/DL (ref 0–1.2)
BUN SERPL-MCNC: 15 MG/DL (ref 6–20)
BUN/CREAT SERPL: 22 (ref 9–23)
CALCIUM SERPL-MCNC: 9.5 MG/DL (ref 8.7–10.2)
CHLORIDE SERPL-SCNC: 102 MMOL/L (ref 96–106)
CO2 SERPL-SCNC: 20 MMOL/L (ref 20–29)
CREAT SERPL-MCNC: 0.68 MG/DL (ref 0.57–1)
ERYTHROCYTE [DISTWIDTH] IN BLOOD BY AUTOMATED COUNT: 14.5 % (ref 11.7–15.4)
GLOBULIN SER CALC-MCNC: 3 G/DL (ref 1.5–4.5)
GLUCOSE SERPL-MCNC: 83 MG/DL (ref 65–99)
HCT VFR BLD AUTO: 30.1 % (ref 34–46.6)
HGB BLD-MCNC: 9.5 G/DL (ref 11.1–15.9)
IRON SERPL-MCNC: 23 UG/DL (ref 27–159)
MCH RBC QN AUTO: 23.1 PG (ref 26.6–33)
MCHC RBC AUTO-ENTMCNC: 31.6 G/DL (ref 31.5–35.7)
MCV RBC AUTO: 73 FL (ref 79–97)
PLATELET # BLD AUTO: 367 X10E3/UL (ref 150–450)
POTASSIUM SERPL-SCNC: 4.7 MMOL/L (ref 3.5–5.2)
PROT SERPL-MCNC: 7.2 G/DL (ref 6–8.5)
RBC # BLD AUTO: 4.11 X10E6/UL (ref 3.77–5.28)
SODIUM SERPL-SCNC: 133 MMOL/L (ref 134–144)
VIT B12 SERPL-MCNC: 840 PG/ML (ref 232–1245)
WBC # BLD AUTO: 8 X10E3/UL (ref 3.4–10.8)

## 2020-07-14 ENCOUNTER — PATIENT MESSAGE (OUTPATIENT)
Dept: INTERNAL MEDICINE CLINIC | Age: 35
End: 2020-07-14

## 2020-07-14 DIAGNOSIS — F32.81 PMDD (PREMENSTRUAL DYSPHORIC DISORDER): ICD-10-CM

## 2020-07-14 DIAGNOSIS — F41.9 ANXIETY: ICD-10-CM

## 2020-07-14 RX ORDER — FLUOXETINE HYDROCHLORIDE 40 MG/1
40 CAPSULE ORAL DAILY
Qty: 90 CAP | Refills: 1 | Status: SHIPPED | OUTPATIENT
Start: 2020-07-14 | End: 2021-01-07

## 2020-07-14 NOTE — TELEPHONE ENCOUNTER
From: Alexis Callahan  To: Christoph Weldon PA-C  Sent: 7/14/2020 2:22 PM EDT  Subject: Prescription Question    Amari Jane! I've been taking the Prozac and feel that it is helping. Can you please submit a 90 day Rx to my pharmacy? My insurance will only cover a 90 day supply.  Thank you!!    -Zac Tatum

## 2020-08-16 ENCOUNTER — HOSPITAL ENCOUNTER (EMERGENCY)
Age: 35
Discharge: HOME | End: 2020-08-16
Attending: EMERGENCY MEDICINE
Payer: COMMERCIAL

## 2020-08-16 VITALS
HEART RATE: 93 BPM | HEIGHT: 63 IN | RESPIRATION RATE: 18 BRPM | DIASTOLIC BLOOD PRESSURE: 84 MMHG | BODY MASS INDEX: 33.91 KG/M2 | OXYGEN SATURATION: 97 % | WEIGHT: 191.36 LBS | TEMPERATURE: 98.7 F | SYSTOLIC BLOOD PRESSURE: 121 MMHG

## 2020-08-16 DIAGNOSIS — N92.0 MENORRHAGIA WITH REGULAR CYCLE: ICD-10-CM

## 2020-08-16 DIAGNOSIS — D64.9 LOW HEMOGLOBIN: Primary | ICD-10-CM

## 2020-08-16 LAB
ABO + RH BLD: NORMAL
ALBUMIN SERPL-MCNC: 3.3 G/DL (ref 3.5–5)
ALBUMIN/GLOB SERPL: 0.7 {RATIO} (ref 1.1–2.2)
ALP SERPL-CCNC: 73 U/L (ref 45–117)
ALT SERPL-CCNC: 15 U/L (ref 12–78)
ANION GAP SERPL CALC-SCNC: 6 MMOL/L (ref 5–15)
AST SERPL-CCNC: 10 U/L (ref 15–37)
BASOPHILS # BLD: 0.1 K/UL (ref 0–0.1)
BASOPHILS NFR BLD: 1 % (ref 0–1)
BILIRUB SERPL-MCNC: 0.2 MG/DL (ref 0.2–1)
BLOOD GROUP ANTIBODIES SERPL: NORMAL
BUN SERPL-MCNC: 16 MG/DL (ref 6–20)
BUN/CREAT SERPL: 24 (ref 12–20)
CALCIUM SERPL-MCNC: 8.9 MG/DL (ref 8.5–10.1)
CHLORIDE SERPL-SCNC: 110 MMOL/L (ref 97–108)
CO2 SERPL-SCNC: 23 MMOL/L (ref 21–32)
COMMENT, HOLDF: NORMAL
CREAT SERPL-MCNC: 0.66 MG/DL (ref 0.55–1.02)
DIFFERENTIAL METHOD BLD: ABNORMAL
EOSINOPHIL # BLD: 0.3 K/UL (ref 0–0.4)
EOSINOPHIL NFR BLD: 4 % (ref 0–7)
ERYTHROCYTE [DISTWIDTH] IN BLOOD BY AUTOMATED COUNT: 17.9 % (ref 11.5–14.5)
GLOBULIN SER CALC-MCNC: 4.5 G/DL (ref 2–4)
GLUCOSE SERPL-MCNC: 101 MG/DL (ref 65–100)
HCT VFR BLD AUTO: 29.4 % (ref 35–47)
HGB BLD-MCNC: 8.9 G/DL (ref 11.5–16)
IMM GRANULOCYTES # BLD AUTO: 0 K/UL (ref 0–0.04)
IMM GRANULOCYTES NFR BLD AUTO: 0 % (ref 0–0.5)
LYMPHOCYTES # BLD: 2.7 K/UL (ref 0.8–3.5)
LYMPHOCYTES NFR BLD: 40 % (ref 12–49)
MCH RBC QN AUTO: 21.7 PG (ref 26–34)
MCHC RBC AUTO-ENTMCNC: 30.3 G/DL (ref 30–36.5)
MCV RBC AUTO: 71.5 FL (ref 80–99)
MONOCYTES # BLD: 0.5 K/UL (ref 0–1)
MONOCYTES NFR BLD: 8 % (ref 5–13)
NEUTS SEG # BLD: 3.1 K/UL (ref 1.8–8)
NEUTS SEG NFR BLD: 47 % (ref 32–75)
NRBC # BLD: 0 K/UL (ref 0–0.01)
NRBC BLD-RTO: 0 PER 100 WBC
PLATELET # BLD AUTO: 368 K/UL (ref 150–400)
PMV BLD AUTO: 10.7 FL (ref 8.9–12.9)
POTASSIUM SERPL-SCNC: 3.8 MMOL/L (ref 3.5–5.1)
PROT SERPL-MCNC: 7.8 G/DL (ref 6.4–8.2)
RBC # BLD AUTO: 4.11 M/UL (ref 3.8–5.2)
SAMPLES BEING HELD,HOLD: NORMAL
SODIUM SERPL-SCNC: 139 MMOL/L (ref 136–145)
SPECIMEN EXP DATE BLD: NORMAL
WBC # BLD AUTO: 6.6 K/UL (ref 3.6–11)

## 2020-08-16 PROCEDURE — 80053 COMPREHEN METABOLIC PANEL: CPT

## 2020-08-16 PROCEDURE — 99282 EMERGENCY DEPT VISIT SF MDM: CPT

## 2020-08-16 PROCEDURE — 36415 COLL VENOUS BLD VENIPUNCTURE: CPT

## 2020-08-16 PROCEDURE — 85025 COMPLETE CBC W/AUTO DIFF WBC: CPT

## 2020-08-16 PROCEDURE — 86900 BLOOD TYPING SEROLOGIC ABO: CPT

## 2020-08-16 NOTE — ED PROVIDER NOTES
Patient is a 58-year-old female with history of diet-controlled hypertension, PCOS, uterine fibroids, dysmenorrhea, gastric bypass surgery November 2019 presenting emergency department for evaluation of vaginal bleeding and dizziness. Patient reports that she has been working with both her primary care physician and her OB/GYN for care of her heavy menstrual cycles and PMDD, recently changed her oral birth control and was started on Prozac. Menstrual cycle began 2 days ago, she has been experiencing cramping, generalized weakness and fatigue, dizziness upon standing for the past 2 days. She has been using 3-4 large pads per day. She also was recently diagnosed with tonsillitis and has been treating with Augmentin for the past 5 days, she denies current sore throat or ear pain. She has never required a blood transfusion in the past, believes her blood type is B+. She denies fever, chills, sweats, chest pain, shortness of breath, nausea, vomiting, diarrhea, constipation, dysuria, hematuria, vaginal discharge, leg swelling, or any other medical complaints at this time. She was diagnosed with anemia several months ago and has been taking iron supplements in addition to her bariatric vitamins. OB/GYN is Dr. Sindy Oneal with [facility], has a upcoming ultrasound to evaluate fibroids. Past Medical History:   Diagnosis Date    Adverse effect of anesthesia     NEVER HAD ANESTHESIA    Anxiety     Arthritis     Back pain     H/O hemorrhoids     Hives     as a child - had 1 year of steroids.      Hypertension     Ill-defined condition     PCOS    Joint pain     PCOS (polycystic ovarian syndrome)     Psychiatric disorder     ANXIETY       Past Surgical History:   Procedure Laterality Date    HX GI  1989    RECTAL POLYP    HX HEENT      WISDOM TEETH REMOVED    HX LAP GASTRIC BYPASS  11/20/2019    Dr. Marium Hua @ [address] [place] [address]     HX OTHER SURGICAL      rectal polyp at 11 yo    HX OTHER SURGICAL Sigmoidoscopy - WNL pp ~    HX WISDOM TEETH EXTRACTION           Family History:   Problem Relation Age of Onset    Diabetes Father     Heart Disease Father         cardiomyopathy, heart transplant    Hypertension Father     Heart Surgery Father     High Cholesterol Father     Diabetes Mother     High Cholesterol Mother     Hypertension Mother     Diabetes Maternal Grandmother     Diabetes Maternal Grandfather     Diabetes Paternal Grandmother     No Known Problems Sister     Anesth Problems Neg Hx        Social History     Socioeconomic History    Marital status: LIFE PARTNER     Spouse name: Reynaldo Matthews Number of children: 0    Years of education: Not on file    Highest education level: Not on file   Occupational History    Occupation: Optometrist      Comment: [employer]   Social Needs    Financial resource strain: Not on file    Food insecurity     Worry: Not on file     Inability: Not on file   [email or web address] needs     Medical: Not on file     Non-medical: Not on file   Tobacco Use    Smoking status: Former Smoker     Packs/day: 0.25     Years: 15.00     Pack years: 3.75     Types: Cigarettes     Last attempt to quit: 2018     Years since quittin.0    Smokeless tobacco: Never Used   Substance and Sexual Activity    Alcohol use: Yes     Alcohol/week: 1.0 standard drinks     Types: 1 Shots of liquor per week     Frequency: 2-4 times a month     Drinks per session: 1 or 2    Drug use: Not Currently     Types: Marijuana    Sexual activity: Yes     Partners: Male     Birth control/protection: I.U.D.    Lifestyle    Physical activity     Days per week: Not on file     Minutes per session: Not on file    Stress: Not on file   Relationships    Social connections     Talks on phone: Not on file     Gets together: Not on file     Attends Shinto service: Not on file     Active member of club or organization: Not on file     Attends meetings of clubs or organizations: Not on file     Relationship status: Not on file    Intimate partner violence     Fear of current or ex partner: Not on file     Emotionally abused: Not on file     Physically abused: Not on file     Forced sexual activity: Not on file   Other Topics Concern     Service Not Asked    Blood Transfusions Not Asked    Caffeine Concern Not Asked    Occupational Exposure Not Asked   Jr [place] Hazards Not Asked    Sleep Concern Not Asked    Stress Concern Not Asked    Weight Concern Not Asked    Special Diet Not Asked    Back Care Not Asked    Exercise No    Bike Helmet Not Asked   [address] Not Asked    Self-Exams Not Asked   Social History Narrative    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 [facility] at [place] [address] for now during the move. Dog - 1 yo in 2017.         01/02/19    Working at [employer]"         ALLERGIES: Patient has no known allergies. Review of Systems   Constitutional: Negative for chills and fever. HENT: Negative for ear pain and sore throat. Eyes: Negative for visual disturbance. Respiratory: Negative for cough and shortness of breath. Cardiovascular: Negative for chest pain. Gastrointestinal: Negative for abdominal pain, diarrhea, nausea and vomiting. Genitourinary: Positive for pelvic pain (cramping) and vaginal bleeding. Negative for dysuria, flank pain, hematuria and vaginal discharge. Musculoskeletal: Negative for back pain. Skin: Negative for color change. Neurological: Positive for dizziness (upon standing). Negative for seizures, weakness and headaches. Psychiatric/Behavioral: Negative for confusion.        Vitals:    08/16/20 1722   BP: 121/84   Pulse: 93   Resp: 18   Temp: 98.7 °F (37.1 °C)   SpO2: 97%   Weight: 86.8 kg (191 lb 5.8 oz)   Height: 5' 3\" (1.6 m) Physical Exam  Vitals signs and nursing note reviewed. Constitutional:       General: She is not in acute distress. Appearance: Normal appearance. She is not ill-appearing. HENT:      Head: Normocephalic and atraumatic. Mouth/Throat:      Pharynx: Oropharynx is clear. Eyes:      Extraocular Movements: Extraocular movements intact. Conjunctiva/sclera: Conjunctivae normal.   Neck:      Musculoskeletal: Normal range of motion. No neck rigidity. Cardiovascular:      Rate and Rhythm: Normal rate and regular rhythm. Pulmonary:      Effort: Pulmonary effort is normal. No respiratory distress. Breath sounds: Normal breath sounds. No wheezing or rales. Abdominal:      General: There is no distension. Palpations: Abdomen is soft. Tenderness: There is no abdominal tenderness. There is no right CVA tenderness, left CVA tenderness or guarding. Musculoskeletal: Normal range of motion. Right lower leg: No edema. Left lower leg: No edema. Skin:     General: Skin is warm and dry. Neurological:      General: No focal deficit present. Mental Status: She is alert and oriented to person, place, and time. Psychiatric:         Mood and Affect: Mood normal.          MDM  Number of Diagnoses or Management Options  Low hemoglobin:   Menorrhagia with regular cycle:   Diagnosis management comments: Patient is alert, appearing, afebrile, vitals stable. History of PMDD and uterine fibroids, treating with oral birth control and Prozac. Currently taking iron supplementation for anemia. 2-day history of heavy menstrual cycle, generalized fatigue and weakness, dizziness upon standing. Lungs are clear to auscultation bilaterally. Abdomen is soft, nondistended, nontender. I personally ambulated patient to department, no ataxia or difficulty walking. Plan: CBC, CMP, type/screen, monitor, reassess     Hemoglobin decreased to 8.9.  Not currently meeting criteria for blood transfusion. Remainder of ED work-up is unremarkable. Patient informed of results and advised to continue iron supplements at to follow-up with her OB/GYN and primary care physician for further treatment of dysmenorrhea/menorrhagia. Strict return precautions outlined. All questions answered at this time. Amount and/or Complexity of Data Reviewed  Clinical lab tests: reviewed  Tests in the medicine section of CPT®: reviewed      ED Course as of Aug 16 1832   Sun Aug 16, 2020   1817 CBC WITH AUTOMATED DIFF(!):    WBC 6.6   RBC 4.11   HGB 8.9(!)   HCT 29.4(!)   MCV 71.5(!)   MCH 21.7(!)   MCHC 30.3   RDW 17.9(!)   PLATELET 887   MPV 40.6   NRBC 0.0   ABSOLUTE NRBC 0.00   NEUTROPHILS 47   LYMPHOCYTES 40   MONOCYTES 8   EOSINOPHILS 4   BASOPHILS 1   IMMATURE GRANULOCYTES 0   ABS. NEUTROPHILS 3.1   ABS. LYMPHOCYTES 2.7   ABS. MONOCYTES 0.5   ABS. EOSINOPHILS 0.3   ABS. BASOPHILS 0.1   ABS. IMM. GRANS. 0.0   DF AUTOMATED [EH]   5342 METABOLIC PANEL, COMPREHENSIVE(!):    Sodium 139   Potassium 3.8   Chloride 110(!)   CO2 23   Anion gap 6   Glucose 101(!)   BUN 16   Creatinine 0.66   BUN/Creatinine ratio 24(!)   GFR est AA >60   GFR est non-AA >60   Calcium 8.9   Bilirubin, total 0.2   ALT 15   AST 10(!)   Alk. phosphatase 73   Protein, total 7.8   Albumin 3.3(!)   Globulin 4.5(!)   A-G Ratio 0.7(!) [EH]      ED Course User Index  [EH] JOY Long     6:48 PM  Pt has been reevaluated. There are no new complaints, changes, or physical findings at this time. Medications have been reviewed w/ pt and/or family. Pt and/or family's questions have been answered. Pt and/or family expressed good understanding of the dx/tx/rx and is in agreement with plan of care. Pt instructed and agreed to f/u w/ OBGYN, PCP and to return to ED upon further deterioration. Pt is ready for discharge. IMPRESSION:  1. Low hemoglobin    2. Menorrhagia with regular cycle        PLAN:  1.    Current Discharge Medication List 2.   Follow-up Information     Follow up With Specialties Details Why Contact Info    Shabana Baker PA-C Physician Assistant Go in 3 days As needed [facility]  [phone]      Your OBGYN  Call in 1 day              Return to ED if worse       Procedures

## 2020-08-16 NOTE — ED TRIAGE NOTES
Patient arrives ambulatory to ED complaining of heavy vaginal bleeding for 2 days. Patient reports usually anemic with heavy periods, but now feeling weak and dizzy.

## 2020-09-09 ENCOUNTER — HOSPITAL ENCOUNTER (EMERGENCY)
Age: 35
Discharge: HOME | End: 2020-09-09
Attending: EMERGENCY MEDICINE | Admitting: EMERGENCY MEDICINE
Payer: COMMERCIAL

## 2020-09-09 VITALS
RESPIRATION RATE: 20 BRPM | SYSTOLIC BLOOD PRESSURE: 122 MMHG | OXYGEN SATURATION: 99 % | BODY MASS INDEX: 32.95 KG/M2 | WEIGHT: 186 LBS | TEMPERATURE: 100.3 F | DIASTOLIC BLOOD PRESSURE: 85 MMHG | HEART RATE: 81 BPM

## 2020-09-09 DIAGNOSIS — Z00.00 GENERAL MEDICAL EXAM: Primary | ICD-10-CM

## 2020-09-09 DIAGNOSIS — F41.9 ANXIETY: ICD-10-CM

## 2020-09-09 PROCEDURE — 99284 EMERGENCY DEPT VISIT MOD MDM: CPT

## 2020-09-09 PROCEDURE — 75810000275 HC EMERGENCY DEPT VISIT NO LEVEL OF CARE

## 2020-09-09 RX ORDER — ALPRAZOLAM 0.25 MG/1
0.25 TABLET ORAL
Qty: 30 TAB | Refills: 2 | Status: SHIPPED | OUTPATIENT
Start: 2020-09-09 | End: 2021-05-21
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
## 2020-09-16 ENCOUNTER — OFFICE VISIT (OUTPATIENT)
Dept: INTERNAL MEDICINE CLINIC | Age: 35
End: 2020-09-16
Payer: COMMERCIAL

## 2020-09-16 VITALS
SYSTOLIC BLOOD PRESSURE: 118 MMHG | TEMPERATURE: 95.2 F | OXYGEN SATURATION: 99 % | HEIGHT: 63 IN | RESPIRATION RATE: 16 BRPM | BODY MASS INDEX: 33.52 KG/M2 | WEIGHT: 189.2 LBS | DIASTOLIC BLOOD PRESSURE: 80 MMHG | HEART RATE: 85 BPM

## 2020-09-16 DIAGNOSIS — Z23 ENCOUNTER FOR IMMUNIZATION: Primary | ICD-10-CM

## 2020-09-16 DIAGNOSIS — R03.0 ELEVATED BP WITHOUT DIAGNOSIS OF HYPERTENSION: ICD-10-CM

## 2020-09-16 DIAGNOSIS — T74.21XA SEXUAL ASSAULT OF ADULT, INITIAL ENCOUNTER: ICD-10-CM

## 2020-09-16 DIAGNOSIS — E66.9 OBESITY (BMI 30-39.9): ICD-10-CM

## 2020-09-16 DIAGNOSIS — F41.9 ANXIETY: ICD-10-CM

## 2020-09-16 DIAGNOSIS — L50.9 HIVES: ICD-10-CM

## 2020-09-16 PROBLEM — E66.01 MORBID OBESITY (HCC): Status: RESOLVED | Noted: 2019-11-20 | Resolved: 2020-09-16

## 2020-09-16 PROCEDURE — 90686 IIV4 VACC NO PRSV 0.5 ML IM: CPT | Performed by: PHYSICIAN ASSISTANT

## 2020-09-16 PROCEDURE — 90471 IMMUNIZATION ADMIN: CPT | Performed by: PHYSICIAN ASSISTANT

## 2020-09-16 PROCEDURE — 99214 OFFICE O/P EST MOD 30 MIN: CPT | Performed by: PHYSICIAN ASSISTANT

## 2020-09-16 RX ORDER — TRIAMCINOLONE ACETONIDE 1 MG/G
CREAM TOPICAL 2 TIMES DAILY
Qty: 15 G | Refills: 1 | Status: SHIPPED | OUTPATIENT
Start: 2020-09-16 | End: 2021-01-06
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
## 2020-09-16 NOTE — PROGRESS NOTES
Chief Complaint   Patient presents with    Rash     neck , onset friday , denies any changes,    Immunization/Injection     flu shot          1. Have you been to the ER, urgent care clinic since your last visit? Hospitalized since your last visit? No    2. Have you seen or consulted any other health care providers outside of the [address] [place] [address] since your last visit? Include any pap smears or colon screening.  No

## 2020-09-30 RX ORDER — OMEPRAZOLE 20 MG/1
CAPSULE, DELAYED RELEASE ORAL
Qty: 90 CAP | Refills: 1 | Status: SHIPPED | OUTPATIENT
Start: 2020-09-30 | End: 2021-04-09

## 2020-10-16 ENCOUNTER — PATIENT MESSAGE (OUTPATIENT)
Dept: SURGERY | Age: 35
End: 2020-10-16

## 2020-10-16 ENCOUNTER — TELEPHONE (OUTPATIENT)
Dept: SURGERY | Age: 35
End: 2020-10-16

## 2020-10-16 DIAGNOSIS — R10.13 EPIGASTRIC PAIN: ICD-10-CM

## 2020-10-16 DIAGNOSIS — Z98.84 S/P GASTRIC BYPASS: Primary | ICD-10-CM

## 2020-10-16 DIAGNOSIS — R11.0 NAUSEA: ICD-10-CM

## 2020-10-16 RX ORDER — SUCRALFATE 1 G/10ML
1 SUSPENSION ORAL 4 TIMES DAILY
Qty: 1120 ML | Refills: 0 | Status: SHIPPED | OUTPATIENT
Start: 2020-10-16 | End: 2020-11-19 | Stop reason: DRUGHIGH

## 2020-10-16 NOTE — TELEPHONE ENCOUNTER
From: Aubree Stark  To: Wallace Stock NP  Sent: 10/16/2020 4:07 PM EDT  Subject: Non-Urgent Medical Question    Kay,     I have been unable to schedule an appointment with you. I am having nausea and stomach pain after meals. Are you able to approve a refill for Sucralfate? The earliest appointment I could get was with Dr. Solange Uribe on Oct 27th. I will absolutely keep that appointment but would like to start taking something for the discomfort. Thank you.

## 2020-10-16 NOTE — TELEPHONE ENCOUNTER
Ms. Alexandra Singletary is a patient of Dr. Jessy Rosas being followed by Thomas Payan. She is calling because she is having pain and nausea with every meal.  She had left message on My Chart & hasn't received an answer. She would like a refill for Sucralsate called into her pharmacy.

## 2020-11-18 NOTE — TELEPHONE ENCOUNTER
Message received from [facility] pharmacy insurance will only cover sucralfate in tablet form. New script needed.

## 2020-11-19 RX ORDER — SUCRALFATE 1 G/1
1 TABLET ORAL 4 TIMES DAILY
Qty: 120 TAB | Refills: 0 | Status: SHIPPED | OUTPATIENT
Start: 2020-11-19 | End: 2020-12-19

## 2020-11-24 ENCOUNTER — OFFICE VISIT (OUTPATIENT)
Dept: SURGERY | Age: 35
End: 2020-11-24
Payer: COMMERCIAL

## 2020-11-24 VITALS
RESPIRATION RATE: 16 BRPM | BODY MASS INDEX: 33.66 KG/M2 | HEART RATE: 90 BPM | SYSTOLIC BLOOD PRESSURE: 124 MMHG | HEIGHT: 63 IN | OXYGEN SATURATION: 98 % | DIASTOLIC BLOOD PRESSURE: 83 MMHG | TEMPERATURE: 99.8 F | WEIGHT: 190 LBS

## 2020-11-24 DIAGNOSIS — E66.01 MORBID OBESITY (HCC): Primary | ICD-10-CM

## 2020-11-24 DIAGNOSIS — R53.83 FATIGUE, UNSPECIFIED TYPE: ICD-10-CM

## 2020-11-24 DIAGNOSIS — F50.89 PICA: ICD-10-CM

## 2020-11-24 DIAGNOSIS — D50.9 IRON DEFICIENCY ANEMIA, UNSPECIFIED IRON DEFICIENCY ANEMIA TYPE: ICD-10-CM

## 2020-11-24 DIAGNOSIS — E55.9 VITAMIN D DEFICIENCY: ICD-10-CM

## 2020-11-24 DIAGNOSIS — E53.8 VITAMIN B12 DEFICIENCY: ICD-10-CM

## 2020-11-24 PROCEDURE — 99213 OFFICE O/P EST LOW 20 MIN: CPT | Performed by: NURSE PRACTITIONER

## 2020-11-24 NOTE — PROGRESS NOTES
1. Have you been to the ER, urgent care clinic since your last visit? Hospitalized since your last visit? no    2. Have you seen or consulted any other health care providers outside of the [address] [place] [address] since your last visit? Include any pap smears or colon screening.  no

## 2020-11-24 NOTE — PATIENT INSTRUCTIONS
Eating Healthy Foods: Care Instructions Your Care Instructions Eating healthy foods can help lower your risk for disease. Healthy food gives you energy and keeps your heart strong, your brain active, your muscles working, and your bones strong. A healthy diet includes a variety of foods from the basic food groups: grains, vegetables, fruits, milk and milk products, and meat and beans. Some people may eat more of their favorite foods from only one food group and, as a result, miss getting the nutrients they need. So, it is important to pay attention not only to what you eat but also to what you are missing from your diet. You can eat a healthy, balanced diet by making a few small changes. Follow-up care is a key part of your treatment and safety. Be sure to make and go to all appointments, and call your doctor if you are having problems. It's also a good idea to know your test results and keep a list of the medicines you take. How can you care for yourself at home? Look at what you eat · Keep a food diary for a week or two and record everything you eat or drink. Track the number of servings you eat from each food group. · For a balanced diet every day, eat a variety of: 
? 6 or more ounce-equivalents of grains, such as cereals, breads, crackers, rice, or pasta, every day. An ounce-equivalent is 1 slice of bread, 1 cup of ready-to-eat cereal, or ½ cup of cooked rice, cooked pasta, or cooked cereal. 
? 2½ cups of vegetables, especially: § Dark-green vegetables such as broccoli and spinach. § Orange vegetables such as carrots and sweet potatoes. § Dry beans (such as bergeron and kidney beans) and peas (such as lentils). ? 2 cups of fresh, frozen, or canned fruit. A small apple or 1 banana or orange equals 1 cup. ? 3 cups of nonfat or low-fat milk, yogurt, or other milk products.  
? 5½ ounces of meat and beans, such as chicken, fish, lean meat, beans, nuts, and seeds. One egg, 1 tablespoon of peanut butter, ½ ounce nuts or seeds, or ¼ cup of cooked beans equals 1 ounce of meat. · Learn how to read food labels for serving sizes and ingredients. Fast-food and convenience-food meals often contain few or no fruits or vegetables. Make sure you eat some fruits and vegetables to make the meal more nutritious. · Look at your food diary. For each food group, add up what you have eaten and then divide the total by the number of days. This will give you an idea of how much you are eating from each food group. See if you can find some ways to change your diet to make it more healthy. Start small · Do not try to make dramatic changes to your diet all at once. You might feel that you are missing out on your favorite foods and then be more likely to fail. · Start slowly, and gradually change your habits. Try some of the following: ? Use whole wheat bread instead of white bread. ? Use nonfat or low-fat milk instead of whole milk. ? Eat brown rice instead of white rice, and eat whole wheat pasta instead of white-flour pasta. ? Try low-fat cheeses and low-fat yogurt. ? Add more fruits and vegetables to meals and have them for snacks. ? Add lettuce, tomato, cucumber, and onion to sandwiches. ? Add fruit to yogurt and cereal. 
Enjoy food · You can still eat your favorite foods. You just may need to eat less of them. If your favorite foods are high in fat, salt, and sugar, limit how often you eat them, but do not cut them out entirely. · Eat a wide variety of foods. Make healthy choices when eating out · The type of restaurant you choose can help you make healthy choices. Even fast-food chains are now offering more low-fat or healthier choices on the menu. · Choose smaller portions, or take half of your meal home. · When eating out, try: ? A veggie pizza with a whole wheat crust or grilled chicken (instead of sausage or pepperoni). ? Pasta with roasted vegetables, grilled chicken, or marinara sauce instead of cream sauce. ? A vegetable wrap or grilled chicken wrap. ? Broiled or poached food instead of fried or breaded items. Make healthy choices easy · Buy packaged, prewashed, ready-to-eat fresh vegetables and fruits, such as baby carrots, salad mixes, and chopped or shredded broccoli and cauliflower. · Buy packaged, presliced fruits, such as melon or pineapple. · Choose 100% fruit or vegetable juice instead of soda. Limit juice intake to 4 to 6 oz (½ to ¾ cup) a day. · Blend low-fat yogurt, fruit juice, and canned or frozen fruit to make a smoothie for breakfast or a snack. Where can you learn more? Go to http://www.mitchell.com/ Enter T756 in the search box to learn more about \"Eating Healthy Foods: Care Instructions. \" Current as of: August 22, 2019               Content Version: 12.6 © 0288-8525 [employer], Incorporated. Care instructions adapted under license by [employer] (which disclaims liability or warranty for this information). If you have questions about a medical condition or this instruction, always ask your healthcare professional. [address] any warranty or liability for your use of this information.

## 2020-11-24 NOTE — PROGRESS NOTES
HISTORY OF PRESENT ILLNESS  Carlton Martínez is a 38 y.o. female with previous Malabsorptive Gastric bypass surgery 1 year ago. . She has lost a total of 70 pounds since surgery. She  has lost 16lbs since the last ov. Body mass index is 33.66 kg/m². [place] no nausea and no vomiting. She states that she had one episode of nausea for a few days but it resolved. . Denies Acid reflux/heartburn, takes prilosec. . Drinking  Lots of water daily. 50+ grams protein intake daily. + BM's. She is not exercising as much as she was over the summer. She has been feeling extremely fatigued and eaitng ice. She has been having heavy periods for the past few months and has been working with her OB/GYN trying different birth control. Last Menstrual cycle 10/8-10/20/2020. She has taken multiple pregnancy tests which were negative. Dietary recall -    Breakfast- protein shake and coffee or 1/2 bagel sandwich  Lunch-varies, soup, salad, chinese  Dinner- eating out more, chili, meat loaf. She is snacking between meals; yogurt, granola, crackers. Vitamins:  MVI : yes  Calcium : yes  B-Vit 12: yes  Vit D: Yes  She has stopped her Vitron c        Ms. Florence has a reminder for a \"due or due soon\" health maintenance. I have asked that she contact her primary care provider for follow-up on this health maintenance. COMORBIDITY     SLEEP APNEA                 NO        GERD  (req.meds)            NO  HYPERLIPIDEMIA            NO  HYPERTENSION              NO         DIABETES                         NO           Current Outpatient Medications:     omeprazole (PRILOSEC) 20 mg capsule, TAKE 1 CAPSULE BY MOUTH EVERY DAY, Disp: 90 Cap, Rfl: 1    ALPRAZolam (Xanax) 0.25 mg tablet, Take 1 Tab by mouth two (2) times daily as needed for Anxiety. Max Daily Amount: 0.5 mg., Disp: 30 Tab, Rfl: 2    FLUoxetine (PROzac) 40 mg capsule, Take 1 Cap by mouth daily. , Disp: 90 Cap, Rfl: 1    ondansetron (ZOFRAN ODT) 4 mg disintegrating tablet, Take 1 Tab by mouth every eight (8) hours as needed for Nausea or Vomiting., Disp: 30 Tab, Rfl: 0    calcium carbonate (OS-PEBBLES) 500 mg calcium (1,250 mg) tablet, Take  by mouth daily. , Disp: , Rfl:     multivitamin (ONE A DAY) tablet, Take 2 Tabs by mouth daily. Bariatric Advantage, Disp: , Rfl:     sucralfate (CARAFATE) 1 gram tablet, Take 1 Tab by mouth four (4) times daily for 30 days. , Disp: 120 Tab, Rfl: 0    triamcinolone acetonide (KENALOG) 0.1 % topical cream, Apply  to affected area two (2) times a day. use thin layer, Disp: 15 g, Rfl: 1      Visit Vitals  /83   Pulse 90   Temp 99.8 °F (37.7 °C) (Oral)   Resp 16   Ht 5' 3\" (1.6 m)   Wt 190 lb (86.2 kg)   SpO2 98%   BMI 33.66 kg/m²     HPI    Review of Systems   Constitutional: Positive for malaise/fatigue. Gastrointestinal: Negative for abdominal pain, heartburn, nausea and vomiting. Physical Exam  Constitutional:       Appearance: She is well-developed. HENT:      Mouth/Throat:      Mouth: Mucous membranes are moist.   Neck:      Musculoskeletal: Normal range of motion. Cardiovascular:      Rate and Rhythm: Normal rate and regular rhythm. Heart sounds: No murmur. No friction rub. No gallop. Pulmonary:      Effort: Pulmonary effort is normal.      Breath sounds: Normal breath sounds. Abdominal:      General: Bowel sounds are normal. There is no distension. Palpations: Abdomen is soft. Tenderness: There is no abdominal tenderness. Comments: No masses or hernias noted   Skin:     General: Skin is warm and dry. Neurological:      Mental Status: She is alert and oriented to person, place, and time. ASSESSMENT and PLAN  Yeny Briceño is a 38 y.o. female with previous Malabsorptive Gastric bypass surgery 1 year ago. . She has lost a total of 70 pounds since surgery. She  has lost 16lbs since the last ov. Body mass index is 33.66 kg/m². Vonnie Messing no nausea and no vomiting.  She states that she had one episode of nausea for a few days but it resolved. . Denies Acid reflux/heartburn, takes prilosec. . Drinking  Lots of water daily. 50+ grams protein intake daily. + BM's. She is not exercising as much as she was over the summer. She has been feeling extremely fatigued and eaitng ice. She has been having heavy periods for the past few months and has been working with her OB/GYN trying different birth control. Last Menstrual cycle 10/8-10/20/2020. She has taken multiple pregnancy tests which were negative. She is happy with her weight loss but wants to lose more. She does struggle with hunger and found that intermittent fasting helps   We discussed adding some meal prep and exercise. Urine HCG ordered. Advised patient regard to diet that is high-protein, low-fat, low-sugar, limited carbohydrates. Strive for 50-60 grams of protein daily. If having a snack, foods that are protein or fiber rich. . No eating/drinking together, chew foods well, and portion control. Measure meals. Discussed snacking behavior and to [facility] pay attention to behavioral factor and habits. Drink at least 40-64 ounces of water or non-calorie/non-carbonated beverages daily. Continue vitamin regiment daily. Exercise at least 3 days a week with cardiovascular and strength training. Patient to follow up in 6 months. Advised to call office if any questions/concerns. Check nutrition labs. 15 Minutes spent face to face with patient, >50 % of time spent counseling.

## 2020-11-25 LAB
25(OH)D3+25(OH)D2 SERPL-MCNC: 22.2 NG/ML (ref 30–100)
ALBUMIN SERPL-MCNC: 4.1 G/DL (ref 3.8–4.8)
ALBUMIN/GLOB SERPL: 1.2 {RATIO} (ref 1.2–2.2)
ALP SERPL-CCNC: 92 IU/L (ref 39–117)
ALT SERPL-CCNC: 11 IU/L (ref 0–32)
AST SERPL-CCNC: 21 IU/L (ref 0–40)
BASOPHILS # BLD AUTO: 0.1 X10E3/UL (ref 0–0.2)
BASOPHILS NFR BLD AUTO: 1 %
BILIRUB SERPL-MCNC: 0.3 MG/DL (ref 0–1.2)
BUN SERPL-MCNC: 12 MG/DL (ref 6–20)
BUN/CREAT SERPL: 20 (ref 9–23)
CALCIUM SERPL-MCNC: 9.4 MG/DL (ref 8.7–10.2)
CHLORIDE SERPL-SCNC: 105 MMOL/L (ref 96–106)
CO2 SERPL-SCNC: 21 MMOL/L (ref 20–29)
CREAT SERPL-MCNC: 0.59 MG/DL (ref 0.57–1)
EOSINOPHIL # BLD AUTO: 0.4 X10E3/UL (ref 0–0.4)
EOSINOPHIL NFR BLD AUTO: 5 %
ERYTHROCYTE [DISTWIDTH] IN BLOOD BY AUTOMATED COUNT: 15.8 % (ref 11.7–15.4)
GLOBULIN SER CALC-MCNC: 3.5 G/DL (ref 1.5–4.5)
GLUCOSE SERPL-MCNC: 82 MG/DL (ref 65–99)
HCG UR QL: NEGATIVE
HCT VFR BLD AUTO: 30.6 % (ref 34–46.6)
HGB BLD-MCNC: 8.9 G/DL (ref 11.1–15.9)
IMM GRANULOCYTES # BLD AUTO: 0 X10E3/UL (ref 0–0.1)
IMM GRANULOCYTES NFR BLD AUTO: 0 %
IRON SATN MFR SERPL: 3 % (ref 15–55)
IRON SERPL-MCNC: 15 UG/DL (ref 27–159)
LYMPHOCYTES # BLD AUTO: 3.1 X10E3/UL (ref 0.7–3.1)
LYMPHOCYTES NFR BLD AUTO: 34 %
MCH RBC QN AUTO: 20.8 PG (ref 26.6–33)
MCHC RBC AUTO-ENTMCNC: 29.1 G/DL (ref 31.5–35.7)
MCV RBC AUTO: 72 FL (ref 79–97)
MONOCYTES # BLD AUTO: 0.7 X10E3/UL (ref 0.1–0.9)
MONOCYTES NFR BLD AUTO: 7 %
NEUTROPHILS # BLD AUTO: 4.8 X10E3/UL (ref 1.4–7)
NEUTROPHILS NFR BLD AUTO: 53 %
PLATELET # BLD AUTO: 462 X10E3/UL (ref 150–450)
POTASSIUM SERPL-SCNC: 4.8 MMOL/L (ref 3.5–5.2)
PROT SERPL-MCNC: 7.6 G/DL (ref 6–8.5)
RBC # BLD AUTO: 4.27 X10E6/UL (ref 3.77–5.28)
RETICS/RBC NFR AUTO: 1.9 % (ref 0.6–2.6)
SODIUM SERPL-SCNC: 140 MMOL/L (ref 134–144)
TIBC SERPL-MCNC: 523 UG/DL (ref 250–450)
UIBC SERPL-MCNC: 508 UG/DL (ref 131–425)
VIT B12 SERPL-MCNC: 950 PG/ML (ref 232–1245)
WBC # BLD AUTO: 8.9 X10E3/UL (ref 3.4–10.8)

## 2020-11-27 DIAGNOSIS — D50.9 IRON DEFICIENCY ANEMIA, UNSPECIFIED IRON DEFICIENCY ANEMIA TYPE: Primary | ICD-10-CM

## 2020-11-27 RX ORDER — ERGOCALCIFEROL 1.25 MG/1
50000 CAPSULE ORAL
Qty: 12 CAP | Refills: 0 | Status: SHIPPED | OUTPATIENT
Start: 2020-11-27 | End: 2021-03-30

## 2020-11-27 NOTE — PROGRESS NOTES
Anemic. Referred to hematology for possible iron infusions. Reviewed other labs with patient. Ergocalciferol sent to pharmacy.

## 2020-12-27 ENCOUNTER — APPOINTMENT (OUTPATIENT)
Dept: CT IMAGING | Age: 35
End: 2020-12-27
Attending: EMERGENCY MEDICINE
Payer: COMMERCIAL

## 2020-12-27 ENCOUNTER — HOSPITAL ENCOUNTER (EMERGENCY)
Age: 35
Discharge: HOME | End: 2020-12-27
Attending: EMERGENCY MEDICINE
Payer: COMMERCIAL

## 2020-12-27 VITALS
SYSTOLIC BLOOD PRESSURE: 168 MMHG | RESPIRATION RATE: 20 BRPM | WEIGHT: 190 LBS | HEART RATE: 92 BPM | BODY MASS INDEX: 33.66 KG/M2 | DIASTOLIC BLOOD PRESSURE: 98 MMHG | TEMPERATURE: 97.8 F | OXYGEN SATURATION: 100 % | HEIGHT: 63 IN

## 2020-12-27 DIAGNOSIS — R51.9 NONINTRACTABLE HEADACHE, UNSPECIFIED CHRONICITY PATTERN, UNSPECIFIED HEADACHE TYPE: ICD-10-CM

## 2020-12-27 DIAGNOSIS — R41.82 ALTERED MENTAL STATUS, UNSPECIFIED ALTERED MENTAL STATUS TYPE: ICD-10-CM

## 2020-12-27 DIAGNOSIS — D64.9 ANEMIA, UNSPECIFIED TYPE: Primary | ICD-10-CM

## 2020-12-27 LAB
ALBUMIN SERPL-MCNC: 3.5 G/DL (ref 3.5–5)
ALBUMIN/GLOB SERPL: 0.8 {RATIO} (ref 1.1–2.2)
ALP SERPL-CCNC: 82 U/L (ref 45–117)
ALT SERPL-CCNC: 14 U/L (ref 12–78)
AMPHET UR QL SCN: NEGATIVE
ANION GAP SERPL CALC-SCNC: 5 MMOL/L (ref 5–15)
APPEARANCE UR: CLEAR
AST SERPL-CCNC: 12 U/L (ref 15–37)
ATRIAL RATE: 102 BPM
BACTERIA URNS QL MICRO: NEGATIVE /HPF
BARBITURATES UR QL SCN: NEGATIVE
BASOPHILS # BLD: 0.1 K/UL (ref 0–0.1)
BASOPHILS NFR BLD: 1 % (ref 0–1)
BENZODIAZ UR QL: NEGATIVE
BILIRUB SERPL-MCNC: 0.3 MG/DL (ref 0.2–1)
BILIRUB UR QL: NEGATIVE
BUN SERPL-MCNC: 10 MG/DL (ref 6–20)
BUN/CREAT SERPL: 14 (ref 12–20)
CALCIUM SERPL-MCNC: 8.7 MG/DL (ref 8.5–10.1)
CALCULATED P AXIS, ECG09: 46 DEGREES
CALCULATED R AXIS, ECG10: 1 DEGREES
CALCULATED T AXIS, ECG11: 30 DEGREES
CANNABINOIDS UR QL SCN: NEGATIVE
CHLORIDE SERPL-SCNC: 107 MMOL/L (ref 97–108)
CO2 SERPL-SCNC: 24 MMOL/L (ref 21–32)
COCAINE UR QL SCN: NEGATIVE
COLOR UR: ABNORMAL
COMMENT, HOLDF: NORMAL
CREAT SERPL-MCNC: 0.7 MG/DL (ref 0.55–1.02)
DIAGNOSIS, 93000: NORMAL
DIFFERENTIAL METHOD BLD: ABNORMAL
DRUG SCRN COMMENT,DRGCM: NORMAL
EOSINOPHIL # BLD: 0.4 K/UL (ref 0–0.4)
EOSINOPHIL NFR BLD: 5 % (ref 0–7)
EPITH CASTS URNS QL MICRO: ABNORMAL /LPF
ERYTHROCYTE [DISTWIDTH] IN BLOOD BY AUTOMATED COUNT: 17.8 % (ref 11.5–14.5)
GLOBULIN SER CALC-MCNC: 4.4 G/DL (ref 2–4)
GLUCOSE SERPL-MCNC: 152 MG/DL (ref 65–100)
GLUCOSE UR STRIP.AUTO-MCNC: NEGATIVE MG/DL
HCG UR QL: NEGATIVE
HCT VFR BLD AUTO: 29.6 % (ref 35–47)
HGB BLD-MCNC: 8.7 G/DL (ref 11.5–16)
HGB UR QL STRIP: ABNORMAL
HYALINE CASTS URNS QL MICRO: ABNORMAL /LPF (ref 0–5)
IMM GRANULOCYTES # BLD AUTO: 0 K/UL (ref 0–0.04)
IMM GRANULOCYTES NFR BLD AUTO: 0 % (ref 0–0.5)
KETONES UR QL STRIP.AUTO: NEGATIVE MG/DL
LEUKOCYTE ESTERASE UR QL STRIP.AUTO: NEGATIVE
LYMPHOCYTES # BLD: 2.9 K/UL (ref 0.8–3.5)
LYMPHOCYTES NFR BLD: 37 % (ref 12–49)
MCH RBC QN AUTO: 20.5 PG (ref 26–34)
MCHC RBC AUTO-ENTMCNC: 29.4 G/DL (ref 30–36.5)
MCV RBC AUTO: 69.8 FL (ref 80–99)
METHADONE UR QL: NEGATIVE
MONOCYTES # BLD: 0.5 K/UL (ref 0–1)
MONOCYTES NFR BLD: 7 % (ref 5–13)
NEUTS SEG # BLD: 3.9 K/UL (ref 1.8–8)
NEUTS SEG NFR BLD: 50 % (ref 32–75)
NITRITE UR QL STRIP.AUTO: NEGATIVE
NRBC # BLD: 0 K/UL (ref 0–0.01)
NRBC BLD-RTO: 0 PER 100 WBC
OPIATES UR QL: NEGATIVE
P-R INTERVAL, ECG05: 156 MS
PCP UR QL: NEGATIVE
PH UR STRIP: 6.5 [PH] (ref 5–8)
PLATELET # BLD AUTO: 420 K/UL (ref 150–400)
PMV BLD AUTO: 10.2 FL (ref 8.9–12.9)
POTASSIUM SERPL-SCNC: 3.6 MMOL/L (ref 3.5–5.1)
PROT SERPL-MCNC: 7.9 G/DL (ref 6.4–8.2)
PROT UR STRIP-MCNC: NEGATIVE MG/DL
Q-T INTERVAL, ECG07: 350 MS
QRS DURATION, ECG06: 90 MS
QTC CALCULATION (BEZET), ECG08: 456 MS
RBC # BLD AUTO: 4.24 M/UL (ref 3.8–5.2)
RBC #/AREA URNS HPF: ABNORMAL /HPF (ref 0–5)
RBC MORPH BLD: ABNORMAL
SAMPLES BEING HELD,HOLD: NORMAL
SODIUM SERPL-SCNC: 136 MMOL/L (ref 136–145)
SP GR UR REFRACTOMETRY: 1.01 (ref 1–1.03)
TSH SERPL DL<=0.05 MIU/L-ACNC: 1.28 UIU/ML (ref 0.36–3.74)
UR CULT HOLD, URHOLD: NORMAL
UROBILINOGEN UR QL STRIP.AUTO: 0.2 EU/DL (ref 0.2–1)
VENTRICULAR RATE, ECG03: 102 BPM
WBC # BLD AUTO: 7.8 K/UL (ref 3.6–11)
WBC URNS QL MICRO: ABNORMAL /HPF (ref 0–4)

## 2020-12-27 PROCEDURE — 85025 COMPLETE CBC W/AUTO DIFF WBC: CPT

## 2020-12-27 PROCEDURE — 36415 COLL VENOUS BLD VENIPUNCTURE: CPT

## 2020-12-27 PROCEDURE — 81001 URINALYSIS AUTO W/SCOPE: CPT

## 2020-12-27 PROCEDURE — 70450 CT HEAD/BRAIN W/O DYE: CPT

## 2020-12-27 PROCEDURE — 81025 URINE PREGNANCY TEST: CPT

## 2020-12-27 PROCEDURE — 80053 COMPREHEN METABOLIC PANEL: CPT

## 2020-12-27 PROCEDURE — 84443 ASSAY THYROID STIM HORMONE: CPT

## 2020-12-27 PROCEDURE — 93005 ELECTROCARDIOGRAM TRACING: CPT

## 2020-12-27 PROCEDURE — 80307 DRUG TEST PRSMV CHEM ANLYZR: CPT

## 2020-12-27 PROCEDURE — 99285 EMERGENCY DEPT VISIT HI MDM: CPT

## 2020-12-27 NOTE — ED NOTES
Informed pt a urine specimen was needed. Pt verbalized understanding. Pt updated on plan of care. Will continue to monitor.

## 2020-12-27 NOTE — ED TRIAGE NOTES
Triage:  Pt to the ED due concerns over periods of confusion and weakness since 12/24/20. Pt states her cycle started 12/25 and has been heavy since onset. Pt states history of anemia. Pt ambulatory into triage via steady gait, no visible cues of distress present.

## 2020-12-27 NOTE — ED PROVIDER NOTES
HPI the patient has a 3-day history of feeling confused, disoriented, and having trouble remembering things. She also has a 2-day history of a mild frontal headache. She denies having fever, vomiting, shortness of breath, syncope or other complaints. She is on no new medications. Past Medical History:   Diagnosis Date    Adverse effect of anesthesia     NEVER HAD ANESTHESIA    Anxiety     Arthritis     Back pain     H/O hemorrhoids     Hives     as a child - had 1 year of steroids.      Hypertension     Ill-defined condition     PCOS    Joint pain     Morbid obesity ([address] 11/20/2019    Gastric Bypass 2019    PCOS (polycystic ovarian syndrome)     Psychiatric disorder     ANXIETY       Past Surgical History:   Procedure Laterality Date    HX GI  1989    RECTAL POLYP    HX HEENT      WISDOM TEETH REMOVED    HX LAP GASTRIC BYPASS  11/20/[facility]     HX OTHER SURGICAL      rectal polyp at 11 yo    HX OTHER SURGICAL      Sigmoidoscopy - WNL pp ~2013    HX WISDOM TEETH EXTRACTION           Family History:   Problem Relation Age of Onset    Diabetes Father     Heart Disease Father         cardiomyopathy, heart transplant    Hypertension Father     Heart Surgery Father     High Cholesterol Father     Diabetes Mother     High Cholesterol Mother     Hypertension Mother     Diabetes Maternal Grandmother     Diabetes Maternal Grandfather     Diabetes Paternal Grandmother     No Known Problems Sister     Anesth Problems Neg Hx        Social History     Socioeconomic History    Marital status: LIFE PARTNER     Spouse name: Malaika Friedman Number of children: 0    Years of education: Not on file    Highest education level: Not on file   Occupational History    Occupation: Optometrist      Comment: [employer] [address] Financial resource strain: Not on file    Food insecurity     Worry: Not on file     Inability: Not on file   MAPPING needs     Medical: Not on file     Non-medical: Not on file   Tobacco Use    Smoking status: Former Smoker     Packs/day: 0.25     Years: 15.00     Pack years: 3.75     Types: Cigarettes     Quit date: 2018     Years since quittin.4    Smokeless tobacco: Never Used   Substance and Sexual Activity    Alcohol use: Yes     Alcohol/week: 1.0 standard drinks     Types: 1 Shots of liquor per week     Frequency: 2-4 times a month     Drinks per session: 1 or 2    Drug use: Not Currently     Types: Marijuana    Sexual activity: Yes     Partners: Male     Birth control/protection: I.U.D. Lifestyle    Physical activity     Days per week: Not on file     Minutes per session: Not on file    Stress: Not on file   Relationships    Social connections     Talks on phone: Not on file     Gets together: Not on file     Attends Sabianism service: Not on file     Active member of club or organization: Not on file     Attends meetings of clubs or organizations: Not on file     Relationship status: Not on file    Intimate partner violence     Fear of current or ex partner: Not on file     Emotionally abused: Not on file     Physically abused: Not on file     Forced sexual activity: Not on file   Other Topics Concern     Service Not Asked    Blood Transfusions Not Asked    Caffeine Concern Not Asked    Occupational Exposure Not Asked   Link Hussain Hazards Not Asked    Sleep Concern Not Asked    Stress Concern Not Asked    Weight Concern Not Asked    Special Diet Not Asked    Back Care Not Asked    Exercise No    Bike Helmet Not Asked    [address] Not Asked    Self-Exams Not Asked   Social History Narrative    17    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 Engaged and living with Jessy Soriano Rater) at [place] [address] for now during the move.          Dog - 1 yo in 2017.         18    Working at \"[employer]\"         ALLERGIES: Patient has no known allergies. Review of Systems   Constitutional: Negative for fever. HENT: Negative for voice change. Eyes: Negative for pain. Respiratory: Negative for cough and shortness of breath. Cardiovascular: Negative for chest pain. Gastrointestinal: Negative for abdominal pain, nausea and vomiting. Genitourinary: Negative for flank pain. Musculoskeletal: Negative for back pain. Skin: Negative for rash. Neurological: Positive for headaches. Psychiatric/Behavioral: Positive for confusion. Vitals:    12/27/20 1402   BP: (!) 192/97   Pulse: (!) 135   Resp: 18   Temp: 98 °F (36.7 °C)   SpO2: 100%   Weight: 86.2 kg (190 lb)   Height: 5' 3\" (1.6 m)            Physical Exam  Constitutional:       General: She is not in acute distress. Appearance: She is well-developed. HENT:      Head: Normocephalic. Eyes:      Pupils: Pupils are equal, round, and reactive to light. Neck:      Musculoskeletal: Normal range of motion. Cardiovascular:      Rate and Rhythm: Normal rate. Heart sounds: No murmur. Pulmonary:      Effort: Pulmonary effort is normal.      Breath sounds: Normal breath sounds. Abdominal:      Palpations: Abdomen is soft. Tenderness: There is no abdominal tenderness. Musculoskeletal: Normal range of motion. Skin:     General: Skin is warm and dry. Capillary Refill: Capillary refill takes less than 2 seconds. Neurological:      Mental Status: She is alert and oriented to person, place, and time. Cranial Nerves: No cranial nerve deficit or facial asymmetry. Motor: No weakness. Psychiatric:         Behavior: Behavior normal.          MDM       Procedures ED EKG interpretation:  Rhythm: Sinus tachycardia, rate 102. No acute ST changes. This EKG was interpreted by Franci Rolle MD,ED Provider.

## 2020-12-30 ENCOUNTER — TELEPHONE (OUTPATIENT)
Dept: SURGERY | Age: 35
End: 2020-12-30

## 2020-12-30 NOTE — TELEPHONE ENCOUNTER
I called the patient back and she said she was seen in the ED on Sunday for disorientation and elevated heart rate, she said she is anemic and she was told to follow up with her PCP who is on vacation. And she said she still feels the same, she said she had to leave work today, she feels like she is having chills now and her family said she looks pale. She had a COVID test yesterday that was negative. She said she is drinking and eating with out problems, she said something feels \"off\". She said she is taking her vitamins. She said her PCP is not available until 1/5/2021and she wants to be seen sooner. I have her coming in tomorrow at 1:20pm. Pt in agreement.

## 2020-12-31 ENCOUNTER — OFFICE VISIT (OUTPATIENT)
Dept: SURGERY | Age: 35
End: 2020-12-31
Payer: COMMERCIAL

## 2020-12-31 VITALS
RESPIRATION RATE: 18 BRPM | OXYGEN SATURATION: 99 % | HEIGHT: 63 IN | DIASTOLIC BLOOD PRESSURE: 88 MMHG | SYSTOLIC BLOOD PRESSURE: 135 MMHG | HEART RATE: 109 BPM | TEMPERATURE: 99.2 F | WEIGHT: 186.4 LBS | BODY MASS INDEX: 33.03 KG/M2

## 2020-12-31 DIAGNOSIS — R53.82 CHRONIC FATIGUE: ICD-10-CM

## 2020-12-31 DIAGNOSIS — D50.0 IRON DEFICIENCY ANEMIA DUE TO CHRONIC BLOOD LOSS: ICD-10-CM

## 2020-12-31 DIAGNOSIS — R41.0 CONFUSION: Primary | ICD-10-CM

## 2020-12-31 PROCEDURE — 99212 OFFICE O/P EST SF 10 MIN: CPT | Performed by: SURGERY

## 2020-12-31 RX ORDER — DROSPIRENONE 4 MG/1
TABLET, FILM COATED ORAL
COMMUNITY
Start: 2020-12-14 | End: 2021-03-30

## 2020-12-31 NOTE — PROGRESS NOTES
1. Have you been to the ER, urgent care clinic since your last visit? Hospitalized since your last visit? 12/27/20 [facility] ED Disorientation. 2. Have you seen or consulted any other health care providers outside of the [address] [place] [address] since your last visit? Include any pap smears or colon screening.  no

## 2021-01-04 PROBLEM — D50.0 IRON DEFICIENCY ANEMIA DUE TO CHRONIC BLOOD LOSS: Status: ACTIVE | Noted: 2021-01-04

## 2021-01-04 NOTE — PROGRESS NOTES
Subjective:      Eloisa Weldon is a 38 y.o. female presents for postop care 1 year following laparoscopic gastric bypass. Her weight loss has slowed over the last portion of this year. She was recently evaluated in the emergency department for confusion. Head CT scan was negative, no signs of hypoglycemia, but anemia has worsened. She has hematology appointment pending, and was told by a nurse practitioner that oral iron supplements would probably not be very helpful at this stage. She also notes pica symptoms, and heavy periods which is a chronic issue. No dizziness or fall. Objective:     Visit Vitals  /88   Pulse (!) 109   Temp 99.2 °F (37.3 °C) (Oral)   Resp 18   Ht 5' 3\" (1.6 m)   Wt 186 lb 6.4 oz (84.6 kg)   SpO2 99%   BMI 33.02 kg/m²       General:  alert, cooperative, no distress, appears stated age, moderately obese   Abdomen: deferred   Incision:   deferred     Assessment:     Iron deficiency anemia following gastric bypass. Hemoglobin, iron panel has progressively worsened. Periods remain heavy; hematology appointment next week. I recommended she restart her oral iron with MiraLAX to avoid attendant constipation. She agrees with this plan. Plan:     1. Continue any current medications. Restart iron supplements with MiraLAX. 2.  Bariatric diet. 3.  Hematology appointment next week. Recommended she contact her gynecologist for ongoing management of menorrhagia.

## 2021-01-04 NOTE — PATIENT INSTRUCTIONS
Anemia From Heavy Bleeding: Care Instructions Your Care Instructions Anemia means that your body does not have enough red blood cells. Red blood cells carry oxygen around the body. When you have anemia, you may feel dizzy, tired, and weak. You may also feel your heart pounding. For some people, it's hard to focus and think clearly. One common cause of anemia is bleeding. Bleeding from ulcers, hemorrhoids, cancer, or other problems can cause anemia. It may also be caused by heavy menstrual periods. Your treatment may include iron pills. Iron helps your body make hemoglobin. Hemoglobin is the part of the red blood cell that carries oxygen. If you have severe anemia, you may need a blood transfusion to give you red blood cells as quickly as possible. Sometimes it takes several months to get iron levels back to normal. 
Follow-up care is a key part of your treatment and safety. Be sure to make and go to all appointments, and call your doctor if you are having problems. It's also a good idea to know your test results and keep a list of the medicines you take. How can you care for yourself at home? · Be safe with medicines. Take your medicines exactly as prescribed. Call your doctor if you think you are having a problem with your medicine. · Follow your doctor's advice about eating foods that have a lot of iron in them. These include red meat, shellfish, poultry, and eggs. They also include beans, raisins, whole-grain bread, and leafy green vegetables. · Steam your vegetables. This is the best way to prepare them if you want to get as much iron as possible. · Iron pills can cause constipation. If you take them, there are things you can do to avoid constipation. Drink plenty of fluids, eat foods with a lot of fiber, and exercise every day. When should you call for help? Call 911 anytime you think you may need emergency care. For example, call if: 
  · You passed out (lost consciousness).   · Your stools are maroon or very bloody. Call your doctor now or seek immediate medical care if: 
  · You are short of breath.  
  · You have new or worse bleeding.  
  · You are dizzy or light-headed, or you feel like you may faint. Watch closely for changes in your health, and be sure to contact your doctor if: 
  · You feel weaker or more tired than usual.  
  · You do not get better as expected. Where can you learn more? Go to http://www.gray.com/ Enter S981 in the search box to learn more about \"Anemia From Heavy Bleeding: Care Instructions. \" Current as of: November 8, 2019               Content Version: 12.6 © 1224-4541 [employer]. Care instructions adapted under license by [employer] (which disclaims liability or warranty for this information). If you have questions about a medical condition or this instruction, always ask your healthcare professional. [address] any warranty or liability for your use of this information.

## 2021-01-06 ENCOUNTER — OFFICE VISIT (OUTPATIENT)
Dept: INTERNAL MEDICINE CLINIC | Age: 36
End: 2021-01-06
Payer: COMMERCIAL

## 2021-01-06 VITALS
RESPIRATION RATE: 16 BRPM | TEMPERATURE: 98 F | BODY MASS INDEX: 33.59 KG/M2 | HEART RATE: 60 BPM | HEIGHT: 63 IN | OXYGEN SATURATION: 100 % | WEIGHT: 189.6 LBS | SYSTOLIC BLOOD PRESSURE: 118 MMHG | DIASTOLIC BLOOD PRESSURE: 80 MMHG

## 2021-01-06 DIAGNOSIS — R03.0 ELEVATED BP WITHOUT DIAGNOSIS OF HYPERTENSION: ICD-10-CM

## 2021-01-06 DIAGNOSIS — D50.0 IRON DEFICIENCY ANEMIA DUE TO CHRONIC BLOOD LOSS: Primary | ICD-10-CM

## 2021-01-06 DIAGNOSIS — F41.9 ANXIETY: ICD-10-CM

## 2021-01-06 LAB
IRON SATN MFR SERPL: 14 % (ref 15–55)
IRON SERPL-MCNC: 73 UG/DL (ref 27–159)
TIBC SERPL-MCNC: 516 UG/DL (ref 250–450)
UIBC SERPL-MCNC: 443 UG/DL (ref 131–425)
VIT B1 BLD-SCNC: 178.9 NMOL/L (ref 66.5–200)

## 2021-01-06 PROCEDURE — 99214 OFFICE O/P EST MOD 30 MIN: CPT | Performed by: PHYSICIAN ASSISTANT

## 2021-01-06 NOTE — PROGRESS NOTES
Sanford Saucedo is a 38 y.o. female  Chief Complaint   Patient presents with    Hypertension     follow up     Visit Vitals  /80   Pulse 60   Temp 98 °F (36.7 °C) (Temporal)   Resp 16   Ht 5' 3\" (1.6 m)   Wt 189 lb 9.6 oz (86 kg)   SpO2 100%   BMI 33.59 kg/m²      Health Maintenance Due   Topic Date Due    PAP AKA CERVICAL CYTOLOGY  12/01/2017       HPI  Anemia follow up - has been feeling dizzy/fatigued. Went to ER and found to be very anemic. Has follow up with hematology on Friday and bariatric surgeon soon as well. Lab Results   Component Value Date/Time    HGB 8.7 (L) 12/27/2020 02:22 PM     Hx large uterine fibroids that may require a hysterectomy at some point. Planning to TTC after anemia is resolved. Periods have been heavy until recently - new OCP is helping. Taking Iron regularly starting recently. Iron level last week was normal.     Lab Results   Component Value Date/Time    Iron 73 12/31/2020 02:29 PM    TIBC 516 (H) 12/31/2020 02:29 PM    Iron % saturation 14 (L) 12/31/2020 02:29 PM     Emotionally feeling anxious frequently due to fogginess with anemia, but feels that Prozac 40 mg is adequate for now. BP Follow up - BP controlled at recheck today:  BP Readings from Last 3 Encounters:   01/06/21 118/80   12/31/20 135/88   12/27/20 (!) 168/98     Currently taking:   Key CAD CHF Meds     Patient is on no cardiovascular meds. Has maintained weight loss over the holidays. Wt Readings from Last 3 Encounters:   01/06/21 189 lb 9.6 oz (86 kg)   12/31/20 186 lb 6.4 oz (84.6 kg)   12/27/20 190 lb (86.2 kg)      recently - happy with . [employer] was stressful due to COVID restrictions, but is happy with the marriage. ROS  Review of Systems   Respiratory: Negative for shortness of breath. Cardiovascular: Negative for chest pain and palpitations. Neurological: Negative for dizziness, loss of consciousness and weakness.      EXAM  Physical Exam  Vitals signs and nursing note reviewed. Constitutional:       General: She is not in acute distress. Appearance: She is well-developed. HENT:      Head: Normocephalic and atraumatic. Neck:      Musculoskeletal: Neck supple. Vascular: No JVD. Cardiovascular:      Rate and Rhythm: Normal rate and regular rhythm. Heart sounds: Normal heart sounds. Pulmonary:      Effort: Pulmonary effort is normal. No respiratory distress. Breath sounds: Normal breath sounds. Skin:     General: Skin is warm and dry. Neurological:      Mental Status: She is alert and oriented to person, place, and time. Psychiatric:         Mood and Affect: Mood normal.         Behavior: Behavior normal.         Thought Content: Thought content normal.         Judgment: Judgment normal.       ASSESSMENT/PLAN  1. Iron deficiency anemia due to chronic blood loss  Continue iron supplements and follow up with hematology & bariatric surgeon as planned    2. Elevated BP without diagnosis of hypertension  Controlled at recheck    3. Anxiety  Reasonably controlled for now. Follow up if symptoms worsen. Otherwise, follow up q6 months.

## 2021-01-06 NOTE — PROGRESS NOTES
1. Have you been to the ER, urgent care clinic since your last visit? Hospitalized since your last visit? No    2. Have you seen or consulted any other health care providers outside of the [address] [place] [address] since your last visit? Include any pap smears or colon screening.  No   Chief Complaint   Patient presents with    Hypertension     follow up

## 2021-01-07 DIAGNOSIS — F32.81 PMDD (PREMENSTRUAL DYSPHORIC DISORDER): ICD-10-CM

## 2021-01-07 DIAGNOSIS — F41.9 ANXIETY: ICD-10-CM

## 2021-01-07 RX ORDER — FLUOXETINE HYDROCHLORIDE 40 MG/1
CAPSULE ORAL
Qty: 90 CAP | Refills: 1 | Status: SHIPPED | OUTPATIENT
Start: 2021-01-07 | End: 2021-03-24 | Stop reason: DRUGHIGH

## 2021-01-08 ENCOUNTER — VIRTUAL VISIT (OUTPATIENT)
Dept: ONCOLOGY | Age: 36
End: 2021-01-08
Payer: COMMERCIAL

## 2021-01-08 DIAGNOSIS — E28.2 PCOS (POLYCYSTIC OVARIAN SYNDROME): ICD-10-CM

## 2021-01-08 DIAGNOSIS — D50.0 IRON DEFICIENCY ANEMIA DUE TO CHRONIC BLOOD LOSS: Primary | ICD-10-CM

## 2021-01-08 PROCEDURE — 99204 OFFICE O/P NEW MOD 45 MIN: CPT | Performed by: INTERNAL MEDICINE

## 2021-01-08 RX ORDER — EPINEPHRINE 1 MG/ML
0.3 INJECTION, SOLUTION, CONCENTRATE INTRAVENOUS AS NEEDED
Status: CANCELLED | OUTPATIENT
Start: 2021-01-20

## 2021-01-08 RX ORDER — ONDANSETRON 2 MG/ML
8 INJECTION INTRAMUSCULAR; INTRAVENOUS AS NEEDED
Status: CANCELLED | OUTPATIENT
Start: 2021-01-27

## 2021-01-08 RX ORDER — DIPHENHYDRAMINE HYDROCHLORIDE 50 MG/ML
25 INJECTION, SOLUTION INTRAMUSCULAR; INTRAVENOUS AS NEEDED
Status: CANCELLED
Start: 2021-01-20

## 2021-01-08 RX ORDER — HYDROCORTISONE SODIUM SUCCINATE 100 MG/2ML
100 INJECTION, POWDER, FOR SOLUTION INTRAMUSCULAR; INTRAVENOUS AS NEEDED
Status: CANCELLED | OUTPATIENT
Start: 2021-01-20

## 2021-01-08 RX ORDER — SODIUM CHLORIDE 9 MG/ML
10 INJECTION INTRAMUSCULAR; INTRAVENOUS; SUBCUTANEOUS AS NEEDED
Status: CANCELLED | OUTPATIENT
Start: 2021-01-27

## 2021-01-08 RX ORDER — HYDROCORTISONE SODIUM SUCCINATE 100 MG/2ML
100 INJECTION, POWDER, FOR SOLUTION INTRAMUSCULAR; INTRAVENOUS AS NEEDED
Status: CANCELLED | OUTPATIENT
Start: 2021-01-27

## 2021-01-08 RX ORDER — DIPHENHYDRAMINE HYDROCHLORIDE 50 MG/ML
25 INJECTION, SOLUTION INTRAMUSCULAR; INTRAVENOUS AS NEEDED
Status: CANCELLED
Start: 2021-01-27

## 2021-01-08 RX ORDER — ONDANSETRON 2 MG/ML
8 INJECTION INTRAMUSCULAR; INTRAVENOUS AS NEEDED
Status: CANCELLED | OUTPATIENT
Start: 2021-01-20

## 2021-01-08 RX ORDER — DIPHENHYDRAMINE HYDROCHLORIDE 50 MG/ML
50 INJECTION, SOLUTION INTRAMUSCULAR; INTRAVENOUS AS NEEDED
Status: CANCELLED
Start: 2021-01-27

## 2021-01-08 RX ORDER — HEPARIN 100 UNIT/ML
300-500 SYRINGE INTRAVENOUS AS NEEDED
Status: CANCELLED
Start: 2021-01-20

## 2021-01-08 RX ORDER — SODIUM CHLORIDE 0.9 % (FLUSH) 0.9 %
10 SYRINGE (ML) INJECTION AS NEEDED
Status: CANCELLED | OUTPATIENT
Start: 2021-01-27

## 2021-01-08 RX ORDER — SODIUM CHLORIDE 9 MG/ML
10 INJECTION INTRAMUSCULAR; INTRAVENOUS; SUBCUTANEOUS AS NEEDED
Status: CANCELLED | OUTPATIENT
Start: 2021-01-20

## 2021-01-08 RX ORDER — ALBUTEROL SULFATE 0.83 MG/ML
2.5 SOLUTION RESPIRATORY (INHALATION) AS NEEDED
Status: CANCELLED
Start: 2021-01-27

## 2021-01-08 RX ORDER — SODIUM CHLORIDE 0.9 % (FLUSH) 0.9 %
10 SYRINGE (ML) INJECTION AS NEEDED
Status: CANCELLED | OUTPATIENT
Start: 2021-01-20

## 2021-01-08 RX ORDER — ACETAMINOPHEN 325 MG/1
650 TABLET ORAL AS NEEDED
Status: CANCELLED
Start: 2021-01-27

## 2021-01-08 RX ORDER — DIPHENHYDRAMINE HYDROCHLORIDE 50 MG/ML
50 INJECTION, SOLUTION INTRAMUSCULAR; INTRAVENOUS AS NEEDED
Status: CANCELLED
Start: 2021-01-20

## 2021-01-08 RX ORDER — ALBUTEROL SULFATE 0.83 MG/ML
2.5 SOLUTION RESPIRATORY (INHALATION) AS NEEDED
Status: CANCELLED
Start: 2021-01-20

## 2021-01-08 RX ORDER — HEPARIN 100 UNIT/ML
300-500 SYRINGE INTRAVENOUS AS NEEDED
Status: CANCELLED
Start: 2021-01-27

## 2021-01-08 RX ORDER — ACETAMINOPHEN 325 MG/1
650 TABLET ORAL AS NEEDED
Status: CANCELLED
Start: 2021-01-20

## 2021-01-08 RX ORDER — EPINEPHRINE 1 MG/ML
0.3 INJECTION, SOLUTION, CONCENTRATE INTRAVENOUS AS NEEDED
Status: CANCELLED | OUTPATIENT
Start: 2021-01-27

## 2021-01-08 NOTE — PROGRESS NOTES
Eloisa Weldon is a 38 y.o. female  Chief Complaint   Patient presents with    Follow-up    Anemia     1. Have you been to the ER, urgent care clinic since your last visit? Hospitalized since your last visit? No    2. Have you seen or consulted any other health care providers outside of the [address] [place] [address] since your last visit? Include any pap smears or colon screening.  No

## 2021-01-08 NOTE — PROGRESS NOTES
[facility] [place] [address] [address], [address]  W: [phone]  F: [phone]    Reason for Visit:   Jessica Colón is a 38 y.o. female who is seen in consultation at the request of Ale HAMILTON for evaluation of ANEMIA  Seen by synchronous (real-time) audio-video technology on 2021     History of Present Illness:   Patient is a 38 y.o. female who is s/p gastric bypass who is seen for anemia. She has had microcytic anemia since 2020  She was previously normal.   She has not been on regular B12 or iron supplements. Does take a multivitamin  She has menorrhagia for months  Has no other bleeding, she is not pregnant, she chews on ice, she is tired, she is SOB at rest. She has no CP. She has had no fevers, chills, sweats, she has no HA, no pain. She has no neuropathy    No FH of anemia    She is an optometrist    Past Medical History:   Diagnosis Date    Adverse effect of anesthesia     NEVER HAD ANESTHESIA    Anxiety     Arthritis     Back pain     H/O hemorrhoids     Hives     as a child - had 1 year of steroids.  Hypertension     Ill-defined condition     PCOS    Joint pain     Morbid obesity ([address] 2019    Gastric Bypass     PCOS (polycystic ovarian syndrome)     Psychiatric disorder     ANXIETY      Past Surgical History:   Procedure Laterality Date    HX GI      RECTAL POLYP    HX HEENT      WISDOM TEETH REMOVED    HX LAP GASTRIC BYPASS  2019    Dr[facility] Kurt Mc[facility]     HX OTHER SURGICAL      rectal polyp at 9 yo    HX OTHER SURGICAL      Sigmoidoscopy - WNL pp ~    HX WISDOM TEETH EXTRACTION        Social History     Tobacco Use    Smoking status: Former Smoker     Packs/day: 0.25     Years: 15.00     Pack years: 3.75     Types: Cigarettes     Quit date: 2018     Years since quittin.4    Smokeless tobacco: Never Used   Substance Use Topics    Alcohol use:  Yes     Alcohol/week: 1.0 standard drinks     Types: 1 Shots of liquor per week     Frequency: 2-4 times a month     Drinks per session: 1 or 2      Family History   Problem Relation Age of Onset    Diabetes Father     Heart Disease Father         cardiomyopathy, heart transplant    Hypertension Father     Heart Surgery Father     High Cholesterol Father     Diabetes Mother     High Cholesterol Mother     Hypertension Mother     Diabetes Maternal Grandmother     Diabetes Maternal Grandfather     Diabetes Paternal Grandmother     No Known Problems Sister     Anesth Problems Neg Hx      Current Outpatient Medications   Medication Sig    FLUoxetine (PROzac) 40 mg capsule TAKE 1 CAPSULE BY MOUTH EVERY DAY    Slynd 4 mg (28) tab TAKE 1 TABLET BY MOUTH EVERY DAY    ergocalciferol (ERGOCALCIFEROL) 1,250 mcg (50,000 unit) capsule Take 1 Cap by mouth every seven (7) days.  omeprazole (PRILOSEC) 20 mg capsule TAKE 1 CAPSULE BY MOUTH EVERY DAY    ALPRAZolam (Xanax) 0.25 mg tablet Take 1 Tab by mouth two (2) times daily as needed for Anxiety. Max Daily Amount: 0.5 mg.    ondansetron (ZOFRAN ODT) 4 mg disintegrating tablet Take 1 Tab by mouth every eight (8) hours as needed for Nausea or Vomiting.  calcium carbonate (OS-PEBBLES) 500 mg calcium (1,250 mg) tablet Take  by mouth daily.  multivitamin (ONE A DAY) tablet Take 2 Tabs by mouth daily. Bariatric Advantage     No current facility-administered medications for this visit. No Known Allergies     Review of Systems: A complete review of systems was obtained, negative except as described above. Physical Exam:     Due to this being a TeleHealth evaluation, many elements of the physical examination are unable to be assessed.      Constitutional: Appears well-developed and well-nourished in no apparent distress   Mental status: Alert and awake, Oriented to person/place/time, Able to follow commands  Eyes: EOM normal, Sclera normal, No visible ocular discharge  HENT: Normocephalic, atraumatic  Pulmonary/Chest: Respiratory effort normal, No visualized signs of difficulty breathing or respiratory distress   Musculoskeletal: Normal gait with no signs of ataxia, Normal range of motion of neck  Neurological: No facial asymmetry (Cranial nerve 7 motor function), No gaze palsy  Skin: No significant exanthematous lesions or discoloration noted on facial skin  Psychiatric: Normal affect, normal judgment/insight. No hallucinations       Results:     Lab Results   Component Value Date/Time    WBC 7.8 12/27/2020 02:22 PM    HGB 8.7 (L) 12/27/2020 02:22 PM    HCT 29.6 (L) 12/27/2020 02:22 PM    PLATELET 825 (H) 96/77/3630 02:22 PM    MCV 69.8 (L) 12/27/2020 02:22 PM    ABS. NEUTROPHILS 3.9 12/27/2020 02:22 PM     Lab Results   Component Value Date/Time    Sodium 136 12/27/2020 02:22 PM    Potassium 3.6 12/27/2020 02:22 PM    Chloride 107 12/27/2020 02:22 PM    CO2 24 12/27/2020 02:22 PM    Glucose 152 (H) 12/27/2020 02:22 PM    BUN 10 12/27/2020 02:22 PM    Creatinine 0.70 12/27/2020 02:22 PM    GFR est AA >60 12/27/2020 02:22 PM    GFR est non-AA >60 12/27/2020 02:22 PM    Calcium 8.7 12/27/2020 02:22 PM     Lab Results   Component Value Date/Time    Bilirubin, total 0.3 12/27/2020 02:22 PM    ALT (SGPT) 14 12/27/2020 02:22 PM    Alk.  phosphatase 82 12/27/2020 02:22 PM    Protein, total 7.9 12/27/2020 02:22 PM    Albumin 3.5 12/27/2020 02:22 PM    Globulin 4.4 (H) 12/27/2020 02:22 PM     Lab Results   Component Value Date/Time    Reticulocyte count 1.9 11/24/2020 12:58 PM    Iron % saturation 14 (L) 12/31/2020 02:29 PM    TIBC 516 (H) 12/31/2020 02:29 PM    Vitamin B12 950 11/24/2020 12:58 PM    TSH 1.28 12/27/2020 02:22 PM    HIV SCREEN 4TH GENERATION WRFX Non Reactive 06/29/2016 09:13 AM     No results found for: INR, APTT, DDIMSQ, DDIME, [id number], Katelyn Lancaster[address], [id number], Deniz Blake, [address] [facility] [address], [address], [id number], [id number], [id number], [id number], [id number], [id number], INREXT    Records reviewed and summarized above. Pathology report(s) reviewed above. Radiology report(s) reviewed above. USG abdomen 11/2019  IMPRESSION  IMPRESSION:      Heterogeneous increased liver echotexture may be due to hepatic steatosis with  small focal central fatty sparing.     The remainder of the exam is unremarkable   Assessment:   1) Microcytic anemia    New since 6/2020  Secondary to blood loss from menorrhagia and poor absorption s/p gastric bypass  She is very symptomatic and not responding to Oral iron    Dicussed injecatfer x 2  Side effects, logistics reviewed     2) SOB  Secondary to anemia    3) s/p Gastric bypass  Counseled to take b12- 1000 mcg daily po    4) Thrombocytosis  Reactive to 1      Plan:     · Injecatfer x 2  · B12 po as above  · RTC 3 months with cbc, iron profile and ferritin    I appreciate the opportunity to participate in Ms. Cachorro Florence's care. Signed By: Jackie Tolentino MD      The patient was seen by synchronous (real-time) audio-video technology. I was in the office while conducting this encounter. The patient was at her home. Consent:  She and/or her healthcare decision maker is aware that this patient-initiated Telehealth encounter is a billable service, with coverage as determined by her insurance carrier. She is aware that she may receive a bill and has provided verbal consent to proceed: Yes    Pursuant to the emergency declaration under the [address] and the [facility] waiver authority and the [employer] Act, this Virtual Visit was conducted, with patient's (and/or legal guardian's) consent, to reduce the patient's risk of exposure to COVID-19 and provide necessary medical care.

## 2021-01-20 ENCOUNTER — HOSPITAL ENCOUNTER (OUTPATIENT)
Dept: INFUSION THERAPY | Age: 36
Discharge: HOME | End: 2021-01-20
Payer: COMMERCIAL

## 2021-01-20 VITALS
DIASTOLIC BLOOD PRESSURE: 75 MMHG | HEART RATE: 79 BPM | RESPIRATION RATE: 18 BRPM | TEMPERATURE: 97.5 F | SYSTOLIC BLOOD PRESSURE: 114 MMHG

## 2021-01-20 DIAGNOSIS — D50.0 IRON DEFICIENCY ANEMIA DUE TO CHRONIC BLOOD LOSS: Primary | ICD-10-CM

## 2021-01-20 PROCEDURE — 96365 THER/PROPH/DIAG IV INF INIT: CPT

## 2021-01-20 PROCEDURE — 74011000258 HC RX REV CODE- 258: Performed by: REGISTERED NURSE

## 2021-01-20 PROCEDURE — 74011250636 HC RX REV CODE- 250/636: Performed by: REGISTERED NURSE

## 2021-01-20 RX ORDER — SODIUM CHLORIDE 0.9 % (FLUSH) 0.9 %
10 SYRINGE (ML) INJECTION AS NEEDED
Status: DISCONTINUED | OUTPATIENT
Start: 2021-01-20 | End: 2021-01-21 | Stop reason: HOSPADM

## 2021-01-20 RX ORDER — SODIUM CHLORIDE 9 MG/ML
10 INJECTION INTRAMUSCULAR; INTRAVENOUS; SUBCUTANEOUS AS NEEDED
Status: DISCONTINUED | OUTPATIENT
Start: 2021-01-20 | End: 2021-01-21 | Stop reason: HOSPADM

## 2021-01-20 RX ORDER — HEPARIN 100 UNIT/ML
300-500 SYRINGE INTRAVENOUS AS NEEDED
Status: DISCONTINUED | OUTPATIENT
Start: 2021-01-20 | End: 2021-01-21 | Stop reason: HOSPADM

## 2021-01-20 RX ADMIN — SODIUM CHLORIDE 100 ML: 900 INJECTION, SOLUTION INTRAVENOUS at 15:00

## 2021-01-20 RX ADMIN — FERRIC CARBOXYMALTOSE INJECTION 750 MG: 50 INJECTION, SOLUTION INTRAVENOUS at 15:48

## 2021-01-20 NOTE — PROGRESS NOTES
Outpatient Infusion Center Progress Note    1500 Pt admit to [facility] for Injectafer dose 1/2 ambulatory in stable condition. Assessment completed. No new concerns voiced. PIV established in the right arm with positive blood return. IV attached to NS at [facility]  /75   Pulse 79   Temp 97.5 °F (36.4 °C)   Resp 18   Breastfeeding No       Medications:  Medications Administered     ferric carboxymaltose (INJECTAFER) 750 mg in 0.9% sodium chloride 250 mL, overfill volume 25 mL IVPB     Admin Date  01/20/2021 Action  Given Dose  750 mg Rate  870 mL/hr Route  IntraVENous Administered By  Michael Meza, HOSSEIN          sodium chloride 0.9 % bolus infusion 500 mL     Admin Date  01/20/2021 Action  New Bag Dose  100 mL Route  IntraVENous Administered By  Michael Meza, RN                0594 Pt tolerated treatment well. Pt stayed 30 minutes post infusion without incidence. IV removed D/c home ambulatory in no distress. Pt aware of next appointment scheduled for 1/27/21.

## 2021-01-26 ENCOUNTER — OFFICE VISIT (OUTPATIENT)
Dept: SURGERY | Age: 36
End: 2021-01-26
Payer: COMMERCIAL

## 2021-01-26 VITALS
HEART RATE: 72 BPM | OXYGEN SATURATION: 98 % | HEIGHT: 63 IN | WEIGHT: 191 LBS | BODY MASS INDEX: 33.84 KG/M2 | RESPIRATION RATE: 20 BRPM | DIASTOLIC BLOOD PRESSURE: 81 MMHG | SYSTOLIC BLOOD PRESSURE: 123 MMHG | TEMPERATURE: 99.2 F

## 2021-01-26 DIAGNOSIS — K91.2 POSTOPERATIVE MALABSORPTION: ICD-10-CM

## 2021-01-26 DIAGNOSIS — D50.0 IRON DEFICIENCY ANEMIA DUE TO CHRONIC BLOOD LOSS: Primary | ICD-10-CM

## 2021-01-26 PROCEDURE — 99213 OFFICE O/P EST LOW 20 MIN: CPT | Performed by: SURGERY

## 2021-01-26 NOTE — PROGRESS NOTES
1. Have you been to the ER, urgent care clinic since your last visit? Hospitalized since your last visit? No    2. Have you seen or consulted any other health care providers outside of the [address] [place] [address] since your last visit? Include any pap smears or colon screening.  No

## 2021-01-27 ENCOUNTER — HOSPITAL ENCOUNTER (OUTPATIENT)
Dept: INFUSION THERAPY | Age: 36
Discharge: HOME | End: 2021-01-27
Payer: COMMERCIAL

## 2021-01-27 VITALS
DIASTOLIC BLOOD PRESSURE: 62 MMHG | RESPIRATION RATE: 18 BRPM | TEMPERATURE: 97.3 F | SYSTOLIC BLOOD PRESSURE: 146 MMHG | HEART RATE: 81 BPM

## 2021-01-27 DIAGNOSIS — D50.0 IRON DEFICIENCY ANEMIA DUE TO CHRONIC BLOOD LOSS: Primary | ICD-10-CM

## 2021-01-27 PROCEDURE — 96374 THER/PROPH/DIAG INJ IV PUSH: CPT

## 2021-01-27 PROCEDURE — 74011250636 HC RX REV CODE- 250/636: Performed by: REGISTERED NURSE

## 2021-01-27 RX ORDER — SODIUM CHLORIDE 9 MG/ML
500 INJECTION, SOLUTION INTRAVENOUS ONCE
Status: COMPLETED | OUTPATIENT
Start: 2021-01-27 | End: 2021-01-27

## 2021-01-27 RX ORDER — HEPARIN 100 UNIT/ML
300-500 SYRINGE INTRAVENOUS AS NEEDED
Status: DISCONTINUED | OUTPATIENT
Start: 2021-01-27 | End: 2021-01-28 | Stop reason: HOSPADM

## 2021-01-27 RX ORDER — SODIUM CHLORIDE 0.9 % (FLUSH) 0.9 %
10 SYRINGE (ML) INJECTION AS NEEDED
Status: DISCONTINUED | OUTPATIENT
Start: 2021-01-27 | End: 2021-01-28 | Stop reason: HOSPADM

## 2021-01-27 RX ORDER — SODIUM CHLORIDE 9 MG/ML
10 INJECTION INTRAMUSCULAR; INTRAVENOUS; SUBCUTANEOUS AS NEEDED
Status: DISCONTINUED | OUTPATIENT
Start: 2021-01-27 | End: 2021-01-28 | Stop reason: HOSPADM

## 2021-01-27 RX ADMIN — SODIUM CHLORIDE 750 MG: 900 INJECTION, SOLUTION INTRAVENOUS at 15:40

## 2021-01-27 RX ADMIN — SODIUM CHLORIDE 500 ML: 900 INJECTION, SOLUTION INTRAVENOUS at 15:40

## 2021-01-27 NOTE — PATIENT INSTRUCTIONS
Iron Deficiency Anemia: Care Instructions Your Care Instructions Anemia means that you don't have enough red blood cells. Red blood cells carry oxygen around your body. When you have anemia, it can make you pale, weak, and tired. Many things can cause anemia. The most common cause is loss of blood. This can happen if you have heavy menstrual periods. It can also happen if you have bleeding in your stomach or bowel. You can also get anemia if you don't have enough iron in your diet or if it's hard for your body to absorb iron. In some cases, pregnancy causes anemia. That's because a pregnant woman needs more iron. Your doctor may do more tests to find the cause of your anemia. If a disease or other health problem is causing it, your doctor will treat that problem. It's important to follow up with your doctor to make sure that your iron level returns to normal. 
Follow-up care is a key part of your treatment and safety. Be sure to make and go to all appointments, and call your doctor if you are having problems. It's also a good idea to know your test results and keep a list of the medicines you take. How can you care for yourself at home? · If your doctor recommended iron pills, take them as directed. ? Try to take the pills on an empty stomach. You can do this about 1 hour before or 2 hours after meals. But you may need to take iron with food to avoid an upset stomach. ? Do not take antacids or drink milk or anything with caffeine within 2 hours of when you take your iron. They can keep your body from absorbing the iron well. ? Vitamin C helps your body absorb iron. You may want to take iron pills with a glass of orange juice or some other food high in vitamin C. 
? Iron pills may cause stomach problems. These include heartburn, nausea, diarrhea, constipation, and cramps. It can help to drink plenty of fluids and include fruits, vegetables, and fiber in your diet. ? It's normal for iron pills to make your stool a greenish or grayish black. But internal bleeding can also cause dark stool. So it's important to tell your doctor about any color changes. ? Call your doctor if you think you are having a problem with your iron pills. Even after you start to feel better, it will take several months for your body to build up its supply of iron. ? If you miss a pill, don't take a double dose. ? Keep iron pills out of the reach of small children. Too much iron can be very dangerous. · Eat foods with a lot of iron. These include red meat, shellfish, poultry, and eggs. They also include beans, raisins, whole-grain bread, and leafy green vegetables. · Steam your vegetables. This is the best way to prepare them if you want to get as much iron as possible. · Be safe with medicines. Do not take nonsteroidal anti-inflammatory pain relievers unless your doctor tells you to. These include aspirin, naproxen (Aleve), and ibuprofen (Advil, Motrin). · Liquid iron can stain your teeth. But you can mix it with water or juice and drink it with a straw. Then it won't get on your teeth. When should you call for help? Call 911 anytime you think you may need emergency care. For example, call if: 
  · You passed out (lost consciousness). Call your doctor now or seek immediate medical care if: 
  · You are short of breath.  
  · You are dizzy or light-headed, or you feel like you may faint.  
  · You have new or worse bleeding. Watch closely for changes in your health, and be sure to contact your doctor if: 
  · You feel weaker or more tired than usual.  
  · You do not get better as expected. Where can you learn more? Go to http://www.gray.com/ Enter R378 in the search box to learn more about \"Iron Deficiency Anemia: Care Instructions. \" Current as of: November 8, 2019               Content Version: 12.6 © 0826-0388 [employer]. Care instructions adapted under license by [employer] (which disclaims liability or warranty for this information). If you have questions about a medical condition or this instruction, always ask your healthcare professional. [address] any warranty or liability for your use of this information.

## 2021-01-27 NOTE — PROGRESS NOTES
Subjective:      Rebekah Dow is a 38 y.o. female presents for postop care approximately 13 months following laparoscopic gastric bypass. She continues to do well from a weight loss standpoint. She was diagnosed with iron deficiency anemia late last year, with confusion, mental status changes associated with severe anemia. Head CT scan negative. All iron studies consistent with iron deficiency anemia attributed to gastric bypass superimposed on heavy menses. Since last visit, she has received iron infusion, with next iron infusion scheduled for 1/27. She denies further episodes of confusion, tachycardia, presyncope. She is interested in proceeding with conception to be followed by laparoscopic hysterectomy to definitively deal with ongoing menstrual blood loss. Objective:     Visit Vitals  /81   Pulse 72   Temp 99.2 °F (37.3 °C)   Resp 20   Ht 5' 3\" (1.6 m)   Wt 191 lb (86.6 kg)   SpO2 98%   BMI 33.83 kg/m²       General:  alert, cooperative, no distress, appears stated age   Abdomen:  Deferred   Incision:   Deferred     Assessment:     Iron deficiency anemia related to chronic, ongoing blood loss following laparoscopic gastric bypass. She continues to receive iron infusions with resolution of preinfusion symptoms. She has followed up with her gynecologist and is interested in proceeding with attempts at conception with plans for hysterectomy following pregnancy and delivery. I think this is a reasonable plan. Plan:     1. Continue current medications. 2.  Iron infusion 1/27. 3.  Continue bariatric diet. Follow-up in 3 months. 4.  Discussed our approval of her efforts at conception with later hysterectomy to manage ongoing fibroid related blood loss.

## 2021-01-27 NOTE — PROGRESS NOTES
Outpatient Infusion Center Progress Note    9060 Pt admit to [facility] for Injectafer dose 2/2 ambulatory in stable condition. Assessment completed. No new concerns voiced. No changs to her previous assessment. Peripheral IV established in the LEFT AC with positive blood return. Medications ordered from pharmacy. Visit Vitals  /76 (BP 1 Location: Right arm, BP Patient Position: Sitting)   Pulse 82   Temp 97.2 °F (36.2 °C)   Resp 18       Medications Administered     0.9% sodium chloride infusion 500 mL     Admin Date  01/27/2021 Action  New Bag Dose  500 mL Rate  25 mL/hr Route  IntraVENous Administered By  Deandre Rita          ferric carboxymaltose (INJECTAFER) 750 mg in 0.9% sodium chloride 250 mL, overfill volume 25 mL IVPB     Admin Date  01/27/2021 Action  Given Dose  750 mg Rate  870 mL/hr Route  IntraVENous Administered By  Deandre Rita                1630 Pt tolerated treatment well. Peripheral IV maintained positive blood return throughout the course of treatment and was removed at the conclsusion of therapy. D/c home ambulatory in no distress.      Viki Ewing RN

## 2021-02-16 ENCOUNTER — HOSPITAL ENCOUNTER (OUTPATIENT)
Dept: LAB | Age: 36
Discharge: HOME | End: 2021-02-16

## 2021-03-24 ENCOUNTER — PATIENT MESSAGE (OUTPATIENT)
Dept: INTERNAL MEDICINE CLINIC | Age: 36
End: 2021-03-24

## 2021-03-24 RX ORDER — FLUOXETINE HYDROCHLORIDE 20 MG/1
20 CAPSULE ORAL DAILY
Qty: 30 CAP | Refills: 5 | Status: SHIPPED | OUTPATIENT
Start: 2021-03-24 | End: 2021-03-30 | Stop reason: SDUPTHER

## 2021-03-30 ENCOUNTER — VIRTUAL VISIT (OUTPATIENT)
Dept: INTERNAL MEDICINE CLINIC | Age: 36
End: 2021-03-30
Payer: COMMERCIAL

## 2021-03-30 DIAGNOSIS — F41.9 ANXIETY: Primary | ICD-10-CM

## 2021-03-30 DIAGNOSIS — D25.9 UTERINE LEIOMYOMA, UNSPECIFIED LOCATION: ICD-10-CM

## 2021-03-30 DIAGNOSIS — F32.81 PMDD (PREMENSTRUAL DYSPHORIC DISORDER): ICD-10-CM

## 2021-03-30 PROCEDURE — 99214 OFFICE O/P EST MOD 30 MIN: CPT | Performed by: PHYSICIAN ASSISTANT

## 2021-03-30 RX ORDER — FLUOXETINE HYDROCHLORIDE 20 MG/1
20 CAPSULE ORAL DAILY
Qty: 90 CAP | Refills: 1 | Status: SHIPPED | OUTPATIENT
Start: 2021-03-30 | End: 2021-04-28 | Stop reason: SDUPTHER

## 2021-03-30 NOTE — PROGRESS NOTES
Roby Eagle is a 38 y.o. female who was seen by synchronous (real-time) audio-video technology on 3/30/2021    Consent: Roby Eagle, who was seen by synchronous (real-time) audio-video technology, and/or her healthcare decision maker, is aware that this patient-initiated, Telehealth encounter on 3/30/2021 is a billable service, with coverage as determined by her insurance carrier. She is aware that she may receive a bill and has provided verbal consent to proceed: YES    Assessment & Plan:   Diagnoses and all orders for this visit:    1. Anxiety  -   Continue  FLUoxetine (PROzac) 20 mg capsule; Take 1 Cap by mouth daily. 2. PMDD (premenstrual dysphoric disorder)  -     FLUoxetine (PROzac) 20 mg capsule; Take 1 Cap by mouth daily. When cycle pattern is established, advised pt that she can contact me for cyclical dosing if she'd like to do this. 3. Uterine leiomyoma, unspecified location  Continue routine follow ups with RE/GYN. Subjective:   Roby Eagle is a 38 y.o. female who was seen for Anxiety    After tonsillectomy, had difficulty swallowing, so stopped Prozac 40 mg. Noted that she felt better off of it - was feeling \"flat\" on 40 mg. However, after a few weeks off of Prozac 40 mg, ruminating thoughts & anxiety became troublesome again, so we restarted Prozac 20 mg. Pt feels that anxiety is improved on this dose. Needing Xanax ~3x/week now. Seeing counselor regularly. Incisional pain s/p Tonsillectomy has improved now. Seeing fertility doctor for Uterine Fibroid. Stopped OCP with Tonsillectomy and planning to TTC. May need surgery for Uterine Fibroid. Counselor has suggested cycling higher dose of Prozac ~10 days before menses since PMDD is a problem. Pt does not yet know the pattern of her cycles s/p stopping OCP, but will let me know if she wants to do this.      Health Maintenance Due   Topic Date Due    Hepatitis C Screening  Never done    COVID-19 Vaccine (1) Never done   [facility] PAP AKA CERVICAL CYTOLOGY  12/01/2017       Review of Systems   Constitutional: Negative for fever. Respiratory: Negative for cough and shortness of breath. Cardiovascular: Negative for chest pain and palpitations. Neurological: Negative for dizziness, loss of consciousness and weakness. Psychiatric/Behavioral: Negative for depression and substance abuse. The patient is nervous/anxious. Objective:       General: alert, cooperative, no distress   Mental  status: normal mood, behavior, speech, dress, motor activity, and thought processes, able to follow commands   HENT: NCAT   Neck: no visualized mass   Resp: no respiratory distress   Neuro: no gross deficits   Skin: no discoloration or lesions of concern on visible areas   Psychiatric: normal affect, consistent with stated mood, no evidence of hallucinations       We discussed the expected course, resolution and complications of the diagnosis(es) in detail. Medication risks, benefits, costs, interactions, and alternatives were discussed as indicated. I advised her to contact the office if her condition worsens, changes or fails to improve as anticipated. She expressed understanding with the diagnosis(es) and plan. Beverly Read is a 38 y.o. female who was evaluated by a video visit encounter for concerns as above. Patient identification was verified prior to start of the visit. A caregiver was present when appropriate. Due to this being a TeleHealth encounter ([facility]), evaluation of the following organ systems was limited: Vitals/Constitutional/EENT/Resp/CV/GI//MS/Neuro/Skin/Heme-Lymph-Imm.   Pursuant to the emergency declaration under the [address] [place] [address] and the [employer] and [employer] Act, this Virtual  Visit was conducted, with patient's (and/or legal guardian's) consent, to reduce the patient's risk of exposure to COVID-19 and provide necessary medical care. Services were provided through a video synchronous discussion virtually to substitute for in-person clinic visit. Patient and provider were located at their individual homes.       Alee Kebede PA-C

## 2021-03-30 NOTE — PROGRESS NOTES
1. Have you been to the ER, urgent care clinic since your last visit? Hospitalized since your last visit? No    2. Have you seen or consulted any other health care providers outside of the [address] [place] [address] since your last visit? Include any pap smears or colon screening. No   Chief Complaint   Patient presents with    Anxiety       Please send link to [email or web address]

## 2021-04-09 RX ORDER — OMEPRAZOLE 20 MG/1
CAPSULE, DELAYED RELEASE ORAL
Qty: 90 CAP | Refills: 1 | Status: SHIPPED | OUTPATIENT
Start: 2021-04-09 | End: 2021-08-24

## 2021-04-27 ENCOUNTER — OFFICE VISIT (OUTPATIENT)
Dept: SURGERY | Age: 36
End: 2021-04-27
Payer: COMMERCIAL

## 2021-04-27 VITALS
DIASTOLIC BLOOD PRESSURE: 89 MMHG | RESPIRATION RATE: 20 BRPM | BODY MASS INDEX: 34.11 KG/M2 | WEIGHT: 192.5 LBS | HEIGHT: 63 IN | OXYGEN SATURATION: 98 % | HEART RATE: 66 BPM | TEMPERATURE: 98.2 F | SYSTOLIC BLOOD PRESSURE: 138 MMHG

## 2021-04-27 DIAGNOSIS — K91.2 POSTSURGICAL MALABSORPTION: Primary | ICD-10-CM

## 2021-04-27 PROCEDURE — 99213 OFFICE O/P EST LOW 20 MIN: CPT | Performed by: SURGERY

## 2021-04-27 RX ORDER — CHOLECALCIFEROL (VITAMIN D3) 125 MCG
CAPSULE ORAL
COMMUNITY
End: 2021-04-28 | Stop reason: ALTCHOICE

## 2021-04-27 NOTE — PROGRESS NOTES
1. Have you been to the ER, urgent care clinic since your last visit? Hospitalized since your last visit? No    2. Have you seen or consulted any other health care providers outside of the [address] [place] [address] since your last visit? Include any pap smears or colon screening.  No

## 2021-04-28 ENCOUNTER — OFFICE VISIT (OUTPATIENT)
Dept: INTERNAL MEDICINE CLINIC | Age: 36
End: 2021-04-28
Payer: COMMERCIAL

## 2021-04-28 VITALS
DIASTOLIC BLOOD PRESSURE: 72 MMHG | SYSTOLIC BLOOD PRESSURE: 121 MMHG | HEART RATE: 73 BPM | HEIGHT: 63 IN | BODY MASS INDEX: 34.02 KG/M2 | WEIGHT: 192 LBS | OXYGEN SATURATION: 98 % | TEMPERATURE: 98.6 F | RESPIRATION RATE: 16 BRPM

## 2021-04-28 DIAGNOSIS — F41.9 ANXIETY: ICD-10-CM

## 2021-04-28 DIAGNOSIS — F32.81 PMDD (PREMENSTRUAL DYSPHORIC DISORDER): ICD-10-CM

## 2021-04-28 DIAGNOSIS — B37.2 CANDIDAL INTERTRIGO: Primary | ICD-10-CM

## 2021-04-28 LAB
BASOPHILS # BLD AUTO: 0 X10E3/UL (ref 0–0.2)
BASOPHILS NFR BLD AUTO: 1 %
EOSINOPHIL # BLD AUTO: 0.4 X10E3/UL (ref 0–0.4)
EOSINOPHIL NFR BLD AUTO: 5 %
ERYTHROCYTE [DISTWIDTH] IN BLOOD BY AUTOMATED COUNT: 12.8 % (ref 11.7–15.4)
FERRITIN SERPL-MCNC: 21 NG/ML (ref 15–150)
HCT VFR BLD AUTO: 38.6 % (ref 34–46.6)
HGB BLD-MCNC: 13.3 G/DL (ref 11.1–15.9)
IMM GRANULOCYTES # BLD AUTO: 0 X10E3/UL (ref 0–0.1)
IMM GRANULOCYTES NFR BLD AUTO: 0 %
IRON SATN MFR SERPL: 16 % (ref 15–55)
IRON SERPL-MCNC: 66 UG/DL (ref 27–159)
LYMPHOCYTES # BLD AUTO: 2.4 X10E3/UL (ref 0.7–3.1)
LYMPHOCYTES NFR BLD AUTO: 37 %
MCH RBC QN AUTO: 29.6 PG (ref 26.6–33)
MCHC RBC AUTO-ENTMCNC: 34.5 G/DL (ref 31.5–35.7)
MCV RBC AUTO: 86 FL (ref 79–97)
MONOCYTES # BLD AUTO: 0.5 X10E3/UL (ref 0.1–0.9)
MONOCYTES NFR BLD AUTO: 8 %
NEUTROPHILS # BLD AUTO: 3.2 X10E3/UL (ref 1.4–7)
NEUTROPHILS NFR BLD AUTO: 49 %
PLATELET # BLD AUTO: 313 X10E3/UL (ref 150–450)
RBC # BLD AUTO: 4.5 X10E6/UL (ref 3.77–5.28)
TIBC SERPL-MCNC: 413 UG/DL (ref 250–450)
UIBC SERPL-MCNC: 347 UG/DL (ref 131–425)
WBC # BLD AUTO: 6.5 X10E3/UL (ref 3.4–10.8)

## 2021-04-28 PROCEDURE — 99213 OFFICE O/P EST LOW 20 MIN: CPT | Performed by: PHYSICIAN ASSISTANT

## 2021-04-28 RX ORDER — NYSTATIN 100000 [USP'U]/G
POWDER TOPICAL 4 TIMES DAILY
Qty: 120 G | Refills: 11 | Status: SHIPPED | OUTPATIENT
Start: 2021-04-28 | End: 2021-10-19

## 2021-04-28 RX ORDER — FLUOXETINE HYDROCHLORIDE 20 MG/1
20 CAPSULE ORAL DAILY
Qty: 90 CAP | Refills: 1 | Status: SHIPPED | OUTPATIENT
Start: 2021-04-28 | End: 2022-01-11

## 2021-04-28 NOTE — PROGRESS NOTES
Chief Complaint   Patient presents with    PCOS     follow up     Cholesterol Problem    Blood Pressure Check    Anemia    Anxiety         1. Have you been to the ER, urgent care clinic since your last visit? Hospitalized since your last visit? No    2. Have you seen or consulted any other health care providers outside of the [address] [place] [address] since your last visit? Include any pap smears or colon screening.  No

## 2021-04-28 NOTE — PROGRESS NOTES
Melania Aparicio is a 38 y.o. female  Chief Complaint   Patient presents with    PCOS     follow up     Cholesterol Problem    Blood Pressure Check    Anemia    Anxiety     Visit Vitals  /72 (BP 1 Location: Left upper arm, BP Patient Position: Sitting, BP Cuff Size: Large adult)   Pulse 73   Temp 98.6 °F (37 °C) (Temporal)   Resp 16   Ht 5' 3\" (1.6 m)   Wt 192 lb (87.1 kg)   SpO2 98%   BMI 34.01 kg/m²      Health Maintenance Due   Topic Date Due    Hepatitis C Screening  Never done    PAP AKA CERVICAL CYTOLOGY  12/01/2017       HPI    Anx/PMDD - Started Prozac 20 mg at Virtual visit 3/30/21. Taking this daily and doing well. S/P excellent weight loss with bariatric surgery, has been getting rashes under skin folds of abdomen - using Gold Bond powder OTC helps somewhat, but is not entirely resolving the issue. ROS  Review of Systems   Constitutional: Negative for fever. Respiratory: Negative for shortness of breath. Cardiovascular: Negative for chest pain and palpitations. Skin: Positive for itching and rash. Neurological: Negative for dizziness, loss of consciousness and weakness. EXAM  Physical Exam  Vitals signs and nursing note reviewed. Constitutional:       General: She is not in acute distress. Appearance: She is well-developed. HENT:      Head: Normocephalic and atraumatic. Neck:      Musculoskeletal: Neck supple. Vascular: No JVD. Cardiovascular:      Rate and Rhythm: Normal rate and regular rhythm. Heart sounds: Normal heart sounds. Pulmonary:      Effort: Pulmonary effort is normal. No respiratory distress. Breath sounds: Normal breath sounds. Skin:     General: Skin is warm and dry. Comments: Under skin folds of abdomen: red, flat, lacy rash. Neurological:      Mental Status: She is alert and oriented to person, place, and time.    Psychiatric:         Mood and Affect: Mood normal.         Behavior: Behavior normal.         Thought Content: Thought content normal.         Judgment: Judgment normal.       ASSESSMENT/PLAN  Encounter Diagnoses     ICD-10-CM ICD-9-CM   1. Candidal intertrigo  B37.2 112.3   2. Anxiety  F41.9 300.00   3. PMDD (premenstrual dysphoric disorder)  F32.81 625.4     Orders Placed This Encounter    FLUoxetine (PROzac) 20 mg capsule    nystatin (MYCOSTATIN) powder   Anxiety and PMDD well controlled on Prozac 20 mg daily. Congratulated pt on weight loss success and encouraged continued efforts.

## 2021-05-04 PROBLEM — K91.2 POSTSURGICAL MALABSORPTION: Status: ACTIVE | Noted: 2021-05-04

## 2021-05-04 NOTE — PATIENT INSTRUCTIONS
Laparoscopic Hysterectomy: Before Your Surgery What is a laparoscopic hysterectomy? A hysterectomy is surgery to take out the uterus. In some cases, the ovaries and fallopian tubes also are taken out at the same time. The doctor makes one or more small cuts in the belly. These cuts are called incisions. They let the doctor insert tools to do the surgery. One of these tools is a tube with a light on it. It's called a laparoscope, or scope. The scope and the other tools allow the doctor to free the uterus. The doctor then removes the uterus through the small cuts. In a total hysterectomy, the doctor takes out the uterus and the cervix. In a supracervical hysterectomy, only the uterus is taken out. Most women go home in 1 to 2 days. You may need about 4 to 6 weeks to fully recover. After the surgery, you will not have periods. You will not be able to get pregnant. If there is a chance that you will want to have a baby, talk to your doctor about other treatment options. Your doctor may advise you to take hormone pills if your ovaries are removed. Your doctor will talk to you about the risks and benefits of hormones. He or she will also tell you how long to take them. This surgery probably won't lower your interest in sex. In fact, some women enjoy sex more. This may be because they no longer have to worry about birth control or heavy bleeding. Follow-up care is a key part of your treatment and safety. Be sure to make and go to all appointments, and call your doctor if you are having problems. It's also a good idea to know your test results and keep a list of the medicines you take. How do you prepare for surgery? Surgery can be stressful. This information will help you understand what you can expect. And it will help you safely prepare for surgery. Preparing for surgery 
  · Be sure you have someone to take you home.  Anesthesia and pain medicine will make it unsafe for you to drive or get home on your own.  
  · Understand exactly what surgery is planned, along with the risks, benefits, and other options.  
  · If you take aspirin or some other blood thinner, ask your doctor if you should stop taking it before your surgery. Make sure that you understand exactly what your doctor wants you to do. These medicines increase the risk of bleeding.  
  · Tell your doctor ALL the medicines, vitamins, supplements, and herbal remedies you take. Some may increase the risk of problems during your surgery. Your doctor will tell you if you should stop taking any of them before the surgery and how soon to do it.  
  · Make sure your doctor and the hospital have a copy of your advance directive. If you don't have one, you may want to prepare one. It lets others know your health care wishes. It's a good thing to have before any type of surgery or procedure. What happens on the day of surgery? · Follow the instructions exactly about when to stop eating and drinking. If you don't, your surgery may be canceled. If your doctor told you to take your medicines on the day of surgery, take them with only a sip of water.  
  · Take a bath or shower before you come in for your surgery. Do not apply lotions, perfumes, deodorants, or nail polish.  
  · Do not shave the surgical site yourself.  
  · Take off all jewelry and piercings. And take out contact lenses, if you wear them. At the hospital or surgery center · Bring a picture ID.  
  · The area for surgery is often marked to make sure there are no errors.  
  · You will be kept comfortable and safe by your anesthesia provider. You will be asleep during the surgery.  
  · The surgery will take about 2 to 4 hours. When should you call your doctor?  
 · You have questions or concerns.  
  · You don't understand how to prepare for your surgery.  
  · You become ill before the surgery (such as fever, flu, or a cold).  
  · You need to reschedule or have changed your mind about having the surgery. Where can you learn more? Go to http://www.gray.com/ Enter D130 in the search box to learn more about \"Laparoscopic Hysterectomy: Before Your Surgery. \" Current as of: July 17, 2020               Content Version: 12.8 © 7324-4322 [employer]. Care instructions adapted under license by [employer] (which disclaims liability or warranty for this information). If you have questions about a medical condition or this instruction, always ask your healthcare professional. [address] any warranty or liability for your use of this information.

## 2021-05-04 NOTE — PROGRESS NOTES
Subjective:      Jonnathan Rosas is a 38 y.o. female presents for postop care 16 months following laparoscopic gastric bypass; her postoperative course was complicated by iron deficiency anemia, managed through iron infusions. Appetite is good. Eating a regular diet without difficulty. She is exercising regularly; no further mental status changes or confusion. She is compliant with vitamins and high-protein diet. Objective:     Visit Vitals  /89   Pulse 66   Temp 98.2 °F (36.8 °C)   Resp 20   Ht 5' 3\" (1.6 m)   Wt 192 lb 8 oz (87.3 kg)   LMP 04/24/2021   SpO2 98%   BMI 34.10 kg/m²       General:  alert, cooperative, no distress, appears stated age, mildly obese   Abdomen:  Deferred   Incision:   Deferred     Assessment:     Doing well following laparoscopic gastric bypass. Hemoglobin, iron indices have normalized following iron transfusion. She is now interested in conception, delivery with future plans for hysterectomy to avoid future anemia. I consider this a reasonable plan. Plan:     1. Continue current medications. 2.  Bariatric diet, exercise as tolerated. 3.  Follow-up in 6 months.

## 2021-05-11 ENCOUNTER — OFFICE VISIT (OUTPATIENT)
Dept: ONCOLOGY | Age: 36
End: 2021-05-11
Payer: COMMERCIAL

## 2021-05-11 VITALS
RESPIRATION RATE: 18 BRPM | TEMPERATURE: 98.7 F | OXYGEN SATURATION: 98 % | WEIGHT: 193 LBS | HEART RATE: 79 BPM | BODY MASS INDEX: 34.19 KG/M2 | DIASTOLIC BLOOD PRESSURE: 71 MMHG | SYSTOLIC BLOOD PRESSURE: 114 MMHG

## 2021-05-11 DIAGNOSIS — D50.0 IRON DEFICIENCY ANEMIA DUE TO CHRONIC BLOOD LOSS: Primary | ICD-10-CM

## 2021-05-11 PROCEDURE — 99213 OFFICE O/P EST LOW 20 MIN: CPT | Performed by: INTERNAL MEDICINE

## 2021-05-11 RX ORDER — ERGOCALCIFEROL 1.25 MG/1
50000 CAPSULE ORAL
COMMUNITY
End: 2021-08-24

## 2021-05-11 NOTE — PROGRESS NOTES
Randal Sacks is a 38 y.o. female    Chief Complaint   Patient presents with    Follow-up      ANEMIA       1. Have you been to the ER, urgent care clinic since your last visit? Hospitalized since your last visit? No    2. Have you seen or consulted any other health care providers outside of the [address] [place] [address] since your last visit? Include any pap smears or colon screening. Yes, MD [facility]- Gyno/oncologist    Patient states he has had both doses of the covid vaccine

## 2021-05-11 NOTE — PROGRESS NOTES
[facility] [place] [address] [address], [address]  W: [phone]  F: [phone]    Reason for Visit:   Greta Marcial is a 38 y.o. female who is seen for follow up of anemia    History of Present Illness:   Patient is a 38 y.o. female who is s/p gastric bypass who is seen for anemia. She has had microcytic anemia since 2020  She was previously normal.   She has not been on regular B12 or iron supplements. Does take a multivitamin  She has menorrhagia for months. Work up showed iron deficiency. Received injectafer, started B12 po and seen for follow up    She has more energy, is not SOB, she has no bleeding. Her pica has resolved . Still has menorrhagia and has fibroid surgery in . No FH of anemia    She is an optometrist    Past Medical History:   Diagnosis Date    Adverse effect of anesthesia     NEVER HAD ANESTHESIA    Anxiety     Arthritis     Back pain     H/O hemorrhoids     Hives     as a child - had 1 year of steroids.  Hypertension     Ill-defined condition     PCOS    Joint pain     Morbid obesity ([address] 2019    Gastric Bypass     PCOS (polycystic ovarian syndrome)     Psychiatric disorder     ANXIETY      Past Surgical History:   Procedure Laterality Date    HX GI      RECTAL POLYP    HX HEENT      WISDOM TEETH REMOVED    HX LAP GASTRIC BYPASS  2019    [facility]essa [facility]     HX OTHER SURGICAL      rectal polyp at 9 yo    HX OTHER SURGICAL      Sigmoidoscopy - WNL pp ~    HX WISDOM TEETH EXTRACTION        Social History     Tobacco Use    Smoking status: Former Smoker     Packs/day: 0.25     Years: 15.00     Pack years: 3.75     Types: Cigarettes     Quit date: 2018     Years since quittin.7    Smokeless tobacco: Never Used   Substance Use Topics    Alcohol use:  Yes     Alcohol/week: 1.0 standard drinks     Types: 1 Shots of liquor per week     Frequency: 2-4 times a month     Drinks per session: 1 or 2      Family History   Problem Relation Age of Onset    Diabetes Father     Heart Disease Father         cardiomyopathy, heart transplant    Hypertension Father     Heart Surgery Father     High Cholesterol Father     Diabetes Mother     High Cholesterol Mother     Hypertension Mother     Diabetes Maternal Grandmother     Diabetes Maternal Grandfather     Diabetes Paternal Grandmother     No Known Problems Sister     Anesth Problems Neg Hx      Current Outpatient Medications   Medication Sig    FLUoxetine (PROzac) 20 mg capsule Take 1 Cap by mouth daily.  nystatin (MYCOSTATIN) powder Apply  to affected area four (4) times daily.  omeprazole (PRILOSEC) 20 mg capsule TAKE 1 CAPSULE BY MOUTH EVERY DAY    ALPRAZolam (Xanax) 0.25 mg tablet Take 1 Tab by mouth two (2) times daily as needed for Anxiety. Max Daily Amount: 0.5 mg.    calcium carbonate (OS-PEBBLES) 500 mg calcium (1,250 mg) tablet Take  by mouth daily.  multivitamin (ONE A DAY) tablet Take 2 Tabs by mouth daily. Bariatric Advantage     No current facility-administered medications for this visit. No Known Allergies     Review of Systems: A complete review of systems was obtained, negative except as described above. Physical Exam:     Vitals reviewed    General appearance - alert, well appearing, and in no distress  Mental status - oriented to person, place, and time  Mouth - mucous membranes moist, pharynx normal without lesions  Skin - normal coloration and turgor, no new rashes, no suspicious skin lesions noted    ECOG PS: 0             Results:     Lab Results   Component Value Date/Time    WBC 6.5 04/27/2021 09:44 AM    HGB 13.3 04/27/2021 09:44 AM    HCT 38.6 04/27/2021 09:44 AM    PLATELET 184 68/61/4461 09:44 AM    MCV 86 04/27/2021 09:44 AM    ABS.  NEUTROPHILS 3.2 04/27/2021 09:44 AM     Lab Results   Component Value Date/Time    Sodium 136 12/27/2020 02:22 PM    Potassium 3.6 12/27/2020 02:22 PM Chloride 107 12/27/2020 02:22 PM    CO2 24 12/27/2020 02:22 PM    Glucose 152 (H) 12/27/2020 02:22 PM    BUN 10 12/27/2020 02:22 PM    Creatinine 0.70 12/27/2020 02:22 PM    GFR est AA >60 12/27/2020 02:22 PM    GFR est non-AA >60 12/27/2020 02:22 PM    Calcium 8.7 12/27/2020 02:22 PM     Lab Results   Component Value Date/Time    Bilirubin, total 0.3 12/27/2020 02:22 PM    ALT (SGPT) 14 12/27/2020 02:22 PM    Alk. phosphatase 82 12/27/2020 02:22 PM    Protein, total 7.9 12/27/2020 02:22 PM    Albumin 3.5 12/27/2020 02:22 PM    Globulin 4.4 (H) 12/27/2020 02:22 PM     Lab Results   Component Value Date/Time    Reticulocyte count 1.9 11/24/2020 12:58 PM    Iron % saturation 16 04/27/2021 09:44 AM    TIBC 413 04/27/2021 09:44 AM    Ferritin 21 04/27/2021 09:44 AM    Vitamin B12 950 11/24/2020 12:58 PM    TSH 1.28 12/27/2020 02:22 PM    HIV SCREEN 4TH GENERATION WRFX Non Reactive 06/29/2016 09:13 AM     No results found for: INR, APTT, DDIMSQ, DDIME, [id number], [facility], [address] [place] [address] Rd, [id number], [facility], [address] [facility] [address], [address], [id number], [id number], [id number], [id number], [id number], [id number], [facility] [employer] [facility]    Records reviewed and summarized above. Pathology report(s) reviewed above. Radiology report(s) reviewed above.     USG abdomen 11/2019  IMPRESSION  IMPRESSION:      Heterogeneous increased liver echotexture may be due to hepatic steatosis with  small focal central fatty sparing.     The remainder of the exam is unremarkable   Assessment:   1) Microcytic anemia    New since 6/2020  Secondary to blood loss from menorrhagia and poor absorption s/p gastric bypass  Resolved as of 4/2021 on lab review following Injecatfer x 2      2) SOB  Secondary to anemia- resolved    3) s/p Gastric bypass  Counseled to take b12- 1000 mcg daily po- continue    4) Thrombocytosis  Reactive to 1- resolved      Plan:       · B12 po as above  · RTC 6 months with cbc, iron profile and ferritin- LABS EVERY 3 MONTHS I appreciate the opportunity to participate in Ms. Sarita Florence's care.     Signed By: Bay Santos MD

## 2021-05-21 DIAGNOSIS — F41.9 ANXIETY: ICD-10-CM

## 2021-05-21 RX ORDER — ALPRAZOLAM 0.25 MG/1
0.25 TABLET ORAL
Qty: 30 TABLET | Refills: 1 | Status: SHIPPED | OUTPATIENT
Start: 2021-05-21 | End: 2022-09-29

## 2021-06-14 ENCOUNTER — TELEPHONE (OUTPATIENT)
Dept: ONCOLOGY | Age: 36
End: 2021-06-14

## 2021-06-15 NOTE — TELEPHONE ENCOUNTER
6/15/21 210pm - Called patient, ID verified x2. This RN informed patient that she has outstanding lab orders to be drawn prior to her appointment and since she believes her hgb has dropped this RN informed patient to go ahead and get these labs drawn and this office will assess the results and call her with any updates to her plan of care. This RN faxed lab orders to [facility] [phone]. patient had no other questions or concerns regarding this information.

## 2021-06-17 LAB
BASOPHILS # BLD AUTO: 0.1 X10E3/UL (ref 0–0.2)
BASOPHILS NFR BLD AUTO: 1 %
EOSINOPHIL # BLD AUTO: 0.3 X10E3/UL (ref 0–0.4)
EOSINOPHIL NFR BLD AUTO: 4 %
ERYTHROCYTE [DISTWIDTH] IN BLOOD BY AUTOMATED COUNT: 12.6 % (ref 11.7–15.4)
FERRITIN SERPL-MCNC: 63 NG/ML (ref 15–150)
HCT VFR BLD AUTO: 34.7 % (ref 34–46.6)
HGB BLD-MCNC: 11 G/DL (ref 11.1–15.9)
IMM GRANULOCYTES # BLD AUTO: 0 X10E3/UL (ref 0–0.1)
IMM GRANULOCYTES NFR BLD AUTO: 0 %
IRON SATN MFR SERPL: 6 % (ref 15–55)
IRON SERPL-MCNC: 24 UG/DL (ref 27–159)
LYMPHOCYTES # BLD AUTO: 2 X10E3/UL (ref 0.7–3.1)
LYMPHOCYTES NFR BLD AUTO: 25 %
MCH RBC QN AUTO: 27.8 PG (ref 26.6–33)
MCHC RBC AUTO-ENTMCNC: 31.7 G/DL (ref 31.5–35.7)
MCV RBC AUTO: 88 FL (ref 79–97)
MONOCYTES # BLD AUTO: 0.7 X10E3/UL (ref 0.1–0.9)
MONOCYTES NFR BLD AUTO: 9 %
NEUTROPHILS # BLD AUTO: 4.8 X10E3/UL (ref 1.4–7)
NEUTROPHILS NFR BLD AUTO: 61 %
PLATELET # BLD AUTO: 469 X10E3/UL (ref 150–450)
RBC # BLD AUTO: 3.96 X10E6/UL (ref 3.77–5.28)
TIBC SERPL-MCNC: 415 UG/DL (ref 250–450)
UIBC SERPL-MCNC: 391 UG/DL (ref 131–425)
VIT B12 SERPL-MCNC: >2000 PG/ML (ref 232–1245)
WBC # BLD AUTO: 7.7 X10E3/UL (ref 3.4–10.8)

## 2021-06-18 ENCOUNTER — PATIENT MESSAGE (OUTPATIENT)
Dept: ONCOLOGY | Age: 36
End: 2021-06-18

## 2021-06-18 NOTE — TELEPHONE ENCOUNTER
6/18/21 0900 - Called patient, ID verified x2. This RN informed patient that her lab results came back and her hgb is 11 but her iron level is low. This RN asked patient where she had her surgery done so this office could request records to see how much iron she had received. Patient informed this RN that she had her procedure at [facility] with Deandra Hardy. This RN will fax a records request. Patient also stated that she is starting to feel a little symptomatic, she is having SOB with activity and at rest with mask on and has a headache. This RN informed patient that she will relay this information to the NP and informed patient that if her SOB becomes worse she needs to go to the ED. Patient understood and stated she will try to retrieve her records as well and send them over via my chart. This RN informed patient that she will call her with any updates to her plan of care. Patient was grateful of call.

## 2021-06-21 ENCOUNTER — TELEPHONE (OUTPATIENT)
Dept: ONCOLOGY | Age: 36
End: 2021-06-21

## 2021-08-05 ENCOUNTER — OFFICE VISIT (OUTPATIENT)
Dept: ONCOLOGY | Age: 36
End: 2021-08-05
Payer: COMMERCIAL

## 2021-08-05 VITALS
BODY MASS INDEX: 34.01 KG/M2 | WEIGHT: 192 LBS | TEMPERATURE: 98.4 F | RESPIRATION RATE: 18 BRPM | OXYGEN SATURATION: 98 % | SYSTOLIC BLOOD PRESSURE: 123 MMHG | DIASTOLIC BLOOD PRESSURE: 77 MMHG | HEART RATE: 74 BPM

## 2021-08-05 DIAGNOSIS — D50.0 IRON DEFICIENCY ANEMIA DUE TO CHRONIC BLOOD LOSS: Primary | ICD-10-CM

## 2021-08-05 PROCEDURE — 99213 OFFICE O/P EST LOW 20 MIN: CPT | Performed by: INTERNAL MEDICINE

## 2021-08-05 RX ORDER — EPINEPHRINE 1 MG/ML
0.3 INJECTION, SOLUTION, CONCENTRATE INTRAVENOUS AS NEEDED
Status: CANCELLED | OUTPATIENT
Start: 2021-08-18

## 2021-08-05 RX ORDER — DIPHENHYDRAMINE HYDROCHLORIDE 50 MG/ML
25 INJECTION, SOLUTION INTRAMUSCULAR; INTRAVENOUS AS NEEDED
Status: CANCELLED
Start: 2021-08-18

## 2021-08-05 RX ORDER — DIPHENHYDRAMINE HYDROCHLORIDE 50 MG/ML
50 INJECTION, SOLUTION INTRAMUSCULAR; INTRAVENOUS AS NEEDED
Status: CANCELLED
Start: 2021-08-18

## 2021-08-05 RX ORDER — ACETAMINOPHEN 325 MG/1
650 TABLET ORAL AS NEEDED
Status: CANCELLED
Start: 2021-08-18

## 2021-08-05 RX ORDER — HYDROCORTISONE SODIUM SUCCINATE 100 MG/2ML
100 INJECTION, POWDER, FOR SOLUTION INTRAMUSCULAR; INTRAVENOUS AS NEEDED
Status: CANCELLED | OUTPATIENT
Start: 2021-08-18

## 2021-08-05 RX ORDER — ACETAMINOPHEN 325 MG/1
650 TABLET ORAL AS NEEDED
Status: CANCELLED
Start: 2021-08-11

## 2021-08-05 RX ORDER — EPINEPHRINE 1 MG/ML
0.3 INJECTION, SOLUTION, CONCENTRATE INTRAVENOUS AS NEEDED
Status: CANCELLED | OUTPATIENT
Start: 2021-08-11

## 2021-08-05 RX ORDER — HYDROCORTISONE SODIUM SUCCINATE 100 MG/2ML
100 INJECTION, POWDER, FOR SOLUTION INTRAMUSCULAR; INTRAVENOUS AS NEEDED
Status: CANCELLED | OUTPATIENT
Start: 2021-08-11

## 2021-08-05 RX ORDER — DIPHENHYDRAMINE HYDROCHLORIDE 50 MG/ML
25 INJECTION, SOLUTION INTRAMUSCULAR; INTRAVENOUS AS NEEDED
Status: CANCELLED
Start: 2021-08-11

## 2021-08-05 RX ORDER — ONDANSETRON 2 MG/ML
8 INJECTION INTRAMUSCULAR; INTRAVENOUS AS NEEDED
Status: CANCELLED | OUTPATIENT
Start: 2021-08-11

## 2021-08-05 RX ORDER — HEPARIN 100 UNIT/ML
300-500 SYRINGE INTRAVENOUS AS NEEDED
Status: CANCELLED
Start: 2021-08-18

## 2021-08-05 RX ORDER — SODIUM CHLORIDE 9 MG/ML
25 INJECTION, SOLUTION INTRAVENOUS CONTINUOUS
Status: CANCELLED | OUTPATIENT
Start: 2021-08-18

## 2021-08-05 RX ORDER — SODIUM CHLORIDE 9 MG/ML
10 INJECTION INTRAMUSCULAR; INTRAVENOUS; SUBCUTANEOUS AS NEEDED
Status: CANCELLED | OUTPATIENT
Start: 2021-08-11

## 2021-08-05 RX ORDER — SODIUM CHLORIDE 0.9 % (FLUSH) 0.9 %
10 SYRINGE (ML) INJECTION AS NEEDED
Status: CANCELLED | OUTPATIENT
Start: 2021-08-11

## 2021-08-05 RX ORDER — ALBUTEROL SULFATE 0.83 MG/ML
2.5 SOLUTION RESPIRATORY (INHALATION) AS NEEDED
Status: CANCELLED
Start: 2021-08-18

## 2021-08-05 RX ORDER — HEPARIN 100 UNIT/ML
300-500 SYRINGE INTRAVENOUS AS NEEDED
Status: CANCELLED
Start: 2021-08-11

## 2021-08-05 RX ORDER — ONDANSETRON 2 MG/ML
8 INJECTION INTRAMUSCULAR; INTRAVENOUS AS NEEDED
Status: CANCELLED | OUTPATIENT
Start: 2021-08-18

## 2021-08-05 RX ORDER — DIPHENHYDRAMINE HYDROCHLORIDE 50 MG/ML
50 INJECTION, SOLUTION INTRAMUSCULAR; INTRAVENOUS AS NEEDED
Status: CANCELLED
Start: 2021-08-11

## 2021-08-05 RX ORDER — SODIUM CHLORIDE 0.9 % (FLUSH) 0.9 %
10 SYRINGE (ML) INJECTION AS NEEDED
Status: CANCELLED | OUTPATIENT
Start: 2021-08-18

## 2021-08-05 RX ORDER — ALBUTEROL SULFATE 0.83 MG/ML
2.5 SOLUTION RESPIRATORY (INHALATION) AS NEEDED
Status: CANCELLED
Start: 2021-08-11

## 2021-08-05 RX ORDER — SODIUM CHLORIDE 9 MG/ML
25 INJECTION, SOLUTION INTRAVENOUS CONTINUOUS
Status: CANCELLED | OUTPATIENT
Start: 2021-08-11

## 2021-08-05 RX ORDER — SODIUM CHLORIDE 9 MG/ML
10 INJECTION INTRAMUSCULAR; INTRAVENOUS; SUBCUTANEOUS AS NEEDED
Status: CANCELLED | OUTPATIENT
Start: 2021-08-18

## 2021-08-05 NOTE — PROGRESS NOTES
Jaret Leong is a 39 y.o. female    Chief Complaint   Patient presents with    Follow-up     anemia       1. Have you been to the ER, urgent care clinic since your last visit? Hospitalized since your last visit? No    2. Have you seen or consulted any other health care providers outside of the [address] [place] [address] since your last visit? Include any pap smears or colon screening.  Yes, MD Libia Flores

## 2021-08-05 NOTE — PROGRESS NOTES
[facility] [place] [facility] [place] [address] [address], [address]  W: [phone]  F: [phone]    Reason for Visit:   Jessie Gorman is a 39 y.o. female who is seen for follow up of anemia    History of Present Illness:   Patient is a 39 y.o. female who is s/p gastric bypass who is seen for anemia. She has had microcytic anemia since June 2020  She was previously normal.   She has not been on regular B12 or iron supplements. Does take a multivitamin  She has menorrhagia for months. Work up showed iron deficiency. Received injectafer, started B12 po. Anemia resolved. Comes with labs. She states that menorrhagia is improved. Energy is good. Not craving ice. She has more energy, is not SOB, she has no bleeding. Her pica has resolved . Nails are brittle. Had fibroids removed in June       No FH of anemia    She is an optometrist    Past Medical History:   Diagnosis Date    Adverse effect of anesthesia     NEVER HAD ANESTHESIA    Anxiety     Arthritis     Back pain     H/O hemorrhoids     Hives     as a child - had 1 year of steroids.  Hypertension     Ill-defined condition     PCOS    Joint pain     Morbid obesity ([address] 11/20/2019    Gastric Bypass 2019    PCOS (polycystic ovarian syndrome)     Psychiatric disorder     ANXIETY      Past Surgical History:   Procedure Laterality Date    HX GI  1989    RECTAL POLYP    HX HEENT      WISDOM TEETH REMOVED    HX LAP GASTRIC BYPASS  11/20/2019    [facility]han [facility]     HX OTHER SURGICAL      rectal polyp at 9 yo    HX OTHER SURGICAL      Sigmoidoscopy - WNL pp ~2013    HX WISDOM TEETH EXTRACTION        Social History     Tobacco Use    Smoking status: Former Smoker     Packs/day: 0.25     Years: 15.00     Pack years: 3.75     Types: Cigarettes     Quit date: 8/1/2018     Years since quitting: 3.0    Smokeless tobacco: Never Used   Substance Use Topics    Alcohol use:  Yes     Alcohol/week: 1.0 standard drinks Types: 1 Shots of liquor per week      Family History   Problem Relation Age of Onset    Diabetes Father     Heart Disease Father         cardiomyopathy, heart transplant    Hypertension Father     Heart Surgery Father     High Cholesterol Father     Diabetes Mother     High Cholesterol Mother     Hypertension Mother     Diabetes Maternal Grandmother     Diabetes Maternal Grandfather     Diabetes Paternal Grandmother     No Known Problems Sister     Anesth Problems Neg Hx      Current Outpatient Medications   Medication Sig    ALPRAZolam (XANAX) 0.25 mg tablet TAKE 1 TAB BY MOUTH TWO (2) TIMES DAILY AS NEEDED FOR ANXIETY. MAX DAILY AMOUNT: 0.5 MG.  ergocalciferol (Vitamin D2) 1,250 mcg (50,000 unit) capsule Take 50,000 Units by mouth.  FLUoxetine (PROzac) 20 mg capsule Take 1 Cap by mouth daily.  nystatin (MYCOSTATIN) powder Apply  to affected area four (4) times daily.  calcium carbonate (OS-PEBBLES) 500 mg calcium (1,250 mg) tablet Take  by mouth daily.  multivitamin (ONE A DAY) tablet Take 2 Tabs by mouth daily. Bariatric Advantage    omeprazole (PRILOSEC) 20 mg capsule TAKE 1 CAPSULE BY MOUTH EVERY DAY (Patient not taking: Reported on 8/5/2021)     No current facility-administered medications for this visit. No Known Allergies     Review of Systems: A complete review of systems was obtained, negative except as described above.     Physical Exam:     Vitals reviewed    General appearance - alert, well appearing, and in no distress  Mental status - oriented to person, place, and time  Mouth - mucous membranes moist, pharynx normal without lesions  Skin - normal coloration and turgor, no new rashes, no suspicious skin lesions noted    ECOG PS: 0             Results:     Lab Results   Component Value Date/Time    WBC 7.7 06/16/2021 10:53 AM    HGB 11.0 (L) 06/16/2021 10:53 AM    HCT 34.7 06/16/2021 10:53 AM    PLATELET 279 (H) 63/26/1292 10:53 AM    MCV 88 06/16/2021 10:53 AM ABS. NEUTROPHILS 4.8 06/16/2021 10:53 AM     Lab Results   Component Value Date/Time    Sodium 136 12/27/2020 02:22 PM    Potassium 3.6 12/27/2020 02:22 PM    Chloride 107 12/27/2020 02:22 PM    CO2 24 12/27/2020 02:22 PM    Glucose 152 (H) 12/27/2020 02:22 PM    BUN 10 12/27/2020 02:22 PM    Creatinine 0.70 12/27/2020 02:22 PM    GFR est AA >60 12/27/2020 02:22 PM    GFR est non-AA >60 12/27/2020 02:22 PM    Calcium 8.7 12/27/2020 02:22 PM     Lab Results   Component Value Date/Time    Bilirubin, total 0.3 12/27/2020 02:22 PM    ALT (SGPT) 14 12/27/2020 02:22 PM    Alk. phosphatase 82 12/27/2020 02:22 PM    Protein, total 7.9 12/27/2020 02:22 PM    Albumin 3.5 12/27/2020 02:22 PM    Globulin 4.4 (H) 12/27/2020 02:22 PM     Lab Results   Component Value Date/Time    Reticulocyte count 1.9 11/24/2020 12:58 PM    Iron % saturation 6 (LL) 06/16/2021 10:53 AM    TIBC 415 06/16/2021 10:53 AM    Ferritin 63 06/16/2021 10:53 AM    Vitamin B12 >2000 (H) 06/16/2021 10:53 AM    TSH 1.28 12/27/2020 02:22 PM    HIV SCREEN 4TH GENERATION WRFX Non Reactive 06/29/2016 09:13 AM     No results found for: INR, APTT, DDIMSQ, DDIME, [id number], Bernard Donna, FDPLT, [place]to [place], [id number], [id number], Sonido Rule, [address] [facility] [address], [facility], [id number], [id number], [id number], [id number], [id number], [id number], Audrea Petite    Records reviewed and summarized above. Pathology report(s) reviewed above. Radiology report(s) reviewed above.     USG abdomen 11/2019  IMPRESSION  IMPRESSION:      Heterogeneous increased liver echotexture may be due to hepatic steatosis with  small focal central fatty sparing.     The remainder of the exam is unremarkable   Assessment:   1) Microcytic anemia      Secondary to blood loss from menorrhagia and poor absorption s/p gastric bypass  Resolved as of 4/2021 on lab review following Injecatfer x 2  Recurred 7/2021 with iron deficiency  Will administer Injectafer again       2) SOB  Secondary to anemia- resolved    3) s/p Gastric bypass  Counseled to take b12- 1000 mcg daily po- continue    4) Thrombocytosis  Reactive to 1- resolved      Plan:       · B12 po as above  · Injectafer x 2  · RTC 6 months with cbc, iron profile and ferritin-    I appreciate the opportunity to participate in Ms. Etienne Florence's care.     Signed By: King Marcie MD

## 2021-08-17 ENCOUNTER — HOSPITAL ENCOUNTER (OUTPATIENT)
Dept: INFUSION THERAPY | Age: 36
Discharge: HOME | End: 2021-08-17
Payer: COMMERCIAL

## 2021-08-17 VITALS
HEART RATE: 90 BPM | DIASTOLIC BLOOD PRESSURE: 71 MMHG | TEMPERATURE: 97.8 F | RESPIRATION RATE: 18 BRPM | SYSTOLIC BLOOD PRESSURE: 116 MMHG

## 2021-08-17 DIAGNOSIS — D50.0 IRON DEFICIENCY ANEMIA DUE TO CHRONIC BLOOD LOSS: Primary | ICD-10-CM

## 2021-08-17 PROCEDURE — 74011250636 HC RX REV CODE- 250/636: Performed by: REGISTERED NURSE

## 2021-08-17 PROCEDURE — 96365 THER/PROPH/DIAG IV INF INIT: CPT

## 2021-08-17 RX ORDER — SODIUM CHLORIDE 9 MG/ML
25 INJECTION, SOLUTION INTRAVENOUS CONTINUOUS
Status: DISCONTINUED | OUTPATIENT
Start: 2021-08-17 | End: 2021-08-18 | Stop reason: HOSPADM

## 2021-08-17 RX ADMIN — FERRIC CARBOXYMALTOSE INJECTION 750 MG: 50 INJECTION, SOLUTION INTRAVENOUS at 17:32

## 2021-08-17 RX ADMIN — SODIUM CHLORIDE 25 ML/HR: 900 INJECTION, SOLUTION INTRAVENOUS at 17:05

## 2021-08-17 NOTE — PROGRESS NOTES
Outpatient Infusion Center Progress Note    5773 Pt admit to [facility] for Dose 1 of 2 Injectafer ambulatory in stable condition. Piv established with good blood return in left AC, Assessment completed. No new concerns voiced. Patient denies Covid contact, denies sob, fever, cough and feeling unwell    Visit Vitals  /71 (BP 1 Location: Left upper arm)   Pulse 78   Temp 97.8 °F (36.6 °C)   Resp 18   Breastfeeding No       Medications:  Medications Administered     0.9% sodium chloride infusion     Admin Date  08/17/2021 Action  New Bag Dose  25 mL/hr Rate  25 mL/hr Route  IntraVENous Administered By  Jaspal Obregon RN          ferric carboxymaltose (INJECTAFER) 750 mg in 0.9% sodium chloride 250 mL, overfill volume 25 mL IVPB     Admin Date  08/17/2021 Action  New Bag Dose  750 mg Rate  870 mL/hr Route  IntraVENous Administered By  Jaspal Obregon, HOSSEIN                5367 Pt tolerated treatment well. D/c home ambulatory in no distress. Pt aware of next appointment scheduled for 8/24/21.

## 2021-08-19 RX ORDER — ACETAMINOPHEN 325 MG/1
650 TABLET ORAL AS NEEDED
Status: CANCELLED
Start: 2021-09-15

## 2021-08-19 RX ORDER — SODIUM CHLORIDE 0.9 % (FLUSH) 0.9 %
10 SYRINGE (ML) INJECTION AS NEEDED
Status: CANCELLED | OUTPATIENT
Start: 2021-08-24

## 2021-08-19 RX ORDER — SODIUM CHLORIDE 9 MG/ML
25 INJECTION, SOLUTION INTRAVENOUS CONTINUOUS
Status: CANCELLED | OUTPATIENT
Start: 2021-09-15

## 2021-08-19 RX ORDER — HEPARIN 100 UNIT/ML
300-500 SYRINGE INTRAVENOUS AS NEEDED
Status: CANCELLED
Start: 2021-09-15

## 2021-08-19 RX ORDER — DIPHENHYDRAMINE HYDROCHLORIDE 50 MG/ML
25 INJECTION, SOLUTION INTRAMUSCULAR; INTRAVENOUS AS NEEDED
Status: CANCELLED
Start: 2021-08-24

## 2021-08-19 RX ORDER — ALBUTEROL SULFATE 0.83 MG/ML
2.5 SOLUTION RESPIRATORY (INHALATION) AS NEEDED
Status: CANCELLED
Start: 2021-09-15

## 2021-08-19 RX ORDER — ONDANSETRON 2 MG/ML
8 INJECTION INTRAMUSCULAR; INTRAVENOUS AS NEEDED
Status: CANCELLED | OUTPATIENT
Start: 2021-09-15

## 2021-08-19 RX ORDER — SODIUM CHLORIDE 9 MG/ML
10 INJECTION INTRAMUSCULAR; INTRAVENOUS; SUBCUTANEOUS AS NEEDED
Status: CANCELLED | OUTPATIENT
Start: 2021-08-24

## 2021-08-19 RX ORDER — ACETAMINOPHEN 325 MG/1
650 TABLET ORAL AS NEEDED
Status: CANCELLED
Start: 2021-08-24

## 2021-08-19 RX ORDER — ONDANSETRON 2 MG/ML
8 INJECTION INTRAMUSCULAR; INTRAVENOUS AS NEEDED
Status: CANCELLED | OUTPATIENT
Start: 2021-08-24

## 2021-08-19 RX ORDER — HYDROCORTISONE SODIUM SUCCINATE 100 MG/2ML
100 INJECTION, POWDER, FOR SOLUTION INTRAMUSCULAR; INTRAVENOUS AS NEEDED
Status: CANCELLED | OUTPATIENT
Start: 2021-08-24

## 2021-08-19 RX ORDER — HEPARIN 100 UNIT/ML
300-500 SYRINGE INTRAVENOUS AS NEEDED
Status: CANCELLED
Start: 2021-08-24

## 2021-08-19 RX ORDER — EPINEPHRINE 1 MG/ML
0.3 INJECTION, SOLUTION, CONCENTRATE INTRAVENOUS AS NEEDED
Status: CANCELLED | OUTPATIENT
Start: 2021-08-24

## 2021-08-19 RX ORDER — ALBUTEROL SULFATE 0.83 MG/ML
2.5 SOLUTION RESPIRATORY (INHALATION) AS NEEDED
Status: CANCELLED
Start: 2021-08-24

## 2021-08-19 RX ORDER — HYDROCORTISONE SODIUM SUCCINATE 100 MG/2ML
100 INJECTION, POWDER, FOR SOLUTION INTRAMUSCULAR; INTRAVENOUS AS NEEDED
Status: CANCELLED | OUTPATIENT
Start: 2021-09-15

## 2021-08-19 RX ORDER — DIPHENHYDRAMINE HYDROCHLORIDE 50 MG/ML
25 INJECTION, SOLUTION INTRAMUSCULAR; INTRAVENOUS AS NEEDED
Status: CANCELLED
Start: 2021-09-15

## 2021-08-19 RX ORDER — EPINEPHRINE 1 MG/ML
0.3 INJECTION, SOLUTION, CONCENTRATE INTRAVENOUS AS NEEDED
Status: CANCELLED | OUTPATIENT
Start: 2021-09-15

## 2021-08-19 RX ORDER — SODIUM CHLORIDE 0.9 % (FLUSH) 0.9 %
10 SYRINGE (ML) INJECTION AS NEEDED
Status: CANCELLED | OUTPATIENT
Start: 2021-09-15

## 2021-08-19 RX ORDER — SODIUM CHLORIDE 9 MG/ML
25 INJECTION, SOLUTION INTRAVENOUS CONTINUOUS
Status: CANCELLED | OUTPATIENT
Start: 2021-08-24

## 2021-08-19 RX ORDER — DIPHENHYDRAMINE HYDROCHLORIDE 50 MG/ML
50 INJECTION, SOLUTION INTRAMUSCULAR; INTRAVENOUS AS NEEDED
Status: CANCELLED
Start: 2021-08-24

## 2021-08-19 RX ORDER — SODIUM CHLORIDE 9 MG/ML
10 INJECTION INTRAMUSCULAR; INTRAVENOUS; SUBCUTANEOUS AS NEEDED
Status: CANCELLED | OUTPATIENT
Start: 2021-09-15

## 2021-08-19 RX ORDER — DIPHENHYDRAMINE HYDROCHLORIDE 50 MG/ML
50 INJECTION, SOLUTION INTRAMUSCULAR; INTRAVENOUS AS NEEDED
Status: CANCELLED
Start: 2021-09-15

## 2021-08-24 ENCOUNTER — HOSPITAL ENCOUNTER (OUTPATIENT)
Dept: INFUSION THERAPY | Age: 36
Discharge: HOME | End: 2021-08-24
Payer: COMMERCIAL

## 2021-08-24 ENCOUNTER — OFFICE VISIT (OUTPATIENT)
Dept: SURGERY | Age: 36
End: 2021-08-24
Payer: COMMERCIAL

## 2021-08-24 VITALS
TEMPERATURE: 97.1 F | HEART RATE: 67 BPM | DIASTOLIC BLOOD PRESSURE: 78 MMHG | SYSTOLIC BLOOD PRESSURE: 121 MMHG | RESPIRATION RATE: 18 BRPM

## 2021-08-24 VITALS
HEART RATE: 77 BPM | TEMPERATURE: 98.1 F | BODY MASS INDEX: 34.55 KG/M2 | SYSTOLIC BLOOD PRESSURE: 118 MMHG | OXYGEN SATURATION: 98 % | HEIGHT: 63 IN | WEIGHT: 195 LBS | RESPIRATION RATE: 20 BRPM | DIASTOLIC BLOOD PRESSURE: 81 MMHG

## 2021-08-24 DIAGNOSIS — D50.0 IRON DEFICIENCY ANEMIA DUE TO CHRONIC BLOOD LOSS: Primary | ICD-10-CM

## 2021-08-24 DIAGNOSIS — R63.5 WEIGHT GAIN, ABNORMAL: Primary | ICD-10-CM

## 2021-08-24 PROCEDURE — 96374 THER/PROPH/DIAG INJ IV PUSH: CPT

## 2021-08-24 PROCEDURE — 99213 OFFICE O/P EST LOW 20 MIN: CPT | Performed by: SURGERY

## 2021-08-24 PROCEDURE — 74011250636 HC RX REV CODE- 250/636: Performed by: NURSE PRACTITIONER

## 2021-08-24 RX ORDER — SODIUM CHLORIDE 9 MG/ML
25 INJECTION, SOLUTION INTRAVENOUS CONTINUOUS
Status: DISPENSED | OUTPATIENT
Start: 2021-08-24 | End: 2021-08-24

## 2021-08-24 RX ORDER — HEPARIN 100 UNIT/ML
300-500 SYRINGE INTRAVENOUS AS NEEDED
Status: ACTIVE | OUTPATIENT
Start: 2021-08-24 | End: 2021-08-24

## 2021-08-24 RX ORDER — SODIUM CHLORIDE 9 MG/ML
10 INJECTION INTRAMUSCULAR; INTRAVENOUS; SUBCUTANEOUS AS NEEDED
Status: ACTIVE | OUTPATIENT
Start: 2021-08-24 | End: 2021-08-24

## 2021-08-24 RX ORDER — SODIUM CHLORIDE 0.9 % (FLUSH) 0.9 %
10 SYRINGE (ML) INJECTION AS NEEDED
Status: DISPENSED | OUTPATIENT
Start: 2021-08-24 | End: 2021-08-24

## 2021-08-24 RX ADMIN — FERRIC CARBOXYMALTOSE INJECTION 750 MG: 50 INJECTION, SOLUTION INTRAVENOUS at 08:43

## 2021-08-24 RX ADMIN — SODIUM CHLORIDE 25 ML/HR: 900 INJECTION, SOLUTION INTRAVENOUS at 08:43

## 2021-08-24 NOTE — PROGRESS NOTES
Outpatient Infusion Center Progress Note    0730 Pt admit to [facility] ambulatory in stable condition. Piv established with good blood return in rightt AC, Assessment completed. No new concerns voiced. Patient denies Covid contact, denies sob, fever, cough and feeling unwell    Visit Vitals  /78   Pulse 67   Temp 97.1 °F (36.2 °C)   Resp 18       Medications:  Medications Administered     0.9% sodium chloride infusion     Admin Date  08/24/2021 Action  New Bag Dose  25 mL/hr Rate  25 mL/hr Route  IntraVENous Administered By  Maribell Cuellar, HOSSEIN          ferric carboxymaltose (INJECTAFER) 750 mg in 0.9% sodium chloride 250 mL, overfill volume 25 mL IVPB     Admin Date  08/24/2021 Action  New Bag Dose  750 mg Rate  870 mL/hr Route  IntraVENous Administered By  Maribell Cuellar, HOSSEIN                0900 Pt tolerated treatment well. D/c home ambulatory in no distress.  Pt aware of next appointment scheduled for   Future Appointments   Date Time Provider Lali Younger   11/10/2021  9:00 AM Maximino Carrero  N Florinda Naik AMB   2/8/2022  8:30 AM Maximino Carrero  N Florinda Naik AMB

## 2021-08-24 NOTE — PROGRESS NOTES
1. Have you been to the ER, urgent care clinic since your last visit? Hospitalized since your last visit? No    2. Have you seen or consulted any other health care providers outside of the [address] [place] [address] since your last visit? Include any pap smears or colon screening.  No

## 2021-08-25 NOTE — PATIENT INSTRUCTIONS
Abnormal Weight Gain: Care Instructions  Your Care Instructions     There are two types of weight gain--normal and abnormal. Normal weight gain is usually caused by eating too much or exercising too little. It can also happen as you get older. But abnormal weight gain has other causes. It can be caused by a problem with your thyroid gland, called hypothyroidism. Or it can be caused by a problem with your adrenal glands, called Cushing's syndrome. Or your body could be holding too much fluid because of kidney, liver, or heart problems. In some cases, a medicine you take can cause you to gain weight. You can work with your doctor to find out the cause of your weight gain. You will probably need tests to do this. Follow-up care is a key part of your treatment and safety. Be sure to make and go to all appointments, and call your doctor if you are having problems. It's also a good idea to know your test results and keep a list of the medicines you take. How can you care for yourself at home? · Weigh yourself at the same time every day. It's best to do it first thing in the morning after you empty your bladder. Be sure to always wear the same amount of clothing. · Write down any changes in your weight and the possible causes. Discuss these with your doctor. · Your doctor may want you to change your diet and exercise habits. A good way to lose weight is to reduce calories and increase exercise. · Walking is an easy way to get exercise. Try to walk a little longer every day. You also may want to swim, bike, or do other activities. · Ask your doctor if you should see a dietitian. This is a person who can help you plan meals that work best for your lifestyle. · If your doctor prescribed medicines, take them exactly as prescribed. Call your doctor if you think you are having a problem with your medicine. You will get more details on the specific medicines your doctor prescribes.   When should you call for help?  Watch closely for changes in your health, and be sure to contact your doctor if:    · You do not get better as expected.     · You continue to gain weight. Where can you learn more? Go to http://www.mitchell.com/  Enter A175 in the search box to learn more about \"Abnormal Weight Gain: Care Instructions. \"  Current as of: September 23, 2020               Content Version: 12.8  © 2006-2021 [employer], [employer]. Care instructions adapted under license by [employer] (which disclaims liability or warranty for this information). If you have questions about a medical condition or this instruction, always ask your healthcare professional. [address] any warranty or liability for your use of this information. 0

## 2021-08-25 NOTE — PROGRESS NOTES
Subjective:      Jessie Gorman is a 39 y.o. female presents for postop care 21 months following laparoscopic gastric bypass. Appetite is good. Eating a regular diet without difficulty. Bowel movements are regular. She is compliant with vitamins and supplements  The patient experienced severe iron deficiency anemia related to gastric bypass alterations in absorption in addition to heavy menses. She is undergoing iron infusions, having just undergone 1 prior to this appointment. She is recovering from open myomectomy with resection of multiple uterine fibroids. She notes mild fatigue with exertion and mild weight regain secondary to recovery from myomectomy. Objective:     Visit Vitals  /81   Pulse 77   Temp 98.1 °F (36.7 °C)   Resp 20   Ht 5' 3\" (1.6 m)   Wt 195 lb (88.5 kg)   SpO2 98%   BMI 34.54 kg/m²       General:  alert, cooperative, no distress, appears stated age, moderately obese   Abdomen:  Deferred   Incision:   Deferred     Assessment:     Iron deficiency anemia in setting of gastric bypass. She is doing well following open myomectomy. Menses has decreased in volume. She is interested in conception later this year. She is also interested in assistance with additional weight loss through medical weight loss program.    Plan:     1. Continue current medications. 2.  High-protein bariatric diet; exercise as tolerated. 3.  Referral to metabolic/medical weight loss program.   4.  Follow-up in 3 months.

## 2021-09-02 DIAGNOSIS — E66.01 MORBID OBESITY (HCC): ICD-10-CM

## 2021-09-02 DIAGNOSIS — Z76.89 ENCOUNTER FOR WEIGHT MANAGEMENT: Primary | ICD-10-CM

## 2021-09-14 ENCOUNTER — OFFICE VISIT (OUTPATIENT)
Dept: SURGERY | Age: 36
End: 2021-09-14
Payer: COMMERCIAL

## 2021-09-14 VITALS
WEIGHT: 192.2 LBS | BODY MASS INDEX: 34.05 KG/M2 | DIASTOLIC BLOOD PRESSURE: 78 MMHG | SYSTOLIC BLOOD PRESSURE: 124 MMHG | RESPIRATION RATE: 18 BRPM | HEART RATE: 68 BPM | OXYGEN SATURATION: 98 % | HEIGHT: 63 IN

## 2021-09-14 DIAGNOSIS — E73.9 LACTOSE INTOLERANCE: ICD-10-CM

## 2021-09-14 DIAGNOSIS — E28.2 PCOS (POLYCYSTIC OVARIAN SYNDROME): ICD-10-CM

## 2021-09-14 DIAGNOSIS — E55.9 VITAMIN D DEFICIENCY: ICD-10-CM

## 2021-09-14 DIAGNOSIS — K91.2 POSTSURGICAL MALABSORPTION: ICD-10-CM

## 2021-09-14 DIAGNOSIS — Z98.890 S/P MYOMECTOMY: ICD-10-CM

## 2021-09-14 DIAGNOSIS — E88.81 METABOLIC SYNDROME: ICD-10-CM

## 2021-09-14 DIAGNOSIS — R63.5 EXCESSIVE WEIGHT GAIN: ICD-10-CM

## 2021-09-14 DIAGNOSIS — Z98.84 S/P GASTRIC BYPASS: ICD-10-CM

## 2021-09-14 DIAGNOSIS — F41.9 ANXIETY: ICD-10-CM

## 2021-09-14 DIAGNOSIS — E78.5 DYSLIPIDEMIA: ICD-10-CM

## 2021-09-14 DIAGNOSIS — Z83.3 FAMILY HISTORY OF DIABETES MELLITUS (DM): ICD-10-CM

## 2021-09-14 DIAGNOSIS — R53.82 CHRONIC FATIGUE: ICD-10-CM

## 2021-09-14 DIAGNOSIS — E66.09 CLASS 1 OBESITY DUE TO EXCESS CALORIES WITH SERIOUS COMORBIDITY AND BODY MASS INDEX (BMI) OF 34.0 TO 34.9 IN ADULT: Primary | ICD-10-CM

## 2021-09-14 DIAGNOSIS — D25.9 UTERINE LEIOMYOMA, UNSPECIFIED LOCATION: ICD-10-CM

## 2021-09-14 DIAGNOSIS — F32.81 PMDD (PREMENSTRUAL DYSPHORIC DISORDER): ICD-10-CM

## 2021-09-14 DIAGNOSIS — D50.0 IRON DEFICIENCY ANEMIA DUE TO CHRONIC BLOOD LOSS: ICD-10-CM

## 2021-09-14 PROCEDURE — 99205 OFFICE O/P NEW HI 60 MIN: CPT | Performed by: FAMILY MEDICINE

## 2021-09-14 NOTE — PROGRESS NOTES
[address] [facility], [facility], [address]  [phone] o  [facility] EXAMINATION    Patient:  Saranya Doran, 1985  PCP:   Kathrin Antonio PA-C  Patient Status: New    Referred by:  Bariatric surgeon, Dr. Indiana Somers Reason referred:  Pt had laparoscopic GBP, gained 10 lbs and wants to reach goal 165 lbs    Patient has attended the [facility] Orientation: yes      Patient's weight management approach preference today:  VLCD    Saranya Doran is a 39 y.o. female with Obesity Class 1 Body mass index is 34.05 kg/m². presents for evaluation and treatment. How did patient hear about this weight management program: Dr. Jesus Kristina  Is patient ready to start participating in this weight loss journey and reason(s) why: yes, \"I am ready to get to my goal weight of 150-165 lbs\"  When did patient begin gaining excess weight:  Pt had an tonsillectomy 02/2021 and myomectomy w 30 fibroids removed 06/2021.- \"It has been difficult. I am very tired. \"  Gained 10 lbs while recovering although she believes she lost lots of weight immediately after the tonsillectomy. Before Gastric Bypass, her weight gain was due to stress, poor eating habits, oral steroids for hives (40 lbs gained in 1 summer).   Any potential unsupportive people in patient's life: no    Patient goals for participation in weight management program:   Lose 35 lbs and have more energy. (150-165 lbs)    WEIGHT LOSS HISTORY  Start weight today:  192 lbs 3.2oz  Wt Readings from Last 3 Encounters:   09/14/21 192 lb 3.2 oz (87.2 kg)   08/24/21 195 lb (88.5 kg)   08/05/21 192 lb (87.1 kg)       Lowest weight in lifetime: 179 lbs (after Pouch re-set 6-9 mos ago)  Highest weight in lifetime:   266 lbs  Previous weight loss programs: Weight Watchers and Keto, GBP, saw nutritionist, saw Dr. Joshua Gates - Rx medication  Most successful weight loss effort: GBP (lost 85 lbs in 1 yrs)  Factors contributing to weight gain:  Lap Gastric Bypass    Past Surgical History:   Procedure Laterality Date    HX GI  2013    Sigmoidoscopy due to rectal polyp- WNL pp     HX LAP GASTRIC BYPASS  11/20/2019    [facility]. Brandon Morales [facility]     HX MYOMECTOMY  06/2021    30 uterine fibroids removed. Dr. Eleno Beal.  HX POLYPECTOMY  1989    RECTAL POLYP- benign.  HX REFRACTIVE SURGERY  2015    HX TONSILLECTOMY  02/16/2021    due to chronic inflammation.  HX WISDOM TEETH EXTRACTION       Previous Weight Loss Surgery:  Lap Gastric Bypass  Bariatric surgeon: Dr. Rodriguez Longest Date of Surgery:  34/32/14  Post-Op Complications:  0  Initial Weight Loss after Bariatric Surgery:  85 lbs  Weight re-gained:  10 lbs and lost 3 lbs by eating more whole foods  Factors contributing to weight re-gain:  Decreased physical activity after tonsillectomy and fibroid removal this year. Tired. Re-cycle 5 lbs.     FAMILY HISTORY  Family who had weight loss surgery/type of surgery: 0  Family history of:  Obesity- yes, Kidney stones- yes, Gallstones- no, Diabetes- yes, Heart Disease- yes, Sleep Apnea-  yes, Gout- yes  Family History   Problem Relation Age of Onset    Diabetes Father     Heart Disease Father 64        cardiomyopathy, heart transplant    Hypertension Father     Heart Surgery Father     High Cholesterol Father     Obesity Father     Gout Father     Other Father         kidney stones    Sleep Apnea Father     Diabetes Mother     High Cholesterol Mother     Hypertension Mother     Obesity Mother     Diabetes Maternal Grandmother     Diabetes Maternal Grandfather     Diabetes Paternal Grandmother     Obesity Sister     Anesth Problems Neg Hx        ASSOCIATED MEDICAL CONCERNS  Patient denies any contraindications to participation in LCD or VLCD including: history of MI in the last 3 months, Type 1 DM, Type 2 DM, Liver or Kidney disease requiring protein restriction, Recent treatment for Cancer, History of Uric-acid Kidney Stone, Gout, Gall stones, Recent onset of Inflammatory Bowel Disease, or severe Food Allergies. Past Medical History:   Diagnosis Date    Anxiety 2011    Zandra Mendez LCSW (Telehealth)    Back pain     Bilateral knee pain     due to OA.  Hemorrhoids     High cholesterol 2016    History of uterine fibroid summer 2020    30 fibroids. Dr. Adis Gong childhood (5-13 yo)    Txd w lots steroids. Severe allergies to everything. Allergist.    Hypertension 2009    Txd w 3 bp meds. off after weight loss.  Iron deficiency anemia due to chronic blood loss 2020    Txd w Iron Infusions x 5. Dr. Miko Churchill Menorrhagia     Morbid obesity ([facility] [address] 11/20/2019    Gastric Bypass 2019    PCOS (polycystic ovarian syndrome) 2015       BEHAVIORAL HEALTH HISTORY  DEPRESSION SCREENING:    3 most recent PHQ Screens 9/14/2021   PHQ Not Done -   Little interest or pleasure in doing things Not at all   Feeling down, depressed, irritable, or hopeless Not at all   Total Score PHQ 2 0       Any history of mental health conditions (including Depression, Anxiety, Anorexia, Bulimia or Binge Eating disorder): AYO, PMDD  Treating provider and medication(s): pcp, Dr. Moises Baker and Prozac 20 mg daily and on-line Zandra Mendez LCSW  Is the mental health condition controlled: yes  History of drug abuse or dependence:  no    Current major lifestyle changes or stressors:   0       WEIGHT LOSS MEDICATION HISTORY  OTC weight loss medications, herbal remedies/supplements: Xenadrine in high school  Previous weight loss prescription:  Phentermine, Contrave, Belviq  Prescribing health care provider:  Dr. Preet Jordan and pcp    Rx last prescribed:  2014  Negative side effects: Contrave caused n/v on first day so I stopped it. Phentermine raised my heart rate. Belviq did not work.   Pounds lost with use:  25 lbs on Phentermine w diet plan    Current Outpatient Medications   Medication Sig    cholecalciferol, vitamin D3, (VITAMIN D3 PO) Take 6,000 Units by mouth.  FLUoxetine (PROzac) 20 mg capsule Take 1 Cap by mouth daily.  calcium carbonate (OS-PEBBLES) 500 mg calcium (1,250 mg) tablet Take  by mouth daily.  multivitamin (ONE A DAY) tablet Take 2 Tabs by mouth daily. Bariatric Advantage    ALPRAZolam (XANAX) 0.25 mg tablet TAKE 1 TAB BY MOUTH TWO (2) TIMES DAILY AS NEEDED FOR ANXIETY. MAX DAILY AMOUNT: 0.5 MG.  nystatin (MYCOSTATIN) powder Apply  to affected area four (4) times daily. (Patient not taking: Reported on 9/14/2021)     No current facility-administered medications for this visit. Medications that cause weight gain:  Xanax prn seldomly  Medications that cause weight loss:  Prozac  Recent changes to medications:  Stopped Omeprazole 6 mos ago for nausea via Dr. Jamila Virk recommendation.     Allergies   Allergen Reactions    Anesthesia S/I-40 (Propofol) [Propofol] Nausea and Vomiting     TXD W SCOPOLAMINE PATCH AND ANTI-NAUSEA PRE-OPERATIVELY       Mobile Apps used for meals, water consumption, sleep, physical activity: 0, prev MyFitness Pal or LoseIt    EATING HABITS  Total calories consumed per day:  7662-4317 kcal    Number of meals consumed per day on average: 3-4  Number of times per week fast food or meals at/from restaurants is consumed: 5-10    Typical Meals:       Breakfast: Overnight Oats, Fair life Protein shake, bagel w cream cheese      Lunch:  Salads or ham sandwich on whole grain thin bread w cheese and spinach      Dinner: turkey, steak, burger or chicken w vegetable      Snacks: ice cream, pineapple, berries, apples    Barriers to eating healthy meals:  timing    DRINKING HABITS  How much water do you consume daily: 60 oz/day    How much caffeine do you drink daily: 14-20 oz cold brew coffee/day  Alcohol use:  2-3 hard liquor/mixed drink   Any other sugar-sweetened beverages daily (sodas, teas, juices, etc.): latte w reduced sugar 3-4x/week  Barriers to consuming at least 2 L water daily:  Urinary frequency and lack of access to restroom during the work day     Social History     Tobacco Use    Smoking status: Former Smoker     Packs/day: 0.25     Years: 15.00     Pack years: 3.75     Types: Cigarettes     Quit date: 2018     Years since quitting: 3.1    Smokeless tobacco: Never Used   Vaping Use    Vaping Use: Never used   Substance Use Topics    Alcohol use: Yes     Alcohol/week: 1.0 standard drinks     Types: 1 Shots of liquor per week    Drug use: Not Currently     Types: Marijuana         SLEEP HISTORY  Hours of sleep nightly: 8 hours  Rx or OTC Sleep aids used:  0  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 2-3 hours since increased work schedule and had iron infusions  Any diagnosis of Sleep Apnea by a physician:   No, negative Sleep Test prior to GBP   Do you use a CPAP machine: no    Sleep Medicine Specialist:  Dr. Arnulfo Wesley  Barriers to getting proper rest:  0    PHYSICAL ACTIVITY HISTORY  Type of physical activity:  Walking, biking, yoga  Physical Activity is performed 3 days a week for 20-30 minutes 1 time(s) a day. Do you own a pedometer or fitness tracker/Smart watch/I phone: yes   Average number steps per day:  5-10,000  Gym membership owned:  yes Actively utilizing the gym:  Not since fibroid surgery (not allowed x 6 weeks)  Pets owned:  yes  Is physical activity enjoyable:  yes    Have you ever been told by a physician or anyone else not to exercise: no  Barriers limiting physical activity:  Energy level. Time. OB/GYN HISTORY (For Female Patients)  Social History     Substance and Sexual Activity   Sexual Activity Yes    Partners: Male    Birth control/protection: I.U.D. OB History    No obstetric history on file.         Menopause:  0   LMP:  21  Are you pregnant or planning on becoming pregnant within 6 months:  yes      Other Medical Care and Concerns  Pt had 5 iron infusions this year due to Iron Def anemia from menorrhagia due to 30 fibroids. Pt had 30 fibroids removed 06/2021. Last menses was the first that was shorter and lighter. Dr. Mauricio Garcia and Xiao Blue, GYN is managing. She is tired. No other associated symptoms. She has a hx of low vit D and high cholesterol. Will get fasting labs. She believes PCOS diagnosis was not proven. Do you have upcoming travel in the next 6 weeks:  [place] for weekend. If so, contact the dietician for meal plan modification and recommendations. Immunization History   Administered Date(s) Administered    Covid-19, MODERNA, Mrna, Lnp-s, Pf, 100mcg/0.5mL 01/06/2021, 02/03/2021, 08/26/2021    Influenza Vaccine 10/09/2019    Influenza Vaccine (Quad) PF (>6 Mo Flulaval, Fluarix, and >3 Yrs Afluria, Fluzone [id number]) 08/18/2017, 10/06/2018, 09/16/2020    Tdap 06/08/2016       HEALTH MAINTENANCE  A1C:  SEE RESULTS BELOW. Lipid panel:  SEE RESULTS BELOW. Colonoscopy:  0 but sigmoidoscopy w rectal polypectomy in 2013, if over 47 yo  Depression screening:  Performed today  Mammogram:  ?, if female over 35 yo  Annual Wellness Visit:  To be performed by pcp annually, when appropriate. ROS:  Review of Systems negative except as noted above in HPI.     PHYSICAL EXAMINATION    Visit Vitals  /78 (BP 1 Location: Left arm, BP Patient Position: Sitting)   Pulse 68   Resp 18   Ht 5' 3\" (1.6 m)   Wt 192 lb 3.2 oz (87.2 kg)   LMP 08/17/2021   SpO2 98%   BMI 34.05 kg/m²           Weight Metrics 9/14/2021 9/14/2021 8/24/2021 8/5/2021 5/11/2021 4/28/2021 4/27/2021   Weight - 192 lb 3.2 oz 195 lb 192 lb 193 lb 192 lb 192 lb 8 oz   Neck Circ (inches) 13 - - - - - -   Waist Measure Inches 44 - - - - - -   Body Fat % 37.9 - - - - - -   BMI - 34.05 kg/m2 34.54 kg/m2 34.01 kg/m2 34.19 kg/m2 34.01 kg/m2 34.1 kg/m2 Neck Circumference:  Acceptable range for M >16 inches, F>15 inches  Waist Circumference:  Acceptable range for M> 40 inches/102 cm, F > 35 inches, 88 cm  Body Fat: Acceptable range for M 18-24%, F 25-31%      General appearance - Well nourished. Well appearing. Well developed. No acute distress. Obese. Head - Normocephalic. Atraumatic. Eyes - pupils equal and reactive. Extraocular eye movements intact. Sclera anicteric. Mildly injected sclera. Ears - Hearing is grossly normal bilaterally. Nose - normal and patent. No polyps noted. No erythema. No discharge. Mouth - mucous membranes with adequate moisture. Posterior pharynx normal with cobblestone appearance. No erythema, white exudate or obstruction. Neck - supple. Midline trachea. No carotid bruits noted bilaterally. No thyromegaly noted. Chest - clear to auscultation bilaterally anteriorly and posteriorly. No wheezes. No rales or rhonchi. Breath sounds are symmetrical bilaterally. Unlabored respirations. Heart - normal rate. Regular rhythm. Normal S1, S2. No murmur noted. No rubs, clicks or gallops noted. Abdomen - soft and distended. No masses or organomegaly. No rebound, rigidity or guarding. Bowel sounds normal x 4 quadrants. No tenderness noted. Neurological - awake, alert and oriented to person, place, and time and event. Cranial nerves II through XII intact. Clear speech. Muscle strength is +5/5 x 4 extremities. Sensation is intact to light touch bilaterally. Steady gait. Heme/Lymph - peripheral pulses normal x 4 extremities. No peripheral edema is noted. Musculoskeletal - Intact x 4 extremities. Full ROM x 4 extremities. No pain with movement. Back exam - normal range of motion. No pain on palpation of the spinous processes in the cervical, thoracic, lumbar, sacral regions. No CVA tenderness. No buffalo hump noted.   Skin - no rashes, erythema, ecchymosis, lacerations, abrasions, suspicious moles noted. No skin tags or moles. No acanthosis nigricans noted in the axilla or neck. Psychological -   normal behavior, dress and thought processes. Good insight. Good eye contact. Normal affect. Appropriate mood. Normal speech. DATA REVIEWED:    Lab Results   Component Value Date/Time    WBC 7.7 06/16/2021 10:53 AM    HGB 11.0 (L) 06/16/2021 10:53 AM    HCT 34.7 06/16/2021 10:53 AM    PLATELET 948 (H) 06/27/9815 10:53 AM    MCV 88 06/16/2021 10:53 AM     Lab Results   Component Value Date/Time    Sodium 136 12/27/2020 02:22 PM    Potassium 3.6 12/27/2020 02:22 PM    Chloride 107 12/27/2020 02:22 PM    CO2 24 12/27/2020 02:22 PM    Anion gap 5 12/27/2020 02:22 PM    Glucose 152 (H) 12/27/2020 02:22 PM    BUN 10 12/27/2020 02:22 PM    Creatinine 0.70 12/27/2020 02:22 PM    BUN/Creatinine ratio 14 12/27/2020 02:22 PM    GFR est AA >60 12/27/2020 02:22 PM    GFR est non-AA >60 12/27/2020 02:22 PM    Calcium 8.7 12/27/2020 02:22 PM    Bilirubin, total 0.3 12/27/2020 02:22 PM    Alk.  phosphatase 82 12/27/2020 02:22 PM    Protein, total 7.9 12/27/2020 02:22 PM    Albumin 3.5 12/27/2020 02:22 PM    Globulin 4.4 (H) 12/27/2020 02:22 PM    A-G Ratio 0.8 (L) 12/27/2020 02:22 PM    ALT (SGPT) 14 12/27/2020 02:22 PM    AST (SGOT) 12 (L) 12/27/2020 02:22 PM     Lab Results   Component Value Date/Time    Hemoglobin A1c 4.7 (L) 03/11/2019 12:47 PM      Lab Results   Component Value Date/Time    TSH 1.28 12/27/2020 02:22 PM     Lab Results   Component Value Date/Time    Uric acid 6.4 03/11/2019 12:47 PM     Magnesium   Date Value Ref Range Status   11/05/2019 2.0 1.6 - 2.4 mg/dL Final     Lab Results   Component Value Date/Time    Vitamin B12 >2000 (H) 06/16/2021 10:53 AM      Lab Results   Component Value Date/Time    Vitamin D 25-Hydroxy 17.9 (L) 11/05/2019 08:45 AM    VITAMIN D, 25-HYDROXY 22.2 (L) 11/24/2020 12:58 PM        Lab Results   Component Value Date/Time    Iron 24 (L) 06/16/2021 10:53 AM    TIBC 415 2021 10:53 AM    Iron % saturation 6 (LL) 2021 10:53 AM    Ferritin 63 2021 10:53 AM     Lab Results   Component Value Date/Time    Cholesterol, total 269 (H) 2019 08:45 AM    HDL Cholesterol 48 2019 12:47 PM    LDL, calculated 151 (H) 2019 12:47 PM    VLDL, calculated 34 2019 12:47 PM    Triglyceride 171 (H) 2019 12:47 PM        EK20 Sinus tachycardia at 102 bpm. QTc 456 msec. No prolonged QTc noted. (Upper QTc limit is 440 msec for males, 460 msec for females)    ASSESSMENT     ICD-10-CM ICD-9-CM    1. Class 1 obesity due to excess calories with serious comorbidity and body mass index (BMI) of 34.0 to 34.9 in adult  E66.09 278.00     Z68.34 V85.34    2. Chronic fatigue  R53.82 780.79 CBC W/O DIFF      HCG QL SERUM      IRON      MAGNESIUM      THYROID PEROXIDASE (TPO) AB      URIC ACID      TSH 3RD GENERATION      VITAMIN B12 & FOLATE      EKG, 12 LEAD, INITIAL    due to iron def vs vit D vs 2 surgeries in 6 months vs other   3. Metabolic syndrome  K59.56 277.7     w high TG, low HDL and high waist circumference   4. Dyslipidemia  E78.5 272.4 INSULIN      LIPID PANEL      METABOLIC PANEL, COMPREHENSIVE      NMR LIPOPROFILE WITH LIPIDS (WITHOUT GRAPH)   5. Iron deficiency anemia due to chronic blood loss  D50.0 280.0 CBC W/O DIFF      IRON    improving after IV iron infusions x 5 this year   6. Vitamin D deficiency  E55.9 268.9 VITAMIN D, 25 HYDROXY   7. Anxiety  F41.9 300.00     stable w Prozac 20 mg daily   8. Excessive weight gain  R63.5 783.1 HCG QL SERUM      METABOLIC PANEL, COMPREHENSIVE      THYROID PEROXIDASE (TPO) AB    40 lbs in 1 summer after tonsillectomy and due to stress, poor eating habits and oral steroids for hives vs other   9. Uterine leiomyoma, unspecified location  D25.9 218.9     30, resolved since myomectomy 2021   10. PMDD (premenstrual dysphoric disorder)  F32.81 625.4     stable on Prozac 20 mg daily   11.  Postsurgical malabsorption  K91.2 579.3     due to Gastric Bypass   12. Lactose intolerance  E73.9 271.3    13. PCOS (polycystic ovarian syndrome)  E28.2 256.4     ? misdiagnosis   14. S/P gastric bypass  Z98.84 V45.86    15. S/P myomectomy  Z98.890 V45.89    16. BMI 34.0-34.9,adult  Z68.34 V85.34    17. Family history of diabetes mellitus (DM)  Z83.3 V18.0 HEMOGLOBIN A1C WITH EAG       WEIGHT MANAGEMENT PLAN    Agree with nurse documentation. Chart was reviewed and updated during the office visit today. Emphasized the importance of patient continuing care from current primary care provider and other specialists while participating in this weight management program.  Patient has full access to all our office notes, orders, lab results on [employer] and can provide them copies, if desired. Advised patient to follow up with pcp for any acute symptoms and Health Maintenance. Informed patient about Obesity medicine treatment options available at the [facility] including nutritional counseling, weight loss medication management and behavioral counseling. Based on patient history, diagnostic tests and physical exam performed today in our office, Dayanara Blue is a good candidate for the [employer] [facility] Medical Services using the [facility] ComCam meal replacements for Low Calorie Dietary approach (0844-2574 kcal daily.)     Welcomed patient to our [facility] [employer] Management Program - Medical Services. During this visit, patient met with Anjum Green,  today to review and sign [facility] Commitment Form, Attendance Policy, and [facility] Agreement and Consent, further discuss program design, assist patient with ordering [employer] meal replacements and schedule initial 1:1 visit with dietician, mandatory weekly (VLCD 800 kcal/day)or biweekly (LCD/6235-5538 kcal/day) nurse visits. SEE SCANNED DOCUMENTS.      Referred patient to [facility] Patient Manual to become familiar with potential side effects of this dietary approach and what to do if such symptoms occur. Encouraged patient to notify our office if any new symptoms develop and persist.      Discussed the patient's medications, BMI, anthropometrics and goals. Informed patient that weight loss goal of 5-10% in 6-12 months has shown significant improvement in obesity and its related health conditions including DM, heart disease, asthma. A key study published in Arthritis & Rheumatism of Overweight and Obese Adults with OA found that losing 1 pound of weight resulted in 4 pounds of pressure being removed from the knees. Nutritional Prescription:    Start LCD (low calorie diet) with 5358-7570 calories consumed daily using [facility] [employer] meal replacements. Referred patient to Pat Jones RD for initial 30 minute 1:1 nutritional counseling, include discussions about how to count daily calories and stay within the recommended amount per day as agreed upon today, simple vs complex carbohydrates. Advised patient to avoid skipping meals. Consume meals every 3-4 hours to prevent low blood sugar events. Discussed the option of starting slowly during first couple of weeks of participation by weaning down caffeine first then simple carbohydrates the following weeks (like sweet foods/simple carbohydrates, baked goods) and white foods (like potatoes, rice, pasta, bread.)    Permission granted to utilize LCD when patient is attending important family, business or social events involving \"regular\" foods. If utilizing LCD approach, consume green leafy vegetables and protein (lean meats and legumes) with each meal first then add minimal complex carbohydrates. Keep calories between 8190-8833 kcal/day. Avoid fried foods and limit saturated fats. If blood pressure is elevated, limit processed foods, caffeine and sodium intake including hot sauce and soy sauce.       Stressed importance of patient drinking a minimum of 2 L (67 oz) of water daily up to half patient's body size in ounces of water, while utilizing this dietary approach unless instructed otherwise by a provider. This will help increase satiety and prevent dehydration. Avoid sugar-sweetened beverages including diet or regular soda, juice, alcohol, use of water enhancers. Try on-line recipes for water infusion, if desired. Limit caffeine to prevent bladder issues and dehydration. Ok to drink 1 8 oz cup of black coffee or green tea (to stimulate release of Adiponectin and promote fat burning.)     Physical Activity Prescription: Minimal and as tolerated while initiating this dietary approach. Discussed approaches to increasing physical activity as part of daily routine. Encouraged strength training, resistance or toning exercises daily to prevent muscle mass loss. Count steps daily with a goal of 5,000-10,000 steps or 2.5-5.0 miles daily. Behavior and Lifestyle Prescription:  Counseled patient on stressors, stress management and self-care approaches. If already receiving formal counseling please continue these sessions routinely. Go to ER if any thoughts or attempts of suicide are experienced. Referred patient to work with a counselor for further evaluation and intervention. Patient expressed appreciation. Sleep Prescription: A goal of 7-9 hours of uninterrupted sleep is recommended to turn off the Grehlin hormone to be released from the stomach and triggers appetite while promoting weight gain. Proper rest turns on Leptin hormone to be released from white adipose tissue and promotes weight loss. Discussed snoring, symptoms of Sleep Apnea and improvements with weight loss. If patient currently uses CPAP or BiPAP, please continue its use whenever resting. Consume any intake of caffeine 6-12 hours before bedtime to avoid sleep disturbances. Limit screen time 1-2 hours before bedtime.   Avoid exercising 2-3 hours before bedtime. Ok to try drinking OTC Sleepy Time Tea, Chamomile tea or Magnesium 400 mg at bedtime for sleep, if receiving less than 7-9 hours nightly. Continue current medications as directed by prescribers. Identified and discussed weight positive and weight negative medications on patient's medication list.  Advised patient not to stop any use without discussing with the prescribing provider. Will monitor patient's weight and health with use. Weight loss medication prescribed today and sent electronically to preferred pharmacy on file after discussing benefits, contraindications and potential side effects:  Consider Metformin to address Metabolic Syndrome and insulin resistance better. Will follow up at next appt. Supplement recommendations:  Start OTC Magnesium 400 mg po at night prn sleep, muscle cramping, constipation. Start OTC vit B12 1000 mcg daily orally if taking Metformin or experiencing numbness and tingling. Start OTC Fish Oil with EPA and DHA 1000 mg daily if experiencing dry skin, hair loss or low HDL. Use with caution if history of heartburn exists. Increase fiber supplements or try Fiber products if experiencing constipation. Take OTC Lactaid with meal replacements, if lactose intolerance history exists or symptoms begin. Reviewed and discussed most recent lab and EKG results. If not available today, advised patient to sign a release to provide our program a copy of each from pcp or specialist.    If labs have not been performed within the past 3-6 months, an order form for initial lab panel was given to patient today to be drawn one week prior to next appointment with me.   If an EKG has not been performed within the past 3-6 months, an order form was given to patient today to be performed after the office visit today to be done before starting the program.  Otherwise, will recheck pertinent labs monthly while participating in VLCD or every 3 months while participating in LCD, if using New Direction meal replacements, as discussed to identify electrolyte abnormalities and guide therapeutic approach. Advised patient to sign up for MyChart and view labs directly. Notify me by [facility] if any questions or concerns arise. Labs ordered today will be reviewed in detail at the next office visit and time allowed for any questions regarding the results. Reviewed and discussed notes from other providers:  Dr. Mariano Ramos, Dr. Christin Swanson, Dr. Marly Hirsch. Encouraged patient to follow their recommendations and follow up plans. Counseled patient on health topics:  Obesity, Insulin Resistance, VLCD and LCD dietary approaches, benefits, contraindications, possible side effects to these diets and how to prevent poor outcomes (including staying hydrated, limit physical exercise while transitioning into this program, avoid skipping meals, etc), Weight loss goals, Sleep hygiene, Barriers to losing weight and keeping weight off. Immunizations noted. Covid vaccines recommended, if patient has not had it. Informed patient that Obesity may increase risk for severe illness and death from Covid-19 disease. Offered empathy, encouragement, legitimation, prayers, partnership to patient. Praised patient for successes. Patient was offered a choice/choices in the treatment plan today. Patient expresses understanding of the plan and agrees with recommendations. Return in about 1 month (around 10/14/2021) for weight, [place] VLCD, results. Total time:  65 mins spent with patient in counseling, coordinating care, reviewing and discussing results, reviewing and completing relevant documentation, and discussing treatment plans in reference to The primary encounter diagnosis was Class 1 obesity due to excess calories with serious comorbidity and body mass index (BMI) of 34.0 to 34.9 in adult.  Diagnoses of Chronic fatigue, Metabolic syndrome, Dyslipidemia, Iron deficiency anemia due to chronic blood loss, Vitamin D deficiency, Anxiety, Excessive weight gain, Uterine leiomyoma, unspecified location, PMDD (premenstrual dysphoric disorder), Postsurgical malabsorption, Lactose intolerance, PCOS (polycystic ovarian syndrome), S/P gastric bypass, S/P myomectomy, BMI 34.0-34.9,adult, and Family history of diabetes mellitus (DM) were also pertinent to this visit. THANK YOU Pastora Owusu PA-C FOR ALLOWING ME THE PRIVILEGE TO PARTICIPATE IN THE CARE OF OUR MUTUAL PATIENT, Ms. Florence WITH RESPECT TO WEIGHT MANAGEMENT. Jin Salinas DO, DiplomatRAFFI Colmenares    Patient Instructions   WEIGHT LOSS PLAN    1. Read food labels and count calories. Goal 1380-2661 calories daily for women and 9535-9670 calories daily for men as you are weaning off sugar and preparing to start a Low Calorie Dietary Approach. Achieve this by decreasing caloric intake by 500 calories by weekly increments. NOTE:  1 pound of fat = 3500 kilocalories! Www.Maozhaoit. Electrochaea  Www.Mendixpal.Electrochaea  Www.Pipedrive  Www.[employer]oph[employer]. gov  Weight watchers mobile    Calories needed to lose 1-2 pounds a week = 10 x your weight in pounds    2. Increase water intake. Per [facility] Weight loss Program, it is important to drink 1/2 your body weight in ounces of water daily. Decrease your water consumption 2-3 hours before bedtime to prevent sleep disturbance from frequent urination. 3.  Decrease sugary beverages. Each can or glass of soda increases your risk of obesity by 60%. You can lose 10 pounds in a month by avoiding any soda. 12 oz can of soda = 140 calories, 16 oz cup of sweet tea = 200 calories    16 oz orange juice = 200 calories, 10 oz apple juice = 150 calories   32 oz sports drink = 200 calories, 16 oz punch = 240 calories   3.5 oz alcohol = 100-150 calories     4. Avoid High Fructose Corn Syrup products. This ingredient makes products highly addictive! 5.   Exercise 30 minutes daily 5 days weekly, minimally. If you burn 3500 calories that equals a pound! Try using a pedometer  or a smart watch to your count steps. Visit www. [employer] for a free pedometer and diet recommendations. Your maximum heart rate = 220 - your age    Never exercise at your maximum heart rate. See handout for target heart rate. 6.  Decrease carbohydrates (white bread, pasta, rice, potatoes, sweet foods and sweet drinks like soda, tea, coffee, juice and sports drinks). Increase fiber and protein. Goal:   calories daily of carbohydrates     Try CHROMIUM PICONATE 200 MG THREE TIMES DAILY,    to decrease Premenstrual carbohydrate cravings. 7.  Eat 3-5 small meals daily, include lots of protein (beans/legumes, nuts, lean meat, eggs) and green vegetables with each. (Breakfast, lunch, dinner with 2 healthy snacks)    8. Get proper rest 7-8 hours uninterrupted. When you get less than 6 hours, it triggers hunger by affecting your Grehlin:Leptin ratio and this results in weight gain. 9.  Watch your food portions. Green leafy vegetables should cover 1/2 of the plate, lean meat 1/4 of the plate, starchy vegetable 1/4 of the plate. Use smaller plates. 10.  Do not eat until you are full. Eat until you are no longer hungry. If you are not sure, try drinking a glass of water before getting your second serving of food. 11.  Do not weigh yourself daily. Wait until your next office visit. Use how you feel and  how your clothes fit as measurements of success. 12.  Address your spirituality to draw strength from above during your journey. Remember \"I am fearfully and wonderfully made. Marvelous in His eyes. \"    13. Set realistic, appropriate and achievable weight loss goals:     RECOMMENDED TARGET WEIGHT LOSS:  Initial weight loss of 5-10% of your initial body weight achieved over 6 months or a decrease of 2 BMI units.      MINIMUM GOAL OF WEIGHT LOSS:  Reduce body weight and maintain a lower body weight. Prevent weight gain. RELATED WEBSITES:      Www.obesityaction. org  Consider joining the Obesity Action Coalition [facility]) for $25/year. The [facility] mission is to elevated and empower those affected by obesity through education, advocacy and support. Quarterly journals included in membership fee. Www.CreateTrips. [employer]  www.Torqeedo.com     RELATED DIETS:  Dr. Jamey Bailey Weight loss challenge, Mediterranean diet, [address] [place] [address] Watchers, Paleo Diet (anti-inflammatory diet)      Congratulations on starting the Very Low Calorie Diet! This requires you to consume 3-4 meal replacements a day as your meals (approximately 200 kcal each meal). Please meet with the dietician to learn how to calculate your total calories daily and/or use one of the suggested Mobile Apps listed in your patient instructions today. Your total calories consumed each day should not be less than or exceed 800 kcalories. 1. If you experience any new syptoms once you start this dietary approach, refer to your New Direction Program  Patient Manual for a list of all potential side effects of the VLCD and recommendations for what to do to improve or resolved the negative side effect. For constipation, do not allow more than 3 days to pass you without having a bowel movement. As mentioned at your provider monthly visit, you can try taking OTC Magnesium 400 mg at bedtime for muscle cramping, difficulty sleeping or constipation or try Milk of Magnesia, Miralax, or Smooth Move Tea for constipation. If you have kidney disease, try OTC Colace instead. Please make sure you are consuming a minimal of 2 liter (67 oz) and up to half your body weight in ounces of water every day to reduce risk experiencing the potential negative side effects.     2. If you experience new dizziness and have consumed your proper water intake, you may drink one 8 oz coffee mug of broth or a bouillon cube in water.      3. Remember to get your labs drawn with your preferred laboratory one week prior to your 3rd monthly visit with your provider. 4. Attend the required nurse triage weigh-ins every other week at the office. Make sure homework sheets are completed prior to arrival or you will not be seen and cannot  meal products. 5. Have a reliable scale and try to use the same scale each time you weigh at home. Best weights are yielded when you weight without clothing or shoes and measure before the first meal of the day. 6. Attend the weekly nutritional meetings on Thursdays with the dietician virtually as scheduled. 7.  If you plan to schedule your next 2 appointments with your provider using a virtual platform, please have a reliable blood pressure cuff and scale available. You will be asked to report your weight, blood pressure and heart rate at the time of checking in with the nurse that day. [address], , at [phone] with any questions or concerns related to the program.       Remember, just 5-10% loss of your total weight in a 6-12 month time period has shown significant improvement in Obesity and its associated medical conditions like Diabetes, Hypertension, High Cholesterol, Asthma, etc.    For every 1 pound of fat that you lose, 4 pounds of pressure is relieved from your joints. Again, WELCOME TO THE [facility] WEIGHT MANAGEMENT EXPERIENCE! We are thrilled you have chosen us to take this weight loss journey with you and honored to do so. We look forward to working very closely with you as you achieve a healthier life. Many blessings!   Amy Hurst

## 2021-09-14 NOTE — PATIENT INSTRUCTIONS
WEIGHT LOSS PLAN    1. Read food labels and count calories. Goal 0198-5087 calories daily for women and 5656-0878 calories daily for men as you are weaning off sugar and preparing to start a Low Calorie Dietary Approach. Achieve this by decreasing caloric intake by 500 calories by weekly increments. NOTE:  1 pound of fat = 3500 kilocalories! Www.loseit. Happy [employer]its [employer]  Www.MOVE Guidesnesspal.Happy Bits Company  Www.iexerci.se  Www.[employer]olo Technologies. gov  Weight watchers mobile    Calories needed to lose 1-2 pounds a week = 10 x your weight in pounds    2. Increase water intake. Per [facility] Weight loss Program, it is important to drink 1/2 your body weight in ounces of water daily. Decrease your water consumption 2-3 hours before bedtime to prevent sleep disturbance from frequent urination. 3.  Decrease sugary beverages. Each can or glass of soda increases your risk of obesity by 60%. You can lose 10 pounds in a month by avoiding any soda. 12 oz can of soda = 140 calories, 16 oz cup of sweet tea = 200 calories    16 oz orange juice = 200 calories, 10 oz apple juice = 150 calories   32 oz sports drink = 200 calories, 16 oz punch = 240 calories   3.5 oz alcohol = 100-150 calories     4. Avoid High Fructose Corn Syrup products. This ingredient makes products highly addictive! 5. Exercise 30 minutes daily 5 days weekly, minimally. If you burn 3500 calories that equals a pound! Try using a pedometer  or a smart watch to your count steps. Visit www. [employer]. Happy Bits [employer] for a free pedometer and diet recommendations. Your maximum heart rate = 220 - your age    Never exercise at your maximum heart rate. See handout for target heart rate. 6.  Decrease carbohydrates (white bread, pasta, rice, potatoes, sweet foods and sweet drinks like soda, tea, coffee, juice and sports drinks). Increase fiber and protein.     Goal:   calories daily of carbohydrates     Try CHROMIUM PICONATE 200 MG THREE TIMES DAILY,    to decrease Premenstrual carbohydrate cravings. 7.  Eat 3-5 small meals daily, include lots of protein (beans/legumes, nuts, lean meat, eggs) and green vegetables with each. (Breakfast, lunch, dinner with 2 healthy snacks)    8. Get proper rest 7-8 hours uninterrupted. When you get less than 6 hours, it triggers hunger by affecting your Grehlin:Leptin ratio and this results in weight gain. 9.  Watch your food portions. Green leafy vegetables should cover 1/2 of the plate, lean meat 1/4 of the plate, starchy vegetable 1/4 of the plate. Use smaller plates. 10.  Do not eat until you are full. Eat until you are no longer hungry. If you are not sure, try drinking a glass of water before getting your second serving of food. 11.  Do not weigh yourself daily. Wait until your next office visit. Use how you feel and  how your clothes fit as measurements of success. 12.  Address your spirituality to draw strength from above during your journey. Remember \"I am fearfully and wonderfully made. Marvelous in His eyes. \"    13. Set realistic, appropriate and achievable weight loss goals:     RECOMMENDED TARGET WEIGHT LOSS:  Initial weight loss of 5-10% of your initial body weight achieved over 6 months or a decrease of 2 BMI units. MINIMUM GOAL OF WEIGHT LOSS:  Reduce body weight and maintain a lower body weight. Prevent weight gain. RELATED WEBSITES:      Www.obesityaction. org  Consider joining the Obesity Action Coalition [facility] for $25/year. The [facility] mission is to elevated and empower those affected by obesity through education, advocacy and support. Quarterly journals included in membership fee. Www.hiQ Labsloser. [employer]On  www.American Giant.com     RELATED DIETS:  Dr. John Alexis Weight loss challenge, Mediterranean diet, [facility] Watchers, Paleo Diet (anti-inflammatory diet)      Congratulations on starting the Very Low Calorie Diet!      This requires you to consume 3-4 meal replacements a day as your meals (approximately 200 kcal each meal). Please meet with the dietician to learn how to calculate your total calories daily and/or use one of the suggested Mobile Apps listed in your patient instructions today. Your total calories consumed each day should not be less than or exceed 800 kcalories. 1. If you experience any new syptoms once you start this dietary approach, refer to your New Direction Program  Patient Manual for a list of all potential side effects of the VLCD and recommendations for what to do to improve or resolved the negative side effect. For constipation, do not allow more than 3 days to pass you without having a bowel movement. As mentioned at your provider monthly visit, you can try taking OTC Magnesium 400 mg at bedtime for muscle cramping, difficulty sleeping or constipation or try Milk of Magnesia, Miralax, or Smooth Move Tea for constipation. If you have kidney disease, try OTC Colace instead. Please make sure you are consuming a minimal of 2 liter (67 oz) and up to half your body weight in ounces of water every day to reduce risk experiencing the potential negative side effects. 2. If you experience new dizziness and have consumed your proper water intake, you may drink one 8 oz coffee mug of broth or a bouillon cube in water. 3. Remember to get your labs drawn with your preferred laboratory one week prior to your 3rd monthly visit with your provider. 4. Attend the required nurse triage weigh-ins every other week at the office. Make sure homework sheets are completed prior to arrival or you will not be seen and cannot  meal products. 5. Have a reliable scale and try to use the same scale each time you weigh at home. Best weights are yielded when you weight without clothing or shoes and measure before the first meal of the day.      6. Attend the weekly nutritional meetings on Thursdays with the dietician virtually as scheduled. 7.  If you plan to schedule your next 2 appointments with your provider using a virtual platform, please have a reliable blood pressure cuff and scale available. You will be asked to report your weight, blood pressure and heart rate at the time of checking in with the nurse that day. [address], , at [phone] with any questions or concerns related to the program.       Remember, just 5-10% loss of your total weight in a 6-12 month time period has shown significant improvement in Obesity and its associated medical conditions like Diabetes, Hypertension, High Cholesterol, Asthma, etc.    For every 1 pound of fat that you lose, 4 pounds of pressure is relieved from your joints. Again, WELCOME TO THE [facility] WEIGHT MANAGEMENT EXPERIENCE! We are thrilled you have chosen us to take this weight loss journey with you and honored to do so. We look forward to working very closely with you as you achieve a healthier life. Many blessings!   Pennie Elam

## 2021-09-21 ENCOUNTER — CLINICAL SUPPORT (OUTPATIENT)
Dept: SURGERY | Age: 36
End: 2021-09-21

## 2021-09-21 ENCOUNTER — HOSPITAL ENCOUNTER (OUTPATIENT)
Dept: NON INVASIVE DIAGNOSTICS | Age: 36
Discharge: HOME | End: 2021-09-21
Payer: COMMERCIAL

## 2021-09-21 ENCOUNTER — TRANSCRIBE ORDER (OUTPATIENT)
Dept: SURGERY | Age: 36
End: 2021-09-21

## 2021-09-21 ENCOUNTER — OFFICE VISIT (OUTPATIENT)
Dept: SURGERY | Age: 36
End: 2021-09-21

## 2021-09-21 DIAGNOSIS — R53.82 CHRONIC FATIGUE: ICD-10-CM

## 2021-09-21 DIAGNOSIS — E66.09 CLASS 1 OBESITY DUE TO EXCESS CALORIES WITH SERIOUS COMORBIDITY AND BODY MASS INDEX (BMI) OF 34.0 TO 34.9 IN ADULT: Primary | ICD-10-CM

## 2021-09-21 LAB
ATRIAL RATE: 63 BPM
CALCULATED P AXIS, ECG09: 28 DEGREES
CALCULATED R AXIS, ECG10: 8 DEGREES
CALCULATED T AXIS, ECG11: 37 DEGREES
DIAGNOSIS, 93000: NORMAL
P-R INTERVAL, ECG05: 176 MS
Q-T INTERVAL, ECG07: 416 MS
QRS DURATION, ECG06: 92 MS
QTC CALCULATION (BEZET), ECG08: 425 MS
VENTRICULAR RATE, ECG03: 63 BPM

## 2021-09-21 PROCEDURE — 93005 ELECTROCARDIOGRAM TRACING: CPT

## 2021-09-21 NOTE — PROGRESS NOTES
[facility]  Metabolic Program Initial Nutrition Consult    Date: 2021   Physician: Octavio Ibarra DO  Name: Mary Mathias  :  1985    Type of Plan: VLCD  Weeks on Plan: 1  Virtual visit was completed through [address] [place] [address]. ASSESSMENT:    Medications/Supplements:   Prior to Admission medications    Medication Sig Start Date End Date Taking? Authorizing Provider   cholecalciferol, vitamin D3, (VITAMIN D3 PO) Take 6,000 Units by mouth. Provider, Historical   ALPRAZolam (XANAX) 0.25 mg tablet TAKE 1 TAB BY MOUTH TWO (2) TIMES DAILY AS NEEDED FOR ANXIETY. MAX DAILY AMOUNT: 0.5 MG. 21   Fossraffaele Sharma PA-C   FLUoxetine (PROzac) 20 mg capsule Take 1 Cap by mouth daily. 21   Pipo Sharma PA-C   nystatin (MYCOSTATIN) powder Apply  to affected area four (4) times daily. Patient not taking: Reported on 2021   Pipo Sharma PA-C   calcium carbonate (OS-PEBBLES) 500 mg calcium (1,250 mg) tablet Take  by mouth daily. Provider, Historical   multivitamin (ONE A DAY) tablet Take 2 Tabs by mouth daily. Bariatric Advantage    Provider, Historical         Pt presents today for initial nutrition consult for the [facility] - Metabolic Program.      Exercise/Physical Activity: gym, walking, weights few days per week. Started meal replacements: yes  If yes, how many per day: 4    Aversions/side effects to meal replacements: not reported    Reported Diet History: hungry, did more - cleaning, organizing, high activity. Coffee 8 oz coffee. Not a fan of the soups. Stress eater, likes night eating, comfort eating, intermittent fasting - 8 hour window.  in the home - supportive. Work meeting conference, at the [employer]. Drink two a week.      Environment/Psychosocial/Support:family supportive    NUTRITION INTERVENTION:  Pt educated on nutrition recommendations for VLCD, specifically 4 meal replacements every day totaling 800kcals and 50 grams carbs every day. Grocery meal:  Use the balanced plate method to plan meals, include 3-6 oz of lean source of protein, unlimited non-starchy vegetables, 1/2 cup whole grains/beans OR 1/2 cup fruit OR 1 serving of low fat dairy. Utilize handouts listing healthy snack and meal ideas. Read all nutrition labels. Demonstrated and emphasized identifying serving size, total fat, sugar and protein content. Defined low fat as </= 3 g per serving. Discussed lean and extra lean sources of protein. Provided list of low fat cooking methods. Avoid foods with sugar listed in the first 3 ingredients and >/10 g sugar per serving. Practice mindful eating habits; take small bites, chew thoroughly, avoid distractions, utilize hunger/fullness scale. Attend Metabolic Weight Loss Class and Support Group and increase physical activity (approved per MD) for long term weight maintenance. NUTRITION MONITORING AND EVALUATION: pt motivated, adherence likely    The following goals were established with patient;  1) no skipping meals, have a packet every 3-4 hours  2) increase water  oz every day   3) on exercise days, have an extra packet, protein bar, or protein food (chicken, eggs, tuna packet)  4) can add herbs/spices to soups and baking spices to shakes      Specific tips and techniques to facilitate compliance with above recommendations were provided and discussed. If further details are desired please contact me at [phone]. This phone number was also provided to the patient for any further questions or concerns.           Polo Bar, MS, RD, LDN

## 2021-09-22 LAB
25(OH)D3+25(OH)D2 SERPL-MCNC: 36.8 NG/ML (ref 30–100)
ALBUMIN SERPL-MCNC: 4.5 G/DL (ref 3.8–4.8)
ALBUMIN/GLOB SERPL: 1.5 {RATIO} (ref 1.2–2.2)
ALP SERPL-CCNC: 103 IU/L (ref 44–121)
ALT SERPL-CCNC: 12 IU/L (ref 0–32)
AST SERPL-CCNC: 14 IU/L (ref 0–40)
B-HCG SERPL QL: NEGATIVE MIU/ML
BILIRUB SERPL-MCNC: 0.7 MG/DL (ref 0–1.2)
BUN SERPL-MCNC: 9 MG/DL (ref 6–20)
BUN/CREAT SERPL: 17 (ref 9–23)
CALCIUM SERPL-MCNC: 9.7 MG/DL (ref 8.7–10.2)
CHLORIDE SERPL-SCNC: 104 MMOL/L (ref 96–106)
CHOLEST SERPL-MCNC: 196 MG/DL (ref 100–199)
CHOLEST SERPL-MCNC: 221 MG/DL (ref 100–199)
CO2 SERPL-SCNC: 23 MMOL/L (ref 20–29)
CREAT SERPL-MCNC: 0.53 MG/DL (ref 0.57–1)
ERYTHROCYTE [DISTWIDTH] IN BLOOD BY AUTOMATED COUNT: 19.6 % (ref 11.7–15.4)
EST. AVERAGE GLUCOSE BLD GHB EST-MCNC: 77 MG/DL
FOLATE SERPL-MCNC: >20 NG/ML
GLOBULIN SER CALC-MCNC: 3 G/DL (ref 1.5–4.5)
GLUCOSE SERPL-MCNC: 82 MG/DL (ref 65–99)
HBA1C MFR BLD: 4.3 % (ref 4.8–5.6)
HCT VFR BLD AUTO: 41.9 % (ref 34–46.6)
HDL SERPL-SCNC: 37 UMOL/L
HDLC SERPL-MCNC: 60 MG/DL
HDLC SERPL-MCNC: 64 MG/DL
HGB BLD-MCNC: 14.3 G/DL (ref 11.1–15.9)
INSULIN SERPL-ACNC: 5.7 UIU/ML (ref 2.6–24.9)
IRON SERPL-MCNC: 76 UG/DL (ref 27–159)
LDL SERPL QN: 21.3 NM
LDL SERPL-SCNC: 1242 NMOL/L
LDL SMALL SERPL-SCNC: 521 NMOL/L
LDLC SERPL CALC-MCNC: 122 MG/DL (ref 0–99)
LDLC SERPL CALC-MCNC: 142 MG/DL (ref 0–99)
LP-IR SCORE SERPL: 25
MAGNESIUM SERPL-MCNC: 2 MG/DL (ref 1.6–2.3)
MCH RBC QN AUTO: 28.5 PG (ref 26.6–33)
MCHC RBC AUTO-ENTMCNC: 34.1 G/DL (ref 31.5–35.7)
MCV RBC AUTO: 84 FL (ref 79–97)
PLATELET # BLD AUTO: 316 X10E3/UL (ref 150–450)
POTASSIUM SERPL-SCNC: 4.8 MMOL/L (ref 3.5–5.2)
PROT SERPL-MCNC: 7.5 G/DL (ref 6–8.5)
RBC # BLD AUTO: 5.02 X10E6/UL (ref 3.77–5.28)
SODIUM SERPL-SCNC: 140 MMOL/L (ref 134–144)
THYROPEROXIDASE AB SERPL-ACNC: 10 IU/ML (ref 0–34)
TRIGL SERPL-MCNC: 78 MG/DL (ref 0–149)
TRIGL SERPL-MCNC: 84 MG/DL (ref 0–149)
TSH SERPL DL<=0.005 MIU/L-ACNC: 1.33 UIU/ML (ref 0.45–4.5)
URATE SERPL-MCNC: 5.4 MG/DL (ref 2.6–6.2)
VIT B12 SERPL-MCNC: 1737 PG/ML (ref 232–1245)
VLDLC SERPL CALC-MCNC: 15 MG/DL (ref 5–40)
WBC # BLD AUTO: 5.3 X10E3/UL (ref 3.4–10.8)

## 2021-09-27 ENCOUNTER — CLINICAL SUPPORT (OUTPATIENT)
Dept: SURGERY | Age: 36
End: 2021-09-27

## 2021-09-27 VITALS
HEART RATE: 84 BPM | DIASTOLIC BLOOD PRESSURE: 66 MMHG | RESPIRATION RATE: 18 BRPM | HEIGHT: 63 IN | WEIGHT: 190.5 LBS | BODY MASS INDEX: 33.75 KG/M2 | SYSTOLIC BLOOD PRESSURE: 104 MMHG | OXYGEN SATURATION: 98 %

## 2021-09-27 DIAGNOSIS — E66.9 OBESITY (BMI 30.0-34.9): Primary | ICD-10-CM

## 2021-09-27 NOTE — PROGRESS NOTES
Progress Note: Weekly Education Class in the [facility] Weight Loss Program         Patient is on Very Low Calorie Diet [x] (4 meal replacements per day, 800 kcal/day)      Low Calorie Diet [] (2-3 meal replacements per day, 9597-8111 kcal/day)    1) Did patient have any new symptoms or physical problems? Yes []    No [x]    If yes, check & comment: weakness [], fatigue [], lightheadedness [], headache [], cramps [], cold intolerance [], hair loss [], diarrhea [], constipation [],  NA [] other:                                 2) Has patient had any medical attention from other providers, urgent care or the emergency room this week? Yes []  No [x]       NA [], If yes, why:                                      3) Any other sugar sweetened beverages consumed this week? Yes [x]  No []    4) Did patient have any problems adhering to the diet? Yes [x]  No [] NA []    If yes, Vacation [], Celebrations [x], Conferences [], Family Reunions [] other:                                               5) How many hours of sleep this week? 7-9     (range)  NA []    Number of meal replacements consumed daily? 2-4 (range)  NA []    Average ounces of water patient consumed daily this week (not including shakes)? 81   (divide the weekly total by 7)    Did you eat any food outside of the program? Yes [x] No []    Physical Activity Over the Past Week:    Cardio exercise: 25 min  Strength exercise: 0 workouts / week  Number of steps walked per day: 5000-39089    How has patient mood overall been this week? Sad [], Happy [x], Stressed [], Tired [x], Content [], NA [], other           Medications reconciled by nurse Yes [x]  No[]    Patient was given therapeutic recommendations for any noted side effects of their dietary approach based upon [facility] patient manual per providers recommendation.

## 2021-09-28 ENCOUNTER — OFFICE VISIT (OUTPATIENT)
Dept: SURGERY | Age: 36
End: 2021-09-28

## 2021-09-28 DIAGNOSIS — E66.09 CLASS 1 OBESITY DUE TO EXCESS CALORIES WITH SERIOUS COMORBIDITY AND BODY MASS INDEX (BMI) OF 34.0 TO 34.9 IN ADULT: Primary | ICD-10-CM

## 2021-09-28 NOTE — PROGRESS NOTES
[facility]  Metabolic Weight Loss Program        Patient's Name: Hermelindo Query  : 1985    This patient is enrolled in [address] [place] [address] Weight Loss Program and attended the required weekly virtual nutrition class hosted via [employer].       Dania Watkins, MS, RD, LDN

## 2021-09-29 NOTE — PROGRESS NOTES
[facility]  Metabolic Weight Loss Program        Patient's Name: Shanell Valladares  : 1985    This patient is enrolled in [address] [place] [address] Weight Loss Program and attended the required weekly virtual nutrition class hosted via [employer].       Jasbir Randall, MS, RD, LDN

## 2021-09-30 PROBLEM — E78.5 DYSLIPIDEMIA: Status: ACTIVE | Noted: 2021-09-30

## 2021-10-02 NOTE — PROGRESS NOTES
Sanford Saucedo presents for weekly or bi-monthly evaluation of Obesity Body mass index is 33.75 kg/m². Visit Vitals  /66 (BP 1 Location: Left arm, BP Patient Position: Sitting)   Pulse 84   Resp 18   Ht 5' 3\" (1.6 m)   Wt 190 lb 8 oz (86.4 kg)   LMP 09/17/2021   SpO2 98%   BMI 33.75 kg/m²       Wt Readings from Last 3 Encounters:   09/27/21 190 lb 8 oz (86.4 kg)   09/14/21 192 lb 3.2 oz (87.2 kg)   08/24/21 195 lb (88.5 kg)       1. Obesity (BMI 30.0-34.9)         I have reviewed and agree with nurse documentation of Weekly Education Class nurse progress note for [address] [place] [address] Weight [address] [place] [address] [place]). Sanford Saucedo is compliant with program requirements and may continue participation utilizing the [employer] meal replacements, as discussed. Patient has been advised to refer to the [facility] Patient Manual and notify us if any side effects to this meal plan are present and/or persist.  Sanford Saucedo may continue the current dietary approach, care and follow up at the monthly office visit with the provider, as scheduled. Follow-up and Dispositions    · Return in about 1 week (around 10/4/2021) for weight. Documentation true and accepted by Lorena Aleman  [facility] Sami., [id number], Diplomate RAFFI WANG

## 2021-10-04 ENCOUNTER — APPOINTMENT (OUTPATIENT)
Dept: ULTRASOUND IMAGING | Age: 36
End: 2021-10-04
Attending: EMERGENCY MEDICINE
Payer: COMMERCIAL

## 2021-10-04 ENCOUNTER — APPOINTMENT (OUTPATIENT)
Dept: CT IMAGING | Age: 36
End: 2021-10-04
Attending: NURSE PRACTITIONER
Payer: COMMERCIAL

## 2021-10-04 ENCOUNTER — CLINICAL SUPPORT (OUTPATIENT)
Dept: SURGERY | Age: 36
End: 2021-10-04

## 2021-10-04 ENCOUNTER — HOSPITAL ENCOUNTER (EMERGENCY)
Age: 36
Discharge: HOME | End: 2021-10-04
Attending: EMERGENCY MEDICINE
Payer: COMMERCIAL

## 2021-10-04 VITALS
BODY MASS INDEX: 33.49 KG/M2 | HEIGHT: 63 IN | RESPIRATION RATE: 16 BRPM | SYSTOLIC BLOOD PRESSURE: 127 MMHG | WEIGHT: 189 LBS | TEMPERATURE: 97.8 F | OXYGEN SATURATION: 99 % | HEART RATE: 77 BPM | DIASTOLIC BLOOD PRESSURE: 85 MMHG

## 2021-10-04 VITALS
DIASTOLIC BLOOD PRESSURE: 72 MMHG | HEIGHT: 63 IN | BODY MASS INDEX: 33.63 KG/M2 | HEART RATE: 76 BPM | SYSTOLIC BLOOD PRESSURE: 102 MMHG | WEIGHT: 189.8 LBS | RESPIRATION RATE: 18 BRPM | OXYGEN SATURATION: 99 %

## 2021-10-04 DIAGNOSIS — R10.13 ABDOMINAL PAIN, EPIGASTRIC: Primary | ICD-10-CM

## 2021-10-04 DIAGNOSIS — E66.9 OBESITY (BMI 30.0-34.9): Primary | ICD-10-CM

## 2021-10-04 DIAGNOSIS — R10.13 EPIGASTRIC ABDOMINAL PAIN: Primary | ICD-10-CM

## 2021-10-04 PROBLEM — R10.9 ABDOMINAL PAIN: Status: ACTIVE | Noted: 2021-10-04

## 2021-10-04 LAB
ALBUMIN SERPL-MCNC: 3.4 G/DL (ref 3.5–5)
ALBUMIN/GLOB SERPL: 0.9 {RATIO} (ref 1.1–2.2)
ALP SERPL-CCNC: 69 U/L (ref 45–117)
ALT SERPL-CCNC: 18 U/L (ref 12–78)
ANION GAP SERPL CALC-SCNC: 3 MMOL/L (ref 5–15)
APPEARANCE UR: CLEAR
AST SERPL-CCNC: 10 U/L (ref 15–37)
BACTERIA URNS QL MICRO: NEGATIVE /HPF
BASOPHILS # BLD: 0 K/UL (ref 0–0.1)
BASOPHILS NFR BLD: 0 % (ref 0–1)
BILIRUB SERPL-MCNC: 0.5 MG/DL (ref 0.2–1)
BILIRUB UR QL: NEGATIVE
BUN SERPL-MCNC: 13 MG/DL (ref 6–20)
BUN/CREAT SERPL: 24 (ref 12–20)
CALCIUM SERPL-MCNC: 8.8 MG/DL (ref 8.5–10.1)
CHLORIDE SERPL-SCNC: 110 MMOL/L (ref 97–108)
CO2 SERPL-SCNC: 25 MMOL/L (ref 21–32)
COLOR UR: ABNORMAL
COMMENT, HOLDF: NORMAL
CREAT SERPL-MCNC: 0.55 MG/DL (ref 0.55–1.02)
DIFFERENTIAL METHOD BLD: ABNORMAL
EOSINOPHIL # BLD: 0.3 K/UL (ref 0–0.4)
EOSINOPHIL NFR BLD: 5 % (ref 0–7)
EPITH CASTS URNS QL MICRO: ABNORMAL /LPF
ERYTHROCYTE [DISTWIDTH] IN BLOOD BY AUTOMATED COUNT: 16.8 % (ref 11.5–14.5)
GLOBULIN SER CALC-MCNC: 3.9 G/DL (ref 2–4)
GLUCOSE SERPL-MCNC: 89 MG/DL (ref 65–100)
GLUCOSE UR STRIP.AUTO-MCNC: NEGATIVE MG/DL
HCG UR QL: NEGATIVE
HCT VFR BLD AUTO: 38.8 % (ref 35–47)
HGB BLD-MCNC: 13.5 G/DL (ref 11.5–16)
HGB UR QL STRIP: NEGATIVE
HYALINE CASTS URNS QL MICRO: ABNORMAL /LPF (ref 0–5)
IMM GRANULOCYTES # BLD AUTO: 0 K/UL (ref 0–0.04)
IMM GRANULOCYTES NFR BLD AUTO: 0 % (ref 0–0.5)
KETONES UR QL STRIP.AUTO: NEGATIVE MG/DL
LEUKOCYTE ESTERASE UR QL STRIP.AUTO: ABNORMAL
LIPASE SERPL-CCNC: 85 U/L (ref 73–393)
LYMPHOCYTES # BLD: 1.8 K/UL (ref 0.8–3.5)
LYMPHOCYTES NFR BLD: 25 % (ref 12–49)
MCH RBC QN AUTO: 29.3 PG (ref 26–34)
MCHC RBC AUTO-ENTMCNC: 34.8 G/DL (ref 30–36.5)
MCV RBC AUTO: 84.3 FL (ref 80–99)
MONOCYTES # BLD: 0.4 K/UL (ref 0–1)
MONOCYTES NFR BLD: 6 % (ref 5–13)
NEUTS SEG # BLD: 4.6 K/UL (ref 1.8–8)
NEUTS SEG NFR BLD: 64 % (ref 32–75)
NITRITE UR QL STRIP.AUTO: NEGATIVE
NRBC # BLD: 0 K/UL (ref 0–0.01)
NRBC BLD-RTO: 0 PER 100 WBC
PH UR STRIP: 8.5 [PH] (ref 5–8)
PLATELET # BLD AUTO: 292 K/UL (ref 150–400)
PMV BLD AUTO: 10.7 FL (ref 8.9–12.9)
POTASSIUM SERPL-SCNC: 4 MMOL/L (ref 3.5–5.1)
PROT SERPL-MCNC: 7.3 G/DL (ref 6.4–8.2)
PROT UR STRIP-MCNC: NEGATIVE MG/DL
RBC # BLD AUTO: 4.6 M/UL (ref 3.8–5.2)
RBC #/AREA URNS HPF: ABNORMAL /HPF (ref 0–5)
SAMPLES BEING HELD,HOLD: NORMAL
SODIUM SERPL-SCNC: 138 MMOL/L (ref 136–145)
SP GR UR REFRACTOMETRY: 1.02 (ref 1–1.03)
UR CULT HOLD, URHOLD: NORMAL
UROBILINOGEN UR QL STRIP.AUTO: 1 EU/DL (ref 0.2–1)
WBC # BLD AUTO: 7.3 K/UL (ref 3.6–11)
WBC URNS QL MICRO: ABNORMAL /HPF (ref 0–4)

## 2021-10-04 PROCEDURE — 99243 OFF/OP CNSLTJ NEW/EST LOW 30: CPT | Performed by: NURSE PRACTITIONER

## 2021-10-04 PROCEDURE — 99284 EMERGENCY DEPT VISIT MOD MDM: CPT

## 2021-10-04 PROCEDURE — 80053 COMPREHEN METABOLIC PANEL: CPT

## 2021-10-04 PROCEDURE — 76705 ECHO EXAM OF ABDOMEN: CPT

## 2021-10-04 PROCEDURE — 74011000636 HC RX REV CODE- 636: Performed by: EMERGENCY MEDICINE

## 2021-10-04 PROCEDURE — 85025 COMPLETE CBC W/AUTO DIFF WBC: CPT

## 2021-10-04 PROCEDURE — 81001 URINALYSIS AUTO W/SCOPE: CPT

## 2021-10-04 PROCEDURE — 81025 URINE PREGNANCY TEST: CPT

## 2021-10-04 PROCEDURE — 36415 COLL VENOUS BLD VENIPUNCTURE: CPT

## 2021-10-04 PROCEDURE — 83690 ASSAY OF LIPASE: CPT

## 2021-10-04 PROCEDURE — 74177 CT ABD & PELVIS W/CONTRAST: CPT

## 2021-10-04 RX ORDER — HYDROMORPHONE HYDROCHLORIDE 1 MG/ML
0.5 INJECTION, SOLUTION INTRAMUSCULAR; INTRAVENOUS; SUBCUTANEOUS
Status: DISCONTINUED | OUTPATIENT
Start: 2021-10-04 | End: 2021-10-04 | Stop reason: HOSPADM

## 2021-10-04 RX ORDER — ONDANSETRON 2 MG/ML
4 INJECTION INTRAMUSCULAR; INTRAVENOUS
Status: DISCONTINUED | OUTPATIENT
Start: 2021-10-04 | End: 2021-10-04 | Stop reason: HOSPADM

## 2021-10-04 RX ORDER — PANTOPRAZOLE SODIUM 40 MG/1
40 TABLET, DELAYED RELEASE ORAL
Status: DISCONTINUED | OUTPATIENT
Start: 2021-10-05 | End: 2021-10-04 | Stop reason: HOSPADM

## 2021-10-04 RX ORDER — PANTOPRAZOLE SODIUM 40 MG/1
40 TABLET, DELAYED RELEASE ORAL DAILY
Qty: 20 TABLET | Refills: 0 | Status: SHIPPED | OUTPATIENT
Start: 2021-10-04 | End: 2021-10-24

## 2021-10-04 RX ADMIN — IOPAMIDOL 100 ML: 755 INJECTION, SOLUTION INTRAVENOUS at 11:26

## 2021-10-04 NOTE — ED TRIAGE NOTES
Pt c/o upper abd pain radiating to back, +nausea, denies fever , chronic constipation, denies diarrhea , felt her heart rate going up and down, denies sob or cough , denies urinary symptoms, unsure of pregnancy

## 2021-10-04 NOTE — ED PROVIDER NOTES
This is a 70-year-old female who had a gastric bypass in 2019 and is followed for the same by Dr. Shanna Britton. She has history of polycystic ovaries and hemorrhoids. She also has a history of hypertension. She states that she is generally done well since her bypass surgery. She states on Saturday night she began with some epigastric pain which she described as more of a dull type pain that was steady and went in towards her back. She did notice some increasing discomfort with eating or drinking. She did take 1 dose of Pepcid. She had some nausea associated with this so she took some Zofran. She states the symptoms kept her awake all Saturday night and the pain persisted Sunday to the extent that she called the on-call physician and they told her to come in today. She was unable to get in today because of the scheduling for the office and we will not be able to get him till tomorrow. She was concerned because she still had some nausea. The pain is somewhat better. All of it is located in the epigastrium. She has had no vomiting and she has had no diarrhea or problems with the urine. There is a history of some chronic constipation. Patient denies any other acute symptomatology. Past Medical History:   Diagnosis Date    Anxiety 2011    Gordan Presybeterian, LCSW (Telehealth)    Back pain     Bilateral knee pain     due to OA.  Hemorrhoids     High cholesterol 2016    History of uterine fibroid summer 2020    30 fibroids. Dr. Morelia Mullins childhood (5-11 yo)    Txd w lots steroids. Severe allergies to everything. Allergist.    Hypertension 2009    Txd w 3 bp meds. off after weight loss.  Iron deficiency anemia due to chronic blood loss 2020    Txd w Iron Infusions x 5.   Dr. Becca Martini Menorrhagia     Morbid obesity ([facility] [address] 11/20/2019    Gastric Bypass 2019    PCOS (polycystic ovarian syndrome) 2015    Vitamin D deficiency 2020       Past Surgical History:   Procedure Laterality Date    HX GI  2013    Sigmoidoscopy due to rectal polyp- WNL pp     HX LAP GASTRIC BYPASS  11/20/2019    Dr. Kurt Mcnally @ [address] [place] [address]     HX MYOMECTOMY  06/2021    30 uterine fibroids removed. Dr. Jose J Olson.  HX POLYPECTOMY  1989    RECTAL POLYP- benign.  HX REFRACTIVE SURGERY  2015    HX TONSILLECTOMY  02/16/2021    due to chronic inflammation. Dr. Twan Schaffer.  HX WISDOM TEETH EXTRACTION           Family History:   Problem Relation Age of Onset    Diabetes Father     Heart Disease Father 64        cardiomyopathy, heart transplant    Hypertension Father     Heart Surgery Father     High Cholesterol Father     Obesity Father     Gout Father     Other Father         kidney stones    Sleep Apnea Father     Diabetes Mother     High Cholesterol Mother     Hypertension Mother     Obesity Mother     Diabetes Maternal Grandmother     Diabetes Maternal Grandfather     Diabetes Paternal Grandmother     Obesity Sister     Anesth Problems Neg Hx        Social History     Socioeconomic History    Marital status: LIFE PARTNER     Spouse name: Vicente Martinez Number of children: 0    Years of education: Not on file    Highest education level: Not on file   Occupational History    Occupation: Optometrist      Comment: [employer]   Tobacco Use    Smoking status: Former Smoker     Packs/day: 0.25     Years: 15.00     Pack years: 3.75     Types: Cigarettes     Quit date: 8/1/2018     Years since quitting: 3.1    Smokeless tobacco: Never Used   Vaping Use    Vaping Use: Never used   Substance and Sexual Activity    Alcohol use: Yes     Alcohol/week: 1.0 standard drinks     Types: 1 Shots of liquor per week    Drug use: Not Currently     Types: Marijuana    Sexual activity: Yes     Partners: Male     Birth control/protection: I.U.D.    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 Engaged and living with Miya Castillo) at [place] [address] for now during the move. Dog - 1 yo in 2017.         01/02/19    Working at [employer]"     Social Determinants of Health     Financial Resource Strain:     Difficulty of Paying Living Expenses:    Food Insecurity:     Worried About [address] in the Last Year:     [address] in the Last Year:    Transportation Needs:     Lack of Transportation (Medical):  Lack of Transportation (Non-Medical):    Physical Activity:     Days of Exercise per Week:     Minutes of Exercise per Session:    Stress:     Feeling of Stress :    Social Connections:     Frequency of Communication with Friends and Family:     Frequency of Social Gatherings with Friends and Family:     Attends Tenriism Services:     Active Member of Clubs or Organizations:     Attends Club or Organization Meetings:     Marital Status:    Intimate Partner Violence:     Fear of Current or Ex-Partner:     Emotionally Abused:     Physically Abused:     Sexually Abused: ALLERGIES: Anesthesia s/i-40 (propofol) [propofol]    Review of Systems   Constitutional: Negative for activity change, appetite change, chills, fatigue and fever. HENT: Negative for ear pain, facial swelling, sore throat and trouble swallowing. Eyes: Negative for pain, discharge and visual disturbance. Respiratory: Negative for chest tightness, shortness of breath and wheezing. Cardiovascular: Negative for chest pain and palpitations. Gastrointestinal: Positive for abdominal pain and nausea. Negative for blood in stool and vomiting. Genitourinary: Negative for difficulty urinating, flank pain and hematuria. Musculoskeletal: Negative for arthralgias, joint swelling, myalgias and neck pain. Skin: Negative for color change and rash. Neurological: Negative for dizziness, weakness, numbness and headaches. Hematological: Negative for adenopathy. Does not bruise/bleed easily. Psychiatric/Behavioral: Negative for behavioral problems, confusion and sleep disturbance. All other systems reviewed and are negative. Vitals:    10/04/21 0904   BP: 127/85   Pulse: 77   Resp: 16   Temp: 97.8 °F (36.6 °C)   SpO2: 99%            Physical Exam  Vitals and nursing note reviewed. Constitutional:       General: She is not in acute distress. Appearance: She is well-developed. Comments: This is a 28-year-old female in a mild degree of discomfort with nausea and mild pain. Vital signs are as noted. HENT:      Head: Normocephalic and atraumatic. Nose: Nose normal.   Eyes:      General: No scleral icterus. Conjunctiva/sclera: Conjunctivae normal.      Pupils: Pupils are equal, round, and reactive to light. Neck:      Thyroid: No thyromegaly. Vascular: No JVD. Trachea: No tracheal deviation. Comments: No carotid bruits noted. Cardiovascular:      Rate and Rhythm: Normal rate and regular rhythm. Heart sounds: Normal heart sounds. No murmur heard. No friction rub. No gallop. Pulmonary:      Effort: Pulmonary effort is normal. No respiratory distress. Breath sounds: Normal breath sounds. No wheezing or rales. Chest:      Chest wall: No tenderness. Abdominal:      General: Abdomen is protuberant. Bowel sounds are normal. There is no distension. Palpations: Abdomen is soft. There is no mass. Tenderness:  There is abdominal tenderness ( There is some localized tenderness in the epigastrium to palpation. It is mild. There is no tenderness in the right upper quadrant or left upper quadrant. The abdomen is generally soft and nontender except in the epigastrium. There is no guarding or r). There is no guarding or rebound. Musculoskeletal:         General: No tenderness. Normal range of motion. Cervical back: Normal range of motion and neck supple. Lymphadenopathy:      Cervical: No cervical adenopathy. Skin:     General: Skin is warm and dry. Findings: No erythema or rash. Neurological:      Mental Status: She is alert and oriented to person, place, and time. Cranial Nerves: No cranial nerve deficit. Coordination: Coordination normal.      Deep Tendon Reflexes: Reflexes are normal and symmetric. Psychiatric:         Behavior: Behavior normal.         Thought Content: Thought content normal.         Judgment: Judgment normal.          MDM  Number of Diagnoses or Management Options  Abdominal pain, epigastric: new and requires workup     Amount and/or Complexity of Data Reviewed  Clinical lab tests: ordered and reviewed  Tests in the radiology section of CPT®: ordered and reviewed  Decide to obtain previous medical records or to obtain history from someone other than the patient: yes  Review and summarize past medical records: yes  Discuss the patient with other providers: yes    Risk of Complications, Morbidity, and/or Mortality  Presenting problems: high  Diagnostic procedures: moderate  Management options: moderate    Patient Progress  Patient progress: stable         Procedures  The patient is status post gastric bypass and is complaining of pain in the epigastrium with some nausea. She has taken Zofran and 1 dose of Pepcid. The pain is somewhat better this morning but she was unable to get into the office until tomorrow to be seen by the surgeon. Therefore came to the ED. Her physical exam is remarkable for only some mild epigastric tenderness.   The remainder of her abdomen is benign. Will obtain labs and then consult the surgeon. I will refrain from any imaging at the moment until her labs are returned. 10:24 AM the patient's labs appear unremarkable. A consult has been placed with general surgery. Her pain has improved somewhat held on imaging until I speak with surgery. This12:48 PM surgery has seen. The CT is negative and they are asking for an ultrasound at this point. That is ordered. The patient's ultrasound was negative for acute process. She was seen in the ED by Dr. Staci Dominguez as well who felt the patient could be discharged home to follow-up with him in the office. He wanted her discharged on some Protonix. Patient was discharged in no acute distress.

## 2021-10-04 NOTE — DISCHARGE INSTRUCTIONS
Dr. Tyler Cummings is asked that she take Protonix daily. He is setting up an endoscopy for you. If your symptoms worsen prior to seeing them call him. He would like you on just a liquid diet until they get the endoscopy set up.

## 2021-10-04 NOTE — CONSULTS
[facility]  Inpatient Consultation        Admit Date: 10/4/2021  Reason for Consultation: epigastric pain, post gastric bypass 2019    HPI:  Celio Davidson is a 39 y.o. female w/ PMHx as outlined below and gastric bypass in 2019 whom we are asked to see in consultation by Dr. Mike Pedro for the above complaint. Pt presents to the ED today w/ c/o epigastric pain that started Saturday and has persisted. The pain is worse after eating a/w nausea; no vomiting. She hasn't been able to eat anything since yesterday. She called the office but was unable to be seen until tomorrow so came to ED today. 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    Candidal intertrigo 04/28/2021    Uterine leiomyoma, unspecified location 03/30/2021    PMDD (premenstrual dysphoric disorder) 03/30/2021    Elevated BP without diagnosis of hypertension 01/06/2021    Iron deficiency anemia due to chronic blood loss 01/04/2021    Vitamin D deficiency 2020    Class 1 obesity due to excess calories with serious comorbidity and body mass index (BMI) of 34.0 to 34.9 in adult 11/20/2019    Hypercholesteremia 07/01/2016    PCOS (polycystic ovarian syndrome) 03/28/2016    Allergic rhinitis due to allergen 03/28/2016    Vaginal low risk HPV DNA test positive 03/28/2016    Anxiety 03/28/2016    High cholesterol 2016     Past Medical History:   Diagnosis Date    Anxiety 2011    Aníbal Madsen LCSW (Telehealth)    Back pain     Bilateral knee pain     due to OA.  Hemorrhoids     High cholesterol 2016    History of uterine fibroid summer 2020    30 fibroids. Dr. Ruth Monk childhood (5-11 yo)    Txd w lots steroids. Severe allergies to everything. Allergist.    Hypertension 2009    Txd w 3 bp meds. off after weight loss.  Iron deficiency anemia due to chronic blood loss 2020    Txd w Iron Infusions x 5. Dr. Duran Se Menorrhagia     Morbid obesity ([address] 11/20/2019    Gastric Bypass 2019    PCOS (polycystic ovarian syndrome) 2015    Vitamin D deficiency 2020      Past Surgical History:   Procedure Laterality Date    HX GI  2013    Sigmoidoscopy due to rectal polyp- WNL pp     HX LAP GASTRIC BYPASS  11/20/2019    [facility] Quinn [facility]     HX MYOMECTOMY  06/2021    30 uterine fibroids removed. Dr. Taiwo Morin.  HX POLYPECTOMY  1989    RECTAL POLYP- benign.  HX REFRACTIVE SURGERY  2015    HX TONSILLECTOMY  02/16/2021    due to chronic inflammation. Dr. Henrik Ruiz.  HX WISDOM TEETH EXTRACTION        Social History     Tobacco Use    Smoking status: Former Smoker     Packs/day: 0.25     Years: 15.00     Pack years: 3.75     Types: Cigarettes     Quit date: 8/1/2018     Years since quitting: 3.1    Smokeless tobacco: Never Used   Substance Use Topics    Alcohol use: Yes     Alcohol/week: 1.0 standard drinks     Types: 1 Shots of liquor per week      Family History   Problem Relation Age of Onset    Diabetes Father     Heart Disease Father 64        cardiomyopathy, heart transplant    Hypertension Father     Heart Surgery Father     High Cholesterol Father     Obesity Father     Gout Father     Other Father         kidney stones    Sleep Apnea Father     Diabetes Mother     High Cholesterol Mother     Hypertension Mother     Obesity Mother     Diabetes Maternal Grandmother     Diabetes Maternal Grandfather     Diabetes Paternal Grandmother     Obesity Sister     Anesth Problems Neg Hx       Prior to Admission medications    Medication Sig Start Date End Date Taking? Authorizing Provider   cholecalciferol, vitamin D3, (VITAMIN D3 PO) Take 6,000 Units by mouth.     Provider, Historical   ALPRAZolam (XANAX) 0.25 mg tablet TAKE 1 TAB BY MOUTH TWO (2) TIMES DAILY AS NEEDED FOR ANXIETY. MAX DAILY AMOUNT: 0.5 MG. 21   Lalo Corona PA-C   FLUoxetine (PROzac) 20 mg capsule Take 1 Cap by mouth daily. 21   Lalo Corona PA-C   nystatin (MYCOSTATIN) powder Apply  to affected area four (4) times daily. 21   Lalo Corona PA-C   calcium carbonate (OS-PEBBLES) 500 mg calcium (1,250 mg) tablet Take  by mouth daily. Provider, Historical   multivitamin (ONE A DAY) tablet Take 2 Tabs by mouth daily. Bariatric Advantage    Provider, Historical     No current facility-administered medications for this encounter. Current Outpatient Medications   Medication Sig    cholecalciferol, vitamin D3, (VITAMIN D3 PO) Take 6,000 Units by mouth.  ALPRAZolam (XANAX) 0.25 mg tablet TAKE 1 TAB BY MOUTH TWO (2) TIMES DAILY AS NEEDED FOR ANXIETY. MAX DAILY AMOUNT: 0.5 MG.    FLUoxetine (PROzac) 20 mg capsule Take 1 Cap by mouth daily.  nystatin (MYCOSTATIN) powder Apply  to affected area four (4) times daily.  calcium carbonate (OS-PEBBLES) 500 mg calcium (1,250 mg) tablet Take  by mouth daily.  multivitamin (ONE A DAY) tablet Take 2 Tabs by mouth daily. Bariatric Advantage     Allergies   Allergen Reactions    Anesthesia S/I-40 (Propofol) [Propofol] Nausea and Vomiting     TXD W SCOPOLAMINE PATCH AND ANTI-NAUSEA PRE-OPERATIVELY          Subjective:     Review of Systems:    A comprehensive review of systems was negative except for that written in the History of Present Illness. Objective:     Blood pressure 127/85, pulse 77, temperature 97.8 °F (36.6 °C), resp. rate 16, last menstrual period 2021, SpO2 99 %.   Temp (24hrs), Av.8 °F (36.6 °C), Min:97.8 °F (36.6 °C), Max:97.8 °F (36.6 °C)      Recent Labs     10/04/21  09   WBC 7.3   HGB 13.5   HCT 38.8        Recent Labs     10/04/21  0923      K 4.0   *   CO2 25   GLU 89   BUN 13   CREA 0.55   CA 8.8   ALB 3.4*   TBILI 0.5   ALT 18     Recent Labs     10/04/21  09   LPSE 85       No intake or output data in the 24 hours ending 10/04/21 1149    _____________________  Physical Exam:     General:  Alert, cooperative, no distress, appears stated age. Eyes:   Sclera clear. Throat: Lips, mucosa, and tongue normal.   Neck: Supple, symmetrical, trachea midline. Lungs:   Clear to auscultation bilaterally. Heart:  Regular rate and rhythm. Abdomen:   Normal BS, Soft, TTP in epigastric area. No masses,  No organomegaly. Extremities: Extremities normal, atraumatic, no cyanosis or edema. Skin: Skin color, texture, turgor normal. No rashes or lesions. Assessment:   Active Problems:    Abdominal pain (10/4/2021)            Plan:     Keep npo for now  Will d/w Dr Jos Resendiz - may get U/s to look for gallstones  Pain and nausea mgmt  Thank you for allowing us to participate in the care of this patient. Total time spent with patient: 30 minutes. Signed By: Torito Golden NP     October 4, 2021      Patient independently interviewed and examined. Agree with NP assessment. She has pain, nausea with history of prior laparoscopic gastric bypass. She denies emesis. Pain developed on Saturday, worsened yesterday and persists today. CT scan without signs of internal hernia or small bowel obstruction. Ultrasound without signs of gallstones. Her findings are suggestive of marginal ulcer. As she can tolerate liquids, we will discharged home on liquid diet with PPI, plans for upper endoscopy. Patient agrees with this plan. All questions answered.

## 2021-10-04 NOTE — ED NOTES
Generally very well and ambulatory at discharge. GCS 15. Speech clear, appropriate and w/o difficulty. Unlabored breathing. Skin warm and dry, normal color for ethnicity. IV D/Mayo tip intact.  Patient understands follow-up care plan and has Follow-up appointment tomorrow at [phone]

## 2021-10-04 NOTE — PROGRESS NOTES
Fransico Light presents for weekly or bi-monthly evaluation of Obesity Body mass index is 33.62 kg/m². Visit Vitals  /72 (BP 1 Location: Left arm, BP Patient Position: Sitting)   Pulse 76   Resp 18   Ht 5' 3\" (1.6 m)   Wt 189 lb 12.8 oz (86.1 kg)   LMP 09/17/2021   SpO2 99%   BMI 33.62 kg/m²       Wt Readings from Last 3 Encounters:   11/01/21 191 lb 14.4 oz (87 kg)   10/19/21 188 lb 4.8 oz (85.4 kg)   10/04/21 189 lb (85.7 kg)       1. Obesity (BMI 30.0-34.9)         I have reviewed and agree with nurse documentation of Weekly Education Class nurse progress note for [facility] Weight [address] [facility] [place]). Fransico Light is compliant with program requirements and may continue participation utilizing the [employer] meal replacements, as discussed. Patient has been advised to refer to the [facility] Patient Manual and notify us if any side effects to this meal plan are present and/or persist.  Fransico Light may continue the current dietary approach, care and follow up at the monthly office visit with the provider, as scheduled. Follow-up and Dispositions    · Return in about 1 week (around 10/11/2021) for weight check. Documentation true and accepted by Sagar Dixon. Richard Collins., AOBFP, Diplomate RAFFI WANG    Progress Note: Weekly Education Class in the [employer] Weight Loss Program         Patient is on Very Low Calorie Diet [x] (4 meal replacements per day, 800 kcal/day)      Low Calorie Diet [] (2-3 meal replacements per day, 5623-9977 kcal/day)    1) Did patient have any new symptoms or physical problems? Yes [x]    No []    If yes, check & comment: weakness [], fatigue [], lightheadedness [], headache [], cramps [], cold intolerance [], hair loss [], diarrhea [], constipation [],  NA [] other: stomach aces                                2) Has patient had any medical attention from other providers, urgent care or the emergency room this week?   Yes []  No [x] NA [], If yes, why:     3) Any other sugar sweetened beverages consumed this week? Yes [x]  No []    4) Did patient have any problems adhering to the diet? Yes []  No [] NA []    If yes, Vacation [], Celebrations [], Conferences [], Family Reunions [x] other: Concert with friend Saturday, sick on Sunday upset stomach, nausea                                               5) How many hours of sleep this week? 8-10  (range)  NA []    Number of meal replacements consumed daily? 1-5 (range)  NA []    Average ounces of water patient consumed daily this week (not including shakes)70.3  (divide the weekly total by 7)    Did you eat any food outside of the program? Yes [x] No []    Physical Activity Over the Past Week:    Cardio exercise: 130 min  Strength exercise: 1 workouts / week  Number of steps walked per day: [phone]    How has patient mood overall been this week? Sad [], Happy [], Stressed [], Tired [], Content [], NA [], other            Medications reconciled by nurse Yes [x]  No[]    Patient was given therapeutic recommendations for any noted side effects of their dietary approach based upon [employer] patient manual per providers recommendation.

## 2021-10-05 ENCOUNTER — VIRTUAL VISIT (OUTPATIENT)
Dept: SURGERY | Age: 36
End: 2021-10-05
Payer: COMMERCIAL

## 2021-10-05 ENCOUNTER — OFFICE VISIT (OUTPATIENT)
Dept: SURGERY | Age: 36
End: 2021-10-05

## 2021-10-05 ENCOUNTER — CLINICAL SUPPORT (OUTPATIENT)
Dept: SURGERY | Age: 36
End: 2021-10-05

## 2021-10-05 DIAGNOSIS — E66.09 CLASS 1 OBESITY DUE TO EXCESS CALORIES WITH SERIOUS COMORBIDITY AND BODY MASS INDEX (BMI) OF 34.0 TO 34.9 IN ADULT: Primary | ICD-10-CM

## 2021-10-05 DIAGNOSIS — Z98.84 S/P GASTRIC BYPASS: ICD-10-CM

## 2021-10-05 DIAGNOSIS — R10.13 EPIGASTRIC PAIN: ICD-10-CM

## 2021-10-05 DIAGNOSIS — E66.9 OBESITY (BMI 30.0-34.9): Primary | ICD-10-CM

## 2021-10-05 PROCEDURE — 99212 OFFICE O/P EST SF 10 MIN: CPT | Performed by: NURSE PRACTITIONER

## 2021-10-05 RX ORDER — ONDANSETRON 4 MG/1
4 TABLET, ORALLY DISINTEGRATING ORAL
Qty: 30 TABLET | Refills: 0 | Status: SHIPPED | OUTPATIENT
Start: 2021-10-05 | End: 2022-09-27

## 2021-10-05 NOTE — PROGRESS NOTES
[facility]  Metabolic Program Follow-up Nutrition Consult    Date: 10/5/2021   Physician: Willis Abraham DO  Name: Krystin Salazar  :  1985    Type of Plan: VLCD  Weeks on Plan: 3 weeks  Virtual visit was completed through [address] [place] [address]. ASSESSMENT:    Medications/Supplements:   Prior to Admission medications    Medication Sig Start Date End Date Taking? Authorizing Provider   ondansetron (ZOFRAN ODT) 4 mg disintegrating tablet Take 1 Tablet by mouth every eight (8) hours as needed for Nausea or Vomiting. 10/5/21   Lowell Parekh NP   pantoprazole (Protonix) 40 mg tablet Take 1 Tablet by mouth daily for 20 days. 10/4/21 10/24/21  Matthias Cordero MD   cholecalciferol, vitamin D3, (VITAMIN D3 PO) Take 6,000 Units by mouth three (3) days a week. Provider, Historical   ALPRAZolam (XANAX) 0.25 mg tablet TAKE 1 TAB BY MOUTH TWO (2) TIMES DAILY AS NEEDED FOR ANXIETY. MAX DAILY AMOUNT: 0.5 MG. 21   Rosie Pagan PA-C   FLUoxetine (PROzac) 20 mg capsule Take 1 Cap by mouth daily. 21   Rosie Pagan PA-C   nystatin (MYCOSTATIN) powder Apply  to affected area four (4) times daily. Patient taking differently: Apply  to affected area as needed. 21   Rosie Pagan PA-C   calcium carbonate (OS-PEBBLES) 500 mg calcium (1,250 mg) tablet Take 2 Tablets by mouth daily. BARIATRIC ADVANTAGE    Provider, Historical   multivitamin (ONE A DAY) tablet Take 2 Tabs by mouth daily. Bariatric Advantage    Provider, Historical              Starting Weight: 192#  Current Weight: 189#  Overall Pounds Lost: 3# Overall Pounds Gained: 0    Exercise/Physical Activity: not reported    Is patient using [employer] products: yes and no. If yes, how many per day: pt following guidelines until 3 days ago when out to a concert with alcohol and caffeine. Stomach pains, nausea. Has refrained from food since. To ED  for pain, post-op bypass patient 3 years ago.   Saw Dr. Shanna Britton in ED, planning an endoscopy to determine cause. Aversions/side effects of product/program: see above - stomach pains, nausea - not related to products - unsure if has an ulcer. Fluids used to mix with products: not reported    Reported Diet History: Dr. Fransisca Mittal said to d/c VLCD until cleared by GI. Feeling mostly fine today. Whenever eat or drink today such as crackers and broth, it helps with hunger pain, but still some nausea 10-15 minutes later. Shakes make her feel bloated. Prior to d/c shakes, pt was drinking 10oz all in one sitting. Barriers/concerns preventing patient from achieving goal(s) since last visit: possible ulcer, not doing the program at this time. NUTRITION INTERVENTION:  Pt educated on nutrition recommendations for the [employer] Very Low Calorie Plan (VLCD), with the specific meal pattern of 4 meal replacements every day totaling 800kcals/every day and 40-50 grams carbohydrates/every day. Choose lean protein and non-starchy vegetable OR an additional meal replacement on days of increased activity/exercise to prevent injury, dizziness/lightheadedness, or delay in healing/repairing after exercise. Consult provider and RD prior to increase in exercise routine. IF choosing grocery meal/snack: Read all nutrition labels. Demonstrated and emphasized identifying serving size, total fat, sugar and protein content. Defined low fat as </= 3 g per serving. Discussed lean and extra lean sources of protein. Avoid foods with sugar listed in the first 3 ingredients and >/10 g sugar per serving. Consume meal replacements every three to four hours or pattern as discussed with provider. Mix with water. May add herbs/spices for taste. Refer to recipe book for meal replacements ideas to alter taste, texture, and temperature while keeping macronutrient distribution within program guidelines.      Practice mindful eating habits; take small bites, chew thoroughly, avoid distractions, utilize hunger/fullness scale. Reviewed attendance policy of attending weekly nutrition classes. Metabolic support group recommended, and increase physical activity (approved per MD) for long term weight maintenance. NUTRITION MONITORING AND EVALUATION: pt plans to f/u with surgeon's office about stomach pain. Pt has been instructed to pause program by Dr. Darek Magana until this is resolved. Discussed easy, bland items that are nutritionally dense while in acute pain. Bone broth, s/f protein water, and/or items from the full liquid post-op diet manual from her surgery. Pt amicable. F/u when pain resolves. The following goals were established with patient;  1) schedule followup with RD when cleared to start program again. 2) reduce intake to prevent bloating, try half shake 4-6 oz at a time or take 30 minutes to consume the whole thing. Specific tips and techniques to facilitate compliance with above recommendations were provided and discussed. If further details are desired please contact me at [phone]. This phone number was also provided to the patient for any further questions or concerns.           Keila Medeiros, MS, RD, LDN

## 2021-10-05 NOTE — PROGRESS NOTES
Discuss abdominal pain. 1. Have you been to the ER, urgent care clinic since your last visit? Hospitalized since your last visit? Yes, [facility] on 10-4-2021 for abdominal pain. 2. Have you seen or consulted any other health care providers outside of the [address] [place] [address] since your last visit? Include any pap smears or colon screening.  No

## 2021-10-05 NOTE — PROGRESS NOTES
HISTORY OF PRESENT ILLNESS  Jessica Colón is a 39 y.o. female with previous malabsorptive gastric bypass in 2019. Jolanta Donaldson There is no height or weight on file to calculate BMI. Jolanta Donaldson She was in the Er yesterday with complaints of epigastric pain. She states that the pain started Saturday with sharp, radiating pain in the epigastric region. On Sunday, she has some nausea. She was unable to be seen in our office yesterday. She went to the ER. She had a CT scan and ultrasound which were normal.  She was seen by Dr. Yeimi Faust in the ER and an order was placed for endoscopy. She has been referred to Dr. Carmen Lambert. Today she states that she is feeling better. She is taking Protonix 40 mg daily. She has been tolerating liquids. Ms. Alexus Ponce has a reminder for a \"due or due soon\" health maintenance. I have asked that she contact her primary care provider for follow-up on this health maintenance. Current Outpatient Medications:     pantoprazole (Protonix) 40 mg tablet, Take 1 Tablet by mouth daily for 20 days. , Disp: 20 Tablet, Rfl: 0    cholecalciferol, vitamin D3, (VITAMIN D3 PO), Take 6,000 Units by mouth three (3) days a week., Disp: , Rfl:     ALPRAZolam (XANAX) 0.25 mg tablet, TAKE 1 TAB BY MOUTH TWO (2) TIMES DAILY AS NEEDED FOR ANXIETY. MAX DAILY AMOUNT: 0.5 MG., Disp: 30 Tablet, Rfl: 1    FLUoxetine (PROzac) 20 mg capsule, Take 1 Cap by mouth daily. , Disp: 90 Cap, Rfl: 1    nystatin (MYCOSTATIN) powder, Apply  to affected area four (4) times daily. (Patient taking differently: Apply  to affected area as needed.), Disp: 120 g, Rfl: 11    calcium carbonate (OS-PEBBLES) 500 mg calcium (1,250 mg) tablet, Take 2 Tablets by mouth daily. BARIATRIC ADVANTAGE, Disp: , Rfl:     multivitamin (ONE A DAY) tablet, Take 2 Tabs by mouth daily. Bariatric Advantage, Disp: , Rfl:   No current facility-administered medications for this visit.       Visit Vitals  LMP 09/17/2021     HPI    Review of Systems   Gastrointestinal: Positive for abdominal pain (epigastric) and nausea. Negative for heartburn and vomiting. Physical Exam  HENT:      Mouth/Throat:      Mouth: Mucous membranes are moist.   Pulmonary:      Effort: No respiratory distress. Abdominal:      Tenderness: There is abdominal tenderness (epigastric ). Neurological:      Mental Status: She is oriented to person, place, and time. ASSESSMENT and PLAN  Celio Davidson is a 39 y.o. female with previous malabsorptive gastric bypass in 2019. Johnny Britt There is no height or weight on file to calculate BMI. Johnny Britt She was in the Er yesterday with complaints of epigastric pain. She states that the pain started Saturday with sharp, radiating pain in the epigastric region. On Sunday, she has some nausea. She was unable to be seen in our office yesterday. She went to the ER. She had a CT scan and ultrasound which were normal.  She was seen by Dr. Luther Bello in the ER and an order was placed for endoscopy. She has been referred to Dr. Coral Munguia. Phone number given to Dr. Coral Munguia office. If she has not heard from their office by this afternoon she will call tomorrow. She needs endoscopy. Advised patient to continue 40 mg of Protonix daily. Stay on liquids may do soups. Pt verbalized understanding and questions were answered to the best of my knowledge and ability.

## 2021-10-05 NOTE — PATIENT INSTRUCTIONS
Upper GI Endoscopy: Before Your Procedure  What is an upper GI endoscopy? An upper gastrointestinal (or GI) endoscopy is a test that allows your doctor to look at the inside of your esophagus, stomach, and the first part of your small intestine, called the duodenum. The esophagus is the tube that carries food to your stomach. The doctor uses a thin, lighted tube that bends. It is called an endoscope, or scope. The doctor puts the tip of the scope in your mouth and gently moves it down your throat. The scope is a flexible video camera. The doctor looks at a monitor (like a TV set or a computer screen) as he or she moves the scope. A doctor may do this procedure to look for ulcers, tumors, infection, or bleeding. It also can be used to look for signs of acid backing up into your esophagus. This is called gastroesophageal reflux disease, or GERD. The doctor can use the scope to take a sample of tissue for study (a biopsy). The doctor also can use the scope to take out growths or stop bleeding. Follow-up care is a key part of your treatment and safety. Be sure to make and go to all appointments, and call your doctor if you are having problems. It's also a good idea to know your test results and keep a list of the medicines you take. How do you prepare for the procedure? Procedures can be stressful. This information will help you understand what you can expect. And it will help you safely prepare for your procedure. Preparing for the procedure    · Do not eat or drink anything for 6 to 8 hours before the test. An empty stomach helps your doctor see your stomach clearly during the test. It also reduces your chances of vomiting. If you vomit, there is a small risk that the vomit could enter your lungs.  (This is called aspiration.) If the test is done in an emergency, a tube may be inserted through your nose or mouth to empty your stomach.     · Do not take sucralfate (Carafate) or antacids on the day of the test. These medicines can make it hard for your doctor to see your upper GI tract.     · If your doctor tells you to, stop taking iron supplements 7 to 14 days before the test.     · Be sure you have someone to take you home. Anesthesia and pain medicine will make it unsafe for you to drive or get home on your own.     · Understand exactly what procedure is planned, along with the risks, benefits, and other options. · Tell your doctor ALL the medicines, vitamins, supplements, and herbal remedies you take. Some may increase the risk of problems during your procedure. Your doctor will tell you if you should stop taking any of them before the procedure and how soon to do it.     · If you take aspirin or some other blood thinner, ask your doctor if you should stop taking it before your procedure. Make sure that you understand exactly what your doctor wants you to do. These medicines increase the risk of bleeding.     · Make sure your doctor and the hospital have a copy of your advance directive. If you don't have one, you may want to prepare one. It lets others know your health care wishes. It's a good thing to have before any type of surgery or procedure. What happens on the day of the procedure? · Follow the instructions exactly about when to stop eating and drinking. If you don't, your procedure may be canceled. If your doctor told you to take your medicines on the day of the procedure, take them with only a sip of water.     · Take a bath or shower before you come in for your procedure. Do not apply lotions, perfumes, deodorants, or nail polish.     · Take off all jewelry and piercings. And take out contact lenses, if you wear them. At the hospital or surgery center   · Bring a picture ID.     · The test may take 15 to 30 minutes.     · The doctor may spray medicine on the back of your throat to numb it. You also will get medicine to prevent pain and to relax you.     · You will lie on your left side.  The doctor will put the scope in your mouth and toward the back of your throat. The doctor will tell you when to swallow. This helps the scope move down your throat. You will be able to breathe normally. The doctor will move the scope down your esophagus into your stomach. The doctor also may look at the duodenum.     · If your doctor wants to take a sample of tissue for a biopsy, he or she may use small surgical tools, which are put into the scope, to cut off some tissue. You will not feel a biopsy, if one is taken. The doctor also can use the tools to stop bleeding or to do other treatments, if needed.     · You will stay at the hospital or surgery center for 1 to 2 hours until the medicine you were given wears off. What happens after an upper GI endoscopy? · After the test, you may belch and feel bloated for a while.     · You may have a tickling, dry throat or mouth. You may feel a bit hoarse, and you may have a mild sore throat. These symptoms may last several days. Throat lozenges and warm saltwater gargles can help relieve the throat symptoms.     · Don't drive or operate machinery for 12 hours after the test.     · Your doctor will tell you when you can go back to your usual diet and activities.     · Don't drink alcohol for 12 to 24 hours after the test.   When should you call your doctor? · You have questions or concerns.     · You don't understand how to prepare for your procedure.     · You become ill before the procedure (such as fever, flu, or a cold).     · You need to reschedule or have changed your mind about having the procedure. Where can you learn more? Go to http://www.gray.com/  Enter P790 in the search box to learn more about \"Upper GI Endoscopy: Before Your Procedure. \"  Current as of: February 10, 2021               Content Version: 13.0  © 4003-0554 [employer].    Care instructions adapted under license by [employer] (which disclaims liability or warranty for this information). If you have questions about a medical condition or this instruction, always ask your healthcare professional. [place] [address] any warranty or liability for your use of this information.

## 2021-10-11 NOTE — PROGRESS NOTES
Your EKG results are stable. It will be my pleasure to further discuss this and be available to answer any questions at your next appointment with me.

## 2021-10-11 NOTE — PROGRESS NOTES
All your lab results are normal except  Your LDL-P (LDL particle) level is increased in this sample. This is a biomarker that represents the actual number of LDL (bad) cholesterol particles in your blood using new technologies to help uncover hidden risks for heart disease. Cholesterol is carried through the bloodstream in containers known as LDL particles. The more LDL particles are present, the more plaque can build up on the artery wall. Studies have shown that elevated LDL particle concentration is associated with increased risk for coronary heart disease, even in the presence of optimal LDL cholesterol values. Statin medication (i.e. Crestor, Zocor, Pravachol, Lipitor) effectively reduce the number of LDL particles. We can discuss this further at your follow up office visit. Please call to schedule your appointment to review your lab results, if you have not already done so. As always, Dr. Miguel Aguirre will discuss your results with your in greater detail and be available to answer any questions at your follow up appointment. Thank you for allowing us the privilege to care for you.

## 2021-10-12 ENCOUNTER — OFFICE VISIT (OUTPATIENT)
Dept: SURGERY | Age: 36
End: 2021-10-12

## 2021-10-12 DIAGNOSIS — E66.9 OBESITY (BMI 30.0-34.9): Primary | ICD-10-CM

## 2021-10-18 NOTE — PROGRESS NOTES
[facility]  Metabolic Weight Loss Program        Patient's Name: Ronald Beltran  : 1985    This patient is enrolled in [address] [place] [address] Weight Loss Program and attended the required weekly virtual nutrition class hosted via [employer].       Carl Weldon RD

## 2021-10-19 ENCOUNTER — OFFICE VISIT (OUTPATIENT)
Dept: SURGERY | Age: 36
End: 2021-10-19
Payer: COMMERCIAL

## 2021-10-19 VITALS
HEART RATE: 82 BPM | HEIGHT: 63 IN | BODY MASS INDEX: 33.36 KG/M2 | DIASTOLIC BLOOD PRESSURE: 68 MMHG | OXYGEN SATURATION: 98 % | TEMPERATURE: 98.4 F | RESPIRATION RATE: 16 BRPM | SYSTOLIC BLOOD PRESSURE: 106 MMHG | WEIGHT: 188.3 LBS

## 2021-10-19 DIAGNOSIS — E28.2 PCOS (POLYCYSTIC OVARIAN SYNDROME): ICD-10-CM

## 2021-10-19 DIAGNOSIS — E78.5 DYSLIPIDEMIA: ICD-10-CM

## 2021-10-19 DIAGNOSIS — R53.82 CHRONIC FATIGUE: ICD-10-CM

## 2021-10-19 DIAGNOSIS — E53.8 VITAMIN B12 DEFICIENCY: ICD-10-CM

## 2021-10-19 DIAGNOSIS — Z98.890 S/P MYOMECTOMY: ICD-10-CM

## 2021-10-19 DIAGNOSIS — E66.09 CLASS 1 OBESITY DUE TO EXCESS CALORIES WITH SERIOUS COMORBIDITY AND BODY MASS INDEX (BMI) OF 33.0 TO 33.9 IN ADULT: Primary | ICD-10-CM

## 2021-10-19 DIAGNOSIS — E55.9 VITAMIN D DEFICIENCY: ICD-10-CM

## 2021-10-19 DIAGNOSIS — D50.0 IRON DEFICIENCY ANEMIA DUE TO CHRONIC BLOOD LOSS: ICD-10-CM

## 2021-10-19 DIAGNOSIS — R10.13 EPIGASTRIC PAIN: ICD-10-CM

## 2021-10-19 DIAGNOSIS — R11.0 NAUSEA: ICD-10-CM

## 2021-10-19 DIAGNOSIS — E73.9 LACTOSE INTOLERANCE: ICD-10-CM

## 2021-10-19 DIAGNOSIS — F32.81 PMDD (PREMENSTRUAL DYSPHORIC DISORDER): ICD-10-CM

## 2021-10-19 DIAGNOSIS — E88.81 METABOLIC SYNDROME: ICD-10-CM

## 2021-10-19 DIAGNOSIS — R63.4 WEIGHT LOSS: ICD-10-CM

## 2021-10-19 DIAGNOSIS — Z98.84 S/P GASTRIC BYPASS: ICD-10-CM

## 2021-10-19 PROCEDURE — 99214 OFFICE O/P EST MOD 30 MIN: CPT | Performed by: FAMILY MEDICINE

## 2021-10-19 NOTE — PROGRESS NOTES
[address] 22.  [phone] o  [phone] f    FOLLOW UP MEDICAL EXAMINATION    Method of Delivery:   Face to Face  Patient:  Mike Cheek 1985  PCP:  Hood Meneses PA-C    Patient's weight management approach:  VLCD    Mike Cheek is a 39 y.o. female with Obesity Class 1 Body mass index is 33.36 kg/m². and associated health concerns presents for Weight Management    Patient has been participating in the [employer] Management Program using the [employer] Very Low Calorie Diet (VLCD, 800 kcal/day) since 09/14/21. Challenges adhering to this plan over the past month:  Severe upper abdominal pain w nausea x 3-4 days 2 weeks after starting VLCD. Went to ER 10/04/21. Normal labs, CT and US. Pt suspects she had a stomach virus, like her friend who shared an pot roast entree w her at a restaurant prior to this episode. She also had some alcohol with the meal. The friend had similar symptoms. Pt saw ALFREDO Mathis and GI specialist.  Recommended EGD to r/o ulcers. Rxd Protonix 40 mg daily. Will have EGD in a week or so. Feeling better now.     Patient goals for participation in this weight management program:   Lose 35 lbs and have more energy. (150-165 lbs)    Anthropometric Measures Previously:    Start Weight:     192 lbs 3.2 oz   BMI:  34.05 kg/m2     Neck circumference: 13 in  Waist circumference: 44 in     Body Fat Percentage: 37.9 %     Anthropometric Measures Today   Today's Weight:   188 lbs 4.8 oz    BMI:  33.36 kg/m2   Neck circumference:  13.5 in   Waist Circumference: 47.5 in    Body Fat Percentage: 37.3 %    Weight Metrics 10/19/2021 10/19/2021 10/4/2021 10/4/2021 9/27/2021 9/14/2021 9/14/2021   Weight - 188 lb 4.8 oz 189 lb 189 lb 12.8 oz 190 lb 8 oz - 192 lb 3.2 oz   Neck Circ (inches) 13.5 - - - - 13 -   Waist Measure Inches 47.5 - - - - 44 - Body Fat % 37.3 - - - - 37.9 -   BMI - 33.36 kg/m2 33.48 kg/m2 33.62 kg/m2 33.75 kg/m2 - 34.05 kg/m2       Pounds loss since last appointment with our Center:  4 lbs  Total pounds loss since starting this therapeutic approach: 4 lbs   Is patient satisfied with his/her progress since the last appointment:  ok   Factors contributing to weight change:  Starting VLCD x 2 weeks but then changed diet when got GI virus. SURGICAL HISTORY  Previous Weight Loss Surgery:  Lap Gastric Bypass  Bariatric Surgeon:  Dr. Chai Hunter  Date of Surgery:  59/19/95  Post-Op Complications:  0  Initial Weight Loss after Bariatric Surgery:  85 lbs  Weight re-gained:  10 lbs and lost 3 lbs by eating more whole foods  Factors contributing to weight re-gain:  Decreased physical activity after tonsillectomy and fibroid removal this year. Tired. Re-cycle 5 lbs. Past Surgical History:   Procedure Laterality Date    HX GI  2013    Sigmoidoscopy due to rectal polyp- WNL pp     HX LAP GASTRIC BYPASS  11/20/2019    Dr. Glenn Friedman @ [facility]     HX MYOMECTOMY  06/2021    30 uterine fibroids removed. Dr. Rajesh Gonzalez.  HX POLYPECTOMY  1989    RECTAL POLYP- benign.  HX REFRACTIVE SURGERY  2015    HX TONSILLECTOMY  02/16/2021    due to chronic inflammation. Dr. Rosa Rose.     HX WISDOM TEETH EXTRACTION         EATING HABITS  Total calories consumed per day:  2000 kcal - I feel ravish on my menses this week  Number of meals consumed from a restaurant weekly:  2      Number of Meal replacements consumed per day:  1 [employer] food products  Negative Side Effects:  0   Are appetite, cravings and hunger controlled:  Yes, when I was doing VLCD  Does patient want to utilize New Direction meal replacements:  Yes    Typical Meals:        Breakfast: ND shake       Lunch:  [place] burger w sweet potatoes       Dinner: kevon chicken w broccoli      Snacks: KIND bar, strawberries, yogurt    Barriers to eating meals:  Having the GI symptoms. Meal prep if too busy. DRINKING HABITS  Average amount of water consumed daily: 30 oz/day  (Goal 2 L or 67 oz daily, minimally) since off VLCD up to 1.5L when on VLCD  Amount of caffeine consumed daily: 8-12 oz cold brew   Sugar-sweetened beverages consumed daily (including alcohol, sodas, teas, juices, etc.): 1-2x/week latte w reduced sugar w 1 pump instead of 4  Barriers limiting consumption of minimum 2L water/day:  It's hard to use the restroom on busy days. Hard to remember to drink on busy work days too. 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 Went to gym twice. Physical activity is performed 5-7 times a week for 30-45 minutes 1 times in the day while on VLCD  Enjoyment with physical activity:  yes  Pedometer or fitness tracker/Smartwatch/ Iphone use: yes   Average number steps per day:  10,000 steps/day    Barriers limiting physical activity:  The last 2 weeks has been very stressful. Not a lot of time.     Mobile Apps used for meal planning, calorie counting, water consumption, sleep, or physical activity:  0     WEIGHT LOSS MEDICATION HISTORY  Current weight loss medication:  0   Weight loss medication prescribed by our office:  0      Outpatient Medications Marked as Taking for the 10/19/21 encounter (Office Visit) with Ezzie Kussmaul, DO   Medication Sig Dispense Refill    ondansetron (ZOFRAN ODT) 4 mg disintegrating tablet Take 1 Tablet by mouth every eight (8) hours as needed for Nausea or Vomiting. 30 Tablet 0    pantoprazole (Protonix) 40 mg tablet Take 1 Tablet by mouth daily for 20 days. 20 Tablet 0    cholecalciferol, vitamin D3, (VITAMIN D3 PO) Take 6,000 Units by mouth three (3) days a week.  ALPRAZolam (XANAX) 0.25 mg tablet TAKE 1 TAB BY MOUTH TWO (2) TIMES DAILY AS NEEDED FOR ANXIETY. MAX DAILY AMOUNT: 0.5 MG. 30 Tablet 1    FLUoxetine (PROzac) 20 mg capsule Take 1 Cap by mouth daily. 90 Cap 1    calcium carbonate (OS-PEBBLES) 500 mg calcium (1,250 mg) tablet Take 2 Tablets by mouth daily. BARIATRIC ADVANTAGE      multivitamin (ONE A DAY) tablet Take 2 Tabs by mouth daily. Bariatric Advantage       Medications that cause weight gain:  Xanax  Medications that cause weight loss:  0  Changes to medications since last office visit with me:  Started Protonix 40 mg daily    Allergies   Allergen Reactions    Anesthesia S/I-40 (Propofol) [Propofol] Nausea and Vomiting     TXD W SCOPOLAMINE PATCH AND ANTI-NAUSEA PRE-OPERATIVELY       BEHAVIORAL HEALTH HISTORY  DEPRESSION SCREENING:    3 most recent PHQ Screens 10/19/2021   PHQ Not Done -   Little interest or pleasure in doing things Not at all   Feeling down, depressed, irritable, or hopeless Not at all   Total Score PHQ 2 0       Any history of mental health conditions (including Depression, Anxiety, Anorexia, Bulimia or Binge Eating disorder): AYO, PMDD  Treating provider and medication(s):  pcp, Dr. Chato Villa - Alprazalam and Prozac 20 mg daily and on-line Cleveland Argueta LCSW. Will see Ms. Alyssa Segura today. Is the mental health condition controlled: I am stressed but stable. I have an appt w Cleveland Argueta today. Current major lifestyle changes or stressors:   Job stress - busy. Recent death in family. We have projects on the house - coordinating this btwn pt care. OTHER MEDICAL CARE SINCE LAST APPOINTMENT  ER visit as above. Recall:  Pt had 5 iron infusions this year due to Iron Def anemia from menorrhagia due to 30 fibroids. Pt had 30 fibroids removed 06/2021. Last menses was the first that was shorter and lighter. Dr. Richy Ramirez and Harry Mcallister, GYN is managing. She is tired. No other associated symptoms.     She has a hx of low vit D and high cholesterol.     She believes PCOS diagnosis was not proven. ASSOCIATED MEDICAL CONDITIONS  Past Medical History:   Diagnosis Date    Anxiety 2011    Cleveland Argueta LCSW (Telehealth)    Back pain     Bilateral knee pain     due to OA.  Hemorrhoids     High cholesterol 2016    History of uterine fibroid summer 2020    30 fibroids. Dr. Angela Sarkar childhood (5-11 yo)    Txd w lots steroids. Severe allergies to everything. Allergist.    Hypertension 2009    Txd w 3 bp meds. off after weight loss.  Iron deficiency anemia due to chronic blood loss 2020    Txd w Iron Infusions x 5.   Dr. Jen Hess Menorrhagia     Morbid obesity ([address] 11/20/2019    Gastric Bypass 2019    PCOS (polycystic ovarian syndrome) 2015    Vitamin D deficiency 2020       Family History   Problem Relation Age of Onset    Diabetes Father     Heart Disease Father 64        cardiomyopathy, heart transplant    Hypertension Father     Heart Surgery Father     High Cholesterol Father     Obesity Father     Gout Father     Other Father         kidney stones    Sleep Apnea Father     Diabetes Mother     High Cholesterol Mother     Hypertension Mother     Obesity Mother     Diabetes Maternal Grandmother     Diabetes Maternal Grandfather     Diabetes Paternal Grandmother     Obesity Sister     Anesth Problems Neg Hx          OB/GYN History (For Female Patients)  Social History     Substance and Sexual Activity   Sexual Activity Yes    Partners: Male    Birth control/protection: I.U.D. OB History    No obstetric history on file. Menopause:  0  LMP:  On it today. Are you pregnant or planning on becoming pregnant within 6 months:  yes    Do you have upcoming travel in the next 6 weeks:  0. Patient will also notify the dietician for recommendations during travel. Immunization History   Administered Date(s) Administered    Covid-19, MODERNA, Mrna, Lnp-s, Pf, 100mcg/0.5mL 01/06/2021, 02/03/2021, 08/26/2021    Influenza Vaccine 10/09/2019    Influenza Vaccine (Quad) PF (>6 Mo Flulaval, Fluarix, and >3 Yrs Afluria, Fluzone [id number]) 08/18/2017, 10/06/2018, 09/16/2020    Tdap 06/08/2016       Health Maintenance   A1c:  SEE RESULTS BELOW     Lipids:  SEE RESULTS BELOW  Depression Screening:  Performed during Initial Visit to [facility]  Colonoscopy:  0, if over 47 yo or family history of colon cancer/polyps  Mammogram:  0, if female over 37 yo  Annual Wellness Exam:  To be performed by PCP, when appropriate. ROS    Pt denies hunger, cravings, lack of focus, fatigue, feeling weak, headaches, dizziness, light headedness, nausea, vomiting, diarrhea, constipation, indigestion, rapid heart rate, SOB, low blood sugar, feeling cold, hair loss, rash, fluid retention, leg aches, difficulty sleeping, irritability, mood swings, or other associated symptoms. Review of Systems negative except as noted above in HPI.     PHYSICAL EXAMINATION    Visit Vitals  /68 (BP 1 Location: Right arm)   Pulse 82   Temp 98.4 °F (36.9 °C)   Resp 16   Ht 5' 3\" (1.6 m)   Wt 188 lb 4.8 oz (85.4 kg)   LMP 10/15/2021 (Exact Date)   SpO2 98%   BMI 33.36 kg/m²           Weight Metrics 10/19/2021 10/19/2021 10/4/2021 10/4/2021 9/27/2021 9/14/2021 9/14/2021   Weight - 188 lb 4.8 oz 189 lb 189 lb 12.8 oz 190 lb 8 oz - 192 lb 3.2 oz   Neck Circ (inches) 13.5 - - - - 13 -   Waist Measure Inches 47.5 - - - - 44 -   Body Fat % 37.3 - - - - 37.9 -   BMI - 33.36 kg/m2 33.48 kg/m2 33.62 kg/m2 33.75 kg/m2 - 34.05 kg/m2          Neck Circumference:  Acceptable range for M >16 inches, F>15 inches  Waist Circumference:  Acceptable range for M> 40 inches/102 cm, F > 35 inches, 88 cm  Body Fat: Acceptable range for M 18-24%, F 25-31%    General appearance - Well nourished. Well appearing. Well developed. No acute distress. Obese. Head - Normocephalic. Atraumatic. Eyes - pupils equal and reactive. Extraocular eye movements intact. Sclera anicteric. Mildly injected sclera. Ears - Hearing is grossly normal bilaterally. Nose - normal and patent. No polyps noted. No erythema. No discharge. Mouth - mucous membranes with adequate moisture. Posterior pharynx normal with cobblestone appearance. No erythema, white exudate or obstruction. Neck - supple. Midline trachea. No carotid bruits noted bilaterally. No thyromegaly noted. Chest - clear to auscultation bilaterally anteriorly and posteriorly. No wheezes. No rales or rhonchi. Breath sounds are symmetrical bilaterally. Unlabored respirations. Heart - normal rate. Regular rhythm. Normal S1, S2. No murmur noted. No rubs, clicks or gallops noted. Abdomen - soft and distended. No masses or organomegaly. No rebound, rigidity or guarding. Bowel sounds normal x 4 quadrants. RUQ tenderness noted. Neurological - awake, alert and oriented to person, place, and time and event. Cranial nerves II through XII intact. Clear speech. Muscle strength is +5/5 x 4 extremities. Sensation is intact to light touch bilaterally. Steady gait. Heme/Lymph - peripheral pulses normal x 4 extremities. No peripheral edema is noted. Musculoskeletal - Intact x 4 extremities. Full ROM x 4 extremities. No pain with movement. Back exam - normal range of motion. No pain on palpation of the spinous processes in the cervical, thoracic, lumbar, sacral regions. No CVA tenderness. No buffalo hump noted.   Skin - no rashes, erythema, ecchymosis, lacerations, abrasions, suspicious moles noted. No skin tags or moles. No acanthosis nigricans noted in the axilla or neck. Psychological -   normal behavior, dress and thought processes. Good insight. Good eye contact. Normal affect. Appropriate mood. Normal speech. DATA REVIEWED    Lab Results   Component Value Date/Time    WBC 7.3 10/04/2021 09:23 AM    HGB 13.5 10/04/2021 09:23 AM    HCT 38.8 10/04/2021 09:23 AM    PLATELET 299 30/24/7134 09:23 AM    MCV 84.3 10/04/2021 09:23 AM     Lab Results   Component Value Date/Time    Sodium 138 10/04/2021 09:23 AM    Potassium 4.0 10/04/2021 09:23 AM    Chloride 110 (H) 10/04/2021 09:23 AM    CO2 25 10/04/2021 09:23 AM    Anion gap 3 (L) 10/04/2021 09:23 AM    Glucose 89 10/04/2021 09:23 AM    BUN 13 10/04/2021 09:23 AM    Creatinine 0.55 10/04/2021 09:23 AM    BUN/Creatinine ratio 24 (H) 10/04/2021 09:23 AM    GFR est AA >60 10/04/2021 09:23 AM    GFR est non-AA >60 10/04/2021 09:23 AM    Calcium 8.8 10/04/2021 09:23 AM    Bilirubin, total 0.5 10/04/2021 09:23 AM    Alk.  phosphatase 69 10/04/2021 09:23 AM    Protein, total 7.3 10/04/2021 09:23 AM    Albumin 3.4 (L) 10/04/2021 09:23 AM    Globulin 3.9 10/04/2021 09:23 AM    A-G Ratio 0.9 (L) 10/04/2021 09:23 AM    ALT (SGPT) 18 10/04/2021 09:23 AM    AST (SGOT) 10 (L) 10/04/2021 09:23 AM     Lab Results   Component Value Date/Time    Hemoglobin A1c 4.3 (L) 09/21/2021 10:38 AM     Lab Results   Component Value Date/Time    TSH 1.330 09/21/2021 10:38 AM     Lab Results   Component Value Date/Time    Uric acid 5.4 09/21/2021 10:38 AM     Magnesium   Date Value Ref Range Status   09/21/2021 2.0 1.6 - 2.3 mg/dL Final   11/05/2019 2.0 1.6 - 2.4 mg/dL Final     Lab Results   Component Value Date/Time    Vitamin B12 1,737 (H) 09/21/2021 10:38 AM    Folate >20.0 09/21/2021 10:38 AM     Lab Results   Component Value Date/Time    Vitamin D 25-Hydroxy 17.9 (L) 11/05/2019 08:45 AM    VITAMIN D, 25-HYDROXY 36.8 09/21/2021 10:38 AM     Lab Results   Component Value Date/Time    Iron 76 09/21/2021 10:38 AM    TIBC 415 06/16/2021 10:53 AM    Iron % saturation 6 (LL) 06/16/2021 10:53 AM    Ferritin 63 06/16/2021 10:53 AM     Lab Results   Component Value Date/Time    Cholesterol, total 221 (H) 09/21/2021 10:38 AM    Cholesterol, Total 196 09/21/2021 10:38 AM    HDL Cholesterol 64 09/21/2021 10:38 AM    LDL, calculated 142 (H) 09/21/2021 10:38 AM    LDL, calculated 151 (H) 03/11/2019 12:47 PM    VLDL, calculated 15 09/21/2021 10:38 AM    VLDL, calculated 34 03/11/2019 12:47 PM    Triglyceride 84 09/21/2021 10:38 AM   )  Results for orders placed or performed in visit on 09/14/21   NMR LIPOPROFILE WITH LIPIDS (WITHOUT GRAPH)     Status: Abnormal   Result Value Ref Range Status    LDL-P 1,242 (H) <1,000 nmol/L Final     Comment:                           Low                   < 1000                            Moderate         1000 - 1299                            Borderline-High  1300 - 1599                            High             1600 - 2000                            Very High             > 2000      LDL-C(NIH CALC) 122 (H) 0 - 99 mg/dL Final     Comment:                           Optimal               <  100                            Above optimal     100 -  129                            Borderline        130 -  159                            High              160 -  189                            Very high             >  189      HDL-C 60 >39 mg/dL Final    Triglycerides 78 0 - 149 mg/dL Final    Cholesterol, Total 196 100 - 199 mg/dL Final    HDL-P (Total) 37.0 >=30.5 umol/L Final    Small LDL-P 521 <=527 nmol/L Final    LDL size 21.3 >20.5 nm Final     Comment:  ----------------------------------------------------------                   ** INTERPRETATIVE INFORMATION**                   PARTICLE CONCENTRATION AND SIZE                      <--Lower CVD Risk   Higher CVD Risk-->    LDL AND HDL PARTICLES Percentile in Reference Population    HDL-P (total)        High     75th    50th    25th   Low                         >34.9    34.9    30.5    26.7   <26.7    Small LDL-P          Low      25th    50th    75th   High                         <117     117     527     839    >839    LDL Size   <-Large (Pattern A)->    <-Small (Pattern B)->                      23.0    20.6           20.5      19.0   ----------------------------------------------------------  Small LDL-P and LDL Size are associated with CVD risk, but not after  LDL-P is taken into account. LP-IR SCORE 25 <=45 Final     Comment: INSULIN RESISTANCE MARKER      <--Insulin Sensitive    Insulin Resistant-->             Percentile in Reference Population  Insulin Resistance Score  LP-IR Score   Low   25th   50th   75th   High                <27   27     45     63     >63  LP-IR Score is inaccurate if patient is non-fasting. The LP-IR score is a laboratory developed index that has been  associated with insulin resistance and diabetes risk and should be  used as one component of a physician's clinical assessment. Narrative    Test(s) 433019-PGK-A; 101407-FKH-J; 110761-Xlnxmlyabrnui; 447357-  Cholesterol, Total; 826797-BDJ-A (Total); 884297-Small LDL-P; 707169-  LDL Size; 579471-VL-XA Score  was developed and its performance characteristics determined  by Devorah Rendon. It has not been cleared or approved by the Food  and Drug Administration. Performed at:  [address]  [id number]  : Jonny Montelongo MD, Phone:  [phone]        EK21 NSR at 63 bpm, QTC interval  425 ms. No prolonged QTc noted. Acceptable QTC upper limits:  440 ms for Males, 460 ms for Females    ASSESSMENT     ICD-10-CM ICD-9-CM    1. Class 1 obesity due to excess calories with serious comorbidity and body mass index (BMI) of 33.0 to 33.9 in adult  E66.09 278.00     Z68.33 V85.33    2. Dyslipidemia  E78.5 272.4    3. Epigastric pain  R10.13 789.06     due to heartburn vs other, resolved since starting Protonix 40 mg and starting liquid diet   4. Chronic fatigue  R53.82 780.79     due to vit D vs other, improved since staring VLCD   5. Metabolic syndrome  N61.76 277.7    6. S/P gastric bypass  Z98.84 V45.86    7. Iron deficiency anemia due to chronic blood loss  D50.0 280.0     stable   8. Vitamin D deficiency  E55.9 268.9    9. PCOS (polycystic ovarian syndrome)  E28.2 256.4    10. Nausea  R11.0 787.02     due to heartburn vs other, resolved since starting Protonix 40 mg and starting liquid diet   11. PMDD (premenstrual dysphoric disorder)  F32.81 625.4     stable on Prozac   12. Lactose intolerance  E73.9 271.3     stable   13. Weight loss  R63.4 783.21     4 lbs due to VLCD vs GI virus vs other   14. Vitamin B12 deficiency  E53.8 266.2     slightly overtreated    15. S/P myomectomy  Z98.890 V45.89    16. BMI 33.0-33.9,adult  Z68.33 V85.33        We discussed the expected course, resolution and complications of the diagnosis(es) in detail. WEIGHT MANAGEMENT PLAN    Chart was reviewed and updated during the office visit today. Austin Truong has fulfilled [facility] program requirements this month by completing homework forms, attending educational classes, nurse triage visits and monthly provider appointments as agreed and remains in good standing. Reviewed and appreciate registered dietician note for nutritional counseling. Patient has attended all requirements of the program over the past month and is in good standing. Reviewed and appreciate weekly nurse visits and agree with documentation. Followed up on any patient concerns today.     Emphasized the importance of the patient continuing care from current primary care provider and other specialists while participating in this weight management program.  Patient has full access to all our office notes, orders, lab results on [employer] and can provide them copies, if desired. Advised patient to follow up with pcp for any acute symptoms and Health Maintenance. Reviewed office notes from other health care providers:  ER, Aleyda Sanford FNP. Continue follow up as directed by these providers. If patient has had bariatric surgery, Gail Fischer was advised to take the vitamin regimen and follow dietary recommendations as directed by patient's bariatric surgical team.  Get annual labs to re-evaluate vitamin levels. Avoid NSAIDS. Avoid concentrated sweets and carbonated beverages. Reviewed and discussed most recent lab results. Reviewed and discussed most recent EKG results. Recheck pertinent labs monthly while participating in VLCD using [employer] meal replacements, as discussed to identify electrolyte abnormalities and guide therapeutic approach. An order form for pertinent labs was given to patient today. Patient was advised to have the labwork performed 1 week prior to the next monthly appointment with me, if participating in VLCD approach. Advised patient to sign up for [employer] and view lab results directly. Notify me by [facility] if any questions or concerns arise. Labs ordered today will be reviewed in detail at the next office visit and time allowed for any questions regarding the results. Discussed the patient's medications, BMI, anthropometric measures and goals with patient. Informed patient that weight loss goal of 5-10% in 6-12 months has shown significant improvement in obesity and its related health conditions including DM, heart disease, asthma. A key study published in Arthritis & Rheumatism of Overweight and Obese Adults with OA found that losing 1 pound of weight resulted in 4 pounds of pressure being removed from the knees. Continue current medications as directed by prescribers.    Identified and discussed medications patient is taking that can cause weight gain or weight loss as recorded on patient's medication list.  Advised patient not to stop any use without discussing with the prescribing provider. Ask prescribing providers to consider more weight neutral or weight negative options. Will monitor patient's weight and health with continued use. Supplement recommendations: Take OTC Magnesium 400 mg po at night prn sleep, muscle cramping, constipation. Take OTC vit B12 1000 mcg daily orally if taking Metformin or experiencing numbness and tingling. Take OTC Fish Oil 1000 mg with EPA and -1,000 mg daily if experiencing dry skin, low HDL. Use with caution if history of heartburn exists. Increase fiber intake, consider adding fiber products (I.e. fiber tea, fiber shakes) or try OTC Fiber products (I.e. Benefiber, Metamucil, psyllium, etc) if experiencing constipation. Take OTC Lactaid with meal replacements, if lactose intolerance history exists or symptoms begin. Referred patient to [facility] Patient Manual for recommended antidotes, if any new symptoms or side effects have been experienced once dietary approach is initiated. Advised patient to notify our office if this occurs. Dietary Prescription:      Recommended meal plan:  Change [facility] to [facility] Low Calorie Dietary approach, 0672-2459 kcal/day, 1-2 ND meal replacements daily, as tolerated until released by GI specialist to return to [facility]. Make sure proper daily calories are consumed. Encouraged patient to stay within the recommended amount of calories per day. Do not skip meals. Eat meals every 3-4 hours routinely to prevent negative side effects like low blood sugar. Avoid heartburn triggers. Strongly encouraged patient to consume a minimum of 2 L (67 oz) of water daily up to half your body weight in ounces of water, as tolerated while utilizing this dietary approach. Avoid sugar-sweetened beverages including soda, juice and alcohol. Limit use of water enhancers.   Try water infusion recipes with fresh herbs, fruit and vegetables. Limit caffeine, will allow 1 8 oz cup of black coffee or green tea (to stimulate release of Adiponectin and promote fat burning.)     Exercise Prescription:   Advised minimal, gradual increase and as tolerated. Emphasized importance of performing limited physical activity 5 days a week and reducing sedentary time. If already exercising, reduce it to half the time and intensity while determining the level of tolerance for the amount of caloric intake. A goal of 30 minutes physical activity daily is recommended for health benefit and at least 60 minutes daily to prevent weight regain. During weight loss phase, no less than 75% of this time needs to be performing aerobic (i.e. Walking) activity with no more than 25% of the time performing resistance exercises (i.e. Weight lifting, strengthening, toning). During weight maintenance phase, 50% of the physical activity time needs to be spent performing aerobic activity with 50% of the total time performing resistance exercises. Behavior and Lifestyle Prescription:  Counseled patient on stressors, stress management and self-care approaches. If already receiving formal counseling please continue these sessions routinely. Go to ER if any thoughts or attempts of suicide are experienced. Referred patient to work with a counselor for further evaluation and intervention. Patient expressed appreciation. Sleep Prescription:   A goal of 7-9 hours nightly of uninterrupted sleep is recommended to turn off the Grehlin hormone to be released from the stomach and triggers appetite while promoting weight gain. Proper rest turns on Leptin hormone to be released from white adipose tissue and promotes weight loss. Avoid caffeine 6-12 hours before bedtime to reduce risk of sleep disturbance and bladder irritation. Discussed snoring, symptoms of Sleep Apnea and improvements with weight loss.   Strongly encouraged compliance with CPAP, if Sleep Apnea has been diagnosed. Advised patient to follow up with sleep specialist for every 25 pounds lost to see if CPAP settings need to be adjusted, if RAYO is present and CPAP is used. Counseled patient on health topics:    Obesity, Insulin Resistance, VLCD and LCD dietary approaches, benefits, contraindications, possible side effects to these diets and how to prevent poor outcomes (including staying hydrated, limit physical exercise while transitioning into this program, avoid skipping meals, etc), weight loss goals, sleep hygiene, barriers to losing weight and keeping weight off, strategies to overcome habits or challenges to approve or continue adherence and stressors. Reminded patients who are female gender and of reproductive age that with weight loss, they may become more fertile. Recommended the use of condoms or some reliable form of contraception if pregnancy is not desired. Notify our office immediately if patient becomes pregnant. Immunizations noted. Influenza and Covid-19 vaccines recommended, if patient has not had it. Obesity may increase risk for severe illness and death from Covid-19 disease. Offered empathy, encouragement, legitimation, prayers, partnership to patient. Praised patient for successes. Patient was offered a choice/choices in the treatment plan today. Patient expressed understanding with the diagnoses and the plan and agrees with recommendations. Return in about 1 month (around 11/19/2021) for ND LCD. Total time:  33 mins spent in counseling, coordinating care, reviewing and discussing results, reviewing communication/notes from other providers, completing relevant documentation, and discussing treatment plans in reference to The primary encounter diagnosis was Class 1 obesity due to excess calories with serious comorbidity and body mass index (BMI) of 33.0 to 33.9 in adult.  Diagnoses of Dyslipidemia, Epigastric pain, Chronic fatigue, Metabolic syndrome, S/P gastric bypass, Iron deficiency anemia due to chronic blood loss, Vitamin D deficiency, PCOS (polycystic ovarian syndrome), Nausea, PMDD (premenstrual dysphoric disorder), Lactose intolerance, Weight loss, Vitamin B12 deficiency, S/P myomectomy, and BMI 33.0-33.9,adult were also pertinent to this visit. THANK YOU Tonja Shaver, Pastora SUE PA-C FOR ALLOWING ME THE PRIVILEGE TO PARTICIPATE IN THE CARE OF OUR MUTUAL PATIENT, Ms. [place] WITH RESPECT TO WEIGHT MANAGEMENT. Albino Crow DO, DiplomatRAFFI Colmenares    There are no Patient Instructions on file for this visit.

## 2021-10-19 NOTE — PROGRESS NOTES
1. Have you been to the ER, urgent care clinic since your last visit? Hospitalized since your last visit? No    2. Have you seen or consulted any other health care providers outside of the [address] [place] [address] since your last visit? Include any pap smears or colon screening.  No

## 2021-10-20 ENCOUNTER — OFFICE VISIT (OUTPATIENT)
Dept: SURGERY | Age: 36
End: 2021-10-20

## 2021-10-20 DIAGNOSIS — E66.9 OBESITY (BMI 30.0-34.9): Primary | ICD-10-CM

## 2021-10-21 NOTE — PROGRESS NOTES
[facility]  Metabolic Weight Loss Program        Patient's Name: Radha France  : 1985    This patient is enrolled in [address] [place] [address] Weight Loss Program and attended the required weekly virtual nutrition class hosted via [employer] today.       Chau Waldrop RD

## 2021-10-26 ENCOUNTER — CLINICAL SUPPORT (OUTPATIENT)
Dept: SURGERY | Age: 36
End: 2021-10-26

## 2021-10-26 DIAGNOSIS — E66.9 OBESITY (BMI 30.0-34.9): Primary | ICD-10-CM

## 2021-10-26 NOTE — PROGRESS NOTES
[facility]  Metabolic Program Follow-up Nutrition Consult    Date: 10/26/2021   Physician: Paige Nicolas DO  Name: Jed Page  :  1985    Type of Plan: VLCD  Weeks on Plan: 6 weeks  Virtual visit was completed through [employer]. ASSESSMENT:    Medications/Supplements:   Prior to Admission medications    Medication Sig Start Date End Date Taking? Authorizing Provider   ondansetron (ZOFRAN ODT) 4 mg disintegrating tablet Take 1 Tablet by mouth every eight (8) hours as needed for Nausea or Vomiting. 10/5/21   Hanny Cobos, FLAVIO   cholecalciferol, vitamin D3, (VITAMIN D3 PO) Take 6,000 Units by mouth three (3) days a week. Provider, Historical   ALPRAZolam (XANAX) 0.25 mg tablet TAKE 1 TAB BY MOUTH TWO (2) TIMES DAILY AS NEEDED FOR ANXIETY. MAX DAILY AMOUNT: 0.5 MG. 21   Veronica Ramos PA-C   FLUoxetine (PROzac) 20 mg capsule Take 1 Cap by mouth daily. 21   Veronica Ramos PA-C   calcium carbonate (OS-PEBBLES) 500 mg calcium (1,250 mg) tablet Take 2 Tablets by mouth daily. BARIATRIC ADVANTAGE    Provider, Historical   multivitamin (ONE A DAY) tablet Take 2 Tabs by mouth daily. Bariatric Advantage    Provider, Historical              Starting Weight: 192# Current Weight: 188#  Overall Pounds Lost: 4# Overall Pounds Gained: 0    Exercise/Physical Activity: 10,000 steps per day    Is patient using New Directions products: yes  If yes, how many per day:0-2    Aversions/side effects of product/program: BM, leave the building and go to [employer]. Fluids used to mix with products:water    Reported Diet History: switched from VLCD to LCD, planned to restart yesterday because fell off track recently due to stress. Has a lot of food in fridge, didn't want to waste, had [employer] events this weekend. Timing and life, may get two meal replacements a day. Using [employer] food delivery system for the one meal replacement every day. Had a banana and soup so far today.   Really dehydrated for a week due to less fluid intake only 2 liters yesterday of water. Taking appropriate bariatric vitamins. Barriers/concerns preventing patient from achieving goal(s) since last visit: still having GI distress, waiting on endoscopy next month. Nausea at times, not consistent, but having less pain. NUTRITION INTERVENTION:  Pt educated on nutrition recommendations for the [employer] Low Calorie Plan (LCD), with the specific meal pattern of 2 meal replacements every day plus a meal and snack. Grocery meal:  Use the balanced plate method to plan meals, include 3-6 oz of lean source of protein, unlimited non-starchy vegetables, 1/2 cup whole grains/beans OR 1/2 cup fruit OR 1 serving of low fat dairy. Utilize handouts listing healthy snack and meal ideas. Read all nutrition labels. Demonstrated and emphasized identifying serving size, total fat, sugar and protein content. Defined low fat as </= 3 g per serving. Discussed lean and extra lean sources of protein. Avoid foods with sugar listed in the first 3 ingredients and >/10 g sugar per serving. Consume meal replacements every three to four hours or pattern as discussed with provider. Mix with water. May add herbs/spices for taste. Practice mindful eating habits; take small bites, chew thoroughly, avoid distractions, utilize hunger/fullness scale. Reviewed attendance policy of attending weekly nutrition classes. Metabolic support group recommended, and increase physical activity (approved per MD) for long term weight maintenance. NUTRITION MONITORING AND EVALUATION: pt reports high stress last few weeks, off track with plan. Switched to LCD. Today we discussed very small attainable goals to reduce stress. Pt willing to make small changes, adherence likely.      The following goals were established with patient;  1) 64 ounces, minimum  2) at least one meal replacement per day - breakfast to replace due to skipping  3) keep 10,000 steps as a goal to reduce stress      Specific tips and techniques to facilitate compliance with above recommendations were provided and discussed. If further details are desired please contact me at [phone]. This phone number was also provided to the patient for any further questions or concerns.           Loretta Olson, MS, RD, LDN

## 2021-11-01 ENCOUNTER — CLINICAL SUPPORT (OUTPATIENT)
Dept: SURGERY | Age: 36
End: 2021-11-01

## 2021-11-01 VITALS
HEIGHT: 63 IN | DIASTOLIC BLOOD PRESSURE: 64 MMHG | OXYGEN SATURATION: 98 % | SYSTOLIC BLOOD PRESSURE: 104 MMHG | WEIGHT: 191.9 LBS | RESPIRATION RATE: 18 BRPM | BODY MASS INDEX: 34 KG/M2 | HEART RATE: 71 BPM

## 2021-11-01 DIAGNOSIS — E66.9 OBESITY (BMI 30.0-34.9): Primary | ICD-10-CM

## 2021-11-01 NOTE — PROGRESS NOTES
Weight Management. 1. Have you been to the ER, urgent care clinic since your last visit? Hospitalized since your last visit? No    2. Have you seen or consulted any other health care providers outside of the [address] [place] [address] since your last visit? Include any pap smears or colon screening. No       Progress Note: Weekly Education Class in the [facility] Weight Loss Program         Patient is on Very Low Calorie Diet [] (4 meal replacements per day, 800 kcal/day)      Low Calorie Diet [x] (2-3 meal replacements per day, 8223-5534 kcal/day)    1) Did patient have any new symptoms or physical problems? Yes []    No [x]    If yes, check & comment: weakness [], fatigue [x], lightheadedness [], headache [], cramps [], cold intolerance [], hair loss [], diarrhea [], constipation [],  NA [] other:  Ongoing                               2) Has patient had any medical attention from other providers, urgent care or the emergency room this week? Yes []  No [x]       NA [], If yes, why:                                      3) Any other sugar sweetened beverages consumed this week? Yes [x]  No []Aaron Tea Latte, Pumpkin Cold Brew  4) Did patient have any problems adhering to the diet? Yes [x]  No [] NA []    If yes, Vacation [], Celebrations [], Conferences [x], Family Reunions [] other: Stress, Work, Family events                                               5) How many hours of sleep this week? 7-9    (range)  NA []    Number of meal replacements consumed daily? 1-3 (range)  NA []    Average ounces of water patient consumed daily this week (not including shakes)? 41     (divide the weekly total by 7)    Did you eat any food outside of the program? Yes [x] No []    Physical Activity Over the Past Week:    Cardio exercise: 0 min  Strength exercise: 0 workouts / week  Number of steps walked per day: 8571    How has patient mood overall been this week?  Sad [], Happy [], Stressed [x], Tired [x], Content [x], NA [], other Anxious           Medications reconciled by nurse Yes [x]  No[]    Patient was given therapeutic recommendations for any noted side effects of their dietary approach based upon [employer] patient manual per providers recommendation.

## 2021-11-02 ENCOUNTER — OFFICE VISIT (OUTPATIENT)
Dept: SURGERY | Age: 36
End: 2021-11-02

## 2021-11-02 DIAGNOSIS — E66.9 OBESITY (BMI 30.0-34.9): Primary | ICD-10-CM

## 2021-11-03 ENCOUNTER — OFFICE VISIT (OUTPATIENT)
Dept: SURGERY | Age: 36
End: 2021-11-03

## 2021-11-03 DIAGNOSIS — E66.9 OBESITY (BMI 30.0-34.9): Primary | ICD-10-CM

## 2021-11-09 ENCOUNTER — OFFICE VISIT (OUTPATIENT)
Dept: SURGERY | Age: 36
End: 2021-11-09

## 2021-11-09 DIAGNOSIS — E66.9 OBESITY (BMI 30.0-34.9): Primary | ICD-10-CM

## 2021-11-10 NOTE — PROGRESS NOTES
[facility]  Metabolic Weight Loss Program        Patient's Name: Lexis Avalos  : 1985    This patient is enrolled in [address] [place] [address] Weight Loss Program and attended the required weekly virtual nutrition class hosted via [address] [place] [address] today.       Alfredo Rosario, MS, RD, LDN

## 2021-11-11 LAB — SARS-COV-2: NOT DETECTED

## 2021-11-15 ENCOUNTER — CLINICAL SUPPORT (OUTPATIENT)
Dept: SURGERY | Age: 36
End: 2021-11-15

## 2021-11-15 VITALS
WEIGHT: 192.8 LBS | HEART RATE: 89 BPM | DIASTOLIC BLOOD PRESSURE: 60 MMHG | HEIGHT: 63 IN | RESPIRATION RATE: 18 BRPM | BODY MASS INDEX: 34.16 KG/M2 | SYSTOLIC BLOOD PRESSURE: 108 MMHG | OXYGEN SATURATION: 98 %

## 2021-11-15 DIAGNOSIS — E66.9 OBESITY (BMI 30.0-34.9): Primary | ICD-10-CM

## 2021-11-15 RX ORDER — FLUTICASONE PROPIONATE 50 MCG
SPRAY, SUSPENSION (ML) NASAL
COMMUNITY
Start: 2021-11-09 | End: 2021-12-07 | Stop reason: ALTCHOICE

## 2021-11-15 NOTE — PROGRESS NOTES
Weight Management. 1. Have you been to the ER, urgent care clinic since your last visit? Hospitalized since your last visit? [facility], [facility]    2. Have you seen or consulted any other health care providers outside of the [address] [place] [address] since your last visit? Include any pap smears or colon screening. No     Week # 1    Progress Note: Weekly Education Class in the [facility] Weight Loss Program         Patient is on Very Low Calorie Diet [] (4 meal replacements per day, 800 kcal/day)      Low Calorie Diet [x] (2-3 meal replacements per day, 9449-6551 kcal/day)    1) Did patient have any new symptoms or physical problems? Yes []    No [x]    If yes, check & comment: weakness [], fatigue [], lightheadedness [], headache [], cramps [], cold intolerance [], hair loss [], diarrhea [], constipation [],  NA [] other:                                 2) Has patient had any medical attention from other providers, urgent care or the emergency room this week? Yes []  No [x]       NA [], If yes, why:                                      3) Any other sugar sweetened beverages consumed this week? Yes []  No [x]    4) Did patient have any problems adhering to the diet? Yes [x]  No [] NA []    If yes, Vacation [], Celebrations [], Conferences [], Family Reunions [] other: [employer] celebrations Thursday  And Saturday                                               5) How many hours of sleep this week? 7-8    (range)  NA []    Number of meal replacements consumed daily? 0-4 (range)  NA []    Average ounces of water patient consumed daily this week (not including shakes)? 51     (divide the weekly total by 7)    Did you eat any food outside of the program? Yes [x] No []    Physical Activity Over the Past Week:    Cardio exercise: 110 min  Strength exercise: 0 workouts / week  Number of steps walked per day: 8043    How has patient mood overall been this week?  Sad [], Happy [], Stressed [], Tired [], Content [x], NA [], other            Medications reconciled by nurse Yes [x]  No[]    Patient was given therapeutic recommendations for any noted side effects of their dietary approach based upon [facility] patient manual per providers recommendation. Week # 2    Progress Note: Weekly Education Class in the [facility] Weight Loss Program         Patient is on Very Low Calorie Diet [] (4 meal replacements per day, 800 kcal/day)      Low Calorie Diet [x] (2-3 meal replacements per day, 0521-8741 kcal/day)    1) Did patient have any new symptoms or physical problems? Yes [x]    No []    If yes, check & comment: weakness [], fatigue [x], lightheadedness [], headache [], cramps [], cold intolerance [], hair loss [], diarrhea [], constipation [],  NA [] other:                                 2) Has patient had any medical attention from other providers, urgent care or the emergency room this week? Yes [x]  No []       NA [], If yes, why: COVID/flu/  Strep test at [facility]                                      3) Any other sugar sweetened beverages consumed this week? Yes [x]  No []    4) Did patient have any problems adhering to the diet? Yes [x]  No [] NA []    If yes, Vacation [], Celebrations [], Conferences [], Family Reunions [] other: sickness                                               5) How many hours of sleep this week? 8-10    (range)  NA []    Number of meal replacements consumed daily? 0-1 (range)  NA []    Average ounces of water patient consumed daily this week (not including shakes)? 54     (divide the weekly total by 7)    Did you eat any food outside of the program? Yes [x] No []    Physical Activity Over the Past Week:    Cardio exercise: 0 min  Strength exercise: 0 workouts / week  Number of steps walked per day: 6640    How has patient mood overall been this week?  Sad [], Happy [], Stressed [], Tired [x], Content [], NA [], other            Medications reconciled by nurse Yes [x]  No[]    Patient was given therapeutic recommendations for any noted side effects of their dietary approach based upon [facility] patient manual per providers recommendation.

## 2021-11-15 NOTE — PROGRESS NOTES
[facility]  Metabolic Program Follow-up Nutrition Consult    Date: 11/15/2021   Physician: Michael Mendoza DO  Name: Shine Ray  :  1985    Type of Plan: LCD  Weeks on Plan: 9 weeks  Virtual visit was completed through [employer]. ASSESSMENT:    Medications/Supplements:   Prior to Admission medications    Medication Sig Start Date End Date Taking? Authorizing Provider   fluticasone propionate (FLONASE) 50 mcg/actuation nasal spray  21   Provider, Historical   ondansetron (ZOFRAN ODT) 4 mg disintegrating tablet Take 1 Tablet by mouth every eight (8) hours as needed for Nausea or Vomiting. 10/5/21   Eduin Ramos, FLAVIO   cholecalciferol, vitamin D3, (VITAMIN D3 PO) Take 6,000 Units by mouth three (3) days a week. Provider, Historical   ALPRAZolam (XANAX) 0.25 mg tablet TAKE 1 TAB BY MOUTH TWO (2) TIMES DAILY AS NEEDED FOR ANXIETY. MAX DAILY AMOUNT: 0.5 MG. 21   Tati Plata PA-C   FLUoxetine (PROzac) 20 mg capsule Take 1 Cap by mouth daily. 21   Tati Plata PA-C   calcium carbonate (OS-PEBBLES) 500 mg calcium (1,250 mg) tablet Take 2 Tablets by mouth daily. BARIATRIC ADVANTAGE    Provider, Historical   multivitamin (ONE A DAY) tablet Take 2 Tabs by mouth daily. Bariatric Advantage    Provider, Historical              Starting Weight: 192#  Current Weight: 192#  Overall Pounds Lost: 0 Overall Pounds Gained: 0    Exercise/Physical Activity: not reported    Is patient using New Directions products:yes  If yes, how many per day:0-2, has been sick last 8 days so less adherence to products    Aversions/side effects of product/program: not reported    Fluids used to mix with products:water    Reported Diet History: sick with head cold last 8 days. A lot of nibbling, eating all day, comfort foods. Homemade cookies (ate 4 yesterday). Chex mix. A lot of chicken soup.  High intake of liquid calories last week: medicine ball (tea and steamed lemonade 25 cals 1-2 x day), faith instant mix 100 calories 1-2 a day, latte from [employer] half sweetener with 2%milk a few times last week, chicken soup powder with bone broth, or by itself 1-2 x day. Plain water 30 oz every day. Herbal tea no sweetened 30 oz every day. Barriers/concerns preventing patient from achieving goal(s) since last visit: feeling tired, hungry all the time, sick. Feels like she is in a downward spiral.  Work/life stress.  supportive. NUTRITION INTERVENTION:  Pt educated on nutrition recommendations for the [employer] Low Calorie Plan (LCD), with the specific meal pattern of 2 meal replacements every day plus a meal and snack. Grocery meal:  Use the balanced plate method to plan meals, include 3-6 oz of lean source of protein, unlimited non-starchy vegetables, 1/2 cup whole grains/beans OR 1/2 cup fruit OR 1 serving of low fat dairy. Utilize handouts listing healthy snack and meal ideas. Read all nutrition labels. Demonstrated and emphasized identifying serving size, total fat, sugar and protein content. Defined low fat as </= 3 g per serving. Discussed lean and extra lean sources of protein. Avoid foods with sugar listed in the first 3 ingredients and >/10 g sugar per serving. Consume meal replacements every three to four hours or pattern as discussed with provider. Mix with water. May add herbs/spices for taste. Practice mindful eating habits; take small bites, chew thoroughly, avoid distractions, utilize hunger/fullness scale. Reviewed attendance policy of attending weekly nutrition classes. Metabolic support group recommended, and increase physical activity (approved per MD) for long term weight maintenance. NUTRITION MONITORING AND EVALUATION: in a bad \"head space,\" doesn't cope well with stress and needs more stability in a program.  Considering going off the program completely until after holidays OR returning to [facility] for more structure.   She reports [facility] did help her with less hunger and BG control. She has a lot of events and stress coming up and sue with food during times of stress. Pt sees a counselor on food addiction and patterns, once every 2-3 weeks. Encouraged her to reduce length of time between visits until she gets through stressful time. Pt agrees this is a good idea. We also discussed how liquid calories can add up, to pick on favorite while not feeling well per day and try to have healthful, nutrient dense foods remaining part of the day to help feel better. Followup PRN with RD. The following goals were established with patient;  1) see your counselor every 1-2 weeks instead of 2-3 weeks to get through stress of holidays and work  2) contact provider about returning to VLCD if interested, do not restart VLCD without provider approval.  3) parm cheese crips, dry roasted edamame, quest protein chips as higher protein \"munch n crunch\" options until feeling better  4) water 64 oz every day  5) keep an eye on liquid calories, choose one a day, no all of them      Specific tips and techniques to facilitate compliance with above recommendations were provided and discussed. If further details are desired please contact me at [phone]. This phone number was also provided to the patient for any further questions or concerns.           Johny Conley, MS, RD, LDN

## 2021-11-16 ENCOUNTER — OFFICE VISIT (OUTPATIENT)
Dept: SURGERY | Age: 36
End: 2021-11-16

## 2021-11-16 DIAGNOSIS — E66.9 OBESITY (BMI 30.0-34.9): Primary | ICD-10-CM

## 2021-11-16 NOTE — PROGRESS NOTES
[facility]  Metabolic Weight Loss Program        Patient's Name: Pietro Peter  : 1985    This patient is enrolled in [address] [place] [address] Weight Loss Program and attended the required weekly virtual nutrition class hosted via [employer] today.       Jennifer Wan MS, RD, LDN

## 2021-11-16 NOTE — PROGRESS NOTES
[facility]  Metabolic Weight Loss Program        Patient's Name: Alesha Reyna  : 1985    This patient is enrolled in [address] [place] [address] Weight Loss Program and attended the required weekly virtual nutrition class hosted via [employer].       Loretta Olson, MS, RD, LDN

## 2021-11-30 ENCOUNTER — OFFICE VISIT (OUTPATIENT)
Dept: SURGERY | Age: 36
End: 2021-11-30
Payer: COMMERCIAL

## 2021-11-30 ENCOUNTER — PATIENT MESSAGE (OUTPATIENT)
Dept: INTERNAL MEDICINE CLINIC | Age: 36
End: 2021-11-30

## 2021-11-30 VITALS
TEMPERATURE: 99.5 F | SYSTOLIC BLOOD PRESSURE: 116 MMHG | DIASTOLIC BLOOD PRESSURE: 80 MMHG | OXYGEN SATURATION: 98 % | HEIGHT: 63 IN | HEART RATE: 78 BPM | RESPIRATION RATE: 20 BRPM | BODY MASS INDEX: 34.73 KG/M2 | WEIGHT: 196 LBS

## 2021-11-30 DIAGNOSIS — K28.9 MARGINAL ULCER: Primary | ICD-10-CM

## 2021-11-30 DIAGNOSIS — R63.5 WEIGHT GAIN, ABNORMAL: ICD-10-CM

## 2021-11-30 PROCEDURE — 99213 OFFICE O/P EST LOW 20 MIN: CPT | Performed by: SURGERY

## 2021-11-30 RX ORDER — PANTOPRAZOLE SODIUM 40 MG/1
40 TABLET, DELAYED RELEASE ORAL DAILY
Qty: 30 TABLET | Refills: 0 | Status: SHIPPED | OUTPATIENT
Start: 2021-11-30 | End: 2021-12-23

## 2021-11-30 NOTE — PROGRESS NOTES
[facility]  Metabolic Weight Loss Program        Patient's Name: Rebekah Dow  : 1985    This patient is enrolled in [address] [place] [address] Weight Loss Program and attended the required weekly virtual nutrition class hosted via [address] [place] [address] today.       Roxanne Mccormick, MS, RD, LDN

## 2021-11-30 NOTE — PROGRESS NOTES
1. Have you been to the ER, urgent care clinic since your last visit? Hospitalized since your last visit? yes ED for abdominal pain    2. Have you seen or consulted any other health care providers outside of the [address] [place] [address] since your last visit? Include any pap smears or colon screening.  Dr Odalis Villanueva

## 2021-12-01 NOTE — PROGRESS NOTES
Subjective:      Ronald Beltran is a 39 y.o. female presents for postop care 2 years following laparoscopic gastric bypass. She was seen several times earlier this year for weight regain, and has been referred to our metabolic program for management. She notes some weight regain since last visit, attributed to stress eating. In October, she developed epigastric pain with oral intake, predominantly dense solids or water. She underwent upper endoscopy recently-diagnosed with marginal ulcers and started on Carafate/omeprazole. She denies dysphagia or hematemesis. She notes ongoing pain with certain foods and water. She does well with protein shakes. Objective:     Visit Vitals  /80   Pulse 78   Temp 99.5 °F (37.5 °C)   Resp 20   Ht 5' 3\" (1.6 m)   Wt 196 lb (88.9 kg)   SpO2 98%   BMI 34.72 kg/m²       General:  alert, cooperative, no distress, appears stated age, moderately obese   Abdomen: deferred   Incision:   deferred     Assessment:     Marginal ulcer-she has been started on medical management; I recommended changing to Protonix for acid suppression. After 30 days, will stop therapy and assess for symptoms. If symptoms persist, will continue medical management for another month and consider interval endoscopy to assess for healing. Weight regain-she is enrolled in our metabolic program and is continuing to struggle. She has appoint with Dr. Marianna Schultz later today. Plan:     1. Continue current medications. 2.  Diet as per metabolic weight management provider. 3.  We will treat ulcer x30 days, then discontinue therapy and reassess whether an additional 30 days of treatment will be needed for healing of ulcer.

## 2021-12-01 NOTE — PATIENT INSTRUCTIONS
Abnormal Weight Gain: Care Instructions  Your Care Instructions     There are two types of weight gainnormal and abnormal. Normal weight gain is usually caused by eating too much or exercising too little. It can also happen as you get older. But abnormal weight gain has other causes. It can be caused by a problem with your thyroid gland, called hypothyroidism. Or it can be caused by a problem with your adrenal glands, called Cushing's syndrome. Or your body could be holding too much fluid because of kidney, liver, or heart problems. In some cases, a medicine you take can cause you to gain weight. You can work with your doctor to find out the cause of your weight gain. You will probably need tests to do this. Follow-up care is a key part of your treatment and safety. Be sure to make and go to all appointments, and call your doctor if you are having problems. It's also a good idea to know your test results and keep a list of the medicines you take. How can you care for yourself at home? · Weigh yourself at the same time every day. It's best to do it first thing in the morning after you empty your bladder. Be sure to always wear the same amount of clothing. · Write down any changes in your weight and the possible causes. Discuss these with your doctor. · Your doctor may want you to change your diet and exercise habits. A good way to lose weight is to reduce calories and increase exercise. · Walking is an easy way to get exercise. Try to walk a little longer every day. You also may want to swim, bike, or do other activities. · Ask your doctor if you should see a dietitian. This is a person who can help you plan meals that work best for your lifestyle. · If your doctor prescribed medicines, take them exactly as prescribed. Call your doctor if you think you are having a problem with your medicine. You will get more details on the specific medicines your doctor prescribes.   When should you call for help?  Watch closely for changes in your health, and be sure to contact your doctor if:    · You do not get better as expected.     · You continue to gain weight. Where can you learn more? Go to http://www.mitchell.com/  Enter A175 in the search box to learn more about \"Abnormal Weight Gain: Care Instructions. \"  Current as of: March 17, 2021               Content Version: 13.0  © 0914-0363 [employer], [employer]. Care instructions adapted under license by [employer] (which disclaims liability or warranty for this information). If you have questions about a medical condition or this instruction, always ask your healthcare professional. [address] any warranty or liability for your use of this information.

## 2021-12-01 NOTE — TELEPHONE ENCOUNTER
----- Message from Jamil Brooks PA-C sent at 12/1/2021 12:25 PM EST -----  Regarding: FW: Dosage change  Please help pt schedule a visit. Virtual is fine. ----- Message -----  From: Antoinette Luong  Sent: 12/1/2021  10:42 AM EST  To: Jamil Brooks PA-C  Subject: FW: Dosage change                                  ----- Message -----  From: Isabelle Yancey  Sent: 11/30/2021   8:22 PM EST  To: Trice Gruber Nurse Pool  Subject: Dosage change                                    Jesus Deutsch been struggling with more PMDD symptoms. My counselor recommended I speak with you about increasing my dose of Prozac for 1-2 weeks prior to my period. I am really interested in pursuing this because my symptoms have become more obvious and are affecting my quality of life. I feel more stressed and brice for 2 weeks out of each month. I have been tracking my symptoms with ovulation patterns because my  and I are currently \"trying\". Please let me know your thoughts. Thank you.

## 2021-12-02 NOTE — PROGRESS NOTES
Fransico Light presents for weekly or bi-monthly evaluation of Obesity Body mass index is 33.99 kg/m². Visit Vitals  /64 (BP 1 Location: Right arm, BP Patient Position: Sitting, BP Cuff Size: Adult long)   Pulse 71   Resp 18   Ht 5' 3\" (1.6 m)   Wt 191 lb 14.4 oz (87 kg)   LMP 10/15/2021 (Exact Date)   SpO2 98%   BMI 33.99 kg/m²       Wt Readings from Last 3 Encounters:   11/30/21 193 lb 14.4 oz (88 kg)   11/30/21 196 lb (88.9 kg)   11/15/21 192 lb 12.8 oz (87.5 kg)       1. Obesity (BMI 30.0-34.9)         I have reviewed and agree with nurse documentation of Weekly Education Class nurse progress note for [facility] Weight [facility] [place]). Fransico Light is compliant with program requirements and may continue participation utilizing the [employer] meal replacements, as discussed. Patient has been advised to refer to the [facility] Patient Manual and notify us if any side effects to this meal plan are present and/or persist.  Fransico Light may continue the current dietary approach, care and follow up at the monthly office visit with the provider, as scheduled. Follow-up and Dispositions    · Return in about 2 weeks (around 11/15/2021). Documentation true and accepted by Sagar Dixon.  Richard Collins., AOBFP, Diplomate RAFFI WANG

## 2021-12-02 NOTE — PROGRESS NOTES
Jessica Colón presents for weekly or bi-monthly evaluation of Obesity Body mass index is 34.15 kg/m². Visit Vitals  /60 (BP 1 Location: Right arm, BP Patient Position: Sitting, BP Cuff Size: Adult long)   Pulse 89   Resp 18   Ht 5' 3\" (1.6 m)   Wt 192 lb 12.8 oz (87.5 kg)   SpO2 98%   BMI 34.15 kg/m²       Wt Readings from Last 3 Encounters:   11/30/21 193 lb 14.4 oz (88 kg)   11/30/21 196 lb (88.9 kg)   11/15/21 192 lb 12.8 oz (87.5 kg)       1. Obesity (BMI 30.0-34.9)         I have reviewed and agree with nurse documentation of Weekly Education Class nurse progress note for [facility] Weight [address] [place] [address] [place]). Jessica Colón is compliant with program requirements and may continue participation utilizing the [employer] meal replacements, as discussed. Patient has been advised to refer to the [facility] Patient Manual and notify us if any side effects to this meal plan are present and/or persist.  Jessica Colón may continue the current dietary approach, care and follow up at the monthly office visit with the provider, as scheduled. Follow-up and Dispositions    · Return in about 2 weeks (around 11/29/2021). Documentation true and accepted by Giovanna Hernandez.  Arjun Denis., AOBFP, Diplomate RAFFI WANG

## 2021-12-07 ENCOUNTER — OFFICE VISIT (OUTPATIENT)
Dept: SURGERY | Age: 36
End: 2021-12-07

## 2021-12-07 ENCOUNTER — OFFICE VISIT (OUTPATIENT)
Dept: SURGERY | Age: 36
End: 2021-12-07
Payer: COMMERCIAL

## 2021-12-07 ENCOUNTER — VIRTUAL VISIT (OUTPATIENT)
Dept: INTERNAL MEDICINE CLINIC | Age: 36
End: 2021-12-07

## 2021-12-07 VITALS
BODY MASS INDEX: 34.55 KG/M2 | RESPIRATION RATE: 18 BRPM | SYSTOLIC BLOOD PRESSURE: 104 MMHG | DIASTOLIC BLOOD PRESSURE: 68 MMHG | HEIGHT: 63 IN | HEART RATE: 97 BPM | WEIGHT: 195 LBS | OXYGEN SATURATION: 97 % | TEMPERATURE: 98.3 F

## 2021-12-07 DIAGNOSIS — F32.81 PMDD (PREMENSTRUAL DYSPHORIC DISORDER): ICD-10-CM

## 2021-12-07 DIAGNOSIS — E66.09 CLASS 1 OBESITY DUE TO EXCESS CALORIES WITH SERIOUS COMORBIDITY AND BODY MASS INDEX (BMI) OF 34.0 TO 34.9 IN ADULT: Primary | ICD-10-CM

## 2021-12-07 DIAGNOSIS — R11.0 NAUSEA: ICD-10-CM

## 2021-12-07 DIAGNOSIS — E78.5 DYSLIPIDEMIA: ICD-10-CM

## 2021-12-07 DIAGNOSIS — K28.9 MARGINAL ULCER: ICD-10-CM

## 2021-12-07 DIAGNOSIS — F41.9 ANXIETY: ICD-10-CM

## 2021-12-07 DIAGNOSIS — F32.81 PMDD (PREMENSTRUAL DYSPHORIC DISORDER): Primary | ICD-10-CM

## 2021-12-07 DIAGNOSIS — E28.2 PCOS (POLYCYSTIC OVARIAN SYNDROME): ICD-10-CM

## 2021-12-07 DIAGNOSIS — D50.0 IRON DEFICIENCY ANEMIA DUE TO CHRONIC BLOOD LOSS: ICD-10-CM

## 2021-12-07 DIAGNOSIS — E55.9 VITAMIN D DEFICIENCY: ICD-10-CM

## 2021-12-07 DIAGNOSIS — R10.13 EPIGASTRIC PAIN: ICD-10-CM

## 2021-12-07 DIAGNOSIS — Z98.84 S/P GASTRIC BYPASS: ICD-10-CM

## 2021-12-07 DIAGNOSIS — E88.81 METABOLIC SYNDROME: ICD-10-CM

## 2021-12-07 DIAGNOSIS — R63.5 ABNORMAL WEIGHT GAIN: ICD-10-CM

## 2021-12-07 DIAGNOSIS — E73.9 LACTOSE INTOLERANCE: ICD-10-CM

## 2021-12-07 DIAGNOSIS — R53.82 CHRONIC FATIGUE: ICD-10-CM

## 2021-12-07 DIAGNOSIS — Z98.890 S/P MYOMECTOMY: ICD-10-CM

## 2021-12-07 DIAGNOSIS — E53.8 VITAMIN B12 DEFICIENCY: ICD-10-CM

## 2021-12-07 PROCEDURE — 99214 OFFICE O/P EST MOD 30 MIN: CPT | Performed by: FAMILY MEDICINE

## 2021-12-07 PROCEDURE — 99214 OFFICE O/P EST MOD 30 MIN: CPT | Performed by: PHYSICIAN ASSISTANT

## 2021-12-07 RX ORDER — SUCRALFATE 1 G/1
1 TABLET ORAL 3 TIMES DAILY
COMMUNITY
End: 2022-01-11

## 2021-12-07 RX ORDER — PHENTERMINE HYDROCHLORIDE 37.5 MG/1
37.5 TABLET ORAL
Qty: 30 TABLET | Refills: 0 | Status: SHIPPED | OUTPATIENT
Start: 2021-12-07 | End: 2022-01-11 | Stop reason: SINTOL

## 2021-12-07 RX ORDER — PROGESTERONE 200 MG/1
CAPSULE ORAL
Qty: 30 CAPSULE | Refills: 5 | Status: SHIPPED | OUTPATIENT
Start: 2021-12-07 | End: 2022-07-05

## 2021-12-07 NOTE — PROGRESS NOTES
Gail Fischer is a 39 y.o. female who was seen by synchronous (real-time) audio-video technology on 12/7/2021    Consent: Gail Fischer, who was seen by synchronous (real-time) audio-video technology, and/or her healthcare decision maker, is aware that this patient-initiated, Telehealth encounter on 12/7/2021 is a billable service, with coverage as determined by her insurance carrier. She is aware that she may receive a bill and has provided verbal consent to proceed: YES  Subjective:   Gail Fischer is a 39 y.o. female who was seen for Medication Evaluation      PMDD/Anxiety/Depression - Prozac helps somewhat with Anx/Depression, but pt feels that there is much room for improvement during luteal phase every month. She and  have struggled with fertility and are currently taking a break from trying before considering IVF after their honeymoon which is planned for April. Pt struggles with low libido. Health Maintenance Due   Topic Date Due    Hepatitis C Screening  Never done    Cervical cancer screen  Never done    Flu Vaccine (1) 09/01/2021       Review of Systems   Respiratory: Negative for shortness of breath. Cardiovascular: Negative for chest pain and palpitations. Neurological: Negative for dizziness, loss of consciousness and weakness. Psychiatric/Behavioral: Negative for depression and substance abuse. The patient is nervous/anxious.        Objective:     Patient-Reported Vitals 12/7/2021   Patient-Reported Weight 195   Patient-Reported Height 5'3\"   Patient-Reported Pulse 83   Patient-Reported Temperature 98.3   Patient-Reported SpO2 97   Patient-Reported Systolic  961   Patient-Reported Diastolic 68   Patient-Reported LMP 11/10/2021      General: alert, cooperative, no distress   Mental  status: normal mood, behavior, speech, dress, motor activity, and thought processes, able to follow commands   HENT: NCAT   Neck: no visualized mass   Resp: no respiratory distress   Neuro: no gross deficits   Skin: no discoloration or lesions of concern on visible areas   Psychiatric: normal affect, consistent with stated mood, no evidence of hallucinations     Assessment & Plan:     Encounter Diagnoses     ICD-10-CM ICD-9-CM   1. PMDD (premenstrual dysphoric disorder)  F32.81 625.4   2. PCOS (polycystic ovarian syndrome)  E28.2 256.4   3. Anxiety  F41.9 300.00     Orders Placed This Encounter    Start luteal phase progesterone (PROMETRIUM) 200 mg capsule   INI: will consider cyclical increase of Prozac. We discussed the expected course, resolution and complications of the diagnosis(es) in detail. Medication risks, benefits, costs, interactions, and alternatives were discussed as indicated. I advised her to contact the office if her condition worsens, changes or fails to improve as anticipated. She expressed understanding with the diagnosis(es) and plan. Sanford Saucedo is a 39 y.o. female who was evaluated by a video visit encounter for concerns as above. Patient identification was verified prior to start of the visit. A caregiver was present when appropriate. Due to this being a TeleHealth encounter (During [facility]- public health emergency), evaluation of the following organ systems was limited: Vitals/Constitutional/EENT/Resp/CV/GI//MS/Neuro/Skin/Heme-Lymph-Imm. Pursuant to the emergency declaration under the [facility] waiver authority and the [employer] General Act, this Virtual  Visit was conducted, with patient's (and/or legal guardian's) consent, to reduce the patient's risk of exposure to COVID-19 and provide necessary medical care. Services were provided through a video synchronous discussion virtually to substitute for in-person clinic visit. Patient and provider were located at their individual homes.       Terrell Arndt PA-C

## 2021-12-07 NOTE — PROGRESS NOTES
1 month follow up. 1. Have you been to the ER, urgent care clinic since your last visit? Hospitalized since your last visit? No    2. Have you seen or consulted any other health care providers outside of the [address] [place] [address] since your last visit? Include any pap smears or colon screening.  No     BMI - 34.2

## 2021-12-07 NOTE — PROGRESS NOTES
1. Have you been to the ER, urgent care clinic since your last visit? Hospitalized since your last visit? No    2. Have you seen or consulted any other health care providers outside of the [address] [place] [address] since your last visit? Include any pap smears or colon screening.  No   Chief Complaint   Patient presents with    Medication Evaluation     Please send link to  [phone]

## 2021-12-07 NOTE — PROGRESS NOTES
[address] [place] [address] 22.  [phone] o  [phone] f    FOLLOW UP MEDICAL EXAMINATION    Method of Delivery:   Face to Face  Patient Name:  Odilon Rosa 1985  PCP:  Rose Shay PA-C    Patient's preferred weight management approach:  Low Calorie Diet (LCD, 8945-0840 kcal/day), Number meal replacements consumed per day:  0    Odilon Rosa is a 39 y.o. female who is a patient with Obesity Class 1 Body mass index is 34.54 kg/m². and associated health concerns. Patient presents today for Weight Management, Medication Evaluation, and Weight Gain    Challenges adhering to the plan over the past month:  I have seen Dr. Lenore Pavon and Gerardo for epigastric abdominal pain and nausea. Had an EGD. Dxd w superficial/marginal ulcers x 2, believed to be stress induced. feeling better but now I feel hungry 1-2 h after eating x 1 month. Dr. Debra Goff recommended I see you for possible medication.     Patient's goals for participating in this weight management program:   Lose 35 lbs and have more energy. (150-165 lbs)    Anthropometric Measures Previously    Start Date:  09/14/21 on VLCD then changed to LCD 10/2021  Start Weight:                                     192 lbs 3.2 oz              BMI:  34.05 kg/m2         Neck circumference: 13 in               Waist circumference: 44 in                                      Body Fat Percentage: 37.9 %      Weight at last appointment on 10/19/21:  188 lbs 4.8 oz      Anthropometric MeasuresToday  Today's Weight:  195 lbs 0 oz      Weight Metrics 12/7/2021 12/7/2021 11/30/2021 11/30/2021 11/30/2021 11/15/2021 11/1/2021   Weight - 195 lb - 193 lb 14.4 oz 196 lb 192 lb 12.8 oz 191 lb 14.4 oz   Neck Circ (inches) 13.5 - 13.5 - - - -   Waist Measure Inches 42 - 47.5 - - - -   Body Fat % 38.4 - 38.2 - - - -   BMI - 34.54 kg/m2 - 34.35 kg/m2 34.72 kg/m2 34.15 kg/m2 33.99 kg/m2     Neck Circumference:  Acceptable range for M >16 inches, F>15 inches  Waist Circumference:  Acceptable range for M> 40 inches/102 cm, F > 35 inches, 88 cm  Body Fat: Acceptable range for M 18-24%, F 25-31%    Pounds loss since the last doctor appointment with our Center a month ago:  0 lbs, gained 7 lbs since off LCD due to abd pain  Total pounds loss since starting this therapeutic approach on start date: 0 lb, 3 lbs   Is patient satisfied with his/her progress since the last appointment: no  Factors contributing to weight change:  PMDD bloating and weight fluctuating btwn 190-195 lbs, increase appetite    SURGICAL HISTORY  Previous Weight Loss Surgery:  Lap GBP by Dr. Mer Escamilla     Past Surgical History:   Procedure Laterality Date    HX GI  2013    Sigmoidoscopy due to rectal polyp- WNL pp     HX LAP GASTRIC BYPASS  11/20/2019    Dr. Luis Miguel Boateng @ [facility]     [address]  06/2021    30 uterine fibroids removed. Dr. Mayra Zarate.  HX POLYPECTOMY  1989    RECTAL POLYP- benign.  HX REFRACTIVE SURGERY  2015    HX TONSILLECTOMY  02/16/2021    due to chronic inflammation. Dr. Dominga Barger.  HX WISDOM TEETH EXTRACTION         WEIGHT LOSS MEDICATION HISTORY  Current weight loss medication:  0 - Previously took Phentermine but got too jittery. Also tried Contrave caused vomiting. Weight loss medication prescribed by our office:  0  OTC weight loss medications, herbal remedies/supplements: Xenadrine in high school  Previous weight loss prescription:  Phentermine 37.5 mg, Contrave by Dr. Patrice Smith by pcp. Topamax. Prescribing health care provider:  Dr. Allen López and pcp                                             Rx last prescribed:  2014  Negative side effects: Contrave caused n/v on first day so I stopped it. Phentermine raised my heart rate to increase - I had HTN prior to starting Phentermine. Belviq did not work. Topamax caused facial tingling.   Pounds lost with use:  25 lbs on Phentermine w diet plan      Outpatient Medications Marked as Taking for the 12/7/21 encounter (Office Visit) with Cuong Vicente, DO   Medication Sig Dispense Refill    pantoprazole (PROTONIX) 40 mg tablet Take 1 Tablet by mouth daily. Indications: a stomach ulcer 30 Tablet 0    ondansetron (ZOFRAN ODT) 4 mg disintegrating tablet Take 1 Tablet by mouth every eight (8) hours as needed for Nausea or Vomiting. 30 Tablet 0    cholecalciferol, vitamin D3, (VITAMIN D3 PO) Take 6,000 Units by mouth three (3) days a week.  ALPRAZolam (XANAX) 0.25 mg tablet TAKE 1 TAB BY MOUTH TWO (2) TIMES DAILY AS NEEDED FOR ANXIETY. MAX DAILY AMOUNT: 0.5 MG. 30 Tablet 1    FLUoxetine (PROzac) 20 mg capsule Take 1 Cap by mouth daily. 90 Cap 1    calcium carbonate (OS-PEBBLES) 500 mg calcium (1,250 mg) tablet Take 2 Tablets by mouth daily. BARIATRIC ADVANTAGE      multivitamin (ONE A DAY) tablet Take 2 Tabs by mouth daily. Bariatric Advantage       Medications that cause weight gain:  Protonix, Xanax  Medications that cause weight loss:  0  Any changes to medications since last office visit with me:  GI Dr. Tom Loza performed an EGD, dxd w marginal ulcers. Rxd Carafate TID and Prilosec. Pt saw Dr. Jensen Noe 11/30/21 changed Omeprazole to Protonix 40 mg daily for gastric ulcers per EGD, tolerating it well. Hard to take Carafate bc of frequent dosing and meal timing. Feeling better. Allergies   Allergen Reactions    Anesthesia S/I-40 (Propofol) [Propofol] Nausea and Vomiting     TXD W SCOPOLAMINE PATCH AND ANTI-NAUSEA PRE-OPERATIVELY       EATING HABITS  Has patient met with the RD over the past month:  0 - was scheduled for today  Nutritional changes made over the last month per recommendation of the RD:  I tried starting 1 ND shake a day but feel off because I still felt hungry   Are hunger, cravings and appetite controlled:  No.  Hunger does not feel normal since I have lots of stress and PMDD. I crave sweets and carbs.   I have 2 weeks of feeling ok. Negative Side Effects from meal replacements:  0   Does patient want to utilize [employer] meal replacements:  Not right now, until the ulcers heal   Barriers to eating meals:  0    DRINKING HABITS  Average amount of water consumed daily: 40 oz/day  (Goal 2 L or 67 oz daily, minimally)  Amount of caffeine consumed daily: 2 8 oz coffee w 2 pumps sugar free syrup and 2% milk  Sugar-sweetened beverages consumed daily (including alcohol, sodas, teas, juices, etc.): 0  Barriers preventing patient from drinking minimum 2L water/day:  Not time to use the bathroom in the day on M, Th while at work. I must do my drinking in the evenings. Unable to hold urine in my bladder since having fibroids removed. Social History     Tobacco Use    Smoking status: Former Smoker     Packs/day: 0.25     Years: 15.00     Pack years: 3.75     Types: Cigarettes     Quit date: 8/1/2018     Years since quitting: 3.3    Smokeless tobacco: Never Used   Vaping Use    Vaping Use: Never used   Substance Use Topics    Alcohol use: Yes     Alcohol/week: 1.0 standard drink     Types: 1 Shots of liquor per week    Drug use: Not Currently     Types: Marijuana         SLEEP HABITS  Total hours of sleep nightly: 7-8 hours (Goal 7-9 hours/nightly)  Sleep Hx:  0   Rx or OTC Sleep Aids:  0   Occupation:       Barriers to getting proper rest:  0     PHYSICAL ACTIVITY HISTORY  Type of physical activity:  Walking, yoga  Physical activity is performed 3 times a week for 30-60 minutes 1 times in the day  Enjoyment with physical activity:  I love it!   Average number steps per day:  6-10,000 steps/day  Barriers limiting physical activity:  Low energy levels    BEHAVIORAL HEALTH HISTORY  DEPRESSION SCREENING:    3 most recent PHQ Screens 12/7/2021   PHQ Not Done -   Little interest or pleasure in doing things Not at all   Feeling down, depressed, irritable, or hopeless Not at all   Total Score PHQ 2 0       Any history of mental health conditions (including Depression, Anxiety, Anorexia, Bulimia or Binge Eating disorder): AYO, PMDD  Treating provider and medication(s):  pcp, Dr. Ashok Montague - Alprazalam and Prozac 20 mg daily and on-line Ingrid Horner LCSW. Will see Ms. Bishop Luna today. Is the mental health condition controlled: I am stressed but stable since increasing my sessions w Haylee Handler. I have an appt w Ingrid Horner today  Any current major lifestyle changes or stressors:   Finances. Schedule. Mobile Apps used for meal planning, calorie counting, water consumption, sleep, or physical activity:  0     ASSOCIATED MEDICAL CONDITIONS  Past Medical History:   Diagnosis Date    Anxiety 2011    Ingrid Horner LCSW (Telehealth)    Back pain     Bilateral knee pain     due to OA.  Gastric ulcer 11/23/2021    Hemorrhoids     High cholesterol 2016    History of uterine fibroid summer 2020    30 fibroids. Dr. Nicki Park childhood (5-13 yo)    Txd w lots steroids. Severe allergies to everything. Allergist.    Hypertension 2009    Txd w 3 bp meds. off after weight loss.  Iron deficiency anemia due to chronic blood loss 2020    Txd w Iron Infusions x 5. Dr. Francesca Pierce Menorrhagia     Morbid obesity ([facility] [address] 11/20/2019    Gastric Bypass 2019    PCOS (polycystic ovarian syndrome) 2015    Vitamin D deficiency 2020       OB/GYN HISTORY (For Female Patients)  Social History     Substance and Sexual Activity   Sexual Activity Yes    Partners: Male    Birth control/protection: I.U.D. OB History    No obstetric history on file. Menopause:  0  LMP:  Patient's last menstrual period was 11/10/2021 (exact date). Are you pregnant or planning on becoming pregnant within 6 months:  No, we have paused. Do you have upcoming travel in the next 6 weeks:  0. Patient will also notify the dietician for recommendations during travel.   Immunization History   Administered Date(s) Administered    COVID-19, [facility], Primary or Immunocompromised Series, MRNA, PF, 100mcg/0.5mL 01/06/2021, 02/03/2021, 08/26/2021    Influenza Vaccine 10/09/2019    Influenza Vaccine (Quad) PF (>6 Mo Flulaval, Fluarix, and >3 Yrs Afluria, Fluzone 00178) 08/18/2017, 10/06/2018, 09/16/2020    Tdap 06/08/2016       OTHER MEDICAL CARE SINCE LAST OFFICE VISIT  As above. Pt had an Lap GBP in 2019. She has gained 7 lbs as she is recovering from [place] ulcers. Rxd carafate and omeprazole by GI specialist.    ROS  Pt denies hunger, cravings, lack of focus, fatigue, feeling weak, headaches, dizziness, light headedness, nausea, vomiting, diarrhea, constipation, indigestion, rapid heart rate, SOB, low blood sugar, feeling cold, hair loss, rash, fluid retention, leg aches, difficulty sleeping, irritability, mood swings, or other associated symptoms. Review of Systems negative except as noted above in HPI. HEALTH MAINTENANCE  A1c:  SEE RESULTS BELOW     Lipids:  SEE RESULTS BELOW  Depression Screening:  Performed during [facility] Visit to [facility]  Colonoscopy:  sigmoidoscopy, if patient is over 49 yo  Mammogram:  ?, if female patient is over 37 yo  Annual Wellness Exam:  To be performed by PCP, when appropriate. PHYSICAL EXAMINATION    Visit Vitals  /68 (BP 1 Location: Right arm, BP Patient Position: Sitting, BP Cuff Size: Adult long)   Pulse 97   Temp 98.3 °F (36.8 °C) (Oral)   Resp 18   Ht 5' 3\" (1.6 m)   Wt 195 lb (88.5 kg)   LMP 11/10/2021 (Exact Date)   SpO2 97%   BMI 34.54 kg/m²         Weight Metrics 12/7/2021 12/7/2021 11/30/2021 11/30/2021 11/30/2021 11/15/2021 11/1/2021   Weight - 195 lb - 193 lb 14.4 oz 196 lb 192 lb 12.8 oz 191 lb 14.4 oz   Neck Circ (inches) 13.5 - 13.5 - - - -   Waist Measure Inches 42 - 47.5 - - - -   Body Fat % 38.4 - 38.2 - - - -   BMI - 34.54 kg/m2 - 34.35 kg/m2 34.72 kg/m2 34.15 kg/m2 33.99 kg/m2          General appearance - Well nourished. Well appearing. Well developed. No acute distress. Obese. Head - Normocephalic. Atraumatic. Eyes - Extraocular eye movements intact. Sclera anicteric. Ears - Hearing is grossly normal bilaterally. Nose - normal and patent. Mouth - mucous membranes with adequate moisture. Neck - supple. Midline trachea. No carotid bruits noted bilaterally. No thyromegaly noted. Chest - clear to auscultation bilaterally anteriorly and posteriorly. No wheezes. No rales or rhonchi. Breath sounds are symmetrical bilaterally. Unlabored respirations. Heart - normal rate. Regular rhythm. Normal S1, S2. No murmur noted. No rubs, clicks or gallops noted. Abdomen - soft and distended. No masses or organomegaly. No rebound, rigidity or guarding. Bowel sounds normal x 4 quadrants. No tenderness noted. Neurological - awake, alert and oriented to person, place, and time and event. Cranial nerves II through XII intact. Clear speech. Muscle strength is +5/5 x 4 extremities. Heme/Lymph - peripheral pulses normal x 4 extremities. No peripheral edema is noted. Musculoskeletal - Intact x 4 extremities. No pain with movement. Back exam - normal range of motion. No CVA tenderness. Negative Straight Leg Test bilaterally. No buffalo hump noted. Skin - no rashes, erythema, ecchymosis, lacerations, abrasions, suspicious moles noted. No skin tags or moles. No acanthosis nigricans noted in the axilla or neck. Psychological -   normal behavior, dress and thought processes. Good insight. Good eye contact. Normal affect. Appropriate mood. Normal speech.     DATA REVIEWED    Lab Results   Component Value Date/Time    WBC 7.3 10/04/2021 09:23 AM    HGB 13.5 10/04/2021 09:23 AM    HCT 38.8 10/04/2021 09:23 AM    PLATELET 469 43/65/8260 09:23 AM    MCV 84.3 10/04/2021 09:23 AM     Lab Results   Component Value Date/Time    Sodium 138 10/04/2021 09:23 AM    Potassium 4.0 10/04/2021 09:23 AM    Chloride 110 (H) 10/04/2021 09:23 AM    CO2 25 10/04/2021 09:23 AM Anion gap 3 (L) 10/04/2021 09:23 AM    Glucose 89 10/04/2021 09:23 AM    BUN 13 10/04/2021 09:23 AM    Creatinine 0.55 10/04/2021 09:23 AM    BUN/Creatinine ratio 24 (H) 10/04/2021 09:23 AM    GFR est AA >60 10/04/2021 09:23 AM    GFR est non-AA >60 10/04/2021 09:23 AM    Calcium 8.8 10/04/2021 09:23 AM    Bilirubin, total 0.5 10/04/2021 09:23 AM    Alk.  phosphatase 69 10/04/2021 09:23 AM    Protein, total 7.3 10/04/2021 09:23 AM    Albumin 3.4 (L) 10/04/2021 09:23 AM    Globulin 3.9 10/04/2021 09:23 AM    A-G Ratio 0.9 (L) 10/04/2021 09:23 AM    ALT (SGPT) 18 10/04/2021 09:23 AM    AST (SGOT) 10 (L) 10/04/2021 09:23 AM     Lab Results   Component Value Date/Time    Hemoglobin A1c 4.3 (L) 09/21/2021 10:38 AM     Lab Results   Component Value Date/Time    TSH 1.330 09/21/2021 10:38 AM     Lab Results   Component Value Date/Time    Uric acid 5.4 09/21/2021 10:38 AM     Magnesium   Date Value Ref Range Status   09/21/2021 2.0 1.6 - 2.3 mg/dL Final   11/05/2019 2.0 1.6 - 2.4 mg/dL Final     Lab Results   Component Value Date/Time    Vitamin B12 1,737 (H) 09/21/2021 10:38 AM    Folate >20.0 09/21/2021 10:38 AM     Lab Results   Component Value Date/Time    Vitamin D 25-Hydroxy 17.9 (L) 11/05/2019 08:45 AM    VITAMIN D, 25-HYDROXY 36.8 09/21/2021 10:38 AM     Lab Results   Component Value Date/Time    Iron 76 09/21/2021 10:38 AM    TIBC 415 06/16/2021 10:53 AM    Iron % saturation 6 (LL) 06/16/2021 10:53 AM    Ferritin 63 06/16/2021 10:53 AM     Lab Results   Component Value Date/Time    Cholesterol, total 221 (H) 09/21/2021 10:38 AM    Cholesterol, Total 196 09/21/2021 10:38 AM    HDL Cholesterol 64 09/21/2021 10:38 AM    LDL, calculated 142 (H) 09/21/2021 10:38 AM    LDL, calculated 151 (H) 03/11/2019 12:47 PM    VLDL, calculated 15 09/21/2021 10:38 AM    VLDL, calculated 34 03/11/2019 12:47 PM    Triglyceride 84 09/21/2021 10:38 AM   )  Results for orders placed or performed in visit on 09/14/21   [address] LIPIDS (WITHOUT GRAPH)     Status: Abnormal   Result Value Ref Range Status    LDL-P 1,242 (H) <1,000 nmol/L Final     Comment:                           Low                   < 1000                            Moderate         1000 - 1299                            Borderline-High  1300 - 1599                            High             1600 - 2000                            Very High             > 2000      LDL-C(NIH CALC) 122 (H) 0 - 99 mg/dL Final     Comment:                           Optimal               <  100                            Above optimal     100 -  129                            Borderline        130 -  159                            High              160 -  189                            Very high             >  189      HDL-C 60 >39 mg/dL Final    Triglycerides 78 0 - 149 mg/dL Final    Cholesterol, Total 196 100 - 199 mg/dL Final    HDL-P (Total) 37.0 >=30.5 umol/L Final    Small LDL-P 521 <=527 nmol/L Final    LDL size 21.3 >20.5 nm Final     Comment:  ----------------------------------------------------------                   ** INTERPRETATIVE INFORMATION**                   PARTICLE CONCENTRATION AND SIZE                      <--Lower CVD Risk   Higher CVD Risk-->    LDL AND HDL PARTICLES   Percentile in Reference Population    HDL-P (total)        High     75th    50th    25th   Low                         >34.9    34.9    30.5    26.7   <26.7    Small LDL-P          Low      25th    50th    75th   High                         <117     117     527     839    >839    LDL Size   <-Large (Pattern A)->    <-Small (Pattern B)->                      23.0    20.6           20.5      19.0   ----------------------------------------------------------  Small LDL-P and LDL Size are associated with CVD risk, but not after  LDL-P is taken into account.       LP-IR SCORE 25 <=45 Final     Comment: INSULIN RESISTANCE MARKER      <--Insulin Sensitive    Insulin Resistant-->             Percentile in Reference Population  Insulin Resistance Score  LP-IR Score   Low   25th   50th   75th   High                <27   27     45     63     >63  LP-IR Score is inaccurate if patient is non-fasting. The LP-IR score is a laboratory developed index that has been  associated with insulin resistance and diabetes risk and should be  used as one component of a physician's clinical assessment. Narrative    Test(s) 265268-SUH-F; 601707-DQO-U; 433290-Pwenbllnntcwh; 666319-  Cholesterol, Total; 681418-OSQ-I (Total); 884297-Small LDL-P; 993127-  LDL Size; 669325-VA-TM Score  was developed and its performance characteristics determined  by [facility]. It has not been cleared or approved by the Food  and Drug Administration. Performed at:  [facility] [employer]  [address]  [id number]  : Liss Cruz MD, Phone:  [phone]        ASSESSMENT     ICD-10-CM ICD-9-CM    1. Class 1 obesity due to excess calories with serious comorbidity and body mass index (BMI) of 34.0 to 34.9 in adult  E66.09 278.00 phentermine (ADIPEX-P) 37.5 mg tablet    Z68.34 V85.34    2. Marginal ulcer  K28.9 534.90    3. Abnormal weight gain  R63.5 783.1    4. Dyslipidemia  E78.5 272.4     w elevated LDLP   5. Epigastric pain  R10.13 789.06     due to gastric ulcers vs other, improving w Carafate and Omeprazole   6. Metabolic syndrome  O15.26 277.7    7. Chronic fatigue  R53.82 780.79     due to stress, pain vs other   8. S/P gastric bypass  Z98.84 V45.86    9. Iron deficiency anemia due to chronic blood loss  D50.0 280.0    10. Vitamin D deficiency  E55.9 268.9    11. PCOS (polycystic ovarian syndrome)  E28.2 256.4    12. Vitamin B12 deficiency  E53.8 266.2    13. Nausea  R11.0 787.02     due to gastric ulcers vs other, improved   14. PMDD (premenstrual dysphoric disorder)  F32.81 625.4    15. Lactose intolerance  E73.9 271.3    16. S/P myomectomy  Z98.890 V45.89    17.  BMI 34.0-34.9,adult  Z68.34 V85.34        We discussed the expected course, resolution and complications of the diagnosis(es) in detail. WEIGHT MANAGEMENT PLAN    Chart was reviewed and updated during the office visit today. Jamey Bruno has fulfilled [facility] program requirements this month by completing homework forms, attending educational classes, nurse triage visits and monthly provider appointments as agreed and remains in good standing. Reviewed and appreciate registered dietician note for nutritional counseling. Patient has attended all requirements of the program over the past month and is in good standing. Reviewed and appreciate bi-monthly nurse visits and agree with documentation. Followed up on any patient concerns today. Emphasized the importance of the patient continuing care from current primary care provider and other specialists while participating in this weight management program.    Patient has full access to all our office notes, orders, lab results on [address] and can provide them copies, if desired. Advised patient to follow up with pcp for Health Maintenance and any acute symptoms. Reviewed office notes from other health care providers. Continue follow up as directed by these providers. If patient has had bariatric surgery, Jamey Bruno was advised to take the vitamin regimen and follow dietary recommendations as directed by patient's bariatric surgical team.  Get annual labs to re-evaluate vitamin levels, per bariatricians recommendations. Avoid NSAIDS, concentrated sweets and carbonated beverages. Reviewed and discussed most recent lab results. Reviewed and discussed most recent EKG result. Advised patient to sign a release to provide our program a copy of each. Informed patient that an EKG will be performed for every 50 lbs of weight loss.   Recheck pertinent labs every 3 months while participating in LCD using [facility] meal replacements, as discussed to identify electrolyte abnormalities and guide therapeutic approach. An order form for pertinent labs was given to patient today. Patient was advised to have the labwork performed 1 week prior to that appointment with me. Advised patient to sign up for [employer] and view lab results directly. Notify me by [address] if any questions or concerns arise. Labs ordered today will be reviewed in detail at the next office visit and time allowed for any questions regarding the results. Discussed the patient's medications, BMI, anthropometric measures and goals with patient. Informed patient that weight loss goal of 5-10% in 6-12 months has shown significant improvement in obesity and its related health conditions including DM, heart disease, asthma. A key study published in Arthritis & Rheumatism of Overweight and Obese Adults with OA found that losing 1 pound of weight resulted in 4 pounds of pressure being removed from the knees. Continue current medications as directed by prescribers. Weight loss medication prescription(s) as noted above was sent electronically to the pharmacy on file after discussing risks, benefits, costs, interactions, alternatives, contraindications and potential side effects:  Hold Adipex until appetite returns and stomach settles. [facility]  was reviewed and patient was found to be in compliance before prescribing the weight loss medication today. A Controlled Substance Agreement was reviewed, signed and copy given to patient today or has been found on file. SEE SCANNED DOCUMENT. Identified and discussed medications the patient is taking that can cause weight gain or weight loss as recorded on patient's medication list.  Advised patient not to stop use of any without discussing with the prescribing provider. Recommended patient to ask prescribing providers to consider more weight neutral or weight negative options.    Will monitor patient's weight and health with continued use of current medications. Supplement recommendations: Take OTC Magnesium 400 mg po at night prn sleep, muscle cramping, constipation. Take OTC vit B12 1000 mcg daily orally if taking Metformin or experiencing numbness and tingling. Take OTC Fish Oil 1000 mg with EPA and -1,000 mg daily if experiencing dry skin, low HDL. Use with caution if history of heartburn exists. Increase fiber products (I.e. fiber tea, fiber shakes) or try OTC Fiber products (I.e. Benefiber, Metamucil, psyllium, etc) if experiencing constipation. Take OTC Gas-X or Lactaid with meal replacements, if lactose intolerance history exists or symptoms begin. Referred patient to [facility] [employer]irection Patient Manual for recommended antidotes. If any new symptoms or side effects have been experienced once dietary approach is initiated, advised patient to notify our office. Dietary Prescription:    Recommended meal plan:  [facility] Low Calorie Dietary approach, 5200-3486 kcal/day, 2-3 meal replacements daily with 1 \"grocery\" meal.   Make sure proper daily calories are consumed for each meal.  Encouraged patient to stay within the recommended amount of calories per day   Reviewed nutrition and importance of regular protein intake and minimizing hidden carbohydrate sources. Decrease simple carbohydrates in any regular meal allowed (white foods, sweet foods, sweet drinks and alcohol), increase green leafy vegetables and protein (lean meats and beans) with each meal.    Avoid fried foods and limit saturated fats. Do not skip meals. Eat meals within 3-4 hours to prevent low blood sugar events. Emphasized importance of drinking a minimum of 2 L (67 oz) of water daily up to half your body weight in ounces of water while utilizing this dietary approach to prevent dehydration and potential side effects. Avoid water enhancers or sugar-sweetened beverages including alcohol, juice, soda, lemonade. Try OTC water infusion recipes.   Limit caffeine, will allow 1 8 oz cup of black coffee or green tea (to stimulate release of Adiponectin and promote fat burning.)     Exercise Prescription:   Emphasized importance of reducing sedentary time and performing physical activity at least 5 days a week. A goal of 30 minutes physical activity daily is recommended for health benefit and at least 60 minutes daily to prevent weight regain. During weight loss phase, no less than 75% of this time needs to be performing aerobic (i.e. Walking) activity with no more than 25% of the time performing resistance exercises (i.e. Weight lifting, strengthening, toning). During weight maintenance phase, 50% of the physical activity time needs to be spent performing aerobic activity with 50% of the total time performing resistance exercises. Behavior and Lifestyle Prescription:  Counseled patient on stressors, stress management and self-care approaches. Strongly encouraged pt to seek formal counseling for stressors mentioned today. If already receiving formal counseling please continue these sessions routinely. Go to ER if any thoughts or attempts of suicide are experienced. Referred patient to work with a counselor for further evaluation and intervention. Patient expressed appreciation. Sleep Prescription:   A goal of 7-9 hours nightly of uninterrupted sleep is recommended to turn off the Grehlin hormone to be released from the stomach and triggers appetite while promoting weight gain. Proper rest turns on Leptin hormone to be released from white adipose tissue and promotes weight loss. Avoid alcohol and caffeine 6-12 hours before bedtime to reduce risk of sleep disturbance and bladder irritation while asleep. Discussed snoring, symptoms of Sleep Apnea and improvements with weight loss. Strongly encouraged compliance with CPAP, if Sleep Apnea has been diagnosed.     Advised patient to follow up with sleep specialist for every 25 pounds lost to see if CPAP settings need to be adjusted. Counseled patient on health topics:    Obesity, Insulin Resistance, LCD dietary approaches, benefits, contraindications, possible side effects to these diets and how to prevent poor outcomes (including staying hydrated, limit physical exercise while transitioning into this program, avoid skipping meals, etc), weight loss goals, sleep hygiene, stressors, barriers to losing weight and keeping weight off, strategies to overcome habits or challenges to improve or continue adherence. Reminded patients who are female gender and of reproductive age that with weight loss, they may become more fertile. Recommended the use of condoms or some reliable form of contraception if pregnancy is not desired. Notify our office immediately if patient becomes pregnant. Immunizations noted. Influenza and Covid-19 vaccines recommended, if patient has not had it. Obesity may increase risk for severe illness and death from Covid-19 disease. Offered empathy, encouragement, legitimation, prayers, partnership to patient. Praised patient for successes. Patient was offered a choice/choices in the treatment plan today. Patient expressed understanding with the diagnoses and the plan and agrees with recommendations. Return in about 1 month (around 1/7/2022) for weight, MEDICATION MANAGMENT, blood pressure. Total time:  36 mins spent in counseling, coordinating care, reviewing and discussing results, reviewing relevant documentation from other providers, completing relevant documentation, and discussing treatment plans in reference to The primary encounter diagnosis was Class 1 obesity due to excess calories with serious comorbidity and body mass index (BMI) of 34.0 to 34.9 in adult.  Diagnoses of Marginal ulcer, Abnormal weight gain, Dyslipidemia, Epigastric pain, Metabolic syndrome, Chronic fatigue, S/P gastric bypass, Iron deficiency anemia due to chronic blood loss, Vitamin D deficiency, PCOS (polycystic ovarian syndrome), Vitamin B12 deficiency, Nausea, PMDD (premenstrual dysphoric disorder), Lactose intolerance, S/P myomectomy, and BMI 34.0-34.9,adult were also pertinent to this visit. THANK YOU Pastora Albarran PA-C FOR ALLOWING ME THE PRIVILEGE TO PARTICIPATE IN THE CARE OF OUR MUTUAL PATIENT, Ms. Florence WITH RESPECT TO WEIGHT MANAGEMENT. Carlyle De Anda DO, Diplomate RAFFI WANG    There are no Patient Instructions on file for this visit.

## 2021-12-07 NOTE — PROGRESS NOTES
[facility]  Metabolic Weight Loss Program        Patient's Name: Austin Truong  : 1985    This patient is enrolled in [address] [place] [address] Weight Loss Program and attended the required weekly virtual nutrition class hosted via [employer] today.       Subhash Watts, MS, RD, LDN

## 2021-12-23 RX ORDER — PANTOPRAZOLE SODIUM 40 MG/1
TABLET, DELAYED RELEASE ORAL
Qty: 30 TABLET | Refills: 0 | Status: SHIPPED | OUTPATIENT
Start: 2021-12-23 | End: 2021-12-28 | Stop reason: SDUPTHER

## 2021-12-28 RX ORDER — PANTOPRAZOLE SODIUM 40 MG/1
TABLET, DELAYED RELEASE ORAL
Qty: 90 TABLET | Refills: 1 | Status: SHIPPED | OUTPATIENT
Start: 2021-12-28 | End: 2022-07-07

## 2022-01-11 ENCOUNTER — OFFICE VISIT (OUTPATIENT)
Dept: SURGERY | Age: 37
End: 2022-01-11
Payer: COMMERCIAL

## 2022-01-11 ENCOUNTER — DOCUMENTATION ONLY (OUTPATIENT)
Dept: SURGERY | Age: 37
End: 2022-01-11

## 2022-01-11 VITALS
WEIGHT: 195.9 LBS | SYSTOLIC BLOOD PRESSURE: 108 MMHG | OXYGEN SATURATION: 99 % | HEART RATE: 91 BPM | RESPIRATION RATE: 18 BRPM | HEIGHT: 63 IN | BODY MASS INDEX: 34.71 KG/M2 | TEMPERATURE: 98.1 F | DIASTOLIC BLOOD PRESSURE: 64 MMHG

## 2022-01-11 DIAGNOSIS — E78.5 DYSLIPIDEMIA: ICD-10-CM

## 2022-01-11 DIAGNOSIS — Z98.84 S/P GASTRIC BYPASS: ICD-10-CM

## 2022-01-11 DIAGNOSIS — R11.0 NAUSEA: ICD-10-CM

## 2022-01-11 DIAGNOSIS — D50.0 IRON DEFICIENCY ANEMIA DUE TO CHRONIC BLOOD LOSS: ICD-10-CM

## 2022-01-11 DIAGNOSIS — Z98.890 S/P MYOMECTOMY: ICD-10-CM

## 2022-01-11 DIAGNOSIS — E53.8 VITAMIN B12 DEFICIENCY: ICD-10-CM

## 2022-01-11 DIAGNOSIS — E66.09 CLASS 1 OBESITY DUE TO EXCESS CALORIES WITH SERIOUS COMORBIDITY AND BODY MASS INDEX (BMI) OF 34.0 TO 34.9 IN ADULT: Primary | ICD-10-CM

## 2022-01-11 DIAGNOSIS — E73.9 LACTOSE INTOLERANCE: ICD-10-CM

## 2022-01-11 DIAGNOSIS — E88.81 METABOLIC SYNDROME: ICD-10-CM

## 2022-01-11 DIAGNOSIS — F41.9 ANXIETY: ICD-10-CM

## 2022-01-11 DIAGNOSIS — R10.13 EPIGASTRIC PAIN: ICD-10-CM

## 2022-01-11 DIAGNOSIS — E28.2 PCOS (POLYCYSTIC OVARIAN SYNDROME): ICD-10-CM

## 2022-01-11 DIAGNOSIS — E55.9 VITAMIN D DEFICIENCY: ICD-10-CM

## 2022-01-11 DIAGNOSIS — R63.5 ABNORMAL WEIGHT GAIN: ICD-10-CM

## 2022-01-11 DIAGNOSIS — K28.9 MARGINAL ULCER: ICD-10-CM

## 2022-01-11 DIAGNOSIS — F32.81 PMDD (PREMENSTRUAL DYSPHORIC DISORDER): ICD-10-CM

## 2022-01-11 PROCEDURE — 99214 OFFICE O/P EST MOD 30 MIN: CPT | Performed by: FAMILY MEDICINE

## 2022-01-11 RX ORDER — SEMAGLUTIDE 0.5 MG/.5ML
0.5 INJECTION, SOLUTION SUBCUTANEOUS
Qty: 5 EACH | Refills: 0 | Status: SHIPPED | OUTPATIENT
Start: 2022-01-11 | End: 2022-02-14 | Stop reason: DRUGHIGH

## 2022-01-11 RX ORDER — FLUOXETINE HYDROCHLORIDE 20 MG/1
CAPSULE ORAL
Qty: 90 CAPSULE | Refills: 1 | Status: SHIPPED | OUTPATIENT
Start: 2022-01-11 | End: 2022-07-08

## 2022-01-11 RX ORDER — SEMAGLUTIDE 0.25 MG/.5ML
0.25 INJECTION, SOLUTION SUBCUTANEOUS
Qty: 5 EACH | Refills: 0 | Status: SHIPPED | OUTPATIENT
Start: 2022-01-11 | End: 2022-02-14 | Stop reason: ALTCHOICE

## 2022-01-11 NOTE — PROGRESS NOTES
Writer has submitted prior authorization to insurance via Cover My Meds for [facility]BRIAN KIMBALL 0.25/0.5ml auto injectors.     Key Code= [id number]

## 2022-01-11 NOTE — PROGRESS NOTES
[address] [place] [address] 22.  [phone] o  [phone] f    FOLLOW UP MEDICAL EXAMINATION    Method of Delivery:  Face to Face  Patient Name:  Ernst Negrete 1985  PCP:  Korin Xiao PA-C    Patient's preferred weight management approach:  Medication Management (Weight loss medication prescribed by our office:  Phentermine 37.5 mg 1/2 po q am, month 1 of 3) - Pt tried LCD w New Direction meal replacements last year. Unable to tolerate ND products    Ernst Negrete is a 39 y.o. female with Obesity Class 1 Body mass index is 34.7 kg/m². presents for Weight Management       Previous Weight Loss Surgery:  Lap GBP by Dr. Sharmin Bryan           Date this medication was first prescribed by me while at [facility]:  12/07/21    Date this medication was last prescribed by me while at [facility]:  12/07/21   Has patient received and filled this prescription from any other health care provider both in state or out of the [facility] since the last office visit here:  0    Are Hunger, cravings and appetite well controlled with its use:  Hunger and appetite were not reduced. Cravings went away. Negative side effects from this medication:  Her blood pressure and heart rate increased. BP was 150/100 at [facility] and 135/90 this morning. Anxiety worsened. Pt experienced difficulty with sleeping. No restful sleep. Hard to go to sleep bc of her increased energy to do things. She awakened very easily earlier in the mornings with great energy. Overall, Ernst Negrete is tolerating this medication somewhat well, is somewhat pleased with the results and does not request a refill of the medication today. She prefers to try Le mars. She denies any gastric slowing, nausea, abdominal pain, pancreatitis or other associated symptoms today.     WEIGHT LOSS MEDICATION HISTORY  Current weight loss medication:  0 - Previously took Phentermine but got too jittery. Also tried Contrave caused vomiting. Weight loss medication prescribed by our office:  0  OTC weight loss medications, herbal remedies/supplements: Xenadrine in high school  Previous weight loss prescription:  Phentermine 37.5 mg, Contrave by Dr. Isaura Hui by pcp. Topamax. Prescribing health care provider:  Dr. Zana Rizo and pcp    Rx last prescribed:  2014  Negative side effects: Contrave caused n/v on first day so I stopped it.  Phentermine raised my heart rate to increase - I had HTN prior to starting Phentermine.  Belviq did not work. Topamax caused facial tingling.   Pounds lost with use:  25 lbs on Phentermine w diet plan                    Patient's goals for participating in this weight management program:   Lose 35 lbs and have more energy. (150-165 lbs)     Anthropometric Measures Previously    Start Date:  09/14/21 on VLCD then changed to LCD 10/2021  Start Weight:                                     192 lbs 3.2 oz              BMI:  34.05 kg/m2         Neck circumference: 13 in               Waist circumference: 44 in                                      Body Fat Percentage: 37.9 %     Anthropometric MeasuresToday  Today's Weight 1/11/2022: 195 lbs 14.4 oz      Weight Metrics 1/11/2022 1/11/2022 12/7/2021 12/7/2021 11/30/2021 11/30/2021 11/30/2021   Weight - 195 lb 14.4 oz - 195 lb - 193 lb 14.4 oz 196 lb   Neck Circ (inches) 13.5 - 13.5 - 13.5 - -   Waist Measure Inches 48 - 42 - 47.5 - -   Body Fat % 38.5 - 38.4 - 38.2 - -   BMI - 34.7 kg/m2 - 34.54 kg/m2 - 34.35 kg/m2 34.72 kg/m2     Neck Circumference:  Acceptable range for M >16 inches, F>15 inches  Waist Circumference:  Acceptable range for M> 40 inches/102 cm, F > 35 inches, 88 cm  Body Fat: Acceptable range for M 18-24%, F 25-31%    Weight at time of last appointment on 12/07/21:   195 lbs 0 oz     Total pounds loss since starting this medication:  0  lbs  Factors contributing to weight change:  Holidays  Barriers to taking the weight loss medication as prescribed this month: It gave me lots of energy and helped cravings but my anxiety worsened, appetite and hunger did not improve. It definitely interrupted my sleep and possibly made my bp and heart rate higher. It was 150/100 at [facility] one day, 135/90 this morning before my appointment. Has patient met with the RD over the past month:  0  Nutritional changes made over the last month per recommendation of the RD:  na  Number of meals consumed daily:  3-4   Number of meal replacements consumed daily:  0 - did not tolerate New Direction. I occasionally do [employer] protein shakes for breakfast  Number of meals consumed from restaurants weekly including fast food and carry out over the past month:  Greatly reduced. 2-3x/week  Are hunger, cravings and appetite controlled:  Cravings are better. Negative Side Effects from meal replacements:  0   Does patient want to utilize New Direction meal replacements:  No, did not tolerate. Barriers to eating meals:  Not a lot of time to prepare meals. Daily water intake:  40 oz   Sugar sweetened beverage intake since last appointment, including alcohol:  0    Daily caffeine consumption:  1 Cup coffee/day 8 oz w 2 pumps sugar free syrup and 2% milk   Barriers to drinking proper water:  I do not have restroom in my office so I go out the office to use the restroom. Average number of hours of sleep nightly:   6-8 hours      Sleep aids or medications taken:  0  Sleep Hx:  0  Barriers to getting proper rest:  Taking Phentermine gave me so much energy, It's hard to go to sleep and have interrupted rest.. Physical activity:  More housework,  Started yoga  3-4 days a week for 60 minutes daily.     Barriers limiting physical activity:  0    ASSOCIATED MEDICAL CONDITIONS  Past Medical History:   Diagnosis Date    Anxiety 2011    [facility] Ord[facility]    Back pain     Bilateral knee pain     due to OA.  Gastric ulcer 11/23/2021    Hemorrhoids     High cholesterol 2016    History of uterine fibroid summer 2020    30 fibroids. Dr. Anant Constantino childhood (5-13 yo)    Txd w lots steroids. Severe allergies to everything. Allergist.    Hypertension 2009    Txd w 3 bp meds. off after weight loss.  Iron deficiency anemia due to chronic blood loss 2020    Txd w Iron Infusions x 5. Dr. Freya Wiggins Menorrhagia     Morbid obesity ([address] 11/20/2019    Gastric Bypass 2019    PCOS (polycystic ovarian syndrome) 2015    Vitamin D deficiency 2020       BEHAVIORAL HEALTH HISTORY  DEPRESSION SCREENING:    3 most recent PHQ Screens 1/11/2022   PHQ Not Done -   Little interest or pleasure in doing things Not at all   Feeling down, depressed, irritable, or hopeless Not at all   Total Score PHQ 2 0       Any history of mental health conditions (including Depression, Anxiety, Anorexia, Bulimia or Binge Eating disorder): AYO, PMDD  Treating provider and medication(s):  pcp, Dr. Emily Chaves - Alprazalam and Prozac 20 mg daily and on-line Toni He LCSW for counseling.    Any current major lifestyle changes or stressors:   Interviewing today for a more stable job. Currently working in 3 different offices. Outpatient Medications Marked as Taking for the 1/11/22 encounter (Office Visit) with Pebbles Starsk, DO   Medication Sig Dispense Refill    pantoprazole (PROTONIX) 40 mg tablet TAKE 1 TABLET BY MOUTH DAILY FOR STOMACH ULCER 90 Tablet 1    progesterone (PROMETRIUM) 200 mg capsule Take one by mouth every evening during luteal phase (P+3 through P+12). Pt understands how to calculate this. 30 Capsule 5    ondansetron (ZOFRAN ODT) 4 mg disintegrating tablet Take 1 Tablet by mouth every eight (8) hours as needed for Nausea or Vomiting. 30 Tablet 0    cholecalciferol, vitamin D3, (VITAMIN D3 PO) Take 6,000 Units by mouth three (3) days a week.       ALPRAZolam (XANAX) 0.25 mg tablet TAKE 1 TAB BY MOUTH TWO (2) TIMES DAILY AS NEEDED FOR ANXIETY. MAX DAILY AMOUNT: 0.5 MG. 30 Tablet 1    FLUoxetine (PROzac) 20 mg capsule Take 1 Cap by mouth daily. 90 Cap 1    calcium carbonate (OS-PEBBLES) 500 mg calcium (1,250 mg) tablet Take 2 Tablets by mouth daily. BARIATRIC ADVANTAGE      multivitamin (ONE A DAY) tablet Take 2 Tabs by mouth daily. Bariatric Advantage       Medications that cause weight gain:  Protonix, Xanax, Progesterone  Medications that cause weight loss:  Phentermine  Any changes to medications since last office visit with me:  0    Allergies   Allergen Reactions    Anesthesia S/I-40 (Propofol) [Propofol] Nausea and Vomiting     TXD W SCOPOLAMINE PATCH AND ANTI-NAUSEA PRE-OPERATIVELY       Family History   Problem Relation Age of Onset    Diabetes Father     Heart Disease Father 64        cardiomyopathy, heart transplant    Hypertension Father     Heart Surgery Father     High Cholesterol Father     Obesity Father     Gout Father     Other Father         kidney stones    Sleep Apnea Father     Diabetes Mother     High Cholesterol Mother     Hypertension Mother     Obesity Mother     Diabetes Maternal Grandmother     Diabetes Maternal Grandfather     Diabetes Paternal Grandmother     Obesity Sister     Anesth Problems Neg Hx        Past Surgical History:   Procedure Laterality Date    HX GI  2013    Sigmoidoscopy due to rectal polyp- WNL pp     HX LAP GASTRIC BYPASS  11/20/2019    Dr. Jackie Yee @ [facility]     HX MYOMECTOMY  06/2021    30 uterine fibroids removed. Dr. Vladimir Tejada.  HX POLYPECTOMY  1989    RECTAL POLYP- benign.  HX REFRACTIVE SURGERY  2015    HX TONSILLECTOMY  02/16/2021    due to chronic inflammation. Dr. Deirdre Barros.     HX WISDOM TEETH EXTRACTION          Social History     Tobacco Use    Smoking status: Former Smoker     Packs/day: 0.25     Years: 15.00     Pack years: 3.75     Types: Cigarettes     Quit date: 8/1/2018     Years since quitting: 3.4    Smokeless tobacco: Never Used   Vaping Use    Vaping Use: Never used   Substance Use Topics    Alcohol use: Yes     Alcohol/week: 1.0 standard drink     Types: 1 Shots of liquor per week    Drug use: Not Currently     Types: Marijuana        Immunization History   Administered Date(s) Administered    COVID-19, Moderna, Primary or Immunocompromised Series, MRNA, PF, 100mcg/0.5mL 01/06/2021, 02/03/2021, 08/26/2021    Influenza Vaccine 10/09/2019    Influenza Vaccine (Quad) PF (>6 Mo Flulaval, Fluarix, and >3 Yrs Leonela Dylan [id number]) 08/18/2017, 10/06/2018, 09/16/2020    Tdap 06/08/2016        OB/GYN HISTORY (For Female Patients)  Social History     Substance and Sexual Activity   Sexual Activity Yes    Partners: Male    Birth control/protection: I.U.D. OB History    No obstetric history on file. Menopause:  0  LMP:  Patient's last menstrual period was 01/10/2022 (exact date). OTHER MEDICAL CARE SINCE LAST OFFICE VISIT  As above. ROS  Pt denies dry mouth, hunger, cravings, lack of focus, fatigue, feeling weak, headaches, dizziness, light headedness, nausea, vomiting, diarrhea, constipation, slowed gastric emptying, indigestion, abdominal pain, chest pain, irregular heart beat, SOB, low blood sugar, feeling cold, hair loss, rash, fluid retention, leg aches, irritability, mood swings, signs of depression, suicidal thoughts/ideation/attempts or other associated symptoms. Review of Systems negative except as noted above in HPI. HEALTH MAINTENANCE  A1c:  SEE RESULTS BELOW     Lipids:  SEE RESULTS BELOW  Depression Screening:  Performed during [facility] to [facility]  Colonoscopy:  sigmoidoscopy, if patient is over 47 yo  Mammogram:  ?, if female patient is over 35 yo  Annual Wellness Exam:  To be performed by PCP, when appropriate.     PHYSICAL EXAMINATION    Visit Vitals  /64 (BP 1 Location: Left arm, BP Patient Position: Sitting, BP Cuff Size: Large adult long) Pulse 91   Temp 98.1 °F (36.7 °C)   Resp 18   Ht 5' 3\" (1.6 m)   Wt 195 lb 14.4 oz (88.9 kg)   LMP 01/10/2022 (Exact Date)   SpO2 99%   BMI 34.70 kg/m²         Weight Metrics 1/11/2022 1/11/2022 12/7/2021 12/7/2021 11/30/2021 11/30/2021 11/30/2021   Weight - 195 lb 14.4 oz - 195 lb - 193 lb 14.4 oz 196 lb   Neck Circ (inches) 13.5 - 13.5 - 13.5 - -   Waist Measure Inches 48 - 42 - 47.5 - -   Body Fat % 38.5 - 38.4 - 38.2 - -   BMI - 34.7 kg/m2 - 34.54 kg/m2 - 34.35 kg/m2 34.72 kg/m2            General appearance - Well nourished. Well appearing. Well developed. No acute distress. Obese. Head - Normocephalic. Atraumatic. Eyes - pupils equal and reactive. Extraocular eye movements intact. Sclera anicteric. Ears - Hearing is grossly normal bilaterally. Nose - normal and patent. No polyps noted. No erythema. No discharge. Mouth - mucous membranes with adequate moisture. Neck - supple. Midline trachea. No carotid bruits noted bilaterally. No thyromegaly noted. Chest - clear to auscultation bilaterally anteriorly and posteriorly. No wheezes. No rales or rhonchi. Breath sounds are symmetrical bilaterally. Unlabored respirations. Heart - normal rate. Regular rhythm. Normal S1, S2. No murmur noted. No rubs, clicks or gallops noted. Abdomen - soft and distended. No masses or organomegaly. No rebound, rigidity or guarding. Bowel sounds normal x 4 quadrants. No tenderness noted. Neurological - awake, alert and oriented to person, place, and time and event. Cranial nerves II through XII intact. Clear speech. Muscle strength is +5/5 x 4 extremities. Sensation is intact to light touch bilaterally. Steady gait. Heme/Lymph - peripheral pulses normal x 4 extremities. No peripheral edema is noted. Musculoskeletal - Intact x 4 extremities. No pain with movement. Back exam - normal range of motion. Negative Straight Leg Raise Test bilaterally. No CVA tenderness.   No buffalo hump noted. Skin - no rashes, erythema, ecchymosis, lacerations, abrasions, suspicious moles noted. No skin tags or moles. No acanthosis nigricans noted in the axilla or neck. Psychological -   normal behavior, dress and thought processes. Good insight. Good eye contact. Normal affect. Appropriate mood. Normal speech. DATA REVIEWED    Lab Results   Component Value Date/Time    WBC 7.3 10/04/2021 09:23 AM    HGB 13.5 10/04/2021 09:23 AM    HCT 38.8 10/04/2021 09:23 AM    PLATELET 270 82/26/5286 09:23 AM    MCV 84.3 10/04/2021 09:23 AM     Lab Results   Component Value Date/Time    Sodium 138 10/04/2021 09:23 AM    Potassium 4.0 10/04/2021 09:23 AM    Chloride 110 (H) 10/04/2021 09:23 AM    CO2 25 10/04/2021 09:23 AM    Anion gap 3 (L) 10/04/2021 09:23 AM    Glucose 89 10/04/2021 09:23 AM    BUN 13 10/04/2021 09:23 AM    Creatinine 0.55 10/04/2021 09:23 AM    BUN/Creatinine ratio 24 (H) 10/04/2021 09:23 AM    GFR est AA >60 10/04/2021 09:23 AM    GFR est non-AA >60 10/04/2021 09:23 AM    Calcium 8.8 10/04/2021 09:23 AM    Bilirubin, total 0.5 10/04/2021 09:23 AM    Alk.  phosphatase 69 10/04/2021 09:23 AM    Protein, total 7.3 10/04/2021 09:23 AM    Albumin 3.4 (L) 10/04/2021 09:23 AM    Globulin 3.9 10/04/2021 09:23 AM    A-G Ratio 0.9 (L) 10/04/2021 09:23 AM    ALT (SGPT) 18 10/04/2021 09:23 AM    AST (SGOT) 10 (L) 10/04/2021 09:23 AM     Lab Results   Component Value Date/Time    Hemoglobin A1c 4.3 (L) 09/21/2021 10:38 AM     Lab Results   Component Value Date/Time    TSH 1.330 09/21/2021 10:38 AM     Lab Results   Component Value Date/Time    Uric acid 5.4 09/21/2021 10:38 AM     Magnesium   Date Value Ref Range Status   09/21/2021 2.0 1.6 - 2.3 mg/dL Final   11/05/2019 2.0 1.6 - 2.4 mg/dL Final     Lab Results   Component Value Date/Time    Vitamin B12 1,737 (H) 09/21/2021 10:38 AM    Folate >20.0 09/21/2021 10:38 AM     Lab Results   Component Value Date/Time    Vitamin D 25-Hydroxy 17.9 (L) 11/05/2019 08:45 AM    VITAMIN D, 25-HYDROXY 36.8 09/21/2021 10:38 AM     Lab Results   Component Value Date/Time    Iron 76 09/21/2021 10:38 AM    TIBC 415 06/16/2021 10:53 AM    Iron % saturation 6 (LL) 06/16/2021 10:53 AM    Ferritin 63 06/16/2021 10:53 AM     Lab Results   Component Value Date/Time    Cholesterol, total 221 (H) 09/21/2021 10:38 AM    Cholesterol, Total 196 09/21/2021 10:38 AM    HDL Cholesterol 64 09/21/2021 10:38 AM    LDL, calculated 142 (H) 09/21/2021 10:38 AM    LDL, calculated 151 (H) 03/11/2019 12:47 PM    VLDL, calculated 15 09/21/2021 10:38 AM    VLDL, calculated 34 03/11/2019 12:47 PM    Triglyceride 84 09/21/2021 10:38 AM   )  Results for orders placed or performed in visit on 09/14/21   NMR LIPOPROFILE WITH LIPIDS (WITHOUT GRAPH)     Status: Abnormal   Result Value Ref Range Status    LDL-P 1,242 (H) <1,000 nmol/L Final     Comment:                           Low                   < 1000                            Moderate         1000 - 1299                            Borderline-High  1300 - 1599                            High             1600 - 2000                            Very High             > 2000      LDL-C(NIH CALC) 122 (H) 0 - 99 mg/dL Final     Comment:                           Optimal               <  100                            Above optimal     100 -  129                            Borderline        130 -  159                            High              160 -  189                            Very high             >  189      HDL-C 60 >39 mg/dL Final    Triglycerides 78 0 - 149 mg/dL Final    Cholesterol, Total 196 100 - 199 mg/dL Final    HDL-P (Total) 37.0 >=30.5 umol/L Final    Small LDL-P 521 <=527 nmol/L Final    LDL size 21.3 >20.5 nm Final     Comment:  ----------------------------------------------------------                   ** INTERPRETATIVE INFORMATION**                   PARTICLE CONCENTRATION AND SIZE                      <--Lower CVD Risk   Higher CVD Risk--> LDL AND HDL PARTICLES   Percentile in Reference Population    HDL-P (total)        High     75th    50th    25th   Low                         >34.9    34.9    30.5    26.7   <26.7    Small LDL-P          Low      25th    50th    75th   High                         <117     117     527     839    >839    LDL Size   <-Large (Pattern A)->    <-Small (Pattern B)->                      23.0    20.6           20.5      19.0   ----------------------------------------------------------  Small LDL-P and LDL Size are associated with CVD risk, but not after  LDL-P is taken into account. LP-IR SCORE 25 <=45 Final     Comment: INSULIN RESISTANCE MARKER      <--Insulin Sensitive    Insulin Resistant-->             Percentile in Reference Population  Insulin Resistance Score  LP-IR Score   Low   25th   50th   75th   High                <27   27     45     63     >63  LP-IR Score is inaccurate if patient is non-fasting. The LP-IR score is a laboratory developed index that has been  associated with insulin resistance and diabetes risk and should be  used as one component of a physician's clinical assessment. Narrative    Test(s) 382661-PMC-L; 450633-XKF-O; 988572-Ydzleahdtmdpo; 299736-  Cholesterol, Total; 816850-UIG-Y (Total); 884297-Small LDL-P; 148513-  LDL Size; 818068-XV-VE Score  was developed and its performance characteristics determined  by [facility]. It has not been cleared or approved by the Food  and Drug Administration. Performed at:  [address] [email or web address] [address]  [id number]  : Nikhil Lemon MD, Phone:  [phone]        EK21 NSR at 63 bpm, QTC interval  425 ms. No prolonged QTc noted.    Acceptable QTC upper limits:  440 ms for Males, 460 ms for Females    ASSESSMENT     ICD-10-CM ICD-9-CM    1. Class 1 obesity due to excess calories with serious comorbidity and body mass index (BMI) of 34.0 to 34.9 in adult  E66.09 278.00 semaglutide, weight loss, [facility]) 0.25 mg/0.5 mL pnij    Z68.34 V85.34 semaglutide, weight loss, ([place]) 0.5 mg/0.5 mL pnij   2. Metabolic syndrome  C33.59 277.7    3. Abnormal weight gain  R63.5 783.1    4. Dyslipidemia  E78.5 272.4     w elevated LDLP   5. Anxiety  F41.9 300.00     worse on Phentermine 37.5 mg 1/2 po q am   6. Marginal ulcer  K28.9 534.90     resolved with mediction   7. Epigastric pain  R10.13 789.06     due to marginal ulcers, resolved since Protonix and avoiding alcohol/triggers   8. Vitamin D deficiency  E55.9 268.9    9. Iron deficiency anemia due to chronic blood loss  D50.0 280.0     resolved   10. Vitamin B12 deficiency  E53.8 266.2    11. S/P gastric bypass  Z98.84 V45.86    12. PCOS (polycystic ovarian syndrome)  E28.2 256.4    13. Lactose intolerance  E73.9 271.3     stable   14. S/P myomectomy  Z98.890 V45.89    15. Nausea  R11.0 787.02     due to marginal ulcer, resolved   16. BMI 34.0-34.9,adult  Z68.34 V85.34        We discussed the expected course, resolution and complications of the diagnosis(es) in detail. WEIGHT MANAGEMENT PLAN  Agree with nurse documentation. Chart was reviewed and updated during the office visit today. Emphasized the importance of the patient continuing care from current primary care provider and other specialists while participating in this weight management program.    Patient has full access to all our office notes, orders, lab results on [employer] and can provide them copies, if desired. Advised patient to follow up with pcp for any acute symptoms and Health Maintenance. Continue current medications as directed by prescribers. Weight loss medication prescribed today and sent electronically to the pharmacy on file:  Start Rx Wegovy 0.25 mg SC weekly x 4 weeks then increase to 0.5 mg SC weekly x 4 weeks. Will titrate monthly up to 2.4 mg SC weekly, as tolerated. Discussed and gave pt [facility] patient educational booklet.   Stop Phentermine due to worsened anxiety, bp, sleep disturbance and heart rate. Will avoid Contrave and Topiramate due to previous side effects of nausea and facial tingling, respectively. Laury Homans has previously attempted weight loss and failed after utilizing reduced calorie dietary efforts, increased physical activity and lifestyle changes. I recommend this weight loss medication as noted above to be used adjunctively with lifestyle modifications and physical activity. Risks, benefits, costs, interactions, alternatives, contraindications and potential side effects were discussed with the patient. Laury Homans expresses understanding and is agreeable to the terms as discussed today. [facility]  was reviewed and patient was found to be in compliance before prescribing the weight loss medication today. Identified and discussed medications the patient is taking that can cause weight gain or weight loss as recorded on patient's medication list.  Advised patient not to stop use of any without discussing with the prescribing provider. Recommended patient ask prescribing providers to consider more weight neutral or weight negative options. Will monitor patient's weight and health with continued use of current medications. If patient has had bariatric surgery, Laury Homans was advised to take the vitamin regimen and follow dietary recommendations as directed by patient's bariatric surgical team.  Get annual labs to re-evaluate vitamin levels, per bariatricians recommendations. Avoid NSAIDS, carbonated beverages and concentrated sweets. Reminded patients who are female gender and of reproductive age that with weight loss, they may become more fertile. Recommended the use of condoms or some reliable form of contraception if pregnancy is not desired. Notify our office immediately if patient becomes pregnant so weight loss medication can be discontinued if that occurs. Reviewed and discussed most recent lab results.   Will recheck pertinent labs periodically to guide therapeutic approach. An order form for pertinent labs was given to patient today. Patient was advised to have the labwork performed 1 week prior to that appointment with me. Reviewed and discussed most recent EKG results. Informed patient that an EKG will be performed for every 50 lbs of weight loss or sooner if any new cardiac symptoms develop. Advised patient to sign up for MyChart and view lab results directly. Notify me by [address] if any questions or concerns arise. Discussed the patient's BMI, anthropometric measures and goals with patient. Informed patient that weight loss goal of 5-10% in 6-12 months has shown significant improvement in obesity and its related health conditions including DM, heart disease, asthma. A key study published in Arthritis & Rheumatism of Overweight and Obese Adults with OA found that losing 1 pound of weight resulted in 4 pounds of pressure being removed from the knees. Dietary Prescription:    Recommended meal plan:  Low Calorie Dietary approach, 6490-3348 kcal/day, 1-2 over the counter meal replacements daily with 1 \"grocery\" meal.  Ok to use OTC Protein shakes tolerated. Encouraged patient to stay within the recommended amount of calories per day. Do not skip meals. Eat meals within 3-4 hours to prevent low blood sugar events. Reviewed nutrition and importance of regular protein intake and minimizing hidden carbohydrate sources. Decrease simple carbohydrates in any regular meal allowed (white foods, sweet foods, sweet drinks and alcohol). Increase green leafy vegetables and protein (lean meats or lentils) with each meal.    Avoid fried foods and limit saturated fats. Emphasized importance of drinking a minimum of 2 L (67 oz) of water daily up to half your body weight in ounces of water while utilizing this dietary approach to prevent dehydration and potential side effects.   Avoid sugar-sweetened beverages including juice, sweet teas, sodas, alcohol. Limit water enhancers. Try water infusion recipes instead flavored with fresh fruit and/or vegetables. Limit caffeine, will allow 1 8 oz cup of black coffee or green tea (to stimulate release of Adiponectin and promote fat burning.)     Exercise Prescription:   Emphasized importance of performing physical activity at least 5 days weekly and reducing sedentary time. A goal of 30 minutes physical activity daily is recommended for health benefit and at least 60 minutes daily to prevent weight regain. During weight loss phase, no less than 75% of this time needs to be performing aerobic (i.e. Walking) activity with no more than 25% of the time performing resistance exercises (i.e. Weight lifting, strengthening, toning). During weight maintenance phase, 50% of the physical activity time needs to be spent performing aerobic activity with 50% of the total time performing resistance exercises. Behavior and Lifestyle Prescription:  Counseled patient on stressors, stress management and self-care approaches. Encouraged pt to seek formal counseling to further address stressors identified today. If already receiving formal counseling please continue these sessions routinely. Go to ER if any thoughts or attempts of suicide are experienced. Sleep Prescription:   A goal of 7-9 hours nightly of uninterrupted sleep is recommended to turn off the Grehlin hormone to be released from the stomach and triggers appetite while promoting weight gain. Proper rest turns on Leptin hormone to be released from white adipose tissue and promotes weight loss. Avoid alcohol and caffeine 6-12 hours before bedtime to avoid sleep disturbance and bladder irritation during sleep. Discussed snoring, symptoms of Sleep Apnea and improvements with weight loss. Strongly encouraged compliance with CPAP, if Sleep Apnea has been diagnosed.   Advised patient to follow up with sleep specialist for every 25 pounds lost to see if CPAP settings need to be adjusted. Counseled patient on health topics:    Weight loss medication options, risks, benefits, contraindications. Weight loss goals. Obesity, Insulin Resistance, LCD dietary approaches, benefits, contraindications, possible side effects to these diets and how to prevent poor outcomes (including staying hydrated, limit physical exercise while transitioning into this program, avoid skipping meals, etc), weight loss goals, sleep hygiene, barriers to losing weight and keeping weight off, strategies to overcome habits or challenges to approve or continue adherence and stressors. Immunizations noted. Influenza and Covid-19 vaccines are recommended, if patient has not had them yet. Obesity may increase risk for severe illness and death from Covid-19 disease. Offered empathy, encouragement, legitimation, prayers, partnership to patient. Praised patient for successes. Patient was offered a choice/choices in the treatment plan today. Patient expressed understanding with the diagnoses and the plan and agrees with recommendations. Return in about 1 month (around 2/11/2022) for MEDICATION MANAGMENT. Total time:  30 mins spent in counseling, coordinating care, reviewing and discussing results, reviewing relevant documentation from other providers, completing relevant documentation, and discussing treatment plans in reference to The primary encounter diagnosis was Class 1 obesity due to excess calories with serious comorbidity and body mass index (BMI) of 34.0 to 34.9 in adult. Diagnoses of Metabolic syndrome, Abnormal weight gain, Dyslipidemia, Anxiety, Marginal ulcer, Epigastric pain, Vitamin D deficiency, Iron deficiency anemia due to chronic blood loss, Vitamin B12 deficiency, S/P gastric bypass, PCOS (polycystic ovarian syndrome), Lactose intolerance, S/P myomectomy, Nausea, and BMI 34.0-34.9,adult were also pertinent to this visit.       Nadia Ansari Maxx Mcghee PA-C FOR ALLOWING ME THE PRIVILEGE TO PARTICIPATE IN THE CARE OF OUR MUTUAL PATIENT, Ms. Florence WITH RESPECT TO WEIGHT MANAGEMENT. Agustín Treviño DO, Diplomate RAFFI WANG    There are no Patient Instructions on file for this visit.

## 2022-01-11 NOTE — PROGRESS NOTES
1. Have you been to the ER, urgent care clinic since your last visit? Hospitalized since your last visit? No    2. Have you seen or consulted any other health care providers outside of the [address] [place] [address] since your last visit? Include any pap smears or colon screening.  No

## 2022-01-12 NOTE — PROGRESS NOTES
[facility] sent note that Ladan Marti is approved from 01/11/2022 to 08/11/2022. Writer will notify pt pharmacy and the pt.

## 2022-01-17 ENCOUNTER — TELEPHONE (OUTPATIENT)
Dept: SURGERY | Age: 37
End: 2022-01-17

## 2022-01-17 NOTE — TELEPHONE ENCOUNTER
Called to pt. Verified pt. Name and date of birth. Pt indicated to writer that [facility] the 0.5mg Wagovy, but informed her that the 0.25 mg  Wagovy was on back order and that the soonest they could get it would be January 22. Writer inquired of pt if she ask the pharmacy person if they checked any near by [facility] for the 0.25mg at the time she picked up the 0.50mg. Pt indicated that she had not. Writer then notified the pt that writer to [facility] the [facility] and check for local stock and if not successful there writer would call the the [facility]. Pt stated understanding. [facility] several times. No one answered. Writer then called to the [facility], whom indicated that they have the Wagovy 0.25mg in stock and would be able to fill the prescription for the pt today. Writer provided the pharmacy with the Patient details and medications details. Writer will follow up with the pt to make sure she is able to get the medication.

## 2022-01-31 ENCOUNTER — TELEPHONE (OUTPATIENT)
Dept: ONCOLOGY | Age: 37
End: 2022-01-31

## 2022-01-31 NOTE — TELEPHONE ENCOUNTER
Called patient to verify appointment and to see if lab slips are on hand to have labs done that were ordered by MD Voss     Orders can be faxed to a [facility] of their choice     No answer   LVM to call office back

## 2022-02-01 ENCOUNTER — OFFICE VISIT (OUTPATIENT)
Dept: SURGERY | Age: 37
End: 2022-02-01
Payer: COMMERCIAL

## 2022-02-01 VITALS
HEART RATE: 83 BPM | RESPIRATION RATE: 20 BRPM | TEMPERATURE: 98.7 F | BODY MASS INDEX: 34.91 KG/M2 | DIASTOLIC BLOOD PRESSURE: 83 MMHG | HEIGHT: 63 IN | OXYGEN SATURATION: 98 % | SYSTOLIC BLOOD PRESSURE: 118 MMHG | WEIGHT: 197 LBS

## 2022-02-01 DIAGNOSIS — K28.9 MARGINAL ULCER: Primary | ICD-10-CM

## 2022-02-01 PROCEDURE — 99213 OFFICE O/P EST LOW 20 MIN: CPT | Performed by: SURGERY

## 2022-02-01 NOTE — PATIENT INSTRUCTIONS
Learning About Weight-Loss (Bariatric) Surgery  What is weight-loss surgery? Bariatric surgery is surgery to help you lose weight. This type of surgery is only used for people who are very overweight and have not been able to lose weight with diet and exercise. This surgery makes the stomach smaller. Some types of surgery also change the connection between your stomach and intestines. Having weight-loss surgery is a big step. After surgery, you'll need to make new, lifelong changes in how you eat and drink. How is weight-loss surgery done? Bariatric surgery may be either \"open\" or \"laparoscopic. \" Open surgery is done through a large cut (incision) in the belly. Laparoscopic surgery is done through several small cuts. The doctor puts a lighted tube, or scope, and other surgical tools through small cuts in your belly. The doctor is able to see your organs with the scope. There are different types of bariatric surgery. Gastric sleeve surgery  The surgery is usually done through several small incisions in the belly. The doctor removes more than half of your stomach. This leaves a thin sleeve, or tube, that is about the size of a banana. Because part of your stomach has been removed, this can't be reversed. Jann-en-Y gastric bypass surgery  Jann-en-Y (say \"marcell-en-why\") surgery changes the connection between the stomach and the intestines. The doctor separates a section of your stomach from the rest of your stomach. This makes a small pouch. The new pouch will hold the food you eat. The doctor connects the stomach pouch to the middle part of the small intestine. Gastric banding surgery  The surgery is usually done through several small incisions in the belly. The doctor wraps a band around the upper part of the stomach. This creates a small pouch. The small size of the pouch means that you will get full after you eat just a small amount of food.  The doctor can inflate or deflate the band to adjust the size. This lets the doctor adjust how quickly food passes from the new pouch into the stomach. It does not change the connection between the stomach and the intestines. What can you expect after the surgery? You may stay in the hospital for one or more days after the surgery. How long you stay depends on the type of surgery you had. Most people need 2 to 4 weeks before they are ready to get back to their usual routine. Your doctor will give you specific instructions about what to eat after the surgery. You'll start with only small amounts of soft foods and liquids. Bit by bit, you will be able to eat more solid foods. Your doctor may advise you to work with a dietitian. This way you'll be sure to get enough protein, vitamins, and minerals while you are losing weight. Even with a healthy diet, you may need to take vitamin and mineral supplements. After surgery, you will not be able to eat very much at one time. You will get full quickly. Try not to eat too much at one time or eat foods that are high in fat or sugar. If you do, you may vomit, get stomach pain, or have diarrhea. Weight loss  You probably will lose weight very quickly in the first few months after surgery. As time goes on, your weight loss will slow down. You will have regular doctor visits to check how you are doing. Emotions  It is common to have many emotions after this surgery. You may feel happy or excited as you begin to lose weight. But you may also feel overwhelmed or frustrated by the changes that you have to make in your diet, activity, and lifestyle. Talk with your doctor if you have concerns or questions. Think of bariatric surgery as a tool to help you lose weight. It isn't an instant fix. You will still need to eat a healthy diet and get regular exercise. This will help you reach your weight goal and avoid regaining the weight you lose. Follow-up care is a key part of your treatment and safety.  Be sure to make and go to all appointments, and call your doctor if you are having problems. It's also a good idea to know your test results and keep a list of the medicines you take. Where can you learn more? Go to http://www.gray.com/  Enter G469 in the search box to learn more about \"Learning About Weight-Loss (Bariatric) Surgery. \"  Current as of: March 17, 2021               Content Version: 13.0  © 4106-2848 Healthwise, [employer]. Care instructions adapted under license by [employer] (which disclaims liability or warranty for this information). If you have questions about a medical condition or this instruction, always ask your healthcare professional. [address] any warranty or liability for your use of this information.

## 2022-02-02 LAB
BASOPHILS # BLD AUTO: 0.1 X10E3/UL (ref 0–0.2)
BASOPHILS NFR BLD AUTO: 1 %
EOSINOPHIL # BLD AUTO: 0.7 X10E3/UL (ref 0–0.4)
EOSINOPHIL NFR BLD AUTO: 9 %
ERYTHROCYTE [DISTWIDTH] IN BLOOD BY AUTOMATED COUNT: 12.3 % (ref 11.7–15.4)
FERRITIN SERPL-MCNC: 186 NG/ML (ref 15–150)
HCT VFR BLD AUTO: 42 % (ref 34–46.6)
HGB BLD-MCNC: 14.3 G/DL (ref 11.1–15.9)
IMM GRANULOCYTES # BLD AUTO: 0 X10E3/UL (ref 0–0.1)
IMM GRANULOCYTES NFR BLD AUTO: 0 %
IRON SATN MFR SERPL: 45 % (ref 15–55)
IRON SERPL-MCNC: 161 UG/DL (ref 27–159)
LYMPHOCYTES # BLD AUTO: 2.5 X10E3/UL (ref 0.7–3.1)
LYMPHOCYTES NFR BLD AUTO: 34 %
MCH RBC QN AUTO: 29.9 PG (ref 26.6–33)
MCHC RBC AUTO-ENTMCNC: 34 G/DL (ref 31.5–35.7)
MCV RBC AUTO: 88 FL (ref 79–97)
MONOCYTES # BLD AUTO: 0.5 X10E3/UL (ref 0.1–0.9)
MONOCYTES NFR BLD AUTO: 7 %
NEUTROPHILS # BLD AUTO: 3.7 X10E3/UL (ref 1.4–7)
NEUTROPHILS NFR BLD AUTO: 49 %
PLATELET # BLD AUTO: 309 X10E3/UL (ref 150–450)
RBC # BLD AUTO: 4.78 X10E6/UL (ref 3.77–5.28)
TIBC SERPL-MCNC: 359 UG/DL (ref 250–450)
UIBC SERPL-MCNC: 198 UG/DL (ref 131–425)
WBC # BLD AUTO: 7.5 X10E3/UL (ref 3.4–10.8)

## 2022-02-08 ENCOUNTER — OFFICE VISIT (OUTPATIENT)
Dept: ONCOLOGY | Age: 37
End: 2022-02-08
Payer: COMMERCIAL

## 2022-02-08 VITALS
RESPIRATION RATE: 18 BRPM | OXYGEN SATURATION: 98 % | DIASTOLIC BLOOD PRESSURE: 83 MMHG | HEART RATE: 84 BPM | TEMPERATURE: 98.5 F | BODY MASS INDEX: 35.96 KG/M2 | SYSTOLIC BLOOD PRESSURE: 123 MMHG | WEIGHT: 203 LBS

## 2022-02-08 DIAGNOSIS — D50.0 IRON DEFICIENCY ANEMIA DUE TO CHRONIC BLOOD LOSS: Primary | ICD-10-CM

## 2022-02-08 DIAGNOSIS — E66.01 SEVERE OBESITY (BMI 35.0-39.9) WITH COMORBIDITY (HCC): ICD-10-CM

## 2022-02-08 PROCEDURE — 99213 OFFICE O/P EST LOW 20 MIN: CPT | Performed by: INTERNAL MEDICINE

## 2022-02-08 NOTE — PROGRESS NOTES
Sandhya Blackwood is a 39 y.o. female    Chief Complaint   Patient presents with    Follow-up     anemia       1. Have you been to the ER, urgent care clinic since your last visit? Hospitalized since your last visit? No    2. Have you seen or consulted any other health care providers outside of the [address] [place] [address] since your last visit? Include any pap smears or colon screening.  No

## 2022-02-08 NOTE — PROGRESS NOTES
[facility] at [address]  [address], [address]  W: [phone]  F: [phone]    Reason for Visit:   Celena Steele is a 39 y.o. female who is seen for follow up of anemia    History of Present Illness:   Patient is a 39 y.o. female who is s/p gastric bypass who is seen for anemia. She has had microcytic anemia since June 2020  She was previously normal.   She has not been on regular B12 or iron supplements. Does take a multivitamin  She has menorrhagia for months. Work up showed iron deficiency. Received injectafer, started B12 po. Anemia resolved. Comes with labs. She states that menorrhagia is slightly improved (shorter but heavy.) Better energy, has no pica. She takes VD and a MV   She stopped B12. No FH of anemia    She is an optometrist    Past Medical History:   Diagnosis Date    Anxiety 2011    Juan Daniel Freitas, NICKW (Telehealth)    Back pain     Bilateral knee pain     due to OA.  Gastric ulcer 11/23/2021    Hemorrhoids     High cholesterol 2016    History of uterine fibroid summer 2020    30 fibroids. Dr. Trang Butcher childhood (5-11 yo)    Txd w lots steroids. Severe allergies to everything. Allergist.    Hypertension 2009    Txd w 3 bp meds. off after weight loss.  Iron deficiency anemia due to chronic blood loss 2020    Txd w Iron Infusions x 5. Dr. Geoff Wen Menorrhagia     Morbid obesity ([address] 11/20/2019    Gastric Bypass 2019    PCOS (polycystic ovarian syndrome) 2015    Vitamin D deficiency 2020      Past Surgical History:   Procedure Laterality Date    HX GI  2013    Sigmoidoscopy due to rectal polyp- WNL pp     HX LAP GASTRIC BYPASS  11/20/2019    Dr. Sharyle Fontana @ [facility]     HX MYOMECTOMY  06/2021    30 uterine fibroids removed. Dr. Ravin Betancourt.  HX POLYPECTOMY  1989    RECTAL POLYP- benign.  HX REFRACTIVE SURGERY  2015    HX TONSILLECTOMY  02/16/2021    due to chronic inflammation.   Dr. Deepak Elizabeth Dirk Mage HX WISDOM TEETH EXTRACTION        Social History     Tobacco Use    Smoking status: Former Smoker     Packs/day: 0.25     Years: 15.00     Pack years: 3.75     Types: Cigarettes     Quit date: 8/1/2018     Years since quitting: 3.5    Smokeless tobacco: Never Used   Substance Use Topics    Alcohol use: Yes     Alcohol/week: 1.0 standard drink     Types: 1 Shots of liquor per week      Family History   Problem Relation Age of Onset    Diabetes Father     Heart Disease Father 64        cardiomyopathy, heart transplant    Hypertension Father     Heart Surgery Father     High Cholesterol Father     Obesity Father     Gout Father     Other Father         kidney stones    Sleep Apnea Father     Diabetes Mother     High Cholesterol Mother     Hypertension Mother     Obesity Mother     Diabetes Maternal Grandmother     Diabetes Maternal Grandfather     Diabetes Paternal Grandmother     Obesity Sister     Anesth Problems Neg Hx      Current Outpatient Medications   Medication Sig    semaglutide, weight loss, (Wegovy) 0.25 mg/0.5 mL pnij 0.25 mg by SubCUTAneous route every seven (7) days.  semaglutide, weight loss, (Wegovy) 0.5 mg/0.5 mL pnij 0.5 mg by SubCUTAneous route every seven (7) days.  FLUoxetine (PROzac) 20 mg capsule TAKE 1 CAPSULE BY MOUTH EVERY DAY    progesterone (PROMETRIUM) 200 mg capsule Take one by mouth every evening during luteal phase (P+3 through P+12). Pt understands how to calculate this.  cholecalciferol, vitamin D3, (VITAMIN D3 PO) Take 6,000 Units by mouth three (3) days a week.  ALPRAZolam (XANAX) 0.25 mg tablet TAKE 1 TAB BY MOUTH TWO (2) TIMES DAILY AS NEEDED FOR ANXIETY. MAX DAILY AMOUNT: 0.5 MG.  calcium carbonate (OS-PEBBLES) 500 mg calcium (1,250 mg) tablet Take 2 Tablets by mouth daily. BARIATRIC ADVANTAGE    multivitamin (ONE A DAY) tablet Take 2 Tabs by mouth daily.  Bariatric Advantage    pantoprazole (PROTONIX) 40 mg tablet TAKE 1 TABLET BY MOUTH DAILY FOR STOMACH ULCER (Patient not taking: Reported on 2/8/2022)    ondansetron (ZOFRAN ODT) 4 mg disintegrating tablet Take 1 Tablet by mouth every eight (8) hours as needed for Nausea or Vomiting. (Patient not taking: Reported on 2/8/2022)     No current facility-administered medications for this visit. Allergies   Allergen Reactions    Anesthesia S/I-40 (Propofol) [Propofol] Nausea and Vomiting     TXD W SCOPOLAMINE PATCH AND ANTI-NAUSEA PRE-OPERATIVELY    Contrave [Naltrexone-Bupropion] Nausea and Vomiting    Phentermine Other (comments)     37.5 MG 1/2 PILL DAILY CAUSED BP AND HEART RATE TO INCREASE, ANXIETY AND INSOMNIA WORSENED    Topamax [Topiramate] Other (comments)     FACIAL TINGLING        Review of Systems: A complete review of systems was obtained, negative except as described above. Physical Exam:     Vitals reviewed    General appearance - alert, well appearing, and in no distress  Mental status - oriented to person, place, and time  Mouth - mucous membranes moist, pharynx normal without lesions  Skin - normal coloration and turgor, no new rashes, no suspicious skin lesions noted    ECOG PS: 0             Results:     Lab Results   Component Value Date/Time    WBC 7.5 02/01/2022 10:12 AM    HGB 14.3 02/01/2022 10:12 AM    HCT 42.0 02/01/2022 10:12 AM    PLATELET 436 03/51/2528 10:12 AM    MCV 88 02/01/2022 10:12 AM    ABS.  NEUTROPHILS 3.7 02/01/2022 10:12 AM     Lab Results   Component Value Date/Time    Sodium 138 10/04/2021 09:23 AM    Potassium 4.0 10/04/2021 09:23 AM    Chloride 110 (H) 10/04/2021 09:23 AM    CO2 25 10/04/2021 09:23 AM    Glucose 89 10/04/2021 09:23 AM    BUN 13 10/04/2021 09:23 AM    Creatinine 0.55 10/04/2021 09:23 AM    GFR est AA >60 10/04/2021 09:23 AM    GFR est non-AA >60 10/04/2021 09:23 AM    Calcium 8.8 10/04/2021 09:23 AM     Lab Results   Component Value Date/Time    Bilirubin, total 0.5 10/04/2021 09:23 AM    ALT (SGPT) 18 10/04/2021 09:23 AM Alk. phosphatase 69 10/04/2021 09:23 AM    Protein, total 7.3 10/04/2021 09:23 AM    Albumin 3.4 (L) 10/04/2021 09:23 AM    Globulin 3.9 10/04/2021 09:23 AM     Lab Results   Component Value Date/Time    Reticulocyte count 1.9 11/24/2020 12:58 PM    Iron % saturation 45 02/01/2022 10:12 AM    TIBC 359 02/01/2022 10:12 AM    Ferritin 186 (H) 02/01/2022 10:12 AM    Vitamin B12 1,737 (H) 09/21/2021 10:38 AM    Folate >20.0 09/21/2021 10:38 AM    TSH 1.330 09/21/2021 10:38 AM    Lipase 85 10/04/2021 09:23 AM    HIV SCREEN 4TH GENERATION WRFX Non Reactive 06/29/2016 09:13 AM     No results found for: INR, APTT, DDIMSQ, DDIME, [id number], [place], FDPLT, [place], [id number], [id number], Jose A Gobble, [address] [facility] [address], [address], [id number], [id number], [id number], [id number], [id number], [id number], Benny Lota    Records reviewed and summarized above. Pathology report(s) reviewed above. Radiology report(s) reviewed above. USG abdomen 11/2019  IMPRESSION  IMPRESSION:      Heterogeneous increased liver echotexture may be due to hepatic steatosis with  small focal central fatty sparing.     The remainder of the exam is unremarkable   Assessment:   1) Microcytic anemia      Secondary to blood loss from menorrhagia and poor absorption s/p gastric bypass  Resolved as of 4/2021 on lab review following Injecatfer x 2  Recurred 7/2021 with iron deficiency and resolved after Injectafer again   She feels well and labs 2/2022 are normal     2) SOB  Secondary to anemia- resolved    3) s/p Gastric bypass  Counseled to take b12- 1000 mcg daily po- continue    4) Thrombocytosis  Reactive to 1- resolved      Plan:       · B12 po as above  ·  6 monthly labs with PCP with cbc, iron profile and ferritin-    See me prn    I appreciate the opportunity to participate in Ms. Jorgito Florence's care.     Signed By: Makenna Mata MD

## 2022-02-14 ENCOUNTER — OFFICE VISIT (OUTPATIENT)
Dept: SURGERY | Age: 37
End: 2022-02-14
Payer: COMMERCIAL

## 2022-02-14 VITALS
SYSTOLIC BLOOD PRESSURE: 119 MMHG | HEART RATE: 86 BPM | DIASTOLIC BLOOD PRESSURE: 84 MMHG | HEIGHT: 63 IN | WEIGHT: 195.1 LBS | TEMPERATURE: 98.4 F | OXYGEN SATURATION: 98 % | RESPIRATION RATE: 18 BRPM | BODY MASS INDEX: 34.57 KG/M2

## 2022-02-14 DIAGNOSIS — D50.0 IRON DEFICIENCY ANEMIA DUE TO CHRONIC BLOOD LOSS: ICD-10-CM

## 2022-02-14 DIAGNOSIS — E88.81 METABOLIC SYNDROME: ICD-10-CM

## 2022-02-14 DIAGNOSIS — Z98.84 S/P GASTRIC BYPASS: ICD-10-CM

## 2022-02-14 DIAGNOSIS — E66.9 OBESITY, CLASS I, BMI 30-34.9: Primary | ICD-10-CM

## 2022-02-14 DIAGNOSIS — E55.9 VITAMIN D DEFICIENCY: ICD-10-CM

## 2022-02-14 DIAGNOSIS — R10.13 EPIGASTRIC PAIN: ICD-10-CM

## 2022-02-14 DIAGNOSIS — R11.0 NAUSEA: ICD-10-CM

## 2022-02-14 DIAGNOSIS — E53.8 VITAMIN B12 DEFICIENCY: ICD-10-CM

## 2022-02-14 DIAGNOSIS — R53.82 CHRONIC FATIGUE: ICD-10-CM

## 2022-02-14 DIAGNOSIS — S69.92XA HAND INJURY, LEFT, INITIAL ENCOUNTER: ICD-10-CM

## 2022-02-14 DIAGNOSIS — E78.5 DYSLIPIDEMIA: ICD-10-CM

## 2022-02-14 DIAGNOSIS — E73.9 LACTOSE INTOLERANCE: ICD-10-CM

## 2022-02-14 DIAGNOSIS — Z98.890 S/P MYOMECTOMY: ICD-10-CM

## 2022-02-14 DIAGNOSIS — R14.0 ABDOMINAL BLOATING: ICD-10-CM

## 2022-02-14 DIAGNOSIS — F41.9 ANXIETY: ICD-10-CM

## 2022-02-14 DIAGNOSIS — E28.2 PCOS (POLYCYSTIC OVARIAN SYNDROME): ICD-10-CM

## 2022-02-14 PROCEDURE — 99214 OFFICE O/P EST MOD 30 MIN: CPT | Performed by: FAMILY MEDICINE

## 2022-02-14 RX ORDER — SEMAGLUTIDE 1 MG/.5ML
1 INJECTION, SOLUTION SUBCUTANEOUS
Qty: 5 EACH | Refills: 0 | Status: SHIPPED | OUTPATIENT
Start: 2022-02-14 | End: 2022-04-11 | Stop reason: DRUGHIGH

## 2022-02-14 RX ORDER — SEMAGLUTIDE 1.7 MG/.75ML
1.7 INJECTION, SOLUTION SUBCUTANEOUS
Qty: 5 EACH | Refills: 0 | Status: SHIPPED | OUTPATIENT
Start: 2022-02-14 | End: 2022-04-11 | Stop reason: DRUGHIGH

## 2022-02-14 NOTE — PROGRESS NOTES
[address] [place] [address] 22.  [phone] o  [phone] f    FOLLOW UP MEDICAL EXAMINATION    Method of Delivery:  Face to Face  Patient Name:  Shabana Meredith 1985  PCP:  Nicky Marquez PA-C    Patient's preferred weight management approach:  Medication Management (Weight loss medication prescribed by our office:  Wegovy 1.0 mg SC weekly x 1 dose last Saturday.)  Changed from Phentermine 37.5 mg since it did not work. Previously tried ND LCD but did not tolerate meal replacements. Has not tried Vegan products yet. Pt requests Gluten testing, per our previous discussions. Shabana Meredith is a 39 y.o. female with Obesity Class 1 Body mass index is 34.56 kg/m². presents for Weight Management       Any history of bariatric surgery:  Lap GBP by Dr. Mikey Carrillo 11/20/19          Date this medication was first prescribed by me while at [facility]:  01/11/22    Date this medication was last prescribed by me while at [facility]:  01/11/22   Has patient received and filled this prescription from any other health care provider both in state or out of the [facility] since the last office visit here:  0    Are Hunger, cravings and appetite well controlled with its use:  Yes. Decreased in all very much the first week then less the second week and no effect the 3rd and 4th week. Now I have started Wegovy 0.5 mg this past Saturday x 1 dose and the hunger, cravings and appetite are improved again. Negative side effects from this medication:  0  Overall, Shabana Meredith is tolerating this medication well, is pleased with the results and requests a refill of the medication today.      Patient's goals for participating in this weight management program:   Lose 35 lbs and have more energy. (150-165 lbs)     Anthropometric Measures Previously    Start Date:  09/14/21 on VLCD then changed to LCD 10/2021  Start TYTXGF:                                     617 TPC 3.2 oz              QOH:  20.80 OP/C0         Neck circumference: 13 in               Waist circumference: 44 in                                      Body Fat Percentage: 37.9 %     Anthropometric MeasuresToday  Today's Weight 2/14/2022: 195 lbs 1.6 oz      Weight Metrics 2/14/2022 2/14/2022 2/8/2022 2/1/2022 1/11/2022 1/11/2022 12/7/2021   Weight - 195 lb 1.6 oz 203 lb 197 lb - 195 lb 14.4 oz -   Neck Circ (inches) 13.25 - - - 13.5 - 13.5   Waist Measure Inches 42 - - - 48 - 42   Body Fat % 38.4 - - - 38.5 - 38.4   BMI - 34.56 kg/m2 35.96 kg/m2 34.9 kg/m2 - 34.7 kg/m2 -     Neck Circumference:  Acceptable range for M >16 inches, F>15 inches  Waist Circumference:  Acceptable range for M> 40 inches/102 cm, F > 35 inches, 88 cm  Body Fat: Acceptable range for M 18-24%, F 25-31%    Weight at time of last appointment on 01/11/22:   195 lbs 14.4 oz     Total pounds loss since starting this medication:  0  Lbs, gained 2 lbs w GI problems/ulcers  Factors contributing to weight change:  Had a bout w stomach ulcers. Saw Dr. Ruslan Bosch. Txd w Protonix. Better now. No Protonix now. No weight change taking Phentermine. Started [facility] 01/2022. Tolerated well. Working well. Barriers to taking the weight loss medication as prescribed this month:  0    Has patient met with the RD over the past month:  0  Nutritional changes made over the last month per recommendation of the RD:  na  Number of meals consumed daily:  3-4   Number of meal replacements consumed daily:  0  Number of meals consumed from restaurants weekly including fast food and carry out over the past month:  5  Are hunger, cravings and appetite controlled:  yes  Negative Side Effects from meal replacements:  na   Does patient want to utilize [employer] meal replacements:  na  Barriers to eating meals:  Busy schedule. Difficulty meal prepping.     Daily water intake:  40 oz   Sugar sweetened beverage intake since last appointment, including alcohol:  2-3x/week Starbucks Cinnamon Dolce Lattew reduced sugar    Daily caffeine consumption: 1 Cup coffee/day 8 oz w 2 pumps sugar free syrup and 2% milk   Barriers to drinking proper water:  Urinary frequency      Average number of hours of sleep nightly:   6-7 hours      Sleep aids or medications taken:  0  Sleep Hx:  0  Barriers to getting proper rest:  Family obligations. Physical activity:  barely   Barriers limiting physical activity:  L hand injury prevents yoga positions    ASSOCIATED MEDICAL CONDITIONS  Past Medical History:   Diagnosis Date    Anxiety 2011    Huyen Savage LCSW (Telehealth)    Back pain     Bilateral knee pain     due to OA.  Gastric ulcer 11/23/2021    Hemorrhoids     High cholesterol 2016    History of uterine fibroid summer 2020    30 fibroids. Dr. Rose Byrnes childhood (5-13 yo)    Txd w lots steroids. Severe allergies to everything. Allergist.    Hypertension 2009    Txd w 3 bp meds. off after weight loss.  Iron deficiency anemia due to chronic blood loss 2020    Txd w Iron Infusions x 5. Dr. Martell Hanson Menorrhagia     Morbid obesity ([facility] [address] 11/20/2019    Gastric Bypass 2019    PCOS (polycystic ovarian syndrome) 2015    Vitamin D deficiency 2020       BEHAVIORAL HEALTH HISTORY  DEPRESSION SCREENING:    3 most recent PHQ Screens 2/1/2022   PHQ Not Done -   Little interest or pleasure in doing things Not at all   Feeling down, depressed, irritable, or hopeless Not at all   Total Score PHQ 2 0       Any history of mental health conditions (including Depression, Anxiety, Anorexia, Bulimia or Binge Eating disorder): AYO, PMDD  Treating provider and medication(s):  pcp, Dr. Yumi Rubin - Alprazalam and Prozac 20 mg daily and on-line Huyen Savage LCSW for counseling q weeks.    Any current major lifestyle changes or stressors:   Finances. Family.      Outpatient Medications Marked as Taking for the 2/14/22 encounter (Office Visit) with Zheng Tim, DO   Medication Sig Dispense Refill    semaglutide, weight loss, (Wegovy) 0.5 mg/0.5 mL pnij 0.5 mg by SubCUTAneous route every seven (7) days. 5 Each 0    FLUoxetine (PROzac) 20 mg capsule TAKE 1 CAPSULE BY MOUTH EVERY DAY 90 Capsule 1    progesterone (PROMETRIUM) 200 mg capsule Take one by mouth every evening during luteal phase (P+3 through P+12). Pt understands how to calculate this. 30 Capsule 5    ondansetron (ZOFRAN ODT) 4 mg disintegrating tablet Take 1 Tablet by mouth every eight (8) hours as needed for Nausea or Vomiting. 30 Tablet 0    cholecalciferol, vitamin D3, (VITAMIN D3 PO) Take 6,000 Units by mouth three (3) days a week.  ALPRAZolam (XANAX) 0.25 mg tablet TAKE 1 TAB BY MOUTH TWO (2) TIMES DAILY AS NEEDED FOR ANXIETY. MAX DAILY AMOUNT: 0.5 MG. 30 Tablet 1    calcium carbonate (OS-PEBBLES) 500 mg calcium (1,250 mg) tablet Take 2 Tablets by mouth daily. BARIATRIC ADVANTAGE      multivitamin (ONE A DAY) tablet Take 1 Tablet by mouth daily.  Bariatric Advantage       Medications that cause weight gain:  off Protonix, barely taking Xanax, Progesterone  Medications that cause weight loss:  Wegovy, off Phentermine  Any changes to medications since last office visit with me:  0    Allergies   Allergen Reactions    Anesthesia S/I-40 (Propofol) [Propofol] Nausea and Vomiting     TXD W SCOPOLAMINE PATCH AND ANTI-NAUSEA PRE-OPERATIVELY    Contrave [Naltrexone-Bupropion] Nausea and Vomiting    Phentermine Other (comments)     37.5 MG 1/2 PILL DAILY CAUSED BP AND HEART RATE TO INCREASE, ANXIETY AND INSOMNIA WORSENED    Topamax [Topiramate] Other (comments)     FACIAL TINGLING       Family History   Problem Relation Age of Onset    Diabetes Father     Heart Disease Father 64        cardiomyopathy, heart transplant    Hypertension Father     Heart Surgery Father     High Cholesterol Father     Obesity Father     Gout Father     Other Father         kidney stones    Sleep Apnea Father     Diabetes Mother     High Cholesterol Mother     Hypertension Mother     Obesity Mother     Diabetes Maternal Grandmother     Diabetes Maternal Grandfather     Diabetes Paternal Grandmother     Obesity Sister     Anesth Problems Neg Hx        Past Surgical History:   Procedure Laterality Date    HX GI  2013    Sigmoidoscopy due to rectal polyp- WNL pp     HX LAP GASTRIC BYPASS  11/20/2019    Dr. Jose Luis Wagner [facility]     HX MYOMECTOMY  06/2021    30 uterine fibroids removed. Dr. Chilango Reyes.  HX POLYPECTOMY  1989    RECTAL POLYP- benign.  HX REFRACTIVE SURGERY  2015    HX TONSILLECTOMY  02/16/2021    due to chronic inflammation. Dr. Almas Daniel.  HX WISDOM TEETH EXTRACTION          Social History     Tobacco Use    Smoking status: Former Smoker     Packs/day: 0.25     Years: 15.00     Pack years: 3.75     Types: Cigarettes     Quit date: 8/1/2018     Years since quitting: 3.5    Smokeless tobacco: Never Used   Vaping Use    Vaping Use: Never used   Substance Use Topics    Alcohol use: Yes     Alcohol/week: 1.0 standard drink     Types: 1 Shots of liquor per week    Drug use: Not Currently     Types: Marijuana        Immunization History   Administered Date(s) Administered    COVID-19, Moderna, Primary or Immunocompromised Series, MRNA, PF, 100mcg/0.5mL 01/06/2021, 02/03/2021, 08/26/2021    Influenza Vaccine 10/09/2019    Influenza Vaccine (Quad) PF (>6 Mo Flulaval, Fluarix, and >3 Yrs Shawna Oscar 19773) 08/18/2017, 10/06/2018, 09/16/2020    Tdap 06/08/2016        OB/GYN HISTORY (For Female Patients)  Social History     Substance and Sexual Activity   Sexual Activity Yes    Partners: Male    Birth control/protection: I.U.D. OB History    No obstetric history on file. Menopause:  0  LMP:  Patient's last menstrual period was 02/08/2022 (exact date). OTHER MEDICAL CARE SINCE LAST OFFICE VISIT  Pt saw hand ortho at [facility] [address] [facility] for L hand injury. Dxd w fracture knuckle or torn ligaments. txd w Jayjay tape and cast at night. Pt saw hematologist for iron def anemia. Txd w Iron Infusions. Released from care since iron levels are normal.    ROS  Pt denies dry mouth, hunger, cravings, lack of focus, fatigue, feeling weak, headaches, dizziness, light headedness, nausea, vomiting, diarrhea, constipation, slowed gastric emptying, indigestion, abdominal pain, chest pain, rapid heart rate, irregular heart beat, SOB, low blood sugar, feeling cold, hair loss, rash, fluid retention, leg aches, difficulty sleeping, irritability, mood swings, signs of depression, suicidal thoughts/ideation/attempts or other associated symptoms. Review of Systems negative except as noted above in HPI. HEALTH MAINTENANCE  A1c:  SEE RESULTS BELOW     Lipids:  SEE RESULTS BELOW  Depression Screening:  Performed during Initial Visit to [facility]  Colonoscopy:  sigmoidoxcopy, if patient is over 49 yo  Mammogram:  ? Fibrocystic breasts, if female patient is over 35 yo  Annual Wellness Exam:  To be performed by PCP, when appropriate. PHYSICAL EXAMINATION    Visit Vitals  /84 (BP 1 Location: Right arm, BP Patient Position: Sitting, BP Cuff Size: Adult long)   Pulse 86   Temp 98.4 °F (36.9 °C) (Oral)   Resp 18   Ht 5' 3\" (1.6 m)   Wt 195 lb 1.6 oz (88.5 kg)   LMP 02/08/2022 (Exact Date)   SpO2 98%   BMI 34.56 kg/m²         Weight Metrics 2/14/2022 2/14/2022 2/8/2022 2/1/2022 1/11/2022 1/11/2022 12/7/2021   Weight - 195 lb 1.6 oz 203 lb 197 lb - 195 lb 14.4 oz -   Neck Circ (inches) 13.25 - - - 13.5 - 13.5   Waist Measure Inches 42 - - - 48 - 42   Body Fat % 38.4 - - - 38.5 - 38.4   BMI - 34.56 kg/m2 35.96 kg/m2 34.9 kg/m2 - 34.7 kg/m2 -            General appearance - Well nourished. Well appearing. Well developed. No acute distress. Obese. Head - Normocephalic. Atraumatic. Eyes - pupils equal and reactive. Extraocular eye movements intact. Sclera anicteric.     Ears - Hearing is grossly normal bilaterally. Nose - normal and patent. No polyps noted. No erythema. No discharge. Neck - supple. Midline trachea. No carotid bruits noted bilaterally. No thyromegaly noted. Chest - clear to auscultation bilaterally anteriorly and posteriorly. No wheezes. No rales or rhonchi. Breath sounds are symmetrical bilaterally. Unlabored respirations. Heart - normal rate. Regular rhythm. Normal S1, S2. No murmur noted. No rubs, clicks or gallops noted. Abdomen - soft and distended. No masses or organomegaly. No rebound, rigidity or guarding. Bowel sounds normal x 4 quadrants. No tenderness noted. Neurological - awake, alert and oriented to person, place, and time and event. Cranial nerves II through XII intact. Clear speech. Muscle strength is +5/5 x 4 extremities. Sensation is intact to light touch bilaterally. Steady gait. Heme/Lymph - peripheral pulses normal x 4 extremities. No peripheral edema is noted. Musculoskeletal - Intact x 4 extremities. No pain with movement. Jayjay tape intact to 4th and 5th fingers of L hand. Back exam - normal range of motion. Negative Straight Leg Raise Test bilaterally. No buffalo hump noted. Skin - no rashes, erythema, ecchymosis, lacerations, abrasions, suspicious moles noted. No skin tags or moles. No acanthosis nigricans noted in the axilla or neck. Psychological -   normal behavior, dress and thought processes. Good insight. Good eye contact. Normal affect. Appropriate mood. Normal speech.     DATA REVIEWED    Lab Results   Component Value Date/Time    WBC 7.5 02/01/2022 10:12 AM    HGB 14.3 02/01/2022 10:12 AM    HCT 42.0 02/01/2022 10:12 AM    PLATELET 330 15/49/3894 10:12 AM    MCV 88 02/01/2022 10:12 AM     Lab Results   Component Value Date/Time    Sodium 138 10/04/2021 09:23 AM    Potassium 4.0 10/04/2021 09:23 AM    Chloride 110 (H) 10/04/2021 09:23 AM    CO2 25 10/04/2021 09:23 AM    Anion gap 3 (L) 10/04/2021 09:23 AM    Glucose 89 10/04/2021 09:23 AM    BUN 13 10/04/2021 09:23 AM    Creatinine 0.55 10/04/2021 09:23 AM    BUN/Creatinine ratio 24 (H) 10/04/2021 09:23 AM    GFR est AA >60 10/04/2021 09:23 AM    GFR est non-AA >60 10/04/2021 09:23 AM    Calcium 8.8 10/04/2021 09:23 AM    Bilirubin, total 0.5 10/04/2021 09:23 AM    Alk.  phosphatase 69 10/04/2021 09:23 AM    Protein, total 7.3 10/04/2021 09:23 AM    Albumin 3.4 (L) 10/04/2021 09:23 AM    Globulin 3.9 10/04/2021 09:23 AM    A-G Ratio 0.9 (L) 10/04/2021 09:23 AM    ALT (SGPT) 18 10/04/2021 09:23 AM    AST (SGOT) 10 (L) 10/04/2021 09:23 AM     Lab Results   Component Value Date/Time    Hemoglobin A1c 4.3 (L) 09/21/2021 10:38 AM     Lab Results   Component Value Date/Time    TSH 1.330 09/21/2021 10:38 AM     Lab Results   Component Value Date/Time    Uric acid 5.4 09/21/2021 10:38 AM     Magnesium   Date Value Ref Range Status   09/21/2021 2.0 1.6 - 2.3 mg/dL Final   11/05/2019 2.0 1.6 - 2.4 mg/dL Final     Lab Results   Component Value Date/Time    Vitamin B12 1,737 (H) 09/21/2021 10:38 AM    Folate >20.0 09/21/2021 10:38 AM     Lab Results   Component Value Date/Time    Vitamin D 25-Hydroxy 17.9 (L) 11/05/2019 08:45 AM    VITAMIN D, 25-HYDROXY 36.8 09/21/2021 10:38 AM     Lab Results   Component Value Date/Time    Iron 161 (H) 02/01/2022 10:12 AM    TIBC 359 02/01/2022 10:12 AM    Iron % saturation 45 02/01/2022 10:12 AM    Ferritin 186 (H) 02/01/2022 10:12 AM     Lab Results   Component Value Date/Time    Cholesterol, total 221 (H) 09/21/2021 10:38 AM    Cholesterol, Total 196 09/21/2021 10:38 AM    HDL Cholesterol 64 09/21/2021 10:38 AM    LDL, calculated 142 (H) 09/21/2021 10:38 AM    LDL, calculated 151 (H) 03/11/2019 12:47 PM    VLDL, calculated 15 09/21/2021 10:38 AM    VLDL, calculated 34 03/11/2019 12:47 PM    Triglyceride 84 09/21/2021 10:38 AM   )  Results for orders placed or performed in visit on 09/14/21   [facility] (WITHOUT GRAPH)     Status: Abnormal   Result Value Ref Range Status    LDL-P 1,242 (H) <1,000 nmol/L Final     Comment:                           Low                   < 1000                            Moderate         1000 - 1299                            Borderline-High  1300 - 1599                            High             1600 - 2000                            Very High             > 2000      LDL-C(NIH CALC) 122 (H) 0 - 99 mg/dL Final     Comment:                           Optimal               <  100                            Above optimal     100 -  129                            Borderline        130 -  159                            High              160 -  189                            Very high             >  189      HDL-C 60 >39 mg/dL Final    Triglycerides 78 0 - 149 mg/dL Final    Cholesterol, Total 196 100 - 199 mg/dL Final    HDL-P (Total) 37.0 >=30.5 umol/L Final    Small LDL-P 521 <=527 nmol/L Final    LDL size 21.3 >20.5 nm Final     Comment:  ----------------------------------------------------------                   ** INTERPRETATIVE INFORMATION**                   PARTICLE CONCENTRATION AND SIZE                      <--Lower CVD Risk   Higher CVD Risk-->    LDL AND HDL PARTICLES   Percentile in Reference Population    HDL-P (total)        High     75th    50th    25th   Low                         >34.9    34.9    30.5    26.7   <26.7    Small LDL-P          Low      25th    50th    75th   High                         <117     117     527     839    >839    LDL Size   <-Large (Pattern A)->    <-Small (Pattern B)->                      23.0    20.6           20.5      19.0   ----------------------------------------------------------  Small LDL-P and LDL Size are associated with CVD risk, but not after  LDL-P is taken into account.       LP-IR SCORE 25 <=45 Final     Comment: INSULIN RESISTANCE MARKER      <--Insulin Sensitive    Insulin Resistant-->             Percentile in Reference Population  Insulin Resistance Score  LP-IR Score   Low   25th   50th   75th   High                <27   27     45     63     >63  LP-IR Score is inaccurate if patient is non-fasting. The LP-IR score is a laboratory developed index that has been  associated with insulin resistance and diabetes risk and should be  used as one component of a physician's clinical assessment. Narrative    Test(s) 506011-LTI-U; 885836-LPB-U; 084706-Dbpztfsrcwnaq; 316156-  Cholesterol, Total; 250059-HAI-Q (Total); 884297-Small LDL-P; 607916-  LDL Size; 154672-SY-GU Score  was developed and its performance characteristics determined  by [facility] Alfredo. It has not been cleared or approved by the Food  and Drug Administration. Performed at:  [facility] [employer]  [address]  [id number]  : Paty Butcher MD, Phone:  [phone]          ASSESSMENT     ICD-10-CM ICD-9-CM    1. Obesity, Class I, BMI 30-34.9  E66.9 278.00 semaglutide, weight loss, (Wegovy) 1 mg/0.5 mL pnij      semaglutide, weight loss, (Wegovy) 1.7 mg/0.75 mL pnij   2. Metabolic syndrome  Q02.65 277.7    3. Dyslipidemia  E78.5 272.4     w elevated LDLP   4. Abdominal bloating  R14.0 787.3 CELIAC ANTIBODY PROFILE      METABOLIC PANEL, COMPREHENSIVE    due to gluten sensitivity vs other   5. Hand injury, left, initial encounter  S69. 92XA 959.4     due to collision w desk/wall   6. Vitamin D deficiency  E55.9 268.9     resolved   7. Iron deficiency anemia due to chronic blood loss  D50.0 280.0     slightly overtreated w iron infusions   8. S/P gastric bypass  Z98.84 V45.86    9. Chronic fatigue  R53.82 780.79     due to stress vs inadequate sleep vs other, unchanged on Wegovy   10. Nausea  R11.0 787.02 CELIAC ANTIBODY PROFILE    due to ulcers, resolved after Protonix   11. Anxiety  F41.9 300.00     stable w counseling   12. Vitamin B12 deficiency  E53.8 266.2    13. PCOS (polycystic ovarian syndrome)  E28.2 256.4    14.  Lactose intolerance  E73.9 271.3    15. S/P myomectomy  Z98.890 V45.89    16. Epigastric pain  R10.13 789.06 CELIAC ANTIBODY PROFILE    due to ulcers, resolved after Protonix   17. BMI 34.0-34.9,adult  Z68.34 V85.34        We discussed the expected course, resolution and complications of the diagnosis(es) in detail. WEIGHT MANAGEMENT PLAN  Agree with nurse documentation. Chart was reviewed and updated during the office visit today. Emphasized the importance of the patient continuing care from current primary care provider and other specialists while participating in this weight management program.    Patient has full access to all our office notes, orders, lab results on [employer] and can provide them copies, if desired. Advised patient to follow up with pcp for any acute symptoms and Health Maintenance. Advised pt to d/w pcp about having a baseline mammogram.    Continue current medications as directed by prescribers. Weight loss medication prescribed today and sent electronically to the pharmacy on file:  Wegovy 1.0 mg SC weekly then start 1.7 mg SC weekly dosage. Mallika Lagunas has previously attempted weight loss and failed after utilizing reduced calorie dietary efforts, increased physical activity and lifestyle changes. I recommend this weight loss medication as noted above to be used adjunctively with lifestyle modifications and physical activity. Risks, benefits, costs, interactions, alternatives, contraindications and potential side effects were discussed with the patient. Mallika Lagunas expresses understanding and is agreeable to the terms as discussed today. Identified and discussed medications the patient is taking that can cause weight gain or weight loss as recorded on patient's medication list.  Advised patient not to stop use of any without discussing with the prescribing provider. Recommended patient ask prescribing providers to consider more weight neutral or weight negative options.    Will monitor patient's weight and health with continued use of current medications. Since patient has had bariatric surgery, Denise Aguilera was advised to take the vitamin regimen and follow dietary recommendations as directed by patient's bariatric surgical team.  Get annual labs to re-evaluate vitamin levels, per bariatricians recommendations. Avoid NSAIDS, carbonated beverages and concentrated sweets. Reminded patients who are female gender and of reproductive age that with weight loss, they may become more fertile. Recommended the use of condoms or some reliable form of contraception if pregnancy is not desired. Notify our office immediately if patient becomes pregnant so weight loss medication can be discontinued if that occurs. Reviewed and discussed most recent lab results. Labs were ordered today will be reviewed in detail at the next office visit and time allowed for any questions regarding the results. Will recheck pertinent labs periodically to guide therapeutic approach. An order form for pertinent labs was given to patient today. Patient was advised to have the labwork performed 1 week prior to that appointment with me. Reviewed and discussed most recent EKG results. Informed patient that an EKG will be performed for every 50 lbs of weight loss or sooner if any new cardiac symptoms develop. Advised [facility] to [facility] and view lab results directly. Notify me by [facility] if any questions or concerns arise. Discussed the patient's BMI, anthropometric measures and goals with patient. Informed patient that weight loss goal of 5-10% in 6-12 months has shown significant improvement in obesity and its related health conditions including DM, heart disease, asthma. A key study published in Arthritis & Rheumatism of Overweight and Obese Adults with OA found that losing 1 pound of weight resulted in 4 pounds of pressure being removed from the knees.     Dietary Prescription: Recommended meal plan:  ND Split Pea Low Calorie Dietary approach, 2973-8425 kcal/day, 1-2 over the counter meal replacements daily with 1 \"grocery\" meal.  - Pt will think about it. Encouraged patient to stay within the recommended amount of calories per day. Do not skip meals. Eat meals within 3-4 hours to prevent low blood sugar events. Reviewed nutrition and importance of regular protein intake and minimizing hidden carbohydrate sources. Decrease simple carbohydrates in any regular meal allowed (white foods, sweet foods, sweet drinks and alcohol). Increase green leafy vegetables and protein (lean meats or lentils) with each meal.    Avoid fried foods and limit saturated fats. Emphasized importance of drinking a minimum of 2 L (67 oz) of water daily up to half your body weight in ounces of water while utilizing this dietary approach to prevent dehydration and potential side effects. Avoid sugar-sweetened beverages including juice, sweet teas, sodas, alcohol. Limit water enhancers. Try water infusion recipes instead flavored with fresh fruit and/or vegetables. Limit caffeine, will allow 1 8 oz cup of black coffee or green tea (to stimulate release of Adiponectin and promote fat burning.)     Exercise Prescription:   Emphasized importance of performing physical activity at least 5 days weekly and reducing sedentary time. A goal of 30 minutes physical activity daily is recommended for health benefit and at least 60 minutes daily to prevent weight regain. During weight loss phase, no less than 75% of this time needs to be performing aerobic (i.e. Walking) activity with no more than 25% of the time performing resistance exercises (i.e. Weight lifting, strengthening, toning). During weight maintenance phase, 50% of the physical activity time needs to be spent performing aerobic activity with 50% of the total time performing resistance exercises.      Behavior and Lifestyle Prescription: Counseled patient on stressors, stress management and self-care approaches. Encouraged pt to seek formal counseling to further address stressors identified today. Since already receiving formal counseling please continue these sessions routinely. Go to ER if any thoughts or attempts of suicide are experienced. Sleep Prescription:   A goal of 7-9 hours nightly of uninterrupted sleep is recommended to turn off the Grehlin hormone to be released from the stomach and triggers appetite while promoting weight gain. Proper rest turns on Leptin hormone to be released from white adipose tissue and promotes weight loss. Avoid alcohol and caffeine 6-12 hours before bedtime to avoid sleep disturbance and bladder irritation during sleep. Counseled patient on health topics:    Weight loss medication risks, benefits, contraindications. Weight loss goals. Obesity, Insulin Resistance, LCD dietary approaches, benefits, contraindications, possible side effects to these diets and how to prevent poor outcomes (including staying hydrated, limit physical exercise while transitioning into this program, avoid skipping meals, etc), weight loss goals, sleep hygiene, barriers to losing weight and keeping weight off, strategies to overcome habits or challenges to approve or continue adherence and stressors. Immunizations noted. Influenza and Covid-19 vaccines are recommended, if patient has not had them yet. Obesity may increase risk for severe illness and death from Covid-19 disease. Offered empathy, encouragement, legitimation, prayers, partnership to patient. Praised patient for successes. Patient was offered a choice/choices in the treatment plan today. Patient expressed understanding with the diagnoses and the plan and agrees with recommendations. Return in about 2 months (around 4/14/2022) for MEDICATION MANAGMENT, weight, results.      Total time:  30 mins spent in counseling, coordinating care, reviewing and discussing results, reviewing relevant documentation from other providers, completing relevant documentation, and discussing treatment plans in reference to The primary encounter diagnosis was Obesity, Class I, BMI 30-34.9. Diagnoses of Metabolic syndrome, Dyslipidemia, Abdominal bloating, Hand injury, left, initial encounter, Vitamin D deficiency, Iron deficiency anemia due to chronic blood loss, S/P gastric bypass, Chronic fatigue, Nausea, Anxiety, Vitamin B12 deficiency, PCOS (polycystic ovarian syndrome), Lactose intolerance, S/P myomectomy, Epigastric pain, and BMI 34.0-34.9,adult were also pertinent to this visit. THANK YOU Ghulam Luo, Pastora SUE PA-C FOR ALLOWING ME THE PRIVILEGE TO PARTICIPATE IN THE CARE OF OUR MUTUAL PATIENT, Ms. Florence WITH RESPECT TO WEIGHT MANAGEMENT. Rigobertochago Bynum DO, Diplomate RAFFI WANG    There are no Patient Instructions on file for this visit.

## 2022-02-14 NOTE — PROGRESS NOTES
Weight Management. 1 month follow up. 1. Have you been to the ER, urgent care clinic since your last visit? Hospitalized since your last visit? No    2. Have you seen or consulted any other health care providers outside of the [address] [place] [address] since your last visit? Include any pap smears or colon screening. Yes, [facility] for right hand [facility].     BMI - 34.2

## 2022-03-18 PROBLEM — F32.81 PMDD (PREMENSTRUAL DYSPHORIC DISORDER): Status: ACTIVE | Noted: 2021-03-30

## 2022-03-18 PROBLEM — R10.9 ABDOMINAL PAIN: Status: ACTIVE | Noted: 2021-10-04

## 2022-03-18 PROBLEM — K28.9 MARGINAL ULCER: Status: ACTIVE | Noted: 2021-11-30

## 2022-03-19 PROBLEM — K91.2 POSTSURGICAL MALABSORPTION: Status: ACTIVE | Noted: 2021-05-04

## 2022-03-19 PROBLEM — R03.0 ELEVATED BP WITHOUT DIAGNOSIS OF HYPERTENSION: Status: ACTIVE | Noted: 2021-01-06

## 2022-03-19 PROBLEM — D50.0 IRON DEFICIENCY ANEMIA DUE TO CHRONIC BLOOD LOSS: Status: ACTIVE | Noted: 2021-01-04

## 2022-03-19 PROBLEM — E78.5 DYSLIPIDEMIA: Status: ACTIVE | Noted: 2021-09-30

## 2022-03-19 PROBLEM — R53.82 CHRONIC FATIGUE: Status: ACTIVE | Noted: 2021-09-14

## 2022-03-19 PROBLEM — D25.9 UTERINE LEIOMYOMA, UNSPECIFIED LOCATION: Status: ACTIVE | Noted: 2021-03-30

## 2022-03-19 PROBLEM — B37.2 CANDIDAL INTERTRIGO: Status: ACTIVE | Noted: 2021-04-28

## 2022-03-19 PROBLEM — E55.9 VITAMIN D DEFICIENCY: Status: ACTIVE | Noted: 2020-01-01

## 2022-03-20 PROBLEM — E66.09 CLASS 1 OBESITY DUE TO EXCESS CALORIES WITH SERIOUS COMORBIDITY AND BODY MASS INDEX (BMI) OF 34.0 TO 34.9 IN ADULT: Status: ACTIVE | Noted: 2019-11-20

## 2022-03-20 PROBLEM — E66.811 CLASS 1 OBESITY DUE TO EXCESS CALORIES WITH SERIOUS COMORBIDITY AND BODY MASS INDEX (BMI) OF 34.0 TO 34.9 IN ADULT: Status: ACTIVE | Noted: 2019-11-20

## 2022-04-11 ENCOUNTER — OFFICE VISIT (OUTPATIENT)
Dept: SURGERY | Age: 37
End: 2022-04-11
Payer: COMMERCIAL

## 2022-04-11 VITALS
DIASTOLIC BLOOD PRESSURE: 72 MMHG | HEART RATE: 85 BPM | HEIGHT: 63 IN | RESPIRATION RATE: 18 BRPM | TEMPERATURE: 98.3 F | OXYGEN SATURATION: 99 % | BODY MASS INDEX: 33.82 KG/M2 | WEIGHT: 190.9 LBS | SYSTOLIC BLOOD PRESSURE: 116 MMHG

## 2022-04-11 DIAGNOSIS — Z98.84 S/P GASTRIC BYPASS: ICD-10-CM

## 2022-04-11 DIAGNOSIS — E53.8 VITAMIN B12 DEFICIENCY: ICD-10-CM

## 2022-04-11 DIAGNOSIS — R53.82 CHRONIC FATIGUE: ICD-10-CM

## 2022-04-11 DIAGNOSIS — E78.5 DYSLIPIDEMIA: ICD-10-CM

## 2022-04-11 DIAGNOSIS — R14.0 ABDOMINAL BLOATING: ICD-10-CM

## 2022-04-11 DIAGNOSIS — E28.2 PCOS (POLYCYSTIC OVARIAN SYNDROME): ICD-10-CM

## 2022-04-11 DIAGNOSIS — Z98.890 S/P MYOMECTOMY: ICD-10-CM

## 2022-04-11 DIAGNOSIS — R11.0 NAUSEA: ICD-10-CM

## 2022-04-11 DIAGNOSIS — E88.81 METABOLIC SYNDROME: ICD-10-CM

## 2022-04-11 DIAGNOSIS — E66.9 OBESITY, CLASS I, BMI 30-34.9: Primary | ICD-10-CM

## 2022-04-11 DIAGNOSIS — D50.0 IRON DEFICIENCY ANEMIA DUE TO CHRONIC BLOOD LOSS: ICD-10-CM

## 2022-04-11 DIAGNOSIS — R10.13 EPIGASTRIC PAIN: ICD-10-CM

## 2022-04-11 DIAGNOSIS — E73.9 LACTOSE INTOLERANCE: ICD-10-CM

## 2022-04-11 DIAGNOSIS — E55.9 VITAMIN D DEFICIENCY: ICD-10-CM

## 2022-04-11 PROCEDURE — 99215 OFFICE O/P EST HI 40 MIN: CPT | Performed by: FAMILY MEDICINE

## 2022-04-11 RX ORDER — SEMAGLUTIDE 0.5 MG/.5ML
0.5 INJECTION, SOLUTION SUBCUTANEOUS
Qty: 5 EACH | Refills: 1 | Status: SHIPPED | OUTPATIENT
Start: 2022-04-11 | End: 2022-05-24 | Stop reason: DRUGHIGH

## 2022-04-11 NOTE — PROGRESS NOTES
1. Have you been to the ER, urgent care clinic since your last visit? Hospitalized since your last visit? No    2. Have you seen or consulted any other health care providers outside of the [address] [place] [address] since your last visit? Include any pap smears or colon screening.  No

## 2022-04-11 NOTE — PROGRESS NOTES
[address] [place] [address] 22.  [phone] o  [phone] f    FOLLOW UP MEDICAL EXAMINATION    Method of Delivery:   Face to Face  Patient Name:  Merry Alejo 1985  PCP:  Tamia Coto PA-C    Merry Alejo is a 39 y.o. female who is a patient with Obesity Class 1 Body mass index is 33.82 kg/m². and associated health concerns. Patient presents today for Weight Management and Medication Management      Patient's preferred weight management approach:  Low Calorie Diet (LCD, 4245-7039 kcal/day), Number meal replacements consumed per day:  1 OTC MyFairLife Protein Shake, tolerating well  Current weight loss medication(s):  Wegovy 1.0 mg SC weekly. Challenges adhering to the plan over the past month:  Nausea, upper abd pain, bloating and some diarrhea after eating 2-3 bites of meals. I have an appetite that has lessened but I can barely eat. Present w  today.   Just returned from [facility] in [place], [place].    Patient's goals for participating in this weight management program:   Lose 35 lbs and have more energy. (150-165 lbs)     Anthropometric Measures Previously    Start Date:  09/14/21 on VLCD then changed to LCD 10/2021  Start FVCYMY:   589 TRR 3.2 oz              BMI:  34.05 kg/m2         Neck circumference: 13 in               Waist circumference: 44 in                                      Body Fat Percentage: 37.9 %      Weight at last appointment on 02/14/22:  195 lbs 1.6 oz      Anthropometric MeasuresToday  Today's Weight 4/11/2022:  190 lbs 14.4 oz      Weight Metrics 4/11/2022 4/11/2022 2/14/2022 2/14/2022 2/8/2022 2/1/2022 1/11/2022   Weight - 190 lb 14.4 oz - 195 lb 1.6 oz 203 lb 197 lb -   Neck Circ (inches) 13.13 - 13.25 - - - 13.5   Waist Measure Inches 45.5 - 42 - - - 48   Body Fat % 37.7 - 38.4 - - - 38.5   BMI - 33.82 kg/m2 - 34.56 kg/m2 35.96 kg/m2 34.9 kg/m2 -     Neck Circumference:  Acceptable range for M >16 inches, F>15 inches  Waist Circumference:  Acceptable range for M> 40 inches/102 cm, F > 35 inches, 88 cm  Body Fat: Acceptable range for M 18-24%, F 25-31%    Pounds loss since the last doctor appointment with our Center a month ago:  5 lbs  Total pounds loss since starting this therapeutic approach on start date: 2 lbs. Is patient satisfied with his/her progress since the last appointment: It has been a bumpy road but I am still motivated to reach my goal.  Factors contributing to weight change:  I have enjoyed being more active doing fun stuff w my new hubby. Increasing Wegovy to 1.0 mg SC weekly. SURGICAL HISTORY  Previous Weight Loss Surgery:  Lap Gastric Bypass 11/2019 by Dr. Gifford Fabry    Past Surgical History:   Procedure Laterality Date    HX GI  2013    Sigmoidoscopy due to rectal polyp- WNL pp     HX LAP GASTRIC BYPASS  11/20/2019    Dr. José Miguel Sen @ [facility]     HX MYOMECTOMY  06/2021    30 uterine fibroids removed. Dr. Albin Lopez.  HX POLYPECTOMY  1989    RECTAL POLYP- benign.  HX REFRACTIVE SURGERY  2015    HX TONSILLECTOMY  02/16/2021    due to chronic inflammation. Dr. Lazaro Luna.  HX WISDOM TEETH EXTRACTION         WEIGHT LOSS MEDICATION HISTORY  Weight loss medication prescribed by our office:  Wegovy 1.0 mg SC x 4 weeks finished. Was supposed to start 1.7 mg yesterday    Negative side effects: ?nausea, abd bloating, epigastric abdominal pain has recurred since having my honeymoon in [facility]  Are hunger, cravings and appetite controlled with use of the weight loss medication:   Yes  Is the medication tolerated well:  Yes, except the day after any dose I have fatigue and diarrhea x 1  Does patient desire refills of any medications previously prescribed by our office:  Yes pt has 1.7 mg dose waiting at the pharmacy     Home Medications    Medication Sig Start Date End Date Taking?  Authorizing Provider   semaglutide, weight loss, (Wegovy) 1 mg/0.5 mL pnij 1 mg by SubCUTAneous route every seven (7) days. 2/14/22  Yes Judit Banks DO   FLUoxetine (PROzac) 20 mg capsule TAKE 1 CAPSULE BY MOUTH EVERY DAY 1/11/22  Yes Sun James PA-C   pantoprazole (PROTONIX) 40 mg tablet TAKE 1 TABLET BY MOUTH DAILY FOR STOMACH ULCER 12/28/21  Yes Duane Crumble, MD   ondansetron (ZOFRAN ODT) 4 mg disintegrating tablet Take 1 Tablet by mouth every eight (8) hours as needed for Nausea or Vomiting. 10/5/21  Yes Anselmo Ogden NP   cholecalciferol, vitamin D3, (VITAMIN D3 PO) Take 6,000 Units by mouth three (3) days a week. Yes Provider, Historical   ALPRAZolam (XANAX) 0.25 mg tablet TAKE 1 TAB BY MOUTH TWO (2) TIMES DAILY AS NEEDED FOR ANXIETY. MAX DAILY AMOUNT: 0.5 MG. 5/21/21  Yes Sun James PA-C   calcium carbonate (OS-PEBBLES) 500 mg calcium (1,250 mg) tablet Take 2 Tablets by mouth daily. BARIATRIC ADVANTAGE   Yes Provider, Historical   multivitamin (ONE A DAY) tablet Take 1 Tablet by mouth daily. Bariatric Advantage   Yes Provider, Historical   semaglutide, weight loss, (Wegovy) 1.7 mg/0.75 mL pnij 1.7 mg by SubCUTAneous route every seven (7) days. Start after 4 weeks of 1.0 mg dosage  Patient not taking: Reported on 4/11/2022 2/14/22   Digna Kasper DO   progesterone (PROMETRIUM) 200 mg capsule Take one by mouth every evening during luteal phase (P+3 through P+12). Pt understands how to calculate this.  12/7/21   Sun James PA-C      Medications that cause weight gain:  off Protonix, barely taking Xanax, Progesterone  Medications that cause weight loss:  Wegovy 1.0 mg SC weekly  Any changes to medications since last office visit with me:  0  Allergies   Allergen Reactions    Anesthesia S/I-40 (Propofol) [Propofol] Nausea and Vomiting     TXD W SCOPOLAMINE PATCH AND ANTI-NAUSEA PRE-OPERATIVELY    Contrave [Naltrexone-Bupropion] Nausea and Vomiting    Phentermine Other (comments)     37.5 MG 1/2 PILL DAILY CAUSED BP AND HEART RATE TO INCREASE, ANXIETY AND INSOMNIA WORSENED    Topamax [Topiramate] Other (comments)     FACIAL TINGLING       EATING HABITS  Has patient met with the RD over the past month for 1:1 session:  No  Nutritional changes made over the last month per recommendation of the RD:  na   Are hunger, cravings and appetite controlled:  Yes  Negative Side Effects from meal replacements:  0   Does patient want to utilize New Direction meal replacements:  No - I did not tolerate it at all. Tolerating OTC FairLife well. Barriers to eating meals:  Terrible GI symptoms x 1 months  Feels like the ulcers I had before starting [facility]. Resolved after tx. Appt w Dr. Erickson Rehman tomorrow. DRINKING HABITS  Average amount of water consumed daily: 8-30 oz/day  (Goal 2 L or 67 oz daily, minimally)  Amount of caffeine consumed daily: 8 oz coffee or single shot of Expresso   Sugar-sweetened beverages consumed daily (including alcohol, sodas, teas, juices, etc.): Gatorade Zero 20 oz/day  Barriers preventing patient from drinking minimum 2L water/day:  Urinary urgency but no access to a restroom at work. I just don't like water anymore. Nauseated every day. Living on [facility]. Social History     Tobacco Use    Smoking status: Former Smoker     Packs/day: 0.25     Years: 15.00     Pack years: 3.75     Types: Cigarettes     Quit date: 8/1/2018     Years since quitting: 3.6    Smokeless tobacco: Never Used   Vaping Use    Vaping Use: Never used   Substance Use Topics    Alcohol use: Yes     Alcohol/week: 1.0 standard drink     Types: 1 Shots of liquor per week    Drug use: Not Currently     Types: Marijuana         SLEEP HABITS  Total hours of sleep nightly: 7 hours (Goal 7-9 hours/nightly)  Rx or OTC Sleep Aids:  0   Sleep Hx:  0   Occupation:       Barriers to getting proper rest:  0     PHYSICAL ACTIVITY HISTORY  Type of physical activity:  Walking more while in [employer] and fun stuff w [employer].   Enjoyment with physical activity:  yes  Barriers limiting physical activity:  Lack of energy    BEHAVIORAL HEALTH HISTORY  DEPRESSION SCREENING:    3 most recent PHQ Screens 4/11/2022   PHQ Not Done -   Little interest or pleasure in doing things Not at all   Feeling down, depressed, irritable, or hopeless Not at all   Total Score PHQ 2 0       Any history of mental health conditions (including Depression, Anxiety, Anorexia, Bulimia or Binge Eating disorder): AYO, PMDD  Treating provider and medication(s):  pcp, Dr. Ilsa Wall - Alprazalam and Prozac 20 mg daily and on-line Abilio Mancuso LCSW for counseling q weeks. Any current major lifestyle changes or stressors:   Finances. Work. Is mental health condition controlled: Yes. No SI/SA. Mobile Apps used for meal planning, calorie counting, water consumption, sleep, or physical activity:  0     ASSOCIATED MEDICAL CONDITIONS  Past Medical History:   Diagnosis Date    Anxiety 2011    Abilio Mancuso LCSW (Telehealth)    Back pain     Bilateral knee pain     due to OA.  Gastric ulcer 11/23/2021    Hemorrhoids     High cholesterol 2016    History of uterine fibroid summer 2020    30 fibroids. Dr. Rakel Vernon childhood (5-13 yo)    Txd w lots steroids. Severe allergies to everything. Allergist.    Hypertension 2009    Txd w 3 bp meds. off after weight loss.  Iron deficiency anemia due to chronic blood loss 2020    Txd w Iron Infusions x 5. Dr. Dread Siegel Menorrhagia     Morbid obesity ([facility] [address] 11/20/2019    Gastric Bypass 2019    PCOS (polycystic ovarian syndrome) 2015    Vitamin D deficiency 2020       OB/GYN HISTORY (For Female Patients)  Social History     Substance and Sexual Activity   Sexual Activity Yes    Partners: Male    Birth control/protection: I.U.D. OB History    No obstetric history on file. Menopause:  No  LMP:  Patient's last menstrual period was 04/02/2022 (exact date).   Are you pregnant or planning on becoming pregnant within 6 months:  No    Do you have upcoming travel in the next 6 weeks:  No  If so, contact the dietician for meal plan modification and recommendations. Immunization History   Administered Date(s) Administered    COVID-19, Moderna, Primary or Immunocompromised Series, MRNA, PF, 100mcg/0.5mL 01/06/2021, 02/03/2021, 08/26/2021    Influenza Vaccine 10/09/2019    Influenza Vaccine (Quad) PF (>6 Mo Flulaval, Fluarix, and >3 Yrs Afluria, Fluzone [id number]) 08/18/2017, 10/06/2018, 09/16/2020    Tdap 06/08/2016       OTHER MEDICAL CARE SINCE LAST OFFICE VISIT  Pt saw Ba Fleming for finger injury. Better now. Txd w Medrol dose rowan 03/14/22. Gi symptoms started 03/23/22 and Dr. Lacie Garcia was notified. Restarted Protonix 40 mg daily w no relief. She was diagnosed with peptic ulcer and treated with Protonix which seemed to help a few months ago. She just returned from her [facility] as well. Pt has had increased fatigue, nausea, abdominal bloating, epigastric pain. She will be seeing her bariatric surgeon soon. She requests to have testing for Celiac dz. ROS  Pt denies hunger, cravings, lack of focus, feeling weak, headaches, dizziness, light headedness, vomiting, diarrhea, constipation, indigestion, rapid heart rate, SOB, low blood sugar, feeling cold, hair loss, rash, fluid retention, leg aches, difficulty sleeping, irritability, mood swings, or other associated symptoms. Review of Systems negative except as noted above in HPI.      HEALTH MAINTENANCE  Health Maintenance   Topic Date Due    Hepatitis C Screening  Never done    Cervical cancer screen  Never done    Flu Vaccine (Season Ended) 09/01/2022    Depression Monitoring  02/01/2023    DTaP/Tdap/Td series (3 - Td or Tdap) 06/08/2026    COVID-19 Vaccine  Completed    Pneumococcal 0-64 years  Aged Out       PHYSICAL EXAMINATION    Visit Vitals  /72 (BP 1 Location: Left arm, BP Patient Position: Sitting, BP Cuff Size: Thigh)   Pulse 85 Temp 98.3 °F (36.8 °C)   Resp 18   Ht 5' 3\" (1.6 m)   Wt 190 lb 14.4 oz (86.6 kg)   LMP 04/02/2022 (Exact Date)   SpO2 99%   BMI 33.82 kg/m²         Weight Metrics 4/11/2022 4/11/2022 2/14/2022 2/14/2022 2/8/2022 2/1/2022 1/11/2022   Weight - 190 lb 14.4 oz - 195 lb 1.6 oz 203 lb 197 lb -   Neck Circ (inches) 13.13 - 13.25 - - - 13.5   Waist Measure Inches 45.5 - 42 - - - 48   Body Fat % 37.7 - 38.4 - - - 38.5   BMI - 33.82 kg/m2 - 34.56 kg/m2 35.96 kg/m2 34.9 kg/m2 -          General appearance - Well nourished. Well appearing. Well developed. No acute distress. Obese. Head - Normocephalic. Atraumatic. Eyes - Extraocular eye movements intact. Sclera anicteric. Ears - Hearing is grossly normal bilaterally. Nose - normal and patent. Neck - supple. Midline trachea. No carotid bruits noted bilaterally. No thyromegaly noted. Chest - clear to auscultation bilaterally anteriorly and posteriorly. No wheezes. No rales or rhonchi. Breath sounds are symmetrical bilaterally. Unlabored respirations. Heart - normal rate. Regular rhythm. Normal S1, S2. No murmur noted. No rubs, clicks or gallops noted. Abdomen - soft and distended. No masses or organomegaly. No rebound, rigidity or guarding. Bowel sounds normal x 4 quadrants. No tenderness noted. Neurological - awake, alert and oriented to person, place, and time and event. Cranial nerves II through XII intact. Clear speech. Muscle strength is +5/5 x 4 extremities. Heme/Lymph - peripheral pulses normal x 4 extremities. No peripheral edema is noted. Musculoskeletal - Intact x 4 extremities. No pain with movement. Back exam - normal range of motion. No CVA tenderness. Negative Straight Leg Test bilaterally. No buffalo hump noted. Skin - no rashes, erythema, ecchymosis, lacerations, abrasions, suspicious moles noted. No skin tags or moles. No acanthosis nigricans noted in the axilla or neck.   Psychological -   normal behavior, dress and thought processes. Good insight. Good eye contact. Normal affect. Appropriate mood. Normal speech. DATA REVIEWED    Lab Results   Component Value Date/Time    WBC 7.5 02/01/2022 10:12 AM    HGB 14.3 02/01/2022 10:12 AM    HCT 42.0 02/01/2022 10:12 AM    PLATELET 125 42/68/3058 10:12 AM    MCV 88 02/01/2022 10:12 AM     Lab Results   Component Value Date/Time    Sodium 138 10/04/2021 09:23 AM    Potassium 4.0 10/04/2021 09:23 AM    Chloride 110 (H) 10/04/2021 09:23 AM    CO2 25 10/04/2021 09:23 AM    Anion gap 3 (L) 10/04/2021 09:23 AM    Glucose 89 10/04/2021 09:23 AM    BUN 13 10/04/2021 09:23 AM    Creatinine 0.55 10/04/2021 09:23 AM    BUN/Creatinine ratio 24 (H) 10/04/2021 09:23 AM    GFR est AA >60 10/04/2021 09:23 AM    GFR est non-AA >60 10/04/2021 09:23 AM    Calcium 8.8 10/04/2021 09:23 AM    Bilirubin, total 0.5 10/04/2021 09:23 AM    Alk.  phosphatase 69 10/04/2021 09:23 AM    Protein, total 7.3 10/04/2021 09:23 AM    Albumin 3.4 (L) 10/04/2021 09:23 AM    Globulin 3.9 10/04/2021 09:23 AM    A-G Ratio 0.9 (L) 10/04/2021 09:23 AM    ALT (SGPT) 18 10/04/2021 09:23 AM    AST (SGOT) 10 (L) 10/04/2021 09:23 AM     Lab Results   Component Value Date/Time    Hemoglobin A1c 4.3 (L) 09/21/2021 10:38 AM     Lab Results   Component Value Date/Time    TSH 1.330 09/21/2021 10:38 AM     Lab Results   Component Value Date/Time    Uric acid 5.4 09/21/2021 10:38 AM     Magnesium   Date Value Ref Range Status   09/21/2021 2.0 1.6 - 2.3 mg/dL Final   11/05/2019 2.0 1.6 - 2.4 mg/dL Final     Lab Results   Component Value Date/Time    Vitamin B12 1,737 (H) 09/21/2021 10:38 AM    Folate >20.0 09/21/2021 10:38 AM     Lab Results   Component Value Date/Time    Vitamin D 25-Hydroxy 17.9 (L) 11/05/2019 08:45 AM    VITAMIN D, 25-HYDROXY 36.8 09/21/2021 10:38 AM     Lab Results   Component Value Date/Time    Iron 161 (H) 02/01/2022 10:12 AM    TIBC 359 02/01/2022 10:12 AM    Iron % saturation 45 02/01/2022 10:12 AM    Ferritin 186 (H) 02/01/2022 10:12 AM     Lab Results   Component Value Date/Time    Cholesterol, total 221 (H) 09/21/2021 10:38 AM    Cholesterol, Total 196 09/21/2021 10:38 AM    HDL Cholesterol 64 09/21/2021 10:38 AM    LDL, calculated 142 (H) 09/21/2021 10:38 AM    LDL, calculated 151 (H) 03/11/2019 12:47 PM    VLDL, calculated 15 09/21/2021 10:38 AM    VLDL, calculated 34 03/11/2019 12:47 PM    Triglyceride 84 09/21/2021 10:38 AM   )  Results for orders placed or performed in visit on 09/14/21   NMR LIPOPROFILE WITH LIPIDS (WITHOUT GRAPH)     Status: Abnormal   Result Value Ref Range Status    LDL-P 1,242 (H) <1,000 nmol/L Final     Comment:                           Low                   < 1000                            Moderate         1000 - 1299                            Borderline-High  1300 - 1599                            High             1600 - 2000                            Very High             > 2000      LDL-C(NIH CALC) 122 (H) 0 - 99 mg/dL Final     Comment:                           Optimal               <  100                            Above optimal     100 -  129                            Borderline        130 -  159                            High              160 -  189                            Very high             >  189      HDL-C 60 >39 mg/dL Final    Triglycerides 78 0 - 149 mg/dL Final    Cholesterol, Total 196 100 - 199 mg/dL Final    HDL-P (Total) 37.0 >=30.5 umol/L Final    Small LDL-P 521 <=527 nmol/L Final    LDL size 21.3 >20.5 nm Final     Comment:  ----------------------------------------------------------                   ** INTERPRETATIVE INFORMATION**                   PARTICLE CONCENTRATION AND SIZE                      <--Lower CVD Risk   Higher CVD Risk-->    LDL AND HDL PARTICLES   Percentile in Reference Population    HDL-P (total)        High     75th    50th    25th   Low                         >34.9    34.9    30.5    26.7   <26.7    Small LDL-P Low      25th    50th    75th   High                         <117     117     527     839    >839    LDL Size   <-Large (Pattern A)->    <-Small (Pattern B)->                      23.0    20.6           20.5      19.0   ----------------------------------------------------------  Small LDL-P and LDL Size are associated with CVD risk, but not after  LDL-P is taken into account. LP-IR SCORE 25 <=45 Final     Comment: INSULIN RESISTANCE MARKER      <--Insulin Sensitive    Insulin Resistant-->             Percentile in Reference Population  Insulin Resistance Score  LP-IR Score   Low   25th   50th   75th   High                <27   27     45     63     >63  LP-IR Score is inaccurate if patient is non-fasting. The LP-IR score is a laboratory developed index that has been  associated with insulin resistance and diabetes risk and should be  used as one component of a physician's clinical assessment. Narrative    Test(s) 789823-LDM-A; 902007-JWF-C; 516159-Rjdwhimoqoqgu; 928471-  Cholesterol, Total; 862206-AKK-B (Total); 884297-Small LDL-P; 028322-  LDL Size; 318518-SA-ZB Score  was developed and its performance characteristics determined  by Jeannette Walsh. It has not been cleared or approved by the Food  and Drug Administration. Performed at:  [address]  [id number]  : Orville Mata MD, Phone:  [phone]        EKG:    Results for orders placed or performed during the hospital encounter of 09/21/21   EKG, 12 LEAD, INITIAL   Result Value Ref Range    Ventricular Rate 63 BPM    Atrial Rate 63 BPM    P-R Interval 176 ms    QRS Duration 92 ms    Q-T Interval 416 ms    QTC Calculation (Bezet) 425 ms    Calculated P Axis 28 degrees    Calculated R Axis 8 degrees    Calculated T Axis 37 degrees    Diagnosis       Normal sinus rhythm  Normal ECG  When compared with ECG of 27-DEC-2020 14:28,  Vent.  rate has decreased BY  39 BPM  Confirmed by Vandana Pereira M.D., Jerry Reilly ([id number]) on 9/21/2021 5:05:08 PM       QTC interval  425 ms. No prolonged QTc noted. (Acceptable QTC upper limits:  440 ms for Males, 460 ms for Females)    ASSESSMENT     ICD-10-CM ICD-9-CM    1. Obesity, Class I, BMI 30-34.9  E66.9 278.00 semaglutide, weight loss, (Wegovy) 0.5 mg/0.5 mL pnij   2. Chronic fatigue  X36.87 142.55 METABOLIC PANEL, COMPREHENSIVE      MAGNESIUM      CBC W/O DIFF      HCG QL SERUM    due to vit D deficiency vs GI vs other   3. Metabolic syndrome  W41.43 277.7    4. Dyslipidemia  Q64.2 161.1 METABOLIC PANEL, COMPREHENSIVE    w elevated LDLP   5. Vitamin D deficiency  E55.9 268.9 VITAMIN D, 25 HYDROXY   6. Epigastric pain  W98.24 530.39 METABOLIC PANEL, COMPREHENSIVE      CBC W/O DIFF      AMYLASE      LIPASE      SED RATE (ESR)      ALLERGEN, GLUTEN AB, IGE, QT      CELIAC ANTIBODY PROFILE      CELIAC DISEASE PROFILE (REFLEX TTG, IGG)      H PYLORI AB, IGM   7. PCOS (polycystic ovarian syndrome)  E28.2 256.4    8. Abdominal bloating  L86.7 420.5 METABOLIC PANEL, COMPREHENSIVE      SED RATE (ESR)   9. Iron deficiency anemia due to chronic blood loss  D50.0 280.0 CBC W/O DIFF      IRON   10. S/P gastric bypass  Z98.84 V45.86    11. Nausea  R11.0 787.02 MAGNESIUM   12. Vitamin B12 deficiency  E53.8 266.2 VITAMIN B12 & FOLATE   13. Lactose intolerance  E73.9 271.3    14. S/P myomectomy  Z98.890 V45.89    15. BMI 34.0-34.9,adult  Z68.34 V85.34          We discussed the expected course, resolution and complications of the diagnosis(es) in detail. WEIGHT MANAGEMENT PLAN    Chart was reviewed and updated during the office visit today. Emphasized the importance of the patient continuing care from current primary care provider and other specialists while participating in this weight management program.    Patient has full access to all our office notes, orders, lab results on [employer] and can provide them copies, if desired.      Advised patient to follow up with pcp for Health Maintenance and any acute symptoms. Reviewed office notes from other health care providers. Continue follow up as directed by these providers. Since patient has had bariatric surgery, Juan Donovan was advised to take the vitamin regimen and follow dietary recommendations as directed by patient's bariatric surgical team.  Get annual labs to re-evaluate vitamin levels, per bariatricians recommendations. Avoid NSAIDS, concentrated sweets and carbonated beverages. Reminded patients who are female gender and of reproductive age that with weight loss, they may become more fertile. Recommended the use of condoms or some reliable form of contraception if pregnancy is not desired. Notify our office immediately if patient becomes pregnant. Reviewed and discussed most recent lab results and EKG results. Advised patient to sign a release to provide our program a copy of each, if it is unavailable today. Informed patient that an EKG will be performed for every 50 lbs of weight loss. Recheck pertinent labs every 3 months while participating in LCD using [employer] Direction meal replacements, as discussed to identify electrolyte abnormalities and guide therapeutic approach. An order form for pertinent labs was given to patient today. Patient was advised to have the labwork performed 1 week prior to that appointment with me. Advised patient to sign up for [facility] and view lab results directly. Notify me by [facility] if any questions or concerns arise. Labs ordered today will be reviewed in detail at the next office visit and time allowed for any questions regarding the results. Discussed the patient's medications, BMI, anthropometric measures and goals with patient. Informed patient that weight loss goal of 5-10% in 6-12 months has shown significant improvement in obesity and its related health conditions including DM, heart disease, asthma.   A key study published in Arthritis & Rheumatism of Overweight and Obese Adults with OA found that losing 1 pound of weight resulted in 4 pounds of pressure being removed from the knees. Continue current medications as directed by prescribers. Weight loss medication prescription(s) as noted above was sent electronically to the pharmacy on file after discussing risks, benefits, costs, interactions, alternatives, contraindications and potential side effects:  0-     Reduce Wegovy down to 0.5 mg instead of 1.0 mg SC weekly due to possible nausea, abd pain, gastroparesis side effects. Identified and discussed medications the patient is taking that can cause weight gain or weight loss as recorded on patient's medication list.  Advised patient not to stop use of any without discussing with the prescribing provider. Recommended patient to ask prescribing providers to consider more weight neutral or weight negative options. Will monitor patient's weight and health with continued use of current medications. Supplement recommendations: Take OTC Magnesium 400 mg po at night prn sleep, muscle cramping, constipation. Take OTC vit B12 1000 mcg daily orally if taking Metformin or experiencing numbness and tingling. Try OTC Fiber products (I.e. Benefiber, Metamucil, psyllium, etc) if experiencing constipation. Take OTC Gas-X or Lactaid with meal replacements, if lactose intolerance history exists or symptoms begin. Referred patient to [facility] Patient Manual for recommended antidotes. If any new symptoms or side effects have been experienced once dietary approach is initiated, advised patient to notify our office. Dietary Prescription:    Rani Whaley has fulfilled [facility] program requirements this month by completing homework forms, attending educational classes, nurse triage visits and monthly provider appointments as agreed and remains in good standing.   Reviewed and appreciate registered dietician note for nutritional counseling. Patient has attended requirements of the program over the past month and is in good standing. Reviewed and appreciate bi-monthly nurse visits and agree with documentation. Followed up on any patient concerns today. Recommended meal plan:  OTC [employer] Low Calorie Dietary approach, 2780-8843 kcal/day, increase to 2 meal replacements daily with 1 \"grocery\" meal, to rest bowels. Make sure proper daily calories are consumed for each meal.  Encouraged patient to stay within the recommended amount of calories per day   Reviewed nutrition and importance of regular protein intake and minimizing hidden carbohydrate sources. Decrease simple carbohydrates in any regular meal allowed (white foods, sweet foods, sweet drinks and alcohol), increase green leafy vegetables and protein (lean meats and beans) with each meal.    Avoid fried foods and limit saturated fats. Do not skip meals. Eat meals within 3-4 hours to prevent low blood sugar events. Emphasized importance of drinking a minimum of 2 L (67 oz) of water daily up to half your body weight in ounces of water while utilizing this dietary approach to prevent dehydration and potential side effects. Avoid water enhancers or sugar-sweetened beverages including alcohol, juice, soda, lemonade. Try OTC water infusion recipes. Limit caffeine, will allow 1 8 oz cup of black coffee or green tea (to stimulate release of Adiponectin and promote fat burning.)  Schedule 1:1 session with RD. Exercise Prescription:   Emphasized importance of reducing sedentary time and performing physical activity at least 5 days a week, as tolerated. A goal of 30 minutes physical activity daily is recommended for health benefit and at least 60 minutes daily to prevent weight regain.     During weight loss phase, no less than 75% of this time needs to be performing aerobic (i.e. Walking) activity with no more than 25% of the time performing resistance exercises (i.e. Weight lifting, strengthening, toning). During weight maintenance phase, 50% of the physical activity time needs to be spent performing aerobic activity with 50% of the total time performing resistance exercises. Behavior and Lifestyle Prescription:  Counseled patient on stressors, stress management and self-care approaches. Strongly encouraged pt to seek formal counseling for stressors mentioned today. If already receiving formal counseling please continue these sessions routinely. Go to ER if any thoughts or attempts of suicide are experienced. Sleep Prescription:   A goal of 7-9 hours nightly of uninterrupted sleep is recommended to turn off the Grehlin hormone to be released from the stomach and triggers appetite while promoting weight gain. Proper rest turns on Leptin hormone to be released from white adipose tissue and promotes weight loss. Avoid alcohol and caffeine 6-12 hours before bedtime to reduce risk of sleep disturbance and bladder irritation while asleep. Discussed snoring, symptoms of Sleep Apnea and improvements with weight loss. Strongly encouraged compliance with CPAP, if Sleep Apnea has been diagnosed. Advised patient to follow up with sleep specialist for every 25 pounds lost to see if CPAP settings need to be adjusted. Counseled patient on health topics:    Obesity, Insulin Resistance, LCD dietary approaches, benefits, contraindications, possible side effects to these diets and how to prevent poor outcomes (including staying hydrated, limit physical exercise while transitioning into this program, avoid skipping meals, etc), weight loss goals, sleep hygiene, stressors, barriers to losing weight and keeping weight off, strategies to overcome habits or challenges to improve or continue adherence. Immunizations noted. Covid-19 vaccines/boosters recommended, if patient has not had it.   Obesity may increase risk for severe illness and death from Covid-19 disease. Offered empathy, encouragement, legitimation, prayers, partnership to patient. Praised patient for successes. Patient was offered a choice/choices in the treatment plan today. Patient expressed understanding with the diagnoses and the plan and agrees with recommendations. Return in about 1 month (around 5/11/2022) for MEDICATION MANAGMENT, results, referral follow up. Total time:  58 mins spent in counseling, coordinating care, reviewing and discussing results, reviewing relevant documentation from other providers, completing relevant documentation, and discussing treatment plans in reference to The primary encounter diagnosis was Obesity, Class I, BMI 30-34.9. Diagnoses of Chronic fatigue, Metabolic syndrome, Dyslipidemia, Vitamin D deficiency, Epigastric pain, PCOS (polycystic ovarian syndrome), Abdominal bloating, Iron deficiency anemia due to chronic blood loss, S/P gastric bypass, Nausea, Vitamin B12 deficiency, Lactose intolerance, S/P myomectomy, and BMI 34.0-34.9,adult were also pertinent to this visit. THANK YOU Pastora Sosa PA-C FOR ALLOWING ME THE PRIVILEGE TO PARTICIPATE IN THE CARE OF OUR MUTUAL PATIENT, Ms. Florence WITH RESPECT TO WEIGHT MANAGEMENT. Santi Jack DO, Diplomate RAFFI WANG    There are no Patient Instructions on file for this visit.

## 2022-04-12 ENCOUNTER — TELEPHONE (OUTPATIENT)
Dept: SURGERY | Age: 37
End: 2022-04-12

## 2022-04-12 ENCOUNTER — OFFICE VISIT (OUTPATIENT)
Dept: SURGERY | Age: 37
End: 2022-04-12
Payer: COMMERCIAL

## 2022-04-12 VITALS
RESPIRATION RATE: 16 BRPM | DIASTOLIC BLOOD PRESSURE: 84 MMHG | HEIGHT: 63 IN | WEIGHT: 192 LBS | OXYGEN SATURATION: 98 % | TEMPERATURE: 97.8 F | BODY MASS INDEX: 34.02 KG/M2 | HEART RATE: 80 BPM | SYSTOLIC BLOOD PRESSURE: 118 MMHG

## 2022-04-12 DIAGNOSIS — K28.9 MARGINAL ULCER: Primary | ICD-10-CM

## 2022-04-12 PROCEDURE — 99213 OFFICE O/P EST LOW 20 MIN: CPT | Performed by: SURGERY

## 2022-04-12 NOTE — TELEPHONE ENCOUNTER
Alvina Hutchins, pharmacist @ [address] [place] [address],[address] back. Advised her as per Dr. Sairta Can. Subha verbalized understanding of all.

## 2022-04-12 NOTE — PROGRESS NOTES
1. Have you been to the ER, urgent care clinic since your last visit? Hospitalized since your last visit? No    2. Have you seen or consulted any other health care providers outside of the [address] [place] [address] since your last visit? Include any pap smears or colon screening.  No

## 2022-04-12 NOTE — Clinical Note
Hi, would you be able to fax her referral to  [facility] for interval endoscopy? Thanks! I will try to be better tomorrow.

## 2022-04-12 NOTE — TELEPHONE ENCOUNTER
Please inform pharmacist, that pt reported taking Wegovy 1 mg SC weekly and was supposed to increase to 1.7 mg SC weekly. Because of her current symptoms, I am reducing her dose back to [facility]FORT REHANA 0.5 mg SC weekly. Ok to change quantity to 4 pens please. Thank you.

## 2022-04-12 NOTE — TELEPHONE ENCOUNTER
Subha, pharmacist, called for clarification for the [facility] Rx from yesterday, 4/11. She wanted to make sure that the patient is to stay on the 0.5 mg dose for the refill, not move up to the 1 mg b/c refill is written for same dose. She also stated that there are 4 pens in the box, not 5. I advised Subha that I would check with Dr. Jacklyn Sims and call her back. Subha verbalized understanding.

## 2022-04-13 ENCOUNTER — DOCUMENTATION ONLY (OUTPATIENT)
Dept: SURGERY | Age: 37
End: 2022-04-13

## 2022-04-13 NOTE — PATIENT INSTRUCTIONS
Learning About Weight-Loss (Bariatric) Surgery  What is weight-loss surgery? Bariatric surgery is surgery to help you lose weight. This type of surgery is only used for people who are very overweight and have not been able to lose weight with diet and exercise. This surgery makes the stomach smaller. Some types of surgery also change the connection between your stomach and intestines. Having weight-loss surgery is a big step. After surgery, you'll need to make new, lifelong changes in how you eat and drink. How is weight-loss surgery done? Bariatric surgery may be either \"open\" or \"laparoscopic. \" Open surgery is done through a large cut (incision) in the belly. Laparoscopic surgery is done through several small cuts. The doctor puts a lighted tube, or scope, and other surgical tools through small cuts in your belly. The doctor is able to see your organs with the scope. There are different types of bariatric surgery. Gastric sleeve surgery  The surgery is usually done through several small incisions in the belly. The doctor removes more than half of your stomach. This leaves a thin sleeve, or tube, that is about the size of a banana. Because part of your stomach has been removed, this can't be reversed. Jann-en-Y gastric bypass surgery  Jann-en-Y (say \"marcell-en-why\") surgery changes the connection between the stomach and the intestines. The doctor separates a section of your stomach from the rest of your stomach. This makes a small pouch. The new pouch will hold the food you eat. The doctor connects the stomach pouch to the middle part of the small intestine. Gastric banding surgery  The surgery is usually done through several small incisions in the belly. The doctor wraps a band around the upper part of the stomach. This creates a small pouch. The small size of the pouch means that you will get full after you eat just a small amount of food.  The doctor can inflate or deflate the band to adjust the size. This lets the doctor adjust how quickly food passes from the new pouch into the stomach. It does not change the connection between the stomach and the intestines. What can you expect after the surgery? You may stay in the hospital for one or more days after the surgery. How long you stay depends on the type of surgery you had. Most people need 2 to 4 weeks before they are ready to get back to their usual routine. Your doctor will give you specific instructions about what to eat after the surgery. You'll start with only small amounts of soft foods and liquids. Bit by bit, you will be able to eat more solid foods. Your doctor may advise you to work with a dietitian. This way you'll be sure to get enough protein, vitamins, and minerals while you are losing weight. Even with a healthy diet, you may need to take vitamin and mineral supplements. After surgery, you will not be able to eat very much at one time. You will get full quickly. Try not to eat too much at one time or eat foods that are high in fat or sugar. If you do, you may vomit, get stomach pain, or have diarrhea. Weight loss  You probably will lose weight very quickly in the first few months after surgery. As time goes on, your weight loss will slow down. You will have regular doctor visits to check how you are doing. Emotions  It is common to have many emotions after this surgery. You may feel happy or excited as you begin to lose weight. But you may also feel overwhelmed or frustrated by the changes that you have to make in your diet, activity, and lifestyle. Talk with your doctor if you have concerns or questions. Think of bariatric surgery as a tool to help you lose weight. It isn't an instant fix. You will still need to eat a healthy diet and get regular exercise. This will help you reach your weight goal and avoid regaining the weight you lose. Follow-up care is a key part of your treatment and safety.  Be sure to make and go to all appointments, and call your doctor if you are having problems. It's also a good idea to know your test results and keep a list of the medicines you take. Where can you learn more? Go to http://www.gray.com/  Enter G469 in the search box to learn more about \"Learning About Weight-Loss (Bariatric) Surgery. \"  Current as of: December 27, 2021               Content Version: 13.2  © 2006-2022 [employer], [email or web address]. Care instructions adapted under license by [employer] (which disclaims liability or warranty for this information). If you have questions about a medical condition or this instruction, always ask your healthcare professional. [address] any warranty or liability for your use of this information.

## 2022-04-14 NOTE — PROGRESS NOTES
Subjective:      Shaheen Oakley is a 39 y.o. female presents for postop care 2+ years following laparoscopic gastric bypass. Her postoperative course was recently complicated by marginal ulcer, managed medically with cessation of acid suppression. She has also been working with our metabolic weight loss program for additional weight loss. She recently experienced orthopedic issues, and was prescribed a Medrol Dosepak. Since that time, she has noted recurrent postprandial abdominal pain, dysphagia, epigastric fullness with eating. She denies emesis, regurgitation, or melena. Objective:     Visit Vitals  /84 (BP 1 Location: Left upper arm, BP Patient Position: Sitting, BP Cuff Size: Adult)   Pulse 80   Temp 97.8 °F (36.6 °C) (Oral)   Resp 16   Ht 5' 3\" (1.6 m)   Wt 192 lb (87.1 kg)   LMP 04/02/2022 (Exact Date)   SpO2 98%   BMI 34.01 kg/m²       General:  alert, cooperative, no distress, appears stated age, moderately obese   Abdomen:  Deferred   Incision:   Deferred     Assessment:     Recurrent dysphagia, postprandial abdominal pain suggestive of recurrent marginal ulcer; this may be related to recent steroid prescription for hand issue. Recommended interval upper endoscopy with increase in dosage of proton pump inhibitor to twice daily, resumption of Carafate. She agrees with this plan. Plan:     1. Continue current medications. Adjust dosing of Protonix as noted above; restart Carafate. 2.  Bariatric diet to include liquid protein supplements as tolerated. 3.  Referral for interval upper endoscopy placed. She will contact our office thereafter to review results. She knows to contact our office if she begins to run low on Carafate, Protonix.

## 2022-04-16 LAB
25(OH)D3+25(OH)D2 SERPL-MCNC: 29.2 NG/ML (ref 30–100)
ALBUMIN SERPL-MCNC: 4.4 G/DL (ref 3.8–4.8)
ALBUMIN/GLOB SERPL: 1.5 {RATIO} (ref 1.2–2.2)
ALP SERPL-CCNC: 82 IU/L (ref 44–121)
ALT SERPL-CCNC: 12 IU/L (ref 0–32)
AMYLASE SERPL-CCNC: 74 U/L (ref 31–110)
AST SERPL-CCNC: 15 IU/L (ref 0–40)
B-HCG SERPL QL: NEGATIVE MIU/ML
BILIRUB SERPL-MCNC: 0.4 MG/DL (ref 0–1.2)
BUN SERPL-MCNC: 13 MG/DL (ref 6–20)
BUN/CREAT SERPL: 18 (ref 9–23)
CALCIUM SERPL-MCNC: 9.7 MG/DL (ref 8.7–10.2)
CHLORIDE SERPL-SCNC: 100 MMOL/L (ref 96–106)
CO2 SERPL-SCNC: 17 MMOL/L (ref 20–29)
CREAT SERPL-MCNC: 0.72 MG/DL (ref 0.57–1)
EGFR: 110 ML/MIN/1.73
ENDOMYSIUM IGA SER QL: NEGATIVE
ERYTHROCYTE [DISTWIDTH] IN BLOOD BY AUTOMATED COUNT: 12.8 % (ref 11.7–15.4)
ERYTHROCYTE [SEDIMENTATION RATE] IN BLOOD BY WESTERGREN METHOD: 6 MM/HR (ref 0–32)
FOLATE SERPL-MCNC: 14.7 NG/ML
GLIADIN PEPTIDE IGA SER-ACNC: 5 UNITS (ref 0–19)
GLIADIN PEPTIDE IGG SER-ACNC: 2 UNITS (ref 0–19)
GLOBULIN SER CALC-MCNC: 2.9 G/DL (ref 1.5–4.5)
GLUCOSE SERPL-MCNC: 81 MG/DL (ref 65–99)
GLUTEN IGE QN: <0.1 KU/L
H PYLORI IGM SER-ACNC: <9 UNITS (ref 0–8.9)
HCT VFR BLD AUTO: 42.3 % (ref 34–46.6)
HGB BLD-MCNC: 14.6 G/DL (ref 11.1–15.9)
IGA SERPL-MCNC: 380 MG/DL (ref 87–352)
IRON SERPL-MCNC: 44 UG/DL (ref 27–159)
LIPASE SERPL-CCNC: 40 U/L (ref 14–72)
MAGNESIUM SERPL-MCNC: 1.9 MG/DL (ref 1.6–2.3)
MCH RBC QN AUTO: 29.9 PG (ref 26.6–33)
MCHC RBC AUTO-ENTMCNC: 34.5 G/DL (ref 31.5–35.7)
MCV RBC AUTO: 87 FL (ref 79–97)
PLATELET # BLD AUTO: 366 X10E3/UL (ref 150–450)
POTASSIUM SERPL-SCNC: 4.6 MMOL/L (ref 3.5–5.2)
PROT SERPL-MCNC: 7.3 G/DL (ref 6–8.5)
RBC # BLD AUTO: 4.88 X10E6/UL (ref 3.77–5.28)
SODIUM SERPL-SCNC: 139 MMOL/L (ref 134–144)
TTG IGA SER-ACNC: <2 U/ML (ref 0–3)
TTG IGG SER-ACNC: <2 U/ML (ref 0–5)
VIT B12 SERPL-MCNC: 848 PG/ML (ref 232–1245)
WBC # BLD AUTO: 7.8 X10E3/UL (ref 3.4–10.8)

## 2022-04-24 PROBLEM — E66.811 OBESITY, CLASS I, BMI 30-34.9: Status: ACTIVE | Noted: 2022-04-24

## 2022-04-24 PROBLEM — E73.9 LACTOSE INTOLERANCE: Status: ACTIVE | Noted: 2022-04-24

## 2022-04-24 PROBLEM — E53.8 VITAMIN B12 DEFICIENCY: Status: ACTIVE | Noted: 2022-04-24

## 2022-04-24 PROBLEM — E66.9 OBESITY, CLASS I, BMI 30-34.9: Status: ACTIVE | Noted: 2022-04-24

## 2022-04-24 PROBLEM — E88.810 METABOLIC SYNDROME: Status: ACTIVE | Noted: 2022-04-24

## 2022-04-24 PROBLEM — E88.81 METABOLIC SYNDROME: Status: ACTIVE | Noted: 2022-04-24

## 2022-04-24 PROBLEM — R14.0 ABDOMINAL BLOATING: Status: ACTIVE | Noted: 2022-04-24

## 2022-04-24 PROBLEM — R11.0 NAUSEA: Status: ACTIVE | Noted: 2022-04-24

## 2022-04-24 PROBLEM — Z98.890 S/P MYOMECTOMY: Status: ACTIVE | Noted: 2022-04-24

## 2022-04-24 PROBLEM — Z98.84 S/P GASTRIC BYPASS: Status: ACTIVE | Noted: 2022-04-24

## 2022-05-24 ENCOUNTER — OFFICE VISIT (OUTPATIENT)
Dept: SURGERY | Age: 37
End: 2022-05-24
Payer: COMMERCIAL

## 2022-05-24 VITALS
TEMPERATURE: 98.4 F | DIASTOLIC BLOOD PRESSURE: 72 MMHG | RESPIRATION RATE: 20 BRPM | WEIGHT: 195 LBS | SYSTOLIC BLOOD PRESSURE: 118 MMHG | OXYGEN SATURATION: 99 % | BODY MASS INDEX: 34.55 KG/M2 | HEART RATE: 88 BPM | HEIGHT: 63 IN

## 2022-05-24 DIAGNOSIS — Z98.84 S/P GASTRIC BYPASS: ICD-10-CM

## 2022-05-24 DIAGNOSIS — E28.2 PCOS (POLYCYSTIC OVARIAN SYNDROME): ICD-10-CM

## 2022-05-24 DIAGNOSIS — E66.9 OBESITY, CLASS I, BMI 30-34.9: Primary | ICD-10-CM

## 2022-05-24 DIAGNOSIS — E55.9 VITAMIN D DEFICIENCY: ICD-10-CM

## 2022-05-24 DIAGNOSIS — E53.8 VITAMIN B12 DEFICIENCY: ICD-10-CM

## 2022-05-24 DIAGNOSIS — E78.5 DYSLIPIDEMIA: ICD-10-CM

## 2022-05-24 DIAGNOSIS — R53.82 CHRONIC FATIGUE: ICD-10-CM

## 2022-05-24 DIAGNOSIS — R10.13 EPIGASTRIC PAIN: ICD-10-CM

## 2022-05-24 DIAGNOSIS — D50.0 IRON DEFICIENCY ANEMIA DUE TO CHRONIC BLOOD LOSS: ICD-10-CM

## 2022-05-24 DIAGNOSIS — R14.0 ABDOMINAL BLOATING: ICD-10-CM

## 2022-05-24 DIAGNOSIS — R11.0 NAUSEA: ICD-10-CM

## 2022-05-24 DIAGNOSIS — E88.81 METABOLIC SYNDROME: ICD-10-CM

## 2022-05-24 DIAGNOSIS — Z98.890 S/P MYOMECTOMY: ICD-10-CM

## 2022-05-24 DIAGNOSIS — E73.9 LACTOSE INTOLERANCE: ICD-10-CM

## 2022-05-24 PROCEDURE — 99214 OFFICE O/P EST MOD 30 MIN: CPT | Performed by: FAMILY MEDICINE

## 2022-05-24 RX ORDER — SEMAGLUTIDE 1 MG/.5ML
1 INJECTION, SOLUTION SUBCUTANEOUS
Qty: 5 EACH | Refills: 0 | Status: SHIPPED | OUTPATIENT
Start: 2022-05-24 | End: 2022-06-28 | Stop reason: DRUGHIGH

## 2022-05-24 NOTE — PROGRESS NOTES
1. Have you been to the ER, urgent care clinic since your last visit? Hospitalized since your last visit? No    2. Have you seen or consulted any other health care providers outside of the [address] [place] [address] since your last visit? Include any pap smears or colon screening.  No

## 2022-05-24 NOTE — PROGRESS NOTES
[address] [place] [address] 22.  [phone] o  [phone] f    FOLLOW UP MEDICAL EXAMINATION    Method of Delivery:   Face to Face  Patient Name:  Carlos Chaves 1985  PCP:  Macario Rice PA-C    Carlos Chaves is a 39 y.o. female who is a patient with Obesity Class 1 Body mass index is 34.54 kg/m². and associated health concerns. Patient presents today for Weight Management      Patient's preferred weight management approach:  Low Calorie Diet (LCD, 2101-4163 kcal/day), Number meal replacements consumed per day:  1 OTC [employer] Protein Shake, tolerating well  Current weight loss medication(s):  Wegovy 0.5 mg SC weekly, dose 3 of 4, reduced from 1.0 mg bc pt experienced nausea, abdominal pain, bloating, diarrhea and could barely eat at her last appointment 04/11/22. GI symptoms are better but pt has noticed an increase in her cravings and hunger on the reduced dose of Wegovy. Challenges adhering to the plan over the past month: Recall that patient was experiencing GI issues at her last appointment. She saw her surgeon who diagnosed her with ulcers and increased her Protonix use to twice daily and added Carafate 3 times a day. She no longer is needing Carafate and continues to take Protonix once daily at the recommendation of the surgeon.     Patient's goals for participating in this weight management program:   Lose 35 lbs and have more energy. (150-165 lbs)     Anthropometric Measures Previously    Start Date:  09/14/21 on VLCD then changed to LCD 10/2021  Start PKSREZ:   900 ELIZABETH 3.2 oz              BMI:  34.05 kg/m2         Neck circumference: 13 in               Waist circumference: 44 in                                      Body Fat Percentage: 37.9 %      Weight at last appointment on 04/11/22:  190 lbs 14.4 oz        Anthropometric MeasuresToday  Today's Weight 5/24/2022:  195 lbs 0 oz      Weight Metrics 5/24/2022 5/24/2022 4/12/2022 4/11/2022 4/11/2022 2/14/2022 2/14/2022   Weight - 195 lb 192 lb - 190 lb 14.4 oz - 195 lb 1.6 oz   Neck Circ (inches) 13 - - 13.13 - 13.25 -   Waist Measure Inches 45 - - 45.5 - 42 -   Body Fat % 39.4 - - 37.7 - 38.4 -   BMI - 34.54 kg/m2 34.01 kg/m2 - 33.82 kg/m2 - 34.56 kg/m2     Neck Circumference:  Acceptable range for M >16 inches, F>15 inches  Waist Circumference:  Acceptable range for M> 40 inches/102 cm, F > 35 inches, 88 cm  Body Fat: Acceptable range for M 18-24%, F 25-31%    Pounds loss since the last doctor appointment [address] a month ago:  0 lbs, up 5 lbs  Total pounds loss since starting this therapeutic approach on start date: 0 lbs, up 3 lbs since having GI symptoms. Is patient satisfied with his/her progress since the last appointment: No  Factors contributing to weight change:  Recurrent stomach ulcers. Increased appetite and reduced hunger on lower dose of Wegovy. Pt wants to get back on Wegovy 1 mg SC weekly. (she already has rx for [facility]NA 1.7 mg SC weekly waiting for her at the pharmacy.)    SURGICAL HISTORY  Previous Weight Loss Surgery:  Lap Gastric Bypass 11/2019 by Dr. Tyson Moses    Past Surgical History:   Procedure Laterality Date    HX GI  2013    Sigmoidoscopy due to rectal polyp- WNL pp     HX LAP GASTRIC BYPASS  11/20/2019    [facility]. Hiwot Ashby @ [facility]     HX MYOMECTOMY  06/2021    30 uterine fibroids removed. Dr. Danika Mir.  HX POLYPECTOMY  1989    RECTAL POLYP- benign.  HX REFRACTIVE SURGERY  2015    HX TONSILLECTOMY  02/16/2021    due to chronic inflammation. Dr. Keith Frye.     HX WISDOM TEETH EXTRACTION         WEIGHT LOSS MEDICATION HISTORY  Weight loss medication prescribed by our office:  Wegovy 0.5 mg SC weekly    Negative side effects: increased hunger and appetite  Are hunger, cravings and appetite controlled with use of the weight loss medication:   No  Is the medication tolerated well: Yes  Does patient desire refills of any medications previously prescribed by our office:  Yes and increase to 1.0 mg [facility] Medications    Medication Sig Start Date End Date Taking? Authorizing Provider   semaglutide, weight loss, (Wegovy) 0.5 mg/0.5 mL pnij 0.5 mg by SubCUTAneous route every seven (7) days. Start after taking 0.25 mg SC weekly x 4 weeks 4/11/22  Yes Debbie Vuong R,    FLUoxetine (PROzac) 20 mg capsule TAKE 1 CAPSULE BY MOUTH EVERY DAY 1/11/22  Yes Sapphire Hernández PA-C   pantoprazole (PROTONIX) 40 mg tablet TAKE 1 TABLET BY MOUTH DAILY FOR STOMACH ULCER 12/28/21  Yes Tin Alexis MD   ondansetron (ZOFRAN ODT) 4 mg disintegrating tablet Take 1 Tablet by mouth every eight (8) hours as needed for Nausea or Vomiting. 10/5/21  Yes Cedric España NP   cholecalciferol, vitamin D3, (VITAMIN D3 PO) Take 6,000 Units by mouth three (3) days a week. Yes Provider, Historical   ALPRAZolam (XANAX) 0.25 mg tablet TAKE 1 TAB BY MOUTH TWO (2) TIMES DAILY AS NEEDED FOR ANXIETY. MAX DAILY AMOUNT: 0.5 MG. 5/21/21  Yes Sapphire Hernández PA-C   calcium carbonate (OS-PEBBLES) 500 mg calcium (1,250 mg) tablet Take 2 Tablets by mouth daily. BARIATRIC ADVANTAGE   Yes Provider, Historical   multivitamin (ONE A DAY) tablet Take 1 Tablet by mouth daily. Bariatric Advantage   Yes Provider, Historical   progesterone (PROMETRIUM) 200 mg capsule Take one by mouth every evening during luteal phase (P+3 through P+12). Pt understands how to calculate this.   Patient not taking: Reported on 5/24/2022 12/7/21   Sapphire Hernández PA-C      Medications that cause weight gain: Protonix, progesterone, Xanax, Zofran  Medications that cause weight loss: Wegovy, Zoloft  Any changes to medications since last office visit with me:  Reduced Wegovy to 0.5 mg daily  Allergies   Allergen Reactions    Anesthesia S/I-40 (Propofol) [Propofol] Nausea and Vomiting     TXD W SCOPOLAMINE PATCH AND ANTI-NAUSEA PRE-OPERATIVELY    Contrave [Naltrexone-Bupropion] Nausea and Vomiting    Phentermine Other (comments)     37.5 MG 1/2 PILL DAILY CAUSED BP AND HEART RATE TO INCREASE, ANXIETY AND INSOMNIA WORSENED    Topamax [Topiramate] Other (comments)     FACIAL TINGLING       EATING HABITS  Has patient met with the RD over the past month for 1:1 session:  No  Nutritional changes made over the last month per recommendation of the RD:  na   Are hunger, cravings and appetite controlled:  No  Negative Side Effects from meal replacements:  Yes, pt did not previously tolerate ND shakes, even the Vegan version   Does patient want to utilize [employer] meal replacements:  No  Barriers to eating meals:  Time. Weird cravings, worse during menses. No refrigerator or microwave at work. DRINKING HABITS  Average amount of water consumed daily: 30-40 oz/day  (Goal 2 L or 67 oz daily, minimally)  Amount of caffeine consumed daily: Occasional 16 oz Starbucks half sweet Cinna dolce latte cold brew or coffee  Sugar-sweetened beverages consumed daily (including alcohol, sodas, teas, juices, etc.): as above  Barriers preventing patient from drinking minimum 2L water/day:  Had bariatric surgery/stomach smaller. No bathroom is available for use at work. Will change to a new full time position soon w a set schedule and bathroom privileges. Social History     Tobacco Use    Smoking status: Former Smoker     Packs/day: 0.25     Years: 15.00     Pack years: 3.75     Types: Cigarettes     Quit date: 8/1/2018     Years since quitting: 3.8    Smokeless tobacco: Never Used   Vaping Use    Vaping Use: Never used   Substance Use Topics    Alcohol use:  Yes     Alcohol/week: 1.0 standard drink     Types: 1 Shots of liquor per week    Drug use: Not Currently     Types: Marijuana         SLEEP HABITS  Total hours of sleep nightly: 8-10 hours (Goal 7-9 hours/nightly)  Rx or OTC Sleep Aids:  0   Sleep Hx:  0   Occupation:       Barriers to getting proper rest:  0 PHYSICAL ACTIVITY HISTORY  Type of physical activity:  walking  Barriers limiting physical activity:  Tired. BEHAVIORAL HEALTH HISTORY  DEPRESSION SCREENING:    3 most recent PHQ Screens 5/24/2022   PHQ Not Done -   Little interest or pleasure in doing things Not at all   Feeling down, depressed, irritable, or hopeless Not at all   Total Score PHQ 2 0       Any history of mental health conditions (including Depression, Anxiety, Anorexia, Bulimia or Binge Eating disorder): AYO, PMDD  Treating provider and medication(s):  pcp, Dr. Dieter Royal - Alprazalam and Prozac 20 mg daily and on-line Larwance Lizz, LCSW for counseling q weeks. Any current major lifestyle changes or stressors:   Finances. Work. Is mental health condition controlled: Yes. No SI/SA. Mobile Apps used for meal planning, calorie counting, water consumption, sleep, or physical activity:  0     ASSOCIATED MEDICAL CONDITIONS  Past Medical History:   Diagnosis Date    Anxiety 2011    Larwance Lizz, LCSW (Telehealth)    Back pain     Bilateral knee pain     due to OA.  Gastric ulcer 11/23/2021    Hemorrhoids     High cholesterol 2016    History of uterine fibroid summer 2020    30 fibroids. Dr. Celestina Can childhood (5-11 yo)    Txd w lots steroids. Severe allergies to everything. Allergist.    Hypertension 2009    Txd w 3 bp meds. off after weight loss.  Iron deficiency anemia due to chronic blood loss 2020    Txd w Iron Infusions x 5. Dr. Charlie Velasquez Menorrhagia     Morbid obesity ([facility] [address] 11/20/2019    Gastric Bypass 2019    PCOS (polycystic ovarian syndrome) 2015    Vitamin D deficiency 2020       OB/GYN HISTORY (For Female Patients)  Social History     Substance and Sexual Activity   Sexual Activity Yes    Partners: Male    Birth control/protection: I.U.D. OB History    No obstetric history on file. Menopause:  No  LMP:  No LMP recorded.   Are you pregnant or planning on becoming pregnant within 6 months:  No    Do you have upcoming travel in the next 6 weeks:  No  If so, contact the dietician for meal plan modification and recommendations. Immunization History   Administered Date(s) Administered    COVID-19, Moderna, Primary or Immunocompromised Series, MRNA, PF, 100mcg/0.5mL 01/06/2021, 02/03/2021, 08/26/2021    Influenza Vaccine 10/09/2019    Influenza Vaccine (Quad) PF (>6 Mo Flulaval, Fluarix, and >3 Yrs Afluria, Fluzone 94630) 08/18/2017, 10/06/2018, 09/16/2020    Tdap 06/08/2016       OTHER MEDICAL CARE SINCE LAST OFFICE VISIT  Pt has had increased fatigue x months. She has a hx of iron def anemia and low vit D. Previously txd w Iron infusions. Stable now. No other associated symptoms. ROS  Pt denies hunger, cravings, lack of focus, feeling weak, headaches, dizziness, light headedness, nausea, vomiting, diarrhea, constipation, indigestion, rapid heart rate, SOB, low blood sugar, feeling cold, hair loss, rash, fluid retention, leg aches, difficulty sleeping, irritability, mood swings, or other associated symptoms. Review of Systems negative except as noted above in HPI.      HEALTH MAINTENANCE  Health Maintenance   Topic Date Due    Hepatitis C Screening  Never done    Cervical cancer screen  Never done    Flu Vaccine (Season Ended) 09/01/2022    Depression Monitoring  04/12/2023    DTaP/Tdap/Td series (3 - Td or Tdap) 06/08/2026    COVID-19 Vaccine  Completed    Pneumococcal 0-64 years  Aged Out       PHYSICAL EXAMINATION    Visit Vitals  /72 (BP 1 Location: Left arm, BP Patient Position: Sitting, BP Cuff Size: Large adult)   Pulse 88   Temp 98.4 °F (36.9 °C)   Resp 20   Ht 5' 3\" (1.6 m)   Wt 195 lb (88.5 kg)   SpO2 99%   BMI 34.54 kg/m²         Weight Metrics 5/24/2022 5/24/2022 4/12/2022 4/11/2022 4/11/2022 2/14/2022 2/14/2022   Weight - 195 lb 192 lb - 190 lb 14.4 oz - 195 lb 1.6 oz   Neck Circ (inches) 13 - - 13.13 - 13.25 -   Waist Measure Inches 45 - - 45.5 - 42 - Body Fat % 39.4 - - 37.7 - 38.4 -   BMI - 34.54 kg/m2 34.01 kg/m2 - 33.82 kg/m2 - 34.56 kg/m2          General appearance - Well nourished. Well appearing. Well developed. No acute distress. Obese. Head - Normocephalic. Atraumatic. Eyes - Extraocular eye movements intact. Sclera anicteric. Ears - Hearing is grossly normal bilaterally. Nose - normal and patent. Neck - supple. Midline trachea. No carotid bruits noted bilaterally. No thyromegaly noted. Chest - clear to auscultation bilaterally anteriorly and posteriorly. No wheezes. No rales or rhonchi. Breath sounds are symmetrical bilaterally. Unlabored respirations. Heart - normal rate. Regular rhythm. Normal S1, S2. No murmur noted. No rubs, clicks or gallops noted. Abdomen - soft and distended. No masses or organomegaly. No rebound, rigidity or guarding. Bowel sounds normal x 4 quadrants. No tenderness noted. Neurological - awake, alert and oriented to person, place, and time and event. Cranial nerves II through XII intact. Clear speech. Muscle strength is +5/5 x 4 extremities. Heme/Lymph - peripheral pulses normal x 4 extremities. No peripheral edema is noted. Musculoskeletal - Intact x 4 extremities. No pain with movement. Back exam - normal range of motion. No CVA tenderness. Negative Straight Leg Test bilaterally. No buffalo hump noted. Skin - no rashes, erythema, ecchymosis, lacerations, abrasions, suspicious moles noted. No skin tags or moles. No acanthosis nigricans noted in the axilla or neck. Psychological -   normal behavior, dress and thought processes. Good insight. Good eye contact. Normal affect. Appropriate mood. Normal speech.     DATA REVIEWED    Lab Results   Component Value Date/Time    WBC 7.8 04/11/2022 04:46 PM    HGB 14.6 04/11/2022 04:46 PM    HCT 42.3 04/11/2022 04:46 PM    PLATELET 610 02/11/1154 04:46 PM    MCV 87 04/11/2022 04:46 PM     Lab Results   Component Value Date/Time    Sodium 139 04/11/2022 04:46 PM    Potassium 4.6 04/11/2022 04:46 PM    Chloride 100 04/11/2022 04:46 PM    CO2 17 (L) 04/11/2022 04:46 PM    Anion gap 3 (L) 10/04/2021 09:23 AM    Glucose 81 04/11/2022 04:46 PM    BUN 13 04/11/2022 04:46 PM    Creatinine 0.72 04/11/2022 04:46 PM    BUN/Creatinine ratio 18 04/11/2022 04:46 PM    GFR est AA >60 10/04/2021 09:23 AM    GFR est non-AA >60 10/04/2021 09:23 AM    Calcium 9.7 04/11/2022 04:46 PM    Bilirubin, total 0.4 04/11/2022 04:46 PM    Alk.  phosphatase 82 04/11/2022 04:46 PM    Protein, total 7.3 04/11/2022 04:46 PM    Albumin 4.4 04/11/2022 04:46 PM    Globulin 3.9 10/04/2021 09:23 AM    A-G Ratio 1.5 04/11/2022 04:46 PM    ALT (SGPT) 12 04/11/2022 04:46 PM    AST (SGOT) 15 04/11/2022 04:46 PM     Lab Results   Component Value Date/Time    Hemoglobin A1c 4.3 (L) 09/21/2021 10:38 AM     Lab Results   Component Value Date/Time    TSH 1.330 09/21/2021 10:38 AM     Lab Results   Component Value Date/Time    Uric acid 5.4 09/21/2021 10:38 AM     Magnesium   Date Value Ref Range Status   04/11/2022 1.9 1.6 - 2.3 mg/dL Final   09/21/2021 2.0 1.6 - 2.3 mg/dL Final   11/05/2019 2.0 1.6 - 2.4 mg/dL Final     Lab Results   Component Value Date/Time    Vitamin B12 848 04/11/2022 04:46 PM    Folate 14.7 04/11/2022 04:46 PM     Lab Results   Component Value Date/Time    Vitamin D 25-Hydroxy 17.9 (L) 11/05/2019 08:45 AM    VITAMIN D, 25-HYDROXY 29.2 (L) 04/11/2022 04:46 PM     Lab Results   Component Value Date/Time    Iron 44 04/11/2022 04:46 PM    TIBC 359 02/01/2022 10:12 AM    Iron % saturation 45 02/01/2022 10:12 AM    Ferritin 186 (H) 02/01/2022 10:12 AM     Lab Results   Component Value Date/Time    Cholesterol, total 221 (H) 09/21/2021 10:38 AM    Cholesterol, Total 196 09/21/2021 10:38 AM    HDL Cholesterol 64 09/21/2021 10:38 AM    LDL, calculated 142 (H) 09/21/2021 10:38 AM    LDL, calculated 151 (H) 03/11/2019 12:47 PM    VLDL, calculated 15 09/21/2021 10:38 AM    VLDL, calculated 34 03/11/2019 12:47 PM    Triglyceride 84 09/21/2021 10:38 AM   )  Results for orders placed or performed in visit on 09/14/21   NMR LIPOPROFILE WITH LIPIDS (WITHOUT GRAPH)     Status: Abnormal   Result Value Ref Range Status    LDL-P 1,242 (H) <1,000 nmol/L Final     Comment:                           Low                   < 1000                            Moderate         1000 - 1299                            Borderline-High  1300 - 1599                            High             1600 - 2000                            Very High             > 2000      LDL-C(NIH CALC) 122 (H) 0 - 99 mg/dL Final     Comment:                           Optimal               <  100                            Above optimal     100 -  129                            Borderline        130 -  159                            High              160 -  189                            Very high             >  189      HDL-C 60 >39 mg/dL Final    Triglycerides 78 0 - 149 mg/dL Final    Cholesterol, Total 196 100 - 199 mg/dL Final    HDL-P (Total) 37.0 >=30.5 umol/L Final    Small LDL-P 521 <=527 nmol/L Final    LDL size 21.3 >20.5 nm Final     Comment:  ----------------------------------------------------------                   ** INTERPRETATIVE INFORMATION**                   PARTICLE CONCENTRATION AND SIZE                      <--Lower CVD Risk   Higher CVD Risk-->    LDL AND HDL PARTICLES   Percentile in Reference Population    HDL-P (total)        High     75th    50th    25th   Low                         >34.9    34.9    30.5    26.7   <26.7    Small LDL-P          Low      25th    50th    75th   High                         <117     117     527     839    >839    LDL Size   <-Large (Pattern A)->    <-Small (Pattern B)->                      23.0    20.6           20.5      19.0   ----------------------------------------------------------  Small LDL-P and LDL Size are associated with CVD risk, but not after  LDL-P is taken into account. LP-IR SCORE 25 <=45 Final     Comment: INSULIN RESISTANCE MARKER      <--Insulin Sensitive    Insulin Resistant-->             Percentile in Reference Population  Insulin Resistance Score  LP-IR Score   Low   25th   50th   75th   High                <27   27     45     63     >63  LP-IR Score is inaccurate if patient is non-fasting. The LP-IR score is a laboratory developed index that has been  associated with insulin resistance and diabetes risk and should be  used as one component of a physician's clinical assessment. Narrative    Test(s) 855800-NDU-M; 278118-PFE-W; 123476-Triglycerides; 210415-  Cholesterol, Total; 634704-PRY-W (Total); 884297-Small LDL-P; 981730-  LDL Size; 743033-TE-CT Score  was developed and its performance characteristics determined  by Billy Chen. It has not been cleared or approved by the Food  and Drug Administration. Performed at:  [facility]  [address]  [id number]  : Samaria Vega MD, Phone:  [phone]        EKG:    Results for orders placed or performed during the hospital encounter of 09/21/21   EKG, 12 LEAD, INITIAL   Result Value Ref Range    Ventricular Rate 63 BPM    Atrial Rate 63 BPM    P-R Interval 176 ms    QRS Duration 92 ms    Q-T Interval 416 ms    QTC Calculation (Bezet) 425 ms    Calculated P Axis 28 degrees    Calculated R Axis 8 degrees    Calculated T Axis 37 degrees    Diagnosis       Normal sinus rhythm  Normal ECG  When compared with ECG of 27-DEC-2020 14:28,  Vent. rate has decreased BY  39 BPM  Confirmed by Brennon Gann M.D., [facility] ([id number]) on 9/21/2021 5:05:08 PM       QTC interval  425 ms. No prolonged QTc noted. (Acceptable QTC upper limits:  440 ms for Males, 460 ms for Females)    ASSESSMENT     ICD-10-CM ICD-9-CM    1. Obesity, Class I, BMI 30-34.9  E66.9 278.00 semaglutide, weight loss, (Wegovy) 1 mg/0.5 mL pnij   2. Dyslipidemia  E78.5 272.4    3.  Chronic fatigue R53.82 780.79     due to vit D vs PMDD vs iron def vs other   4. Metabolic syndrome  Z20.19 277.7    5. Vitamin D deficiency  E55.9 268.9    6. Epigastric pain  R10.13 789.06     due to ulcers, resolved w meds   7. PCOS (polycystic ovarian syndrome)  E28.2 256.4    8. Abdominal bloating  R14.0 787.3    9. Iron deficiency anemia due to chronic blood loss  D50.0 280.0    10. S/P gastric bypass  Z98.84 V45.86    11. Vitamin B12 deficiency  E53.8 266.2    12. Nausea  R11.0 787.02     due to ulcers, resolved   13. Lactose intolerance  E73.9 271.3     stable   14. S/P myomectomy  Z98.890 V45.89    15. BMI 34.0-34.9,adult  Z68.34 V85.34          We discussed the expected course, resolution and complications of the diagnosis(es) in detail. WEIGHT MANAGEMENT PLAN    Chart was reviewed and updated during the office visit today. Emphasized the importance of the patient continuing care from current primary care provider and other specialists while participating in this weight management program.    Patient has full access to all our office notes, orders, lab results on [employer] and can provide them copies, if desired. Advised patient to follow up with pcp for Health Maintenance and any acute symptoms. Reviewed office notes from other health care providers. Continue follow up as directed by these providers. Since patient has had bariatric surgery, Stefani Weston was advised to take the vitamin regimen and follow dietary recommendations as directed by patient's bariatric surgical team.  Get annual labs to re-evaluate vitamin levels, per bariatricians recommendations. Avoid NSAIDS, concentrated sweets and carbonated beverages. Reminded patients who are female gender and of reproductive age that with weight loss, they may become more fertile. Recommended the use of condoms or some reliable form of contraception if pregnancy is not desired. Notify our office immediately if patient becomes pregnant.     Reviewed and discussed most recent EKG, tests and lab results. Advised patient to sign a release to provide our program a copy of any new lab results, EKG or pertinent tests from other physicians, if it is unavailable today. Informed patient that an EKG will be performed for every 50 lbs of weight loss. Recheck pertinent labs every 3 months while participating in LCD using [employer] meal replacements, as discussed to identify electrolyte abnormalities and guide therapeutic approach. Advised patient to sign up for [employer] and view lab results directly. Notify me by [facility] if any questions or concerns arise. Discussed the patient's medications, BMI, anthropometric measures and goals with patient. Informed patient that weight loss goal of 5-10% in 6-12 months has shown significant improvement in obesity and its related health conditions including DM, heart disease, asthma. A key study published in Arthritis & Rheumatism of Overweight and Obese Adults with OA found that losing 1 pound of weight resulted in 4 pounds of pressure being removed from the knees. Continue current medications as directed by prescribers. Weight loss medication prescription(s) as noted above was sent electronically to the pharmacy on file after discussing risks, benefits, costs, interactions, alternatives, contraindications and potential side effects: Increase Wegovy back to 1.0 mg SC weekly x 4 doses then start 1.7 mg SC weekly, if lower dose is tolerated. Identified and discussed medications the patient is taking that can cause weight gain or weight loss as recorded on patient's medication list.  Advised patient not to stop use of any without discussing with the prescribing provider. Recommended patient to ask prescribing providers to consider more weight neutral or weight negative options. Will monitor patient's weight and health with continued use of current medications.     Supplement recommendations: Take OTC Magnesium 400 mg po at night prn sleep, muscle cramping, constipation. Take OTC vit B12 1000 mcg daily orally if taking Metformin or experiencing numbness and tingling. Take OTC Fish Oil 1000 mg with EPA and -1,000 mg daily if experiencing dry skin, low HDL. Use with caution if history of heartburn exists. Increase fiber products (I.e. fiber tea, fiber shakes) or try OTC Fiber products (I.e. Benefiber, Metamucil, psyllium, etc) if experiencing constipation. Take OTC Gas-X or Lactaid with meal replacements, if lactose intolerance history exists or symptoms begin. Referred patient to [facility] Patient Manual for recommended antidotes. If any new symptoms or side effects have been experienced once dietary approach is initiated, advised patient to notify our office. Dietary Prescription:    Clive Mann has fulfilled [facility] program requirements this month by completing homework forms, attending educational classes, nurse triage visits and monthly provider appointments as agreed and remains in good standing. Reviewed and appreciate registered dietician note for nutritional counseling. Patient has attended requirements of the program over the past month and is in good standing. Reviewed and appreciate bi-monthly nurse visits and agree with documentation. Followed up on any patient concerns today. Recommended meal plan:  OTC Low Calorie Dietary approach, 9416-2741 kcal/day, 1-2 meal replacements daily with 2 \"grocery\" meal.   Make sure proper daily calories are consumed for each meal.  Encouraged patient to stay within the recommended amount of calories per day   Reviewed nutrition and importance of regular protein intake and minimizing hidden carbohydrate sources.    Decrease simple carbohydrates in any regular meal allowed (white foods, sweet foods, sweet drinks and alcohol), increase green leafy vegetables and protein (lean meats and beans) with each meal. Avoid fried foods and limit saturated fats. Do not skip meals. Eat meals within 3-4 hours to prevent low blood sugar events. Emphasized importance of drinking a minimum of 2 L (67 oz) of water daily up to half your body weight in ounces of water while utilizing this dietary approach to prevent dehydration and potential side effects. Avoid water enhancers or sugar-sweetened beverages including alcohol, juice, soda, lemonade. Try OTC water infusion recipes. Limit caffeine, will allow 1 8 oz cup of black coffee or green tea (to stimulate release of Adiponectin and promote fat burning.)  Schedule 1:1 session with RD. Exercise Prescription:   Emphasized importance of reducing sedentary time and performing physical activity at least 5 days a week, as tolerated. A goal of 30 minutes physical activity daily is recommended for health benefit and at least 60 minutes daily to prevent weight regain. During weight loss phase, no less than 75% of this time needs to be performing aerobic (i.e. Walking) activity with no more than 25% of the time performing resistance exercises (i.e. Weight lifting, strengthening, toning). During weight maintenance phase, 50% of the physical activity time needs to be spent performing aerobic activity with 50% of the total time performing resistance exercises. Behavior and Lifestyle Prescription:  Counseled patient on stressors, stress management and self-care approaches. Strongly encouraged pt to seek formal counseling for stressors mentioned today. If already receiving formal counseling please continue these sessions routinely. Go to ER if any thoughts or attempts of suicide are experienced. Sleep Prescription:   A goal of 7-9 hours nightly of uninterrupted sleep is recommended to turn off the Grehlin hormone to be released from the stomach and triggers appetite while promoting weight gain.  Proper rest turns on Leptin hormone to be released from white adipose tissue and promotes weight loss. Avoid alcohol and caffeine 6-12 hours before bedtime to reduce risk of sleep disturbance and bladder irritation while asleep. Discussed snoring, symptoms of Sleep Apnea and improvements with weight loss. Strongly encouraged compliance with CPAP, if Sleep Apnea has been diagnosed. Advised patient to follow up with sleep specialist for every 25 pounds lost to see if CPAP settings need to be adjusted. Counseled patient on health topics:    Obesity, Insulin Resistance, LCD dietary approaches, benefits, contraindications, possible side effects to these diets and how to prevent poor outcomes (including staying hydrated, limit physical exercise while transitioning into this program, avoid skipping meals, etc), weight loss goals, sleep hygiene, stressors, barriers to losing weight and keeping weight off, strategies to overcome habits or challenges to improve or continue adherence. Immunizations noted. Covid-19 vaccines/boosters recommended, if patient has not had it. Obesity may increase risk for severe illness and death from Covid-19 disease. Offered empathy, encouragement, legitimation, prayers, partnership to patient. Praised patient for successes. Patient was offered a choice/choices in the treatment plan today. Patient expressed understanding with the diagnoses and the plan and agrees with recommendations. Return in about 1 month (around 6/24/2022) for ND LCD, MEDICATION MANAGMENT. Total time:  30 mins spent in counseling, coordinating care, reviewing and discussing results, reviewing relevant documentation from other providers, completing relevant documentation, and discussing treatment plans in reference to The primary encounter diagnosis was Obesity, Class I, BMI 30-34.9.  Diagnoses of Dyslipidemia, Chronic fatigue, Metabolic syndrome, Vitamin D deficiency, Epigastric pain, PCOS (polycystic ovarian syndrome), Abdominal bloating, Iron deficiency anemia due to chronic blood loss, S/P gastric bypass, Vitamin B12 deficiency, Nausea, Lactose intolerance, S/P myomectomy, and BMI 34.0-34.9,adult were also pertinent to this visit. THANK YOU Pastora Cannon PA-C FOR ALLOWING ME THE PRIVILEGE TO PARTICIPATE IN THE CARE OF OUR MUTUAL PATIENT, Ms. Florence WITH RESPECT TO WEIGHT MANAGEMENT. Nurys Kuhn DO, Diplomate RAFFI WANG    There are no Patient Instructions on file for this visit.

## 2022-07-05 ENCOUNTER — OFFICE VISIT (OUTPATIENT)
Dept: SURGERY | Age: 37
End: 2022-07-05
Payer: COMMERCIAL

## 2022-07-05 ENCOUNTER — TELEPHONE (OUTPATIENT)
Dept: SLEEP MEDICINE | Age: 37
End: 2022-07-05

## 2022-07-05 VITALS
RESPIRATION RATE: 18 BRPM | HEIGHT: 63 IN | WEIGHT: 194.5 LBS | OXYGEN SATURATION: 98 % | BODY MASS INDEX: 34.46 KG/M2 | SYSTOLIC BLOOD PRESSURE: 123 MMHG | DIASTOLIC BLOOD PRESSURE: 86 MMHG | TEMPERATURE: 98.8 F | HEART RATE: 77 BPM

## 2022-07-05 DIAGNOSIS — R53.83 TIREDNESS: ICD-10-CM

## 2022-07-05 DIAGNOSIS — E53.8 VITAMIN B12 DEFICIENCY: ICD-10-CM

## 2022-07-05 DIAGNOSIS — E66.9 OBESITY, CLASS I, BMI 30-34.9: Primary | ICD-10-CM

## 2022-07-05 DIAGNOSIS — G47.8 UNREFRESHED BY SLEEP: ICD-10-CM

## 2022-07-05 DIAGNOSIS — R40.0 DAYTIME SOMNOLENCE: ICD-10-CM

## 2022-07-05 PROCEDURE — 99213 OFFICE O/P EST LOW 20 MIN: CPT | Performed by: FAMILY MEDICINE

## 2022-07-05 NOTE — PROGRESS NOTES
Weight Loss Progress Note: follow up Physician Visit      Fransico Light is a 39 y.o. female  who is here for her follow up  Evaluation for the medical bariatric care. CC: avoid weight regain    Weight History  Current weight 194 and BMI is Body mass index is 34.45 kg/m². Goal weight ,   Highest weight 194   (See weight gain time line scanned into media section of chart)  She started with vlcd, then transitioned to LCD and now on regular food     Hunger control: good and getting better on wegovy  1.7 mg per week- only 1 dose so far yesterday. No nausea so far  Last week was first dose on this dose  Started at 192 in September 2021, has gone up to 0 in Feb,  Down to 190 in April and now back up to 194  Hopefully the wegovy help her get in better control of her eating       Significant Medical History    Update on sleep apnea and  CPAP 8 hours, not on cpap    Ever had bariatric surgery: yes, 2019 GBP  Never hit goal and started regaining    Pregnant or planning on becoming pregnant w/in 6 months: no         Significant Psychosocial History     Current Major Lifestyle Changes: no  Any potential unsupportive people: no          Social History  Social History     Tobacco Use    Smoking status: Former Smoker     Packs/day: 0.25     Years: 15.00     Pack years: 3.75     Types: Cigarettes     Quit date: 8/1/2018     Years since quitting: 3.9    Smokeless tobacco: Never Used   Substance Use Topics    Alcohol use: Yes     Alcohol/week: 1.0 standard drink     Types: 1 Shots of liquor per week     How many times a week do you eat out?  5-10    Do you drink any EtOH?  no   If so, how much? Do you have upcoming any travel in the next 6 weeks?  no   If so, what do you have planned?           Exercise  How many days a week do you currently exercise?  0 days  Have you ever been told by a physician not to exercise?  no      Objective  Visit Vitals  /86 (BP 1 Location: Right arm, BP Patient Position: Sitting, BP Cuff Size: Adult long)   Pulse 77   Temp 98.8 °F (37.1 °C) (Oral)   Resp 18   Ht 5' 3\" (1.6 m)   Wt 194 lb 8 oz (88.2 kg)   LMP 06/23/2022 (Exact Date)   SpO2 98%   BMI 34.45 kg/m²         Waist Circumference: See Weight Management Doc Flowsheet  Neck Circumference: See Weight Management Doc Flowsheet  Percent Body Fat: See Weight Management Doc Flowsheet  Weight Metrics 7/5/2022 7/5/2022 5/24/2022 5/24/2022 4/12/2022 4/11/2022 4/11/2022   Weight - 194 lb 8 oz - 195 lb 192 lb - 190 lb 14.4 oz   Neck Circ (inches) 13.25 - 13 - - 13.13 -   Waist Measure Inches 38.75 - 45 - - 45.5 -   Body Fat % 38.3 - 39.4 - - 37.7 -   BMI - 34.45 kg/m2 - 34.54 kg/m2 34.01 kg/m2 - 33.82 kg/m2         Labs:     Review of Systems  Complete ROS negative except where noted above      Physical Exam    Vital Signs Reviewed  Weight Management Metrics Reviewed    Vital Signs Reviewed  Appearance: Obese, A&O x 3, NAD  HEENT:  NC/AT, EOMI, PERRL, No scleral icterus, malampatti score:  Skin:    Skin tags - no   Acanthosis Nigricans - no  Neck:  No nodes, thyromegaly   Heart:  RRR without M/R/G  Lungs:  CTAB, no rhonchi, rales, or wheezes with good air exchange   Abdomen:  Non-tender, pos bowel sounds; hepatomegaly -   Ext:  No C/C/E        Assessment & Plan  Diagnoses and all orders for this visit:    1. Obesity, Class I, BMI 77-92.6  -     METABOLIC PANEL, COMPREHENSIVE; Future  Diet:see dietitian asap to make sure the protein is proper  The lack of proper sleep may be causing some of the trouble with appetite  Activity:start 10 min ea day on tm or bike    Medication: cont 1.7, send DGSE message if tolerating and I will go up to next dose. I will follow up in 1 month  2. Vitamin B12 deficiency  Cont to monitor  3. Daytime somnolence  -     SLEEP MEDICINE REFERRAL    4. Unrefreshed by sleep  -     SLEEP MEDICINE REFERRAL    5. Tiredness  -     METABOLIC PANEL, COMPREHENSIVE;  Future    Other orders  -     METABOLIC PANEL, COMPREHENSIVE        Based on his history and exam, Darrell Tena is a good candidate for the  [facility]     There are no Patient Instructions on file for this visit. The primary encounter diagnosis was Obesity, Class I, BMI 30-34.9. Diagnoses of Vitamin B12 deficiency, Daytime somnolence, Unrefreshed by sleep, and Tiredness were also pertinent to this visit.   The patient verbalizes understanding and agrees with the plan of care

## 2022-07-05 NOTE — PROGRESS NOTES
Weight Management. 1 month follow up. 1. Have you been to the ER, urgent care clinic since your last visit? Hospitalized since your last visit? No    2. Have you seen or consulted any other health care providers outside of the [address] [place] [address] since your last visit? Include any pap smears or colon screening.  No     BMI - 34.1

## 2022-07-05 NOTE — TELEPHONE ENCOUNTER
GALILEAM on 07/05/2022 at 2:39pm returning patient's call into the office to schedule a new patient consult visit.

## 2022-07-06 LAB
ALBUMIN SERPL-MCNC: 4.1 G/DL (ref 3.8–4.8)
ALBUMIN/GLOB SERPL: 1.4 {RATIO} (ref 1.2–2.2)
ALP SERPL-CCNC: 77 IU/L (ref 44–121)
ALT SERPL-CCNC: 12 IU/L (ref 0–32)
AST SERPL-CCNC: 16 IU/L (ref 0–40)
BILIRUB SERPL-MCNC: 0.5 MG/DL (ref 0–1.2)
BUN SERPL-MCNC: 12 MG/DL (ref 6–20)
BUN/CREAT SERPL: 21 (ref 9–23)
CALCIUM SERPL-MCNC: 9.4 MG/DL (ref 8.7–10.2)
CHLORIDE SERPL-SCNC: 101 MMOL/L (ref 96–106)
CO2 SERPL-SCNC: 23 MMOL/L (ref 20–29)
CREAT SERPL-MCNC: 0.58 MG/DL (ref 0.57–1)
EGFR: 119 ML/MIN/1.73
GLOBULIN SER CALC-MCNC: 2.9 G/DL (ref 1.5–4.5)
GLUCOSE SERPL-MCNC: 71 MG/DL (ref 65–99)
POTASSIUM SERPL-SCNC: 4.1 MMOL/L (ref 3.5–5.2)
PROT SERPL-MCNC: 7 G/DL (ref 6–8.5)
SODIUM SERPL-SCNC: 137 MMOL/L (ref 134–144)

## 2022-07-07 RX ORDER — PANTOPRAZOLE SODIUM 40 MG/1
TABLET, DELAYED RELEASE ORAL
Qty: 90 TABLET | Refills: 1 | Status: SHIPPED | OUTPATIENT
Start: 2022-07-07 | End: 2022-08-26

## 2022-07-27 DIAGNOSIS — E66.9 OBESITY, CLASS I, BMI 30-34.9: Primary | ICD-10-CM

## 2022-07-27 RX ORDER — SEMAGLUTIDE 2.4 MG/.75ML
1 INJECTION, SOLUTION SUBCUTANEOUS
Qty: 1 EACH | Refills: 0 | Status: CANCELLED | OUTPATIENT
Start: 2022-07-27

## 2022-07-27 RX ORDER — SEMAGLUTIDE 2.4 MG/.75ML
2.4 INJECTION, SOLUTION SUBCUTANEOUS
Qty: 1 EACH | Refills: 2 | Status: SHIPPED | OUTPATIENT
Start: 2022-07-27 | End: 2022-09-27

## 2022-08-22 ENCOUNTER — TELEPHONE (OUTPATIENT)
Dept: SURGERY | Age: 37
End: 2022-08-22

## 2022-08-23 ENCOUNTER — VIRTUAL VISIT (OUTPATIENT)
Dept: SLEEP MEDICINE | Age: 37
End: 2022-08-23

## 2022-08-23 ENCOUNTER — OFFICE VISIT (OUTPATIENT)
Dept: SURGERY | Age: 37
End: 2022-08-23
Payer: COMMERCIAL

## 2022-08-23 ENCOUNTER — TELEPHONE (OUTPATIENT)
Dept: SLEEP MEDICINE | Age: 37
End: 2022-08-23

## 2022-08-23 VITALS
WEIGHT: 189.1 LBS | HEIGHT: 63 IN | DIASTOLIC BLOOD PRESSURE: 74 MMHG | HEART RATE: 76 BPM | BODY MASS INDEX: 33.5 KG/M2 | OXYGEN SATURATION: 98 % | TEMPERATURE: 98.6 F | RESPIRATION RATE: 16 BRPM | SYSTOLIC BLOOD PRESSURE: 102 MMHG

## 2022-08-23 DIAGNOSIS — E88.81 METABOLIC SYNDROME: ICD-10-CM

## 2022-08-23 DIAGNOSIS — E78.5 DYSLIPIDEMIA: ICD-10-CM

## 2022-08-23 DIAGNOSIS — E66.9 OBESITY, CLASS I, BMI 30-34.9: Primary | ICD-10-CM

## 2022-08-23 DIAGNOSIS — F32.A ANXIETY AND DEPRESSION: ICD-10-CM

## 2022-08-23 DIAGNOSIS — R53.83 TIREDNESS: ICD-10-CM

## 2022-08-23 DIAGNOSIS — E53.8 VITAMIN B12 DEFICIENCY: ICD-10-CM

## 2022-08-23 DIAGNOSIS — G47.419 NARCOLEPSY WITHOUT CATAPLEXY: Primary | ICD-10-CM

## 2022-08-23 DIAGNOSIS — F41.9 ANXIETY AND DEPRESSION: ICD-10-CM

## 2022-08-23 PROCEDURE — 99214 OFFICE O/P EST MOD 30 MIN: CPT | Performed by: FAMILY MEDICINE

## 2022-08-23 PROCEDURE — 99203 OFFICE O/P NEW LOW 30 MIN: CPT | Performed by: INTERNAL MEDICINE

## 2022-08-23 RX ORDER — FAMOTIDINE 40 MG/1
40 TABLET, FILM COATED ORAL DAILY
COMMUNITY

## 2022-08-23 NOTE — PROGRESS NOTES
[address] [facility] [place]. [place], [address]  Tel.  [phone]  Fax. [address]  [address] [facility] [address], [address]  Tel.  [phone]  Fax. [phone] [address]  Tel.  [phone]  Fax. [phone]       Darrell Tena is a 44y.o. year old female seen for evaluation of a sleep disorder. ASSESSMENT/PLAN:      ICD-10-CM ICD-9-CM    1. Narcolepsy without cataplexy  G47.419 347.00 POLYSOMNOGRAPHY 1 NIGHT      DRUG ABUSE PROF, URINE (SEVEN DRUGS), MS COFIRM      MULTIPLE SLEEP LATENCY TEST      DRUG ABUSE PROF, URINE (SEVEN DRUGS), MS COFIRM      2. Anxiety and depression  F41.9 300.00     F32. A 311       3. BMI 33.0-33.9,adult  Z68.33 V85.33           Patient has a history of fatigue and is at an age at which narcolepsy often presents. Orders Placed This Encounter    MULTIPLE SLEEP LATENCY TEST     Standing Status:   Future     Standing Expiration Date:   8/23/2023    DRUG ABUSE PROF, URINE (SEVEN DRUGS), MS COFIRM     Standing Status:   Future     Number of Occurrences:   1     Standing Expiration Date:   2/23/2023    POLYSOMNOGRAPHY 1 NIGHT     Order Specific Question:   Reason for Exam     Answer:   Narcolepsy         * The patient currently has a Minimal risk for having sleep apnea. STOP-BANG score 1.  * Since narcolepsy may present in this manner testing is advised. * PSG / MSLT was ordered for initial evaluation of narcolepsy. Testing protocol including need for urine drug screen reviewed. 2. Anxiety and Depression -  continue on current regimen, she will continue to monitor her mood and follow up with her care provider for reevaluation/adjustment of medications if warranted. I have reviewed the relationship between mood as it relates to sleep-disorders along with effect of prozac on testing.     3. Recommended a dedicated weight loss program through appropriate diet and exercise regimen as significant weight reduction has been shown to reduce severity of obstructive sleep apnea. * Patient is aware of the need to avoid driving or performing tasks requiring a high degree of vigilance in the presence of unintentional sleep attacks. Thank you for allowing us to participate in your patient's medical care. We'll keep you updated on these investigations. SUBJECTIVE/OBJECTIVE:    Jed Page is an 44 y.o. female referred for evaluation for a sleep disorder. She complains of excessive daytime tiredness / fatigue associated with not being able function well during the day. She denies of feeling refreshed on waking. Symptoms began a few years ago, gradually worsening since that time. She usually can fall asleep in 5 minutes. Family or house members do not note snoring. She denies of symptoms indicative of cataplexy, sleep paralysis or sleep related hallucinations. She denies of a history of unusual movements occurring during sleep. Jed Page does not wake up frequently at night. She is not bothered by waking up too early and left unable to get back to sleep. She actually sleeps about 8 hours at night and wakes up about 0-1 times during the night. She does not work shifts: Martín Bella indicates she does not get too little sleep at night. Her bedtime is 10:00 pm. She awakens at 6:00 pm. She does take naps. She takes 3 naps a week lasting 2 hours to 4 hours. She does feel refreshed after napping but does not dream. She has the following observed behaviors:  ;  .  Other remarks: The patient has undergone diagnostic testing for the current problems. Home Sleep Apnea Test (HSAT) performed on 05- showed an AHI of 4.2/hr with a lowest SpO2 of 83%, duration of SpO2 < 88% 0.2 minutes.        Review of Systems:  Constitutional:  Significant weight loss 266 to 189 lbs since last sleep test  Eyes:  No blurred vision  CVS:  No significant chest pain  Pulm:  No significant shortness of breath  GI:  No significant nausea or vomiting  :  No significant nocturia  Musculoskeletal:  No significant joint pain at night  Skin:  No significant rashes  Neuro:  No significant dizziness   Psych:  No active mood issues    Sleep Review of Systems: notable for Negative difficulty falling asleep; Negative awakenings at night; Positive perceived regular dreaming; Negative nightmares; Negative  early morning headaches; Mild memory problems; Positive  concentration issues; Negative caffeine;  Negative alcohol;   Negative history of any automobile or occupational accidents due to daytime drowsiness. [place] Sleepiness Score: (P) 9   and Modified F.O.S.Q. Score Total / 2: (P) 9.5    Patient-Reported Vitals 8/22/2022   Patient-Reported Weight -   Patient-Reported Height -   Patient-Reported Pulse 73   Patient-Reported Temperature -   Patient-Reported SpO2 -   Patient-Reported Systolic  -   Patient-Reported Diastolic -   Patient-Reported LMP 8/20/22       Physical Exam completed by visual and auditory observation of patient with verbal input from patient. General:   Alert, oriented, not in acute distress   Eyes:  Anicteric Sclerae; no obvious strabismus   Nose:  No obvious nasal septum deviation    Neck:   Midline trachea, no visible mass   Chest/Lungs:  Respiratory effort normal, no visualized signs of difficulty breathing or respiratory distress   CVS:  No JVD   Extremities:  No obvious rashes noted on face, neck, or hands   Neuro:  No facial asymmetry, no focal deficits; no obvious tremor    Psych:  Normal affect,  normal countenance     Elly Sanders is being evaluated by a Virtual Visit (video visit) encounter to address concerns as mentioned above. A caregiver was present when appropriate. Due to this being a TeleHealth encounter (During ADUDG-92 public health emergency), evaluation of the following organ systems was limited: Vitals/Constitutional/EENT/Resp/CV/GI//MS/Neuro/Skin/Heme-Lymph-Imm.   Pursuant to the emergency declaration under the [facility], [address] [place] [address] authority and the [employer] Act, this Virtual Visit was conducted with patient's (and/or legal guardian's) consent, to reduce the patient's risk of exposure to COVID-19 and provide necessary medical care. Patient identification was verified at the start of the visit: YES using name and date of birth. Services were provided through a video synchronous discussion virtually to substitute for in-person clinic visit. I was in the office while conducting this encounter, patient located at their home or alternate location of their choice. An electronic signature was used to authenticate this note. Piedad Harris MD, FAASM  Diplomate American Board of Sleep Medicine  Diplomate in Sleep Medicine - ABP    Electronically signed.  08/23/22

## 2022-08-23 NOTE — PROGRESS NOTES
1. Have you been to the ER, urgent care clinic since your last visit? Hospitalized since your last visit? No    2. Have you seen or consulted any other health care providers outside of the [address] [place] [address] since your last visit? Include any pap smears or colon screening.  No

## 2022-08-23 NOTE — PROGRESS NOTES
Weight Loss Progress Note: follow up Physician Visit      Fransico Light is a 44 y.o. female  who is here for her follow up  Evaluation for the medical bariatric care. CC: I want to be healthier  B dymatize meal replacement add to coffee  Lunch meal order called factor prepped meals a protein and a vegetable  D leftovers from lunch  Snacks here and there w fruit or yogurt  Not counting cals    On 2.4 mg wegovy  Feeling like her clothes are changing  Weight History  Current weight 189 and BMI is Body mass index is 33.5 kg/m². Goal weight 29,   Highest weight 195   (See weight gain time line scanned into media section of chart)    Hunger control: great    Significant Medical History    Update on sleep apnea and  CPAP has a sleep study scheduled  Sleeps 6-7  unrefreshed when wakes up    Ever had bariatric surgery: no    Pregnant or planning on becoming pregnant w/in 6 months: no         Significant Psychosocial History     Current Major Lifestyle Changes: no  Any potential unsupportive people: no          Social History  Social History     Tobacco Use    Smoking status: Former     Packs/day: 0.25     Years: 15.00     Pack years: 3.75     Types: Cigarettes     Quit date: 2018     Years since quittin.0    Smokeless tobacco: Never   Substance Use Topics    Alcohol use: Yes     Alcohol/week: 1.0 standard drink     Types: 1 Shots of liquor per week     How many times a week do you eat out?  3-4    Do you drink any EtOH? If so, how much? Do you have upcoming any travel in the next 6 weeks? If so, what do you have planned?         Exercise  How many days a week do you currently exercise?  0 days  Have you ever been told by a physician not to exercise?  no      Objective  Visit Vitals  /74 (BP 1 Location: Left upper arm, BP Patient Position: Sitting, BP Cuff Size: Large adult)   Pulse 76   Temp 98.6 °F (37 °C)   Resp 16   Ht 5' 3\" (1.6 m)   Wt 189 lb 1.6 oz (85.8 kg)   LMP 2022   SpO2 98%   BMI 33.50 kg/m²         Waist Circumference: See Weight Management Doc Flowsheet  Neck Circumference: See Weight Management Doc Flowsheet  Percent Body Fat: See Weight Management Doc Flowsheet  Weight Metrics 8/23/2022 8/23/2022 7/5/2022 7/5/2022 5/24/2022 5/24/2022 4/12/2022   Weight - 189 lb 1.6 oz - 194 lb 8 oz - 195 lb 192 lb   Neck Circ (inches) 13 - 13.25 - 13 - -   Waist Measure Inches 10 - 38.75 - 45 - -   Body Fat % 38.5 - 38.3 - 39.4 - -   BMI - 33.5 kg/m2 - 34.45 kg/m2 - 34.54 kg/m2 34.01 kg/m2         Labs: last did electrolytes last month  April was last iron level and cbc    Review of Systems  Complete ROS negative except where noted above      Physical Exam    Vital Signs Reviewed  Weight Management Metrics Reviewed    Vital Signs Reviewed  Appearance: Obese, A&O x 3, NAD  HEENT:  NC/AT, EOMI, PERRL, No scleral icterus, malampatti score:  Skin:    Skin tags - no   Acanthosis Nigricans - no  Neck:  No nodes, thyromegaly   Heart:  RRR without M/R/G  Lungs:  CTAB, no rhonchi, rales, or wheezes with good air exchange   Abdomen:  Non-tender, pos bowel sounds; hepatomegaly -   Ext:  No C/C/E        Assessment & Plan  Diagnoses and all orders for this visit:    1. Obesity, Class I, BMI 30-34.9    Diet: meet with dietitian to get clear picture of how much protein, sugar and fat and sodium she is eating    Activity: exercise at least 150 mins a week    Medication: cont semaglutide  2.metabolic syndrome  Cont semaglutide    3. Dyslipidemia  Needs check with next labs if not done by other md    4. Vit B 12 def  5 tiredness  Metabolic panel nl            Based on his history and exam, Pietro Peter is a good candidate for the  [facility] Weight Loss Program     There are no Patient Instructions on file for this visit. The encounter diagnosis was Obesity, Class I, BMI 30-34.9.   The patient verbalizes understanding and agrees with the plan of care

## 2022-08-23 NOTE — PATIENT INSTRUCTIONS
[address] [place], [address]  Tel.  [phone]  Fax. 100 [facility] 60  [place], [address]  Tel.  [phone]  Fax. [phone] [address]  Tel.  [phone]  Fax. [phone]       Narcolepsy: After Your Visit  Your Care Instructions  Everybody gets a little sleepy once in a while, during a long car ride or other times when you want to be alert. But some people cannot control their sleepiness. It is no fun to be in the middle of your workday or driving your car down the street and have an overwhelming desire to sleep. This condition is called narcolepsy. Doctors do not know what causes narcolepsy. Your doctor may ask you to keep a sleep diary for a couple of weeks. It will help you and your doctor decide on treatment. It often helps to take limited naps during the day. It also helps to create a good mood and place for nighttime sleep. Your doctor may recommend medicine to help you stay awake during the day or sleep at night. Follow-up care is a key part of your treatment and safety. Be sure to make and go to all appointments, and call your doctor if you are having problems. It's also a good idea to know your test results and keep a list of the medicines you take. How can you care for yourself at home? Try to take 2 or 3 short naps at regular times during the day. After a nap, always give yourself time to become alert before you drive a car or do anything that might cause an accident. Take your medicines exactly as prescribed. Call your doctor if you think you are having a problem with your medicine. You may need to try several medicines before you find the one that works best for you. Try to improve your nighttime sleep habits. Here are a few of the things you could do:  Go to bed only when you are sleepy, and get up at the same time every day, even if you do not feel rested.  This might help you sleep well the next night and the night after that. If you lie awake for longer than 15 minutes, get up, leave the bedroom, and do something quiet, such as read, until you feel sleepy again. Avoid drinking or eating anything with caffeine after 3 p.m. This includes coffee, tea, cola drinks, and chocolate. Make sure your bedroom is not too hot or too cold, and keep it quiet and dark. Make sure your mattress provides good support. Be kind to your body:  Relieve tension with exercise or a massage. Learn and do relaxation techniques. Avoid alcohol, caffeine, nicotine, and illegal drugs. They can increase your anxiety level and cause sleep problems. Get light exercise daily. Gentle stretching, light aerobics, swimming, walking, and riding a bicycle can help to keep you going during the day and to sleep well at night. Eat a healthy diet. You may feel better if you avoid heavy meals and eat more fruits and vegetables. Do not use over-the-counter sleeping pills. They can make your sleep restless. Ask your doctor if any medicines you take could cause sleepiness. For example, cold and allergy medicines can make you drowsy. Consider joining a support group with people who have narcolepsy or other sleep problems. These groups can be a good source of tips for what to do. Also, it can be comforting to talk to people who face similar challenges. Your doctor can tell you how to contact a support group. When should you call for help? Call your doctor now or seek immediate medical care if:  You passed out (lost consciousness). You cannot use your muscles. This may happen very briefly, sometimes after you laugh or are angry, and may only affect part of your body. Watch closely for changes in your health, and be sure to contact your doctor if:  Your sleepiness continues to get worse. Where can you learn more?    Go to Duxter.be  Enter V069 in the search box to learn more about \"Narcolepsy: After Your Visit. \"   © 7499-6506 Healthwise, Incorporated. Care instructions adapted under license by [employer] (which disclaims liability or warranty for this information). This care instruction is for use with your licensed healthcare professional. If you have questions about a medical condition or this instruction, always ask your healthcare professional. [address] any warranty or liability for your use of this information. Content Version: 9.0.36513; Last Revised: September 15, 2009  PROPER SLEEP HYGIENE    What to avoid  Do not have drinks with caffeine, such as coffee or black tea, for 8 hours before bed. Do not smoke or use other types of tobacco near bedtime. Nicotine is a stimulant and can keep you awake. Avoid drinking alcohol late in the evening, because it can cause you to wake in the middle of the night. Do not eat a big meal close to bedtime. If you are hungry, eat a light snack. Do not drink a lot of water close to bedtime, because the need to urinate may wake you up during the night. Do not read or watch TV in bed. Use the bed only for sleeping and sexual activity. What to try  Go to bed at the same time every night, and wake up at the same time every morning. Do not take naps during the day. Keep your bedroom quiet, dark, and cool. Get regular exercise, but not within 3 to 4 hours of your bedtime. .  Sleep on a comfortable pillow and mattress. If watching the clock makes you anxious, turn it facing away from you so you cannot see the time. If you worry when you lie down, start a worry book. Well before bedtime, write down your worries, and then set the book and your concerns aside. Try meditation or other relaxation techniques before you go to bed. If you cannot fall asleep, get up and go to another room until you feel sleepy. Do something relaxing. Repeat your bedtime routine before you go to bed again. Make your house quiet and calm about an hour before bedtime. Turn down the lights, turn off the TV, log off the computer, and turn down the volume on music. This can help you relax after a busy day. Drowsy Driving: The [employer] cites drowsiness as a causing factor in more than 451,272 police reported crashes annually, resulting in 76,000 injuries and 1,500 deaths. Other surveys suggest 55% of people polled have driven while drowsy in the past year, 23% had fallen asleep but not crashed, 3% crashed, and 2% had and accident due to drowsy driving. Who is at risk? Young Drivers: One study of drowsy driving accidents states that 55% of the drivers were under 25 years. Of those, 75% were male. Shift Workers and Travelers: People who work overnight or travel across time zones frequently are at higher risk of experiencing Circadian Rhythm Disorders. They are trying to work and function when their body is programed to sleep. Sleep Deprived: Lack of sleep has a serious impact on your ability to pay attention or focus on a task. Consistently getting less than the average of 8 hours your body needs creates partial or cumulative sleep deprivation. Untreated Sleep Disorders: Sleep Apnea, Narcolepsy, R.L.S., and other sleep disorders (untreated) prevent a person from getting enough restful sleep. This leads to excessive daytime sleepiness and increases the risk for drowsy driving accidents by up to 7 times. Medications / Alcohol: Even over the counter medications can cause drowsiness. Medications that impair a drivers attention should have a warning label. Alcohol naturally makes you sleepy and on its own can cause accidents. Combined with excessive drowsiness its effects are amplified.    Signs of Drowsy Driving:   * You don't remember driving the last few miles   * You may drift out of your carloz   * You are unable to focus and your thoughts wander   * You may yawn more often than normal   * You have difficulty keeping your eyes open / nodding off   * Missing traffic signs, speeding, or tailgating  Prevention-   Good sleep hygiene, lifestyle and behavioral choices have the most impact on drowsy driving. There is no substitute for sleep and the average person requires 8 hours nightly. If you find yourself driving drowsy, stop and sleep. Consider the sleep hygiene tips provided during your visit as well. Medication Refill Policy: Refills for all medications require 1 week advance notice. Please have your pharmacy fax a refill request. We are unable to fax, or call in \"controled substance\" medications and you will need to pick these prescriptions up from our office. SendTaskhart Activation    Thank you for requesting access to Twyxt. Please follow the instructions below to securely access and download your online medical record. Twyxt allows you to send messages to your doctor, view your test results, renew your prescriptions, schedule appointments, and more. How Do I Sign Up? In your internet browser, go to [email or web address]. Click on the First Time User? Click Here link in the Sign In box. You will see the New Member Sign Up page. Enter your Twyxt Access Code exactly as it appears below. You will not need to use this code after youve completed the sign-up process. If you do not sign up before the expiration date, you must request a new code. Twyxt Access Code: Activation code not generated  Current Twyxt Status: Active (This is the date your Hactust access code will )    Enter the last four digits of your Social Security Number (xxxx) and Date of Birth (mm/dd/yyyy) as indicated and click Submit. You will be taken to the next sign-up page. Create a Hactust ID. This will be your Twyxt login ID and cannot be changed, so think of one that is secure and easy to remember. Create a Twyxt password. You can change your password at any time. Enter your Password Reset Question and Answer.  This can be used at a later time if you forget your password. Enter your e-mail address. You will receive e-mail notification when new information is available in [address] Click Sign Up. You can now view and download portions of your medical record. Click the CAPNIA link to download a portable copy of your medical information. Additional Information    If you have questions, please call [phone]. Remember, [employer] is NOT to be used for urgent needs. For medical emergencies, dial 911.

## 2022-09-13 ENCOUNTER — DOCUMENTATION ONLY (OUTPATIENT)
Dept: SURGERY | Age: 37
End: 2022-09-13

## 2022-09-13 NOTE — PROGRESS NOTES
Prior Auth for [place] denied.     Patient did not meet requirements of plan:    Plan covers drug when met conditions:  - Lost at least 5 percent of body weight  - Continued to keep initial 5 percent weight loss off    Patient started at 195#, now 189#  (Should be 185.25# or less)

## 2022-09-13 NOTE — PROGRESS NOTES
S/w patient. She would like to file an appeal for the [facility] denial.    Patient stated that she feels she is at 185# Patient also stated that she \"hit max dose\" (2.4 mg) only a month ago, so that should be taken into consideration. She also stated that she had to decrease her dose at one point b/c she had some ulcer symptoms not r/t the [facility]. I advised the patient that she should come in for office visit with Dr. Rere Arana. We will have updated weight on file for the appeal.     The patient verbalized understanding and will call back to schedule for around 9/23.

## 2022-09-27 ENCOUNTER — VIRTUAL VISIT (OUTPATIENT)
Dept: INTERNAL MEDICINE CLINIC | Age: 37
End: 2022-09-27
Payer: COMMERCIAL

## 2022-09-27 DIAGNOSIS — R53.83 FATIGUE, UNSPECIFIED TYPE: ICD-10-CM

## 2022-09-27 DIAGNOSIS — F32.9 TREATMENT-RESISTANT DEPRESSION: ICD-10-CM

## 2022-09-27 DIAGNOSIS — F41.9 ANXIETY: Primary | ICD-10-CM

## 2022-09-27 DIAGNOSIS — F33.0 MILD EPISODE OF RECURRENT MAJOR DEPRESSIVE DISORDER (HCC): ICD-10-CM

## 2022-09-27 PROCEDURE — 99214 OFFICE O/P EST MOD 30 MIN: CPT | Performed by: PHYSICIAN ASSISTANT

## 2022-09-27 RX ORDER — ARIPIPRAZOLE 2 MG/1
2 TABLET ORAL DAILY
Qty: 30 TABLET | Refills: 2 | Status: SHIPPED | OUTPATIENT
Start: 2022-09-27 | End: 2022-10-21 | Stop reason: SDUPTHER

## 2022-09-27 NOTE — PROGRESS NOTES
Rm    Chief Complaint   Patient presents with    Medication Evaluation        Patient-Reported Vitals 9/27/2022   Patient-Reported Weight -   Patient-Reported Height -   Patient-Reported Pulse 84   Patient-Reported Temperature -   Patient-Reported SpO2 -   Patient-Reported Systolic  -   Patient-Reported Diastolic -   Patient-Reported LMP -       Pain Scale:0 /10  Pain Location:                 1. Have you been to the ER, urgent care clinic since your last visit? Hospitalized since your last visit? No    2. Have you seen or consulted any other health care providers outside of the [address] [place] [address] since your last visit? Include any pap smears or colon screening. No     Health Maintenance Due   Topic Date Due    Hepatitis C Screening  Never done    Cervical cancer screen  Never done    Flu Vaccine (1) 08/01/2022        3 most recent PHQ Screens 9/27/2022   PHQ Not Done -   Little interest or pleasure in doing things Not at all   Feeling down, depressed, irritable, or hopeless Not at all   Total Score PHQ 2 0        No flowsheet data found.     Learning Assessment 1/6/2021   PRIMARY LEARNER Patient   HIGHEST LEVEL OF EDUCATION - PRIMARY LEARNER  > 4 YEARS OF COLLEGE   BARRIERS PRIMARY LEARNER NONE   CO-LEARNER CAREGIVER No   PRIMARY LANGUAGE ENGLISH   LEARNER PREFERENCE PRIMARY DEMONSTRATION   ANSWERED BY patient   RELATIONSHIP SELF

## 2022-09-27 NOTE — PROGRESS NOTES
Ave Nixon is a 44 y.o. female who was seen by synchronous (real-time) audio-video technology on 9/27/2022    Consent: Ave Nixon, who was seen by synchronous (real-time) audio-video technology, and/or her healthcare decision maker, is aware that this patient-initiated, Telehealth encounter on 9/27/2022 is a billable service, with coverage as determined by her insurance carrier. She is aware that she may receive a bill and has provided verbal consent to proceed: YES  Subjective:   Ave Nixon is a 44 y.o. female who was seen for Medication Evaluation    Sleep med wants pt to stop Prozac to do a new sleep study. Questioning possible Narcolepsy. Not falling asleep while driving, but napping easily x years. Pt would like to wait on this study for now. Therapist wants pt to try a mood stabilizer because she has tried many antidepressants without success and mood changes seem cyclical. Therapist is also concerned because pt is starting fertility treatments and that will be emotionally taxing. Reviewed old records. Tried Wellbutrin, Lexapro, Prozac without sustained success over the past few years. Health Maintenance Due   Topic Date Due    Hepatitis C Screening  Never done    Cervical cancer screen  Never done    Flu Vaccine (1) 08/01/2022       Review of Systems   Constitutional:  Negative for fever. Respiratory:  Negative for shortness of breath. Cardiovascular:  Negative for chest pain and palpitations. Neurological:  Negative for dizziness, loss of consciousness and weakness. Psychiatric/Behavioral:  Positive for depression. Negative for suicidal ideas. The patient is nervous/anxious. The patient does not have insomnia.        Objective:     Patient-Reported Vitals 9/27/2022   Patient-Reported Weight -   Patient-Reported Height -   Patient-Reported Pulse 84   Patient-Reported Temperature -   Patient-Reported SpO2 -   Patient-Reported Systolic  -   Patient-Reported Diastolic - Patient-Reported LMP -      General: alert, cooperative, no distress   Mental  status: normal mood, behavior, speech, dress, motor activity, and thought processes, able to follow commands   HENT: NCAT   Neck: no visualized mass   Resp: no respiratory distress   Neuro: no gross deficits   Skin: no discoloration or lesions of concern on visible areas   Psychiatric: normal affect, consistent with stated mood, no evidence of hallucinations     Assessment & Plan:     Encounter Diagnoses     ICD-10-CM ICD-9-CM   1. Anxiety  F41.9 300.00   2. Mild episode of recurrent major depressive disorder (HCC)  F33.0 296.31   3. Fatigue, unspecified type  R53.83 780.79   4. Treatment-resistant depression  F32.9 296.20     Orders Placed This Encounter    Start ARIPiprazole (ABILIFY) 2 mg tablet       We discussed the expected course, resolution and complications of the diagnosis(es) in detail. Medication risks, benefits, costs, interactions, and alternatives were discussed as indicated. I advised her to contact the office if her condition worsens, changes or fails to improve as anticipated. She expressed understanding with the diagnosis(es) and plan. Jed Page is a 44 y.o. female who was evaluated by a video visit encounter for concerns as above. Patient identification was verified prior to start of the visit. A caregiver was present when appropriate. Due to this being a TeleHealth encounter (During [place] [address] public health emergency), evaluation of the following organ systems was limited: Vitals/Constitutional/EENT/Resp/CV/GI//MS/Neuro/Skin/Heme-Lymph-Imm. Pursuant to the emergency declaration under the [facility], [facility] waiver authority and the [employer] and [employer] Act, this Virtual  Visit was conducted, with patient's (and/or legal guardian's) consent, to reduce the patient's risk of exposure to COVID-19 and provide necessary medical care. Services were provided through a video synchronous discussion virtually to substitute for in-person clinic visit. Patient and provider were located at their individual homes.       Darryl Claros PA-C

## 2022-10-02 DIAGNOSIS — F32.81 PMDD (PREMENSTRUAL DYSPHORIC DISORDER): ICD-10-CM

## 2022-10-02 DIAGNOSIS — F41.9 ANXIETY: ICD-10-CM

## 2022-10-03 RX ORDER — FLUOXETINE HYDROCHLORIDE 20 MG/1
CAPSULE ORAL
Qty: 90 CAPSULE | Refills: 1 | Status: SHIPPED | OUTPATIENT
Start: 2022-10-03 | End: 2022-10-25

## 2022-10-12 LAB
BASOPHILS # BLD AUTO: 0.1 X10E3/UL (ref 0–0.2)
BASOPHILS NFR BLD AUTO: 1 %
EOSINOPHIL # BLD AUTO: 0.4 X10E3/UL (ref 0–0.4)
EOSINOPHIL NFR BLD AUTO: 5 %
ERYTHROCYTE [DISTWIDTH] IN BLOOD BY AUTOMATED COUNT: 12.7 % (ref 11.7–15.4)
HCT VFR BLD AUTO: 43.1 % (ref 34–46.6)
HGB BLD-MCNC: 14.4 G/DL (ref 11.1–15.9)
IMM GRANULOCYTES # BLD AUTO: 0 X10E3/UL (ref 0–0.1)
IMM GRANULOCYTES NFR BLD AUTO: 0 %
LYMPHOCYTES # BLD AUTO: 2.7 X10E3/UL (ref 0.7–3.1)
LYMPHOCYTES NFR BLD AUTO: 32 %
MCH RBC QN AUTO: 29.4 PG (ref 26.6–33)
MCHC RBC AUTO-ENTMCNC: 33.4 G/DL (ref 31.5–35.7)
MCV RBC AUTO: 88 FL (ref 79–97)
MONOCYTES # BLD AUTO: 0.6 X10E3/UL (ref 0.1–0.9)
MONOCYTES NFR BLD AUTO: 7 %
NEUTROPHILS # BLD AUTO: 4.7 X10E3/UL (ref 1.4–7)
NEUTROPHILS NFR BLD AUTO: 55 %
PLATELET # BLD AUTO: 337 X10E3/UL (ref 150–450)
RBC # BLD AUTO: 4.9 X10E6/UL (ref 3.77–5.28)
VIT B12 SERPL-MCNC: 996 PG/ML (ref 232–1245)
WBC # BLD AUTO: 8.5 X10E3/UL (ref 3.4–10.8)

## 2022-10-21 DIAGNOSIS — F41.9 ANXIETY: ICD-10-CM

## 2022-10-21 DIAGNOSIS — F33.0 MILD EPISODE OF RECURRENT MAJOR DEPRESSIVE DISORDER (HCC): ICD-10-CM

## 2022-10-21 RX ORDER — ARIPIPRAZOLE 2 MG/1
2 TABLET ORAL DAILY
Qty: 30 TABLET | Refills: 5 | Status: SHIPPED | OUTPATIENT
Start: 2022-10-21 | End: 2022-10-25 | Stop reason: SDUPTHER

## 2022-10-25 ENCOUNTER — OFFICE VISIT (OUTPATIENT)
Dept: INTERNAL MEDICINE CLINIC | Age: 37
End: 2022-10-25

## 2022-10-25 VITALS
DIASTOLIC BLOOD PRESSURE: 76 MMHG | BODY MASS INDEX: 34.38 KG/M2 | WEIGHT: 194 LBS | RESPIRATION RATE: 18 BRPM | HEIGHT: 63 IN | OXYGEN SATURATION: 98 % | SYSTOLIC BLOOD PRESSURE: 109 MMHG | TEMPERATURE: 98.7 F | HEART RATE: 98 BPM

## 2022-10-25 DIAGNOSIS — Z00.00 ANNUAL PHYSICAL EXAM: Primary | ICD-10-CM

## 2022-10-25 DIAGNOSIS — F41.9 ANXIETY: ICD-10-CM

## 2022-10-25 DIAGNOSIS — F33.0 MILD EPISODE OF RECURRENT MAJOR DEPRESSIVE DISORDER (HCC): ICD-10-CM

## 2022-10-25 PROCEDURE — 99395 PREV VISIT EST AGE 18-39: CPT | Performed by: PHYSICIAN ASSISTANT

## 2022-10-25 RX ORDER — ARIPIPRAZOLE 2 MG/1
2 TABLET ORAL DAILY
Qty: 90 TABLET | Refills: 1 | Status: SHIPPED | OUTPATIENT
Start: 2022-10-25 | End: 2022-11-01

## 2022-10-25 NOTE — PROGRESS NOTES
Celena Steele is a 44 y.o. female  Chief Complaint   Patient presents with    Annual Exam     Visit Vitals  /76 (BP 1 Location: Left upper arm, BP Patient Position: Sitting, BP Cuff Size: Adult)   Pulse 98   Temp 98.7 °F (37.1 °C) (Temporal)   Resp 18   Ht 5' 3\" (1.6 m)   Wt 194 lb (88 kg)   SpO2 98%   BMI 34.37 kg/m²      Health Maintenance Due   Topic Date Due    Hepatitis C Screening  Never done    Cervical cancer screen  Never done    Flu Vaccine (1) 2022   Seeing GYN    Social History     Tobacco Use    Smoking status: Former     Packs/day: 0.25     Years: 15.00     Pack years: 3.75     Types: Cigarettes     Quit date: 2018     Years since quittin.2    Smokeless tobacco: Never   Substance Use Topics    Alcohol use: Not Currently      Family History   Problem Relation Age of Onset    Diabetes Mother     High Cholesterol Mother     Hypertension Mother     Obesity Mother     Diabetes Father     Heart Disease Father 64        cardiomyopathy, heart transplant    Hypertension Father     Heart Surgery Father     High Cholesterol Father     Obesity Father     Gout Father     Other Father         kidney stones    Sleep Apnea Father     Obesity Sister     Diabetes Maternal Grandmother     Diabetes Maternal Grandfather     Diabetes Paternal Grandmother     Anesth Problems Neg Hx     Breast Cancer Neg Hx     Colon Cancer Neg Hx       HPI  Pt presents for an Annual Physical.     Stopped Wigovy with weight loss doctor. Wt Readings from Last 3 Encounters:   10/25/22 194 lb (88 kg)   22 189 lb 1.6 oz (85.8 kg)   22 194 lb 8 oz (88.2 kg)     Rash between skin folds - raw, itchy around belly button. Using Rx powder      Review of Systems   Constitutional:  Negative for fever and weight loss. HENT:  Negative for congestion, hearing loss and sore throat. Eyes:  Negative for blurred vision. Respiratory:  Negative for cough and shortness of breath.     Cardiovascular:  Negative for chest pain, palpitations and leg swelling. Gastrointestinal:  Positive for constipation. Negative for abdominal pain, blood in stool, diarrhea, heartburn, melena, nausea and vomiting. Constipation with associated hemorrhoids. Better with laxatives, probiotics, prunes   Genitourinary:  Negative for dysuria, frequency, hematuria and urgency. Musculoskeletal:  Negative for back pain, joint pain and neck pain. R shoulder pain   Neurological:  Negative for dizziness, seizures, loss of consciousness, weakness and headaches. Endo/Heme/Allergies:  Negative for environmental allergies. Psychiatric/Behavioral:  Negative for depression and substance abuse. The patient is not nervous/anxious and does not have insomnia. Physical Exam  Vitals and nursing note reviewed. Constitutional:       General: She is not in acute distress. Appearance: She is well-developed. HENT:      Head: Normocephalic and atraumatic. Neck:      Vascular: No JVD. Cardiovascular:      Rate and Rhythm: Normal rate and regular rhythm. Heart sounds: Normal heart sounds. Pulmonary:      Effort: Pulmonary effort is normal. No respiratory distress. Breath sounds: Normal breath sounds. Musculoskeletal:      Cervical back: Neck supple. Skin:     General: Skin is warm and dry. Neurological:      Mental Status: She is alert and oriented to person, place, and time. Psychiatric:         Mood and Affect: Mood normal.         Behavior: Behavior normal.         Thought Content: Thought content normal.         Judgment: Judgment normal.       Diagnoses and all orders for this visit:    1. Annual physical exam  Reviewed outside labs. Ok to postpone labs for this office until next year's CE. 2. Anxiety  -    Continue ARIPiprazole (ABILIFY) 2 mg tablet; Take 1 Tablet by mouth daily. 3. Mild episode of recurrent major depressive disorder (HCC)  -     ARIPiprazole (ABILIFY) 2 mg tablet; Take 1 Tablet by mouth daily.

## 2022-10-25 NOTE — PROGRESS NOTES
Reviewed record in preparation for visit and have obtained necessary documentation. Identified pt with two pt identifiers(name and ). Chief Complaint   Patient presents with    Annual Exam     Blood pressure 109/76, pulse 98, temperature 98.7 °F (37.1 °C), temperature source Temporal, resp. rate 18, height 5' 3\" (1.6 m), weight 194 lb (88 kg), SpO2 98 %. Health Maintenance Due   Topic Date Due    Hepatitis C Test  Never done    Cervical cancer screen  Never done    Yearly Flu Vaccine (1) 2022       Ms. Florence has a reminder for a \"due or due soon\" health maintenance. I have asked that she discuss this further with her primary care provider for follow-up on this health maintenance. Coordination of Care Questionnaire:  :     1) Have you been to an emergency room, urgent care clinic since your last visit? no   Hospitalized since your last visit? no             2) Have you seen or consulted any other health care providers outside of [address] [place] [address] since your last visit? yes  F[facility]")       3) In the event something were to happen to you and you were unable to speak on your behalf, do you have an Advance Directive/ Living Will in place stating your wishes?  NO

## 2022-11-01 ENCOUNTER — PATIENT MESSAGE (OUTPATIENT)
Dept: INTERNAL MEDICINE CLINIC | Age: 37
End: 2022-11-01

## 2022-11-01 DIAGNOSIS — F41.9 ANXIETY: ICD-10-CM

## 2022-11-01 DIAGNOSIS — F33.0 MILD EPISODE OF RECURRENT MAJOR DEPRESSIVE DISORDER (HCC): ICD-10-CM

## 2022-11-01 RX ORDER — ARIPIPRAZOLE 5 MG/1
5 TABLET ORAL
Qty: 90 TABLET | Refills: 1 | Status: SHIPPED | OUTPATIENT
Start: 2022-11-01

## 2022-11-01 NOTE — TELEPHONE ENCOUNTER
From: Jamey Bruno  To: Pilar Silver PA-C  Sent: 11/1/2022 10:55 AM EDT  Subject: Medication Dosage    Hi Pastora! I hope you're doing well. Shortly after I saw you for my physical I started having a lot of irritability and trouble focusing. I'm wondering if we should try to increase the dosage of Abilify and see if it helps. Please advise.      Aníbal Antonio

## 2022-11-18 ENCOUNTER — TELEPHONE (OUTPATIENT)
Dept: SLEEP MEDICINE | Age: 37
End: 2022-11-18

## 2022-11-18 NOTE — TELEPHONE ENCOUNTER
LVM for patient to call "[employer] [facility] to confirm sleep study & review instructions.

## 2022-11-21 ENCOUNTER — HOSPITAL ENCOUNTER (OUTPATIENT)
Dept: SLEEP MEDICINE | Age: 37
Discharge: HOME | End: 2022-11-21
Payer: COMMERCIAL

## 2022-11-21 PROCEDURE — 95810 POLYSOM 6/> YRS 4/> PARAM: CPT

## 2022-11-22 ENCOUNTER — HOSPITAL ENCOUNTER (OUTPATIENT)
Dept: SLEEP MEDICINE | Age: 37
Discharge: HOME | End: 2022-11-22
Payer: COMMERCIAL

## 2022-11-22 DIAGNOSIS — G47.419 NARCOLEPSY WITHOUT CATAPLEXY: ICD-10-CM

## 2022-11-22 PROCEDURE — 95805 MULTIPLE SLEEP LATENCY TEST: CPT | Performed by: INTERNAL MEDICINE

## 2022-11-23 ENCOUNTER — TELEPHONE (OUTPATIENT)
Dept: SLEEP MEDICINE | Age: 37
End: 2022-11-23

## 2022-11-23 NOTE — TELEPHONE ENCOUNTER
Sleep test interpreted, in-person office visit to be scheduled to review results and discuss treatment options.

## 2022-11-25 ENCOUNTER — DOCUMENTATION ONLY (OUTPATIENT)
Dept: SLEEP MEDICINE | Age: 37
End: 2022-11-25

## 2022-11-28 ENCOUNTER — TELEPHONE (OUTPATIENT)
Dept: SLEEP MEDICINE | Age: 37
End: 2022-11-28

## 2022-12-08 ENCOUNTER — OFFICE VISIT (OUTPATIENT)
Dept: SLEEP MEDICINE | Age: 37
End: 2022-12-08
Payer: COMMERCIAL

## 2022-12-08 ENCOUNTER — TELEPHONE (OUTPATIENT)
Dept: SURGERY | Age: 37
End: 2022-12-08

## 2022-12-08 VITALS
TEMPERATURE: 98.2 F | SYSTOLIC BLOOD PRESSURE: 122 MMHG | DIASTOLIC BLOOD PRESSURE: 83 MMHG | HEIGHT: 63 IN | WEIGHT: 202.1 LBS | OXYGEN SATURATION: 100 % | BODY MASS INDEX: 35.81 KG/M2 | HEART RATE: 82 BPM

## 2022-12-08 DIAGNOSIS — Z86.59 H/O ANXIETY DISORDER: ICD-10-CM

## 2022-12-08 DIAGNOSIS — G47.11 IDIOPATHIC HYPERSOMNIA: Primary | ICD-10-CM

## 2022-12-08 DIAGNOSIS — Z98.84 S/P GASTRIC BYPASS: ICD-10-CM

## 2022-12-08 RX ORDER — (CALCIUM, MAGNESIUM, POTASSIUM, AND SODIUM OXYBATES) .5; .5; .5; .5 G/ML; G/ML; G/ML; G/ML
SOLUTION ORAL
Qty: 180 EACH | Refills: 0 | Status: SHIPPED | OUTPATIENT
Start: 2022-12-08 | End: 2023-01-05

## 2022-12-08 RX ORDER — (CALCIUM, MAGNESIUM, POTASSIUM, AND SODIUM OXYBATES) .5; .5; .5; .5 G/ML; G/ML; G/ML; G/ML
4.5 SOLUTION ORAL
Qty: 270 EACH | Refills: 1 | Status: SHIPPED | OUTPATIENT
Start: 2023-01-07 | End: 2023-02-07

## 2022-12-08 RX ORDER — (CALCIUM, MAGNESIUM, POTASSIUM, AND SODIUM OXYBATES) .5; .5; .5; .5 G/ML; G/ML; G/ML; G/ML
SOLUTION ORAL
Qty: 180 EACH | Refills: 0 | Status: SHIPPED | OUTPATIENT
Start: 2022-12-08 | End: 2022-12-08 | Stop reason: SDUPTHER

## 2022-12-08 RX ORDER — ARMODAFINIL 150 MG/1
1 TABLET ORAL DAILY
Qty: 30 TABLET | Refills: 2 | Status: SHIPPED | OUTPATIENT
Start: 2022-12-08

## 2022-12-08 NOTE — PROGRESS NOTES
[address] [facility], [facility]  Tel.  [phone]  Fax. 1866 [facility], [address] [facility]  Tel.  [phone]  Fax. [phone] [address] [place] [address]  Tel.  [phone]  Fax. [phone]       Latasha Scales is a 44y.o. year old female referred by Dr. Gracia Victoria  for evaluation of a sleep disorder. ASSESSMENT/PLAN:      ICD-10-CM ICD-9-CM    1. Idiopathic hypersomnia  G47.11 780.54 Armodafinil 150 mg tab      sodium,calcium,mag,pot oxybate (Xywav) 0.5 gram/mL soln      sodium,calcium,mag,pot oxybate (Xywav) 0.5 gram/mL soln      2. H/O anxiety disorder  Z86.59 V11.8       3. S/P gastric bypass  Z98.84 V45.86       4. BMI 35.0-35.9,adult  Z68.35 V85.35           Patient has a history of prolonged sleep periods which are non-refreshing along with daytime sleepiness an non-refreshing naps. This history along with findings on sleep testing indicates presence of idiopathic hypersomia. Follow-up and Dispositions    Return for with sleep physician in 3 months. Orders Placed This Encounter    Armodafinil 150 mg tab     Sig: Take 1 Tablet by mouth daily. Max Daily Amount: 1 Tablet. Dispense:  30 Tablet     Refill:  2    DISCONTD: sodium,calcium,mag,pot oxybate (Xywav) 0.5 gram/mL soln     Sig: Take 2.25 g by mouth nightly for 7 days, THEN 3 g nightly for 7 days, THEN 3.75 g nightly for 7 days, THEN 4.5 g nightly for 7 days. Max Daily Amount: 4.5 g.     Dispense:  180 Each     Refill:  0    sodium,calcium,mag,pot oxybate (Xywav) 0.5 gram/mL soln     Sig: Take 4.5 g by mouth nightly for 31 days. Max Daily Amount: 4.5 g.     Dispense:  270 Each     Refill:  1    sodium,calcium,mag,pot oxybate (Xywav) 0.5 gram/mL soln     Sig: Take 2.25 g by mouth nightly for 7 days, THEN 3 g nightly for 7 days, THEN 3.75 g nightly for 7 days, THEN 4.5 g nightly for 7 days.  Max Daily Amount: 4.5 g.     Dispense:  180 Each Refill:  0     She was informed on this medications including side effects and adverse reactions profile. * She was provided information on sleep apnea including corresponding risk factors and the importance of proper treatment. * Treatment options were reviewed in detail. she would like to proceed with PAP therapy. Patient will be seen in follow-up in 6-8 weeks after PAP setup to gauge treatment response and adherence to therapy. * The patient was counseled regarding proper sleep hygiene, with emphasis on ensuring sufficient total sleep time; safe driving and the benefits of exercise and weight loss. * All of her questions were addressed. 2. Recommended a dedicated weight loss program through appropriate diet and exercise regimen as significant weight reduction has been shown to reduce severity of obstructive sleep apnea. SUBJECTIVE/OBJECTIVE:    Celena Steele is an 44 y.o. female referred for evaluation for a sleep disorder. She complains of excessive daytime sleepiness associated with waking up not feeling refreshed after getting 8 plus hours of sleep. She feels that naps are refreshing and not sure if she dreams during naps which may be 2-3 hours in duration. Symptoms began 5 years ago, gradually worsening since that time. She usually can fall asleep in 5 minutes. Family or house members do not note snoring. She denies of symptoms indicative of cataplexy, sleep paralysis or sleep related hallucinations. She denies of a history of unusual movements occurring during sleep. Celena Steele does not wake up frequently at night. She is not bothered by waking up too early and left unable to get back to sleep. She actually sleeps about 8 hours at night and wakes up about 0-1 times during the night. She does not work shifts: Concepción Ortiz indicates she does not get too little sleep at night on most nights. Her bedtime is 10:00. She awakens at 06:00. She does take naps.  She takes 2-3 naps a week lasting 45 minutes to 2 hours. She has the following observed behaviors:  ;  .  Other remarks: The patient has undergone diagnostic testing for the current problems. Review of Systems:  Constitutional:  No significant weight loss or weight gain  Eyes:  No blurred vision  CVS:  No significant chest pain  Pulm:  No significant shortness of breath  GI:  No significant nausea or vomiting  :  No significant nocturia  Musculoskeletal:  No significant joint pain at night  Skin:  No significant rashes  Neuro:  No significant dizziness   Psych:  No active mood issues    Sleep Review of Systems: notable for Negative difficulty falling asleep; Negative awakenings at night; Positive perceived regular dreaming; Negative nightmares; Negative  early morning headaches; Negative  memory problems; Negative  concentration issues; Positive caffeine;  Negative alcohol;   Negative history of any automobile or occupational accidents due to daytime drowsiness. [place] Sleepiness Score: (P) 10   and Modified F.O.S.Q. Score Total / 2: (P) 10    Visit Vitals  /83 (BP 1 Location: Left upper arm, BP Patient Position: Sitting, BP Cuff Size: Large adult)   Pulse 82   Temp 98.2 °F (36.8 °C) (Temporal)   Ht 5' 3\" (1.6 m)   Wt 202 lb 1.6 oz (91.7 kg)   SpO2 100%   BMI 35.80 kg/m²           General:   Alert, oriented, not in acute distress   Eyes:  Anicteric Sclerae; intact EOM's   Nose:  No obvious nasal septum deviation    Oropharynx:   Mallampati score 4, thick tongue base, uvula not seen due to low-lying soft palate, narrow tonsilo-pharyngeal pilars, tongue scalloped   Neck:   midline trachea,  no JVD   Chest/Lungs:  symmetrical lung expansion ,clear lung fields on auscultation    CVS:  Normal rate, regular rhythm    Extremities:  No obvious rashes, absent edema    Neuro:  No focal deficits;  No obvious tremor    Psych:  Normal eye contact; normal  affect, normal countenance        Office visit exceeded 60 minutes with counseling and direction of care taking up more than 50% of the allotted time. Beba Pereira MD, FAASM  Diplomate American Board of Sleep Medicine  Diplomate in Sleep Medicine - ABP    Electronically signed.  12/08/22

## 2022-12-08 NOTE — PATIENT INSTRUCTIONS
217 [facility]., [place], [address]  Tel.  [phone]  Fax. 100 [facility] 60  [place], [address]  Tel.  [phone]  Fax. [phone] [address]  Tel.  [phone]  Fax. [phone]       Narcolepsy: After Your Visit  Your Care Instructions  Everybody gets a little sleepy once in a while, during a long car ride or other times when you want to be alert. But some people cannot control their sleepiness. It is no fun to be in the middle of your workday or driving your car down the street and have an overwhelming desire to sleep. This condition is called narcolepsy. Doctors do not know what causes narcolepsy. Your doctor may ask you to keep a sleep diary for a couple of weeks. It will help you and your doctor decide on treatment. It often helps to take limited naps during the day. It also helps to create a good mood and place for nighttime sleep. Your doctor may recommend medicine to help you stay awake during the day or sleep at night. Follow-up care is a key part of your treatment and safety. Be sure to make and go to all appointments, and call your doctor if you are having problems. It's also a good idea to know your test results and keep a list of the medicines you take. How can you care for yourself at home? Try to take 2 or 3 short naps at regular times during the day. After a nap, always give yourself time to become alert before you drive a car or do anything that might cause an accident. Take your medicines exactly as prescribed. Call your doctor if you think you are having a problem with your medicine. You may need to try several medicines before you find the one that works best for you. Try to improve your nighttime sleep habits. Here are a few of the things you could do:  Go to bed only when you are sleepy, and get up at the same time every day, even if you do not feel rested.  This might help you sleep well the next night and the night after that. If you lie awake for longer than 15 minutes, get up, leave the bedroom, and do something quiet, such as read, until you feel sleepy again. Avoid drinking or eating anything with caffeine after 3 p.m. This includes coffee, tea, cola drinks, and chocolate. Make sure your bedroom is not too hot or too cold, and keep it quiet and dark. Make sure your mattress provides good support. Be kind to your body:  Relieve tension with exercise or a massage. Learn and do relaxation techniques. Avoid alcohol, caffeine, nicotine, and illegal drugs. They can increase your anxiety level and cause sleep problems. Get light exercise daily. Gentle stretching, light aerobics, swimming, walking, and riding a bicycle can help to keep you going during the day and to sleep well at night. Eat a healthy diet. You may feel better if you avoid heavy meals and eat more fruits and vegetables. Do not use over-the-counter sleeping pills. They can make your sleep restless. Ask your doctor if any medicines you take could cause sleepiness. For example, cold and allergy medicines can make you drowsy. Consider joining a support group with people who have narcolepsy or other sleep problems. These groups can be a good source of tips for what to do. Also, it can be comforting to talk to people who face similar challenges. Your doctor can tell you how to contact a support group. When should you call for help? Call your doctor now or seek immediate medical care if:  You passed out (lost consciousness). You cannot use your muscles. This may happen very briefly, sometimes after you laugh or are angry, and may only affect part of your body. Watch closely for changes in your health, and be sure to contact your doctor if:  Your sleepiness continues to get worse. Where can you learn more?    Go to [email or web address]  Enter V069 in the search box to learn more about \"Narcolepsy: After Your Visit. \"   © 6155-2854 Healthwise, Incorporated. Care instructions adapted under license by [employer] (which disclaims liability or warranty for this information). This care instruction is for use with your licensed healthcare professional. If you have questions about a medical condition or this instruction, always ask your healthcare professional. [address] any warranty or liability for your use of this information. Content Version: 9.0.08531; Last Revised: September 15, 2009  PROPER SLEEP HYGIENE    What to avoid  Do not have drinks with caffeine, such as coffee or black tea, for 8 hours before bed. Do not smoke or use other types of tobacco near bedtime. Nicotine is a stimulant and can keep you awake. Avoid drinking alcohol late in the evening, because it can cause you to wake in the middle of the night. Do not eat a big meal close to bedtime. If you are hungry, eat a light snack. Do not drink a lot of water close to bedtime, because the need to urinate may wake you up during the night. Do not read or watch TV in bed. Use the bed only for sleeping and sexual activity. What to try  Go to bed at the same time every night, and wake up at the same time every morning. Do not take naps during the day. Keep your bedroom quiet, dark, and cool. Get regular exercise, but not within 3 to 4 hours of your bedtime. .  Sleep on a comfortable pillow and mattress. If watching the clock makes you anxious, turn it facing away from you so you cannot see the time. If you worry when you lie down, start a worry book. Well before bedtime, write down your worries, and then set the book and your concerns aside. Try meditation or other relaxation techniques before you go to bed. If you cannot fall asleep, get up and go to another room until you feel sleepy. Do something relaxing. Repeat your bedtime routine before you go to bed again. Make your house quiet and calm about an hour before bedtime. Turn down the lights, turn off the TV, log off the computer, and turn down the volume on music. This can help you relax after a busy day. Drowsy Driving: [facility] cites drowsiness as a causing factor in more than 038,514 police reported crashes annually, resulting in 76,000 injuries and 1,500 deaths. Other surveys suggest 55% of people polled have driven while drowsy in the past year, 23% had fallen asleep but not crashed, 3% crashed, and 2% had and accident due to drowsy driving. Who is at risk? Young Drivers: One study of drowsy driving accidents states that 55% of the drivers were under 25 years. Of those, 75% were male. Shift Workers and Travelers: People who work overnight or travel across time zones frequently are at higher risk of experiencing Circadian Rhythm Disorders. They are trying to work and function when their body is programed to sleep. Sleep Deprived: Lack of sleep has a serious impact on your ability to pay attention or focus on a task. Consistently getting less than the average of 8 hours your body needs creates partial or cumulative sleep deprivation. Untreated Sleep Disorders: Sleep Apnea, Narcolepsy, R.L.S., and other sleep disorders (untreated) prevent a person from getting enough restful sleep. This leads to excessive daytime sleepiness and increases the risk for drowsy driving accidents by up to 7 times. Medications / Alcohol: Even over the counter medications can cause drowsiness. Medications that impair a drivers attention should have a warning label. Alcohol naturally makes you sleepy and on its own can cause accidents. Combined with excessive drowsiness its effects are amplified.    Signs of Drowsy Driving:   * You don't remember driving the last few miles   * You may drift out of your carloz   * You are unable to focus and your thoughts wander   * You may yawn more often than normal   * You have difficulty keeping your eyes open / nodding off   * Missing traffic signs, speeding, or tailgating  Prevention-   Good sleep hygiene, lifestyle and behavioral choices have the most impact on drowsy driving. There is no substitute for sleep and the average person requires 8 hours nightly. If you find yourself driving drowsy, stop and sleep. Consider the sleep hygiene tips provided during your visit as well. Medication Refill Policy: Refills for all medications require 1 week advance notice. Please have your pharmacy fax a refill request. We are unable to fax, or call in \"controled substance\" medications and you will need to pick these prescriptions up from our office. [employer] Activation    Thank you for requesting access to [employer]. Please follow the instructions below to securely access and download your online medical record. DropShip allows you to send messages to your doctor, view your test results, renew your prescriptions, schedule appointments, and more. How Do I Sign Up? In your internet browser, go to [email or web address] [email or web address]. Click on the First Time User? Click Here link in the Sign In box. You will see the New Member Sign Up page. Enter your DropShip Access Code exactly as it appears below. You will not need to use this code after youve completed the sign-up process. If you do not sign up before the expiration date, you must request a new code. DropShip Access Code: Activation code not generated  Current DropShip Status: Active (This is the date your [employer] access code will )    Enter the last four digits of your Social Security Number (xxxx) and Date of Birth (mm/dd/yyyy) as indicated and click Submit. You will be taken to the next sign-up page. Create a [employer] ID. This will be your DropShip login ID and cannot be changed, so think of one that is secure and easy to remember. Create a DropShip password. You can change your password at any time. Enter your Password Reset Question and Answer.  This can be used at a later time if you forget your password. Enter your e-mail address. You will receive e-mail notification when new information is available in [address] Click Sign Up. You can now view and download portions of your medical record. Click the [employer] link to download a portable copy of your medical information. Additional Information    If you have questions, please call [phone]. Remember, [employer] is NOT to be used for urgent needs. For medical emergencies, dial 911.

## 2022-12-08 NOTE — TELEPHONE ENCOUNTER
Returning patient's message re: scheduling an appointment with Dr. Tanja Mireles. Patient did not answer so I left a vmail with her first availability and our contact information to scheduled.

## 2022-12-14 ENCOUNTER — TELEPHONE (OUTPATIENT)
Dept: SLEEP MEDICINE | Age: 37
End: 2022-12-14

## 2022-12-14 NOTE — TELEPHONE ENCOUNTER
Started Armodafinil 150 mg last Wednesday sleepiness and focus improved markedly. Anxiety  since Monday and Panic attack today not on medications. Took Xanax today. Patient feels strongly that Armodafinil may be causing her anxiety. Xywav not started yet. Suggest cutting back on Armadafinil dose by 1/2 for next 3 days followed by an increment back to full dose. Also advised follow-up with psychiatry.

## 2022-12-14 NOTE — TELEPHONE ENCOUNTER
Patient called and stated that:     \"Today I experienced my first panic attack while at work. I believe it may be related to the Armodafinil as I am not taking any other medications regularly. I have Xanax that I take PRN but Im concerned because the panic attack was bad enough that I had to leave work, and I have never had one before. The medication was initially very helpful and improved my tiredness and helped focus. \"  Please advise.

## 2022-12-18 ENCOUNTER — HOSPITAL ENCOUNTER (INPATIENT)
Age: 37
LOS: 1 days | Discharge: LEFT BEFORE DISCHARGE | DRG: 880 | End: 2022-12-18
Attending: STUDENT IN AN ORGANIZED HEALTH CARE EDUCATION/TRAINING PROGRAM | Admitting: PSYCHIATRY & NEUROLOGY
Payer: COMMERCIAL

## 2022-12-18 VITALS
OXYGEN SATURATION: 98 % | SYSTOLIC BLOOD PRESSURE: 118 MMHG | HEIGHT: 63 IN | BODY MASS INDEX: 35.44 KG/M2 | DIASTOLIC BLOOD PRESSURE: 84 MMHG | TEMPERATURE: 98.9 F | HEART RATE: 72 BPM | WEIGHT: 200 LBS | RESPIRATION RATE: 18 BRPM

## 2022-12-18 DIAGNOSIS — F41.1 ANXIETY STATE: Primary | ICD-10-CM

## 2022-12-18 PROBLEM — F41.9 ANXIETY DISORDER: Status: ACTIVE | Noted: 2022-12-18

## 2022-12-18 LAB
ALBUMIN SERPL-MCNC: 4.1 G/DL (ref 3.5–5)
ALBUMIN/GLOB SERPL: 0.9 {RATIO} (ref 1.1–2.2)
ALP SERPL-CCNC: 89 U/L (ref 45–117)
ALT SERPL-CCNC: 17 U/L (ref 12–78)
AMPHET UR QL SCN: NEGATIVE
ANION GAP SERPL CALC-SCNC: 6 MMOL/L (ref 5–15)
APAP SERPL-MCNC: <2 UG/ML (ref 10–30)
AST SERPL-CCNC: 10 U/L (ref 15–37)
BARBITURATES UR QL SCN: NEGATIVE
BASOPHILS # BLD: 0 K/UL (ref 0–0.1)
BASOPHILS NFR BLD: 0 % (ref 0–1)
BENZODIAZ UR QL: POSITIVE
BILIRUB SERPL-MCNC: 0.7 MG/DL (ref 0.2–1)
BUN SERPL-MCNC: 11 MG/DL (ref 6–20)
BUN/CREAT SERPL: 17 (ref 12–20)
CALCIUM SERPL-MCNC: 9.2 MG/DL (ref 8.5–10.1)
CANNABINOIDS UR QL SCN: NEGATIVE
CHLORIDE SERPL-SCNC: 103 MMOL/L (ref 97–108)
CO2 SERPL-SCNC: 26 MMOL/L (ref 21–32)
COCAINE UR QL SCN: NEGATIVE
COMMENT, HOLDF: NORMAL
CREAT SERPL-MCNC: 0.66 MG/DL (ref 0.55–1.02)
DIFFERENTIAL METHOD BLD: ABNORMAL
DRUG SCRN COMMENT,DRGCM: ABNORMAL
EOSINOPHIL # BLD: 0.1 K/UL (ref 0–0.4)
EOSINOPHIL NFR BLD: 1 % (ref 0–7)
ERYTHROCYTE [DISTWIDTH] IN BLOOD BY AUTOMATED COUNT: 11.9 % (ref 11.5–14.5)
ETHANOL SERPL-MCNC: <10 MG/DL
FLUAV RNA SPEC QL NAA+PROBE: NOT DETECTED
FLUBV RNA SPEC QL NAA+PROBE: NOT DETECTED
GLOBULIN SER CALC-MCNC: 4.7 G/DL (ref 2–4)
GLUCOSE SERPL-MCNC: 91 MG/DL (ref 65–100)
HCG SERPL QL: NEGATIVE
HCT VFR BLD AUTO: 45.2 % (ref 35–47)
HGB BLD-MCNC: 15.9 G/DL (ref 11.5–16)
IMM GRANULOCYTES # BLD AUTO: 0 K/UL (ref 0–0.04)
IMM GRANULOCYTES NFR BLD AUTO: 0 % (ref 0–0.5)
LYMPHOCYTES # BLD: 2.4 K/UL (ref 0.8–3.5)
LYMPHOCYTES NFR BLD: 25 % (ref 12–49)
MCH RBC QN AUTO: 29.9 PG (ref 26–34)
MCHC RBC AUTO-ENTMCNC: 35.2 G/DL (ref 30–36.5)
MCV RBC AUTO: 85.1 FL (ref 80–99)
METHADONE UR QL: NEGATIVE
MONOCYTES # BLD: 0.4 K/UL (ref 0–1)
MONOCYTES NFR BLD: 4 % (ref 5–13)
NEUTS SEG # BLD: 6.7 K/UL (ref 1.8–8)
NEUTS SEG NFR BLD: 70 % (ref 32–75)
NRBC # BLD: 0 K/UL (ref 0–0.01)
NRBC BLD-RTO: 0 PER 100 WBC
OPIATES UR QL: NEGATIVE
PCP UR QL: NEGATIVE
PLATELET # BLD AUTO: 377 K/UL (ref 150–400)
PMV BLD AUTO: 10 FL (ref 8.9–12.9)
POTASSIUM SERPL-SCNC: 3.8 MMOL/L (ref 3.5–5.1)
PROT SERPL-MCNC: 8.8 G/DL (ref 6.4–8.2)
RBC # BLD AUTO: 5.31 M/UL (ref 3.8–5.2)
SALICYLATES SERPL-MCNC: <1.7 MG/DL (ref 2.8–20)
SAMPLES BEING HELD,HOLD: NORMAL
SARS-COV-2, COV2: NOT DETECTED
SODIUM SERPL-SCNC: 135 MMOL/L (ref 136–145)
TSH SERPL DL<=0.05 MIU/L-ACNC: 0.93 UIU/ML (ref 0.36–3.74)
WBC # BLD AUTO: 9.7 K/UL (ref 3.6–11)

## 2022-12-18 PROCEDURE — 80307 DRUG TEST PRSMV CHEM ANLYZR: CPT

## 2022-12-18 PROCEDURE — 84443 ASSAY THYROID STIM HORMONE: CPT

## 2022-12-18 PROCEDURE — 87636 SARSCOV2 & INF A&B AMP PRB: CPT

## 2022-12-18 PROCEDURE — 65220000003 HC RM SEMIPRIVATE PSYCH

## 2022-12-18 PROCEDURE — 85025 COMPLETE CBC W/AUTO DIFF WBC: CPT

## 2022-12-18 PROCEDURE — 80143 DRUG ASSAY ACETAMINOPHEN: CPT

## 2022-12-18 PROCEDURE — 80053 COMPREHEN METABOLIC PANEL: CPT

## 2022-12-18 PROCEDURE — 93005 ELECTROCARDIOGRAM TRACING: CPT

## 2022-12-18 PROCEDURE — 36415 COLL VENOUS BLD VENIPUNCTURE: CPT

## 2022-12-18 PROCEDURE — 84703 CHORIONIC GONADOTROPIN ASSAY: CPT

## 2022-12-18 PROCEDURE — 99284 EMERGENCY DEPT VISIT MOD MDM: CPT

## 2022-12-18 PROCEDURE — 82077 ASSAY SPEC XCP UR&BREATH IA: CPT

## 2022-12-18 PROCEDURE — 80179 DRUG ASSAY SALICYLATE: CPT

## 2022-12-18 NOTE — ED TRIAGE NOTES
Reports panic attacks for 5 days. Increased heart rate, palpitations, chills, sweating. Recently started Nuvigil for hypersomnia.

## 2022-12-18 NOTE — ED PROVIDER NOTES
Please note that this dictation was completed with [employer], the computer voice recognition software. Quite often unanticipated grammatical, syntax, homophones, and other interpretive errors are inadvertently transcribed by the computer software. Please disregard these errors. Please excuse any errors that have escaped final proofreading. Patient is a 26-year-old female with history of anxiety, hypersomnia, presenting to ED for evaluation of increased anxiety. States that for the past 5 days she has had panic attacks and has been experiencing palpitations. She also explains that she has had difficulty doing her daily activities including eating, showering, going to work secondary to the anxiety. When asked if she has had any thoughts of self-harm she responds \"I just do not want to keep feeling like this\" but denies any plan for suicide or self-harm. Denies any prior or recent attempts. She was started on Nuvigil last week for treatment of her hypersomnia, last dose 2 days ago, she was advised by her prescriber to stop as this may be worsening her anxiety. She spoke with her psychiatric nurse practitioner who started her on Lamictal in addition to her as needed Xanax without any relief. Past Medical History:   Diagnosis Date    Anxiety 2011    Vicky Fam LCSW (Telehealth)    Back pain     Bilateral knee pain     due to OA. Gastric ulcer 11/23/2021    Hemorrhoids     High cholesterol 2016    History of uterine fibroid summer 2020    30 fibroids. Dr. Dennis Steele childhood (9-14 yo)    Txd w lots steroids. Severe allergies to everything. Allergist.    Hypertension 2009    Txd w 3 bp meds. off after weight loss. Iron deficiency anemia due to chronic blood loss 2020    Txd w Iron Infusions x 5.   Dr. Diomedes Nixon    Menorrhagia     Morbid obesity ([facility] [address] 11/20/2019    Gastric Bypass 2019    PCOS (polycystic ovarian syndrome) 2015    Vitamin D deficiency 2020       Past Surgical History: Procedure Laterality Date    HX GI  2013    Sigmoidoscopy due to rectal polyp- WNL pp     HX LAP GASTRIC BYPASS  2019    Dr. Jose Luis Wagner @ [facility]     HX MYOMECTOMY  2021    30 uterine fibroids removed. Dr. Chilango Reyes. HX POLYPECTOMY      RECTAL POLYP- benign. HX REFRACTIVE SURGERY      HX TONSILLECTOMY  2021    due to chronic inflammation. Dr. Almas Daniel.     HX WISDOM TEETH EXTRACTION           Family History:   Problem Relation Age of Onset    Diabetes Mother     High Cholesterol Mother     Hypertension Mother     Obesity Mother     Diabetes Father     Heart Disease Father 64        cardiomyopathy, heart transplant    Hypertension Father     Heart Surgery Father     High Cholesterol Father     Obesity Father     Gout Father     Other Father         kidney stones    Sleep Apnea Father     Obesity Sister     Diabetes Maternal Grandmother     Diabetes Maternal Grandfather     Diabetes Paternal Grandmother     Anesth Problems Neg Hx     Breast Cancer Neg Hx     Colon Cancer Neg Hx        Social History     Socioeconomic History    Marital status:      Spouse name: Mary Lou Morrison    Number of children: 0    Years of education: Not on file    Highest education level: Not on file   Occupational History    Occupation: Optometrist      Comment: [employer]   Tobacco Use    Smoking status: Former     Packs/day: 0.25     Years: 15.00     Pack years: 3.75     Types: Cigarettes     Quit date: 2018     Years since quittin.3    Smokeless tobacco: Never   Vaping Use    Vaping Use: Never used   Substance and Sexual Activity    Alcohol use: Not Currently    Drug use: Not Currently     Types: Marijuana    Sexual activity: Yes     Partners: Male   Other Topics Concern     Service Not Asked    Blood Transfusions Not Asked    Caffeine Concern Not Asked    Occupational Exposure Not Asked    Hobby Hazards Not Asked    Sleep Concern Not Asked    Stress Concern Not Asked    Weight Concern Not Asked Special Diet Not Asked    Back Care Not Asked    Exercise No    Bike Helmet Not Asked    Seat Belt Not Asked    Self-Exams Not Asked   Social History Narrative    05/25/18    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 Engaged and living with Farooq Gupta Adjutant) at [place] [address] for now during the move. Dog - 1 yo in 2017.         01/02/19    Working at [employer]"     Social Determinants of Health     Financial Resource Strain: Not on file   Food Insecurity: Not on file   Transportation Needs: Not on file   Physical Activity: Not on file   Stress: Not on file   Social Connections: Not on file   Intimate Partner Violence: Not on file   Housing Stability: Not on file         ALLERGIES: Anesthesia s/i-40 (propofol) [propofol], Contrave [naltrexone-bupropion], Phentermine, and Topamax [topiramate]    Review of Systems   Constitutional:  Positive for appetite change. Negative for chills and fever. HENT:  Negative for congestion, ear pain and sore throat. Eyes:  Negative for visual disturbance. Respiratory:  Negative for cough and shortness of breath. Cardiovascular:  Positive for palpitations. Negative for chest pain. Gastrointestinal:  Negative for abdominal pain, diarrhea, nausea and vomiting. Genitourinary:  Negative for dysuria and flank pain. Musculoskeletal:  Negative for back pain. Skin:  Negative for color change. Neurological:  Negative for dizziness and headaches. Psychiatric/Behavioral:  Positive for sleep disturbance. Negative for confusion. The patient is nervous/anxious. Vitals:    12/18/22 1449   BP: 127/87   Pulse: 99   Resp: 18   Temp: 97.7 °F (36.5 °C)   SpO2: 99%   Weight: 90.7 kg (200 lb)   Height: 5' 3\" (1.6 m)            Physical Exam  Vitals and nursing note reviewed. Constitutional:       General: She is not in acute distress. Appearance: Normal appearance. She is not ill-appearing. HENT:      Head: Normocephalic and atraumatic. Eyes:      General: Vision grossly intact. Extraocular Movements: Extraocular movements intact. Conjunctiva/sclera: Conjunctivae normal.   Neck:      Trachea: Phonation normal.   Cardiovascular:      Rate and Rhythm: Normal rate and regular rhythm. Heart sounds: Normal heart sounds. Pulmonary:      Effort: Pulmonary effort is normal.      Breath sounds: Normal breath sounds and air entry. Abdominal:      Palpations: Abdomen is soft. Tenderness: There is no abdominal tenderness. Musculoskeletal:         General: Normal range of motion. Skin:     General: Skin is warm and dry. Neurological:      General: No focal deficit present. Mental Status: She is alert and oriented to person, place, and time. Psychiatric:         Mood and Affect: Mood is anxious. Behavior: Behavior is cooperative. Thought Content: Thought content includes suicidal (? passive thoughts) ideation. Thought content does not include homicidal ideation. Thought content does not include homicidal or suicidal plan. MDM  Number of Diagnoses or Management Options  Anxiety state  Diagnosis management comments: Patient is alert, afebrile, vitals stable. Presents ambulatory reporting worsening anxiety, panic disorders, difficulty with ADLs. Recently started on Nuvigil and Lamictal.  EKG without acute ischemic change or ectopy. Labs reassuring. Patient seen by [facility] who is also consulted the on-call psychiatrist who both have the recommendation for behavioral health admission. Patient and her spouse are in agreement with this plan. Bsmart advises that as patient is voluntary and low SI risk that she does not need a sitter and is able to leave at any time without need for TDO evaluation. Pending medical clearance and bed search. 6:00 PM  Change of shift.   Care of patient signed over to Dr. Osvaldo Will. Bedside handoff complete. Awaiting medical clearance. Per bsmart beds available here.          Amount and/or Complexity of Data Reviewed  Discuss the patient with other providers: yes (Dr. Osvaldo Will)      ED Course as of 12/18/22 1652   Sun Dec 18, 2022   1647 EKG time 3:17 PM  Normal sinus rhythm rate of 87 normal axis, narrow QRS, normal QTC, nonspecific ST-T wave changes without ST elevations or depressions [CC]      ED Course User Index  [CC] Dayton Serrano, DO       Procedures

## 2022-12-18 NOTE — BSMART NOTE
BSMART assessment completed, and suicide risk level noted to be no risk. Charge Nurse, Yuan Valdes and ED Medical Provider, Fernando Goldman notified. There is no primary nurse assigned at this time. Concerns not observed. Security/Off- has not been notified.

## 2022-12-18 NOTE — BSMART NOTE
Comprehensive Assessment Form Part 1      Section I - Disposition    Anxiety by Hx    Past Medical History:   Diagnosis Date    Anxiety     Cuca Bowen LCSW (Telehealth)    Back pain     Bilateral knee pain     due to OA. Gastric ulcer 2021    Hemorrhoids     High cholesterol     History of uterine fibroid summer 2020    30 fibroids. Dr. Vini Smith childhood (9-12 yo)    Txd w lots steroids. Severe allergies to everything. Allergist.    Hypertension     Txd w 3 bp meds. off after weight loss. Iron deficiency anemia due to chronic blood loss     Txd w Iron Infusions x 5. Dr. Maryann Lake    Menorrhagia     Morbid obesity ([facility] [address] 2019    Gastric Bypass     PCOS (polycystic ovarian syndrome)     Vitamin D deficiency      The Medical Doctor to Psychiatrist conference was not completed. The Medical Doctor is in agreement with Psychiatrist disposition because pt meets criteria for psychiatric inpatient treatment. The plan is for voluntary psychiatric hospitalization. The on-call PMHNP consulted was Jovan Gleason. The admitting Psychiatrist will be SHWETHA. The admitting Diagnosis is Anxiety. The Payor source is [facility]. This writer reviewed the [address] in nursing flowsheet and the risk level assigned is no risk. Based on this assessment, the risk of suicide is no risk and the plan is for voluntary psychiatric hospitalization. Section II - Integrated Summary  Summary:      43 yo female arrived to the ED reporting panic attacks over the last 5 days. Pt consented to complete her assessment, remained within LOS and was able to confirm her name and . This assessment was completed face to face. Pt denies SI/HI/VH/AH; no evidence of psychosis or delusional thoughts.   Pt admit to making statements to the ED Medical Provider, JOY Ferrari about not wanting to live w/ the panic attacks, but denies any thoughts regarding suicide, plans or intention to harm herself. Pt denies any history of suicide attempts or self-harm. Pt reports experiencing panic attacks over the last 5 days w/o an identifiable trigger, which includes increased heart rate, palpitations, chills and sweating. Pt reports a history of anxiety and previously was prescribed Xanax PRN by her PCP, JOY Llamas. Pt reports being recently started on Nuvigil for hypersomnia 2.5 weeks ago and was started on Lamictal by a Matthew Gasca 3 days ago after having an initial appointment this past Tuesday. Pt reports feeling nauseous over the last 5 days causing a decrease in appetite and reports the last time she ate was a slice of pizza yesterday evening. Pt denies any issues falling or staying asleep, but reports feeling panicked and anxious upon waking up. Pt reports ETOH use socially stating her last drink was 1 week ago. Pt denies any other substance use or access to firearms. Pt reports she has been unable to return to work since experiencing the initial panic attack this past Wednesday along w/ having difficulty getting up from bed, preparing meals for herself and showering. Consulted w/ the on-call Rigo Esquivel via TC who agrees that pt meets criteria for psychiatric inpatient treatment d/t the panic attacks being highly disruptive w/ the goal for medication adjustment; however, Jennie confirms if pt does not wish to be voluntary for admission she does not meet criteria for a TDO prescreen evaluation. Spoke w/ pt and , Dimitrios Leger to relay information. Pt is agreeable for voluntary admission at this time. ED Medical Provider, JOY Ocampo is in agreement w/ plan for admission. The patient has demonstrated mental capacity to provide informed consent. The information is given by the patient. The Chief Complaint is panic attacks over the last 5 days. The Precipitant Factors are recent medication changes.   Previous Hospitalizations: No  The patient has not previously been in restraints. Current Psychiatrist and/or  is Jan Osman NP and Juan Purcell LCSW via telehealth. Lethality Assessment:    The potential for suicide noted by the following: Not noted. The potential for homicide is not noted. The patient has not been a perpetrator of sexual or physical abuse. There are not pending charges. The patient is not felt to be at risk for self harm or harm to others. There is no attending nurse assigned at this time. Section III - Psychosocial  The patient's overall mood and attitude is mildly anxious. Feelings of helplessness and hopelessness are observed by self-report. Generalized anxiety is observed by self-report. Panic is not observed. Phobias are not observed. Obsessive compulsive tendencies are not observed. Section IV - Mental Status Exam  The patient's appearance shows no evidence of impairment. The patient's behavior shows no evidence of impairment. The patient is oriented to time, place, person and situation. The patient's speech shows no evidence of impairment. The patient's mood is anxious. The range of affect is flat. The patient's thought content demonstrates no evidence of impairment. The thought process shows no evidence of impairment. The patient's perception shows no evidence of impairment. The patient's memory shows no evidence of impairment. The patient's appetite is decreased. The patient's sleep has evidence of hypersomnia. The patient's insight shows no evidence of impairment. The patient's judgement shows no evidence of impairment. Section V - Substance Abuse  The patient is using substances. The patient is using alcohol socially with last use being 1 week ago. The patient has experienced the following withdrawal symptoms: N/A. Section VI - Living Arrangements  The patient is .   The spouses approximate age is unknown and appears to be in unknown health. The patient lives with a spouse. The patient does plan to return home upon discharge. The patient does not have legal issues pending. The patient's source of income comes from employment. Judaism and cultural practices have not been voiced at this time. The patient's greatest support comes from her , Stacie Yeh and this person will be involved with the treatment. The patient has not been in an event described as horrible or outside the realm of ordinary life experience either currently or in the past.  The patient has not been a victim of sexual/physical abuse. Section VII - Other Areas of Clinical Concern  The highest grade achieved is unknown with the overall quality of school experience being described as not assessed. The patient is currently employed and speaks [place] as a primary language. The patient has no communication impairments affecting communication. The patient's preference for learning can be described as: can read and write adequately.   The patient's hearing is normal.  The patient's vision is normal.      Kiki Quezada LMSW

## 2022-12-18 NOTE — BSMART NOTE
Spoke w/ Leda w/ Access via TC who confirms pt has been accepted to [address] [place] [address] by [facility] and will be placed in bed #014-2. Notified Merlene Zazueta, Charge Nurse.      RN2RN ext. [phone]

## 2022-12-19 NOTE — ROUTINE PROCESS
TRANSFER - IN REPORT:    Verbal report received from Blaze Kwon RN(name) on Barry Loo  being received from [facility]unit) for routine progression of care      Report consisted of patients Situation, Background, Assessment and   Recommendations(SBAR). Information from the following report(s) SBAR, ED Summary, and Recent Results was reviewed with the receiving nurse. Opportunity for questions and clarification was provided. Assessment completed upon patients arrival to unit and care assumed.

## 2022-12-19 NOTE — BH NOTES
As soon as patient arrived on unit, she requested to leave AMA. Called Psych NP Mariah Feldman to get order for [facility] discharge. Patient signed informed refusal for treatment.

## 2022-12-19 NOTE — ED NOTES
TRANSFER - OUT REPORT:    Verbal report given to Sumit Mcmahan (name) on Barry Loo  being transferred to [address] [place] [address] ([facility]) for transfer of care. Report consisted of patients Situation, Background, Assessment and   Recommendations(SBAR). Information from the following report(s) SBAR, Kardex, ED Summary, MAR, and Recent Results was reviewed with the receiving nurse. Lines:   Peripheral IV 12/18/22 Left Antecubital (Active)   Site Assessment Clean, dry, & intact 12/18/22 1545   Phlebitis Assessment 0 12/18/22 1545   Infiltration Assessment 0 12/18/22 1545   Dressing Status Clean, dry, & intact 12/18/22 1545   Dressing Type Transparent 12/18/22 1545   Hub Color/Line Status Pink;Patent; Flushed 12/18/22 1545   Action Taken Blood drawn 12/18/22 1545        Opportunity for questions and clarification was provided.       Patient transported with:   Registered Nurse

## 2022-12-19 NOTE — ED NOTES
Verbal shift change report given to [address] [place] [address] (oncoming nurse) by Lucy Beth LPN (offgoing nurse). Report included the following information SBAR and MAR.

## 2022-12-21 LAB
ATRIAL RATE: 87 BPM
CALCULATED P AXIS, ECG09: 50 DEGREES
CALCULATED R AXIS, ECG10: 38 DEGREES
CALCULATED T AXIS, ECG11: 37 DEGREES
DIAGNOSIS, 93000: NORMAL
P-R INTERVAL, ECG05: 148 MS
Q-T INTERVAL, ECG07: 366 MS
QRS DURATION, ECG06: 92 MS
QTC CALCULATION (BEZET), ECG08: 440 MS
VENTRICULAR RATE, ECG03: 87 BPM

## 2023-02-08 ENCOUNTER — TELEPHONE (OUTPATIENT)
Dept: SURGERY | Age: 38
End: 2023-02-08

## 2023-03-09 ENCOUNTER — OFFICE VISIT (OUTPATIENT)
Dept: SLEEP MEDICINE | Age: 38
End: 2023-03-09
Payer: COMMERCIAL

## 2023-03-09 VITALS
TEMPERATURE: 98.4 F | OXYGEN SATURATION: 99 % | SYSTOLIC BLOOD PRESSURE: 120 MMHG | WEIGHT: 201.4 LBS | DIASTOLIC BLOOD PRESSURE: 78 MMHG | HEIGHT: 63 IN | BODY MASS INDEX: 35.68 KG/M2 | HEART RATE: 73 BPM

## 2023-03-09 DIAGNOSIS — Z98.84 S/P GASTRIC BYPASS: ICD-10-CM

## 2023-03-09 DIAGNOSIS — Z86.59 H/O ANXIETY DISORDER: ICD-10-CM

## 2023-03-09 DIAGNOSIS — G47.11 IDIOPATHIC HYPERSOMNIA: Primary | ICD-10-CM

## 2023-03-09 PROCEDURE — 99214 OFFICE O/P EST MOD 30 MIN: CPT | Performed by: INTERNAL MEDICINE

## 2023-03-09 RX ORDER — FLUOXETINE HYDROCHLORIDE 40 MG/1
40 CAPSULE ORAL DAILY
COMMUNITY
Start: 2023-03-01

## 2023-03-09 RX ORDER — PROPRANOLOL HYDROCHLORIDE 40 MG/1
TABLET ORAL
COMMUNITY
Start: 2023-03-01

## 2023-03-09 RX ORDER — MODAFINIL 100 MG/1
100 TABLET ORAL DAILY
COMMUNITY
Start: 2023-03-01

## 2023-03-09 NOTE — PROGRESS NOTES
[address] [facility], [address]   Tel.  [phone]   Fax. [address] [facility], [address]   Tel.  [phone]   Fax. [phone] [address] [facility] [place], [facility] [place]    Tel.  [phone]   Fax. [phone]       Sarah Beth Ng (: 1985) is a 44 y.o. female, established patient, seen for narcolepsy follow up and medication management, she was last seen by me on 22. ASSESSMENT/PLAN:    ICD-10-CM ICD-9-CM    1. Idiopathic hypersomnia  G47.11 780.54 lisdexamfetamine (VYVANSE) 20 mg capsule      lisdexamfetamine (VYVANSE) 20 mg capsule      lisdexamfetamine (VYVANSE) 20 mg capsule      DRUG ABUSE PROF, URINE (SEVEN DRUGS), MS COFIRM      DRUG ABUSE PROF, URINE (SEVEN DRUGS), MS COFIRM      2. H/O anxiety disorder  Z86.59 V11.8       3. S/P gastric bypass  Z98.84 V45.86       4. BMI 35.0-35.9,adult  Z68.35 V85.35           Patient has a history and examination consistent with the diagnosis of narcolepsy. Follow-up and Dispositions    Return for with sleep physician in 3 months. * Continue on current medications, replace with adderall XR, will investigate as to why she did not get Xywav. Orders Placed This Encounter    DRUG ABUSE PROF, URINE (SEVEN DRUGS), MS COFIRM     Standing Status:   Future     Number of Occurrences:   1     Standing Expiration Date:   2023    lisdexamfetamine (VYVANSE) 20 mg capsule     Sig: Take 1 Capsule by mouth daily for 29 days. Max Daily Amount: 20 mg. Dispense:  30 Capsule     Refill:  0    lisdexamfetamine (VYVANSE) 20 mg capsule     Sig: Take 1 Capsule by mouth daily for 29 days. Max Daily Amount: 20 mg. Dispense:  30 Capsule     Refill:  0    lisdexamfetamine (VYVANSE) 20 mg capsule     Sig: Take 1 Capsule by mouth daily for 29 days. Max Daily Amount: 20 mg.      Dispense:  30 Capsule     Refill:  0       * She was educated on this medications including side effects and adverse reactions profile. * Counseling was provided regarding the importance of proper sleep hygiene (sleep duration of 8.5 to 9 hours), planned naps and use of wakefulness promoting agents. * Discussed need for napping to prevent unintentional sleep attacks. * Patient was advised to exercise caution and vigilance while operating machinery and driving. * Patient was asked to contact our office at any time for further questions regarding their sleep symptoms. SUBJECTIVE/OBJECTIVE:  She reports of improved daytime sleepiness following initiation of armodafinil but after 4 days developed severe anxiety and panic attacks. She had to go to the emergency department and did partial hospital  program for 2 weeks prior to getting back to work. She did not get the [facility] as this medication was not approved. Her anti-depressant therapy was changed and use of armodafinil was tried at lower dose but she was not able to tolerate this medication. She was switched to modafinil, she still has some side effects but not as severe as Armodafinil. She is not able to get up easily each morning and does feel tired during the day. She denies impairment in ability to perform her daily activities. She does report of occasional night sweats and tingling sensation in the arms (decreased). She denies of symptoms indicative of cataplexy, sleep paralysis, or hypnagogic hallucinations.     CURRENT THERAPY:  Modafinil 100 Mg Tablet    [facility] Data Review:  2023 1   Modafinil 100 Mg Tablet  30.00 30 [facility] [id number] Vir (1432) 0  Comm [facility]  2023 1   Modafinil 100 Mg Tablet  30.00 30 An [facility] [id number] Vir (1432) 0  Comm [facility]    CURRENT SLEEP PATTERN:   Bedtime: 10:30 pm and midnight on weekends   Sleep Onset: 5 minutes   Rise time: 07:00 am and until 07:00 am on weekends, patient finds it hard to wake up   Frequency of nocturnal awakenings: 0-1 per night   Nappin-2 per week, duration 1-2 hours, naps are not refreshing    Vernalis Sleepiness Score: (P) 7   and Modified F.O.S.Q. Score Total / 2: (P) 13.5    SLEEP TESTING:    PSG(11-21-22): Apnea/Hypopnea index of 1.9 which indicates no significant sleep apnea. MSLT(11-22-22): Severe daytime sleepiness as evidenced by an average latency of 00:55 minutes, Stage REM was not noted in any of the naps. Urine Drug Screen: Negative    Sleep Review of Systems: notable for Negative difficulty falling asleep; Negative awakenings at night; Positive perceived regular dreaming; Negative nightmares; Negative  early morning headaches; Negative  memory problems; Negative  concentration issues; Negative caffeine;  Negative alcohol;   Negative history of any automobile or occupational accidents due to daytime drowsiness. Visit Vitals  /78 (BP 1 Location: Left upper arm, BP Patient Position: Sitting, BP Cuff Size: Large adult)   Pulse 73   Temp 98.4 °F (36.9 °C) (Temporal)   Ht 5' 3\" (1.6 m)   Wt 201 lb 6.4 oz (91.4 kg)   SpO2 99%   BMI 35.68 kg/m²           General:   Alert, oriented, not in acute distress   Eyes:  Anicteric Sclerae; intact EOM's   Nose:  No obvious nasal septum deviation    Oropharynx:   Mallampati score 1,  uvula seen    Neck:   midline trachea,  no JVD   Chest/Lungs:  symmetrical lung expansion ,clear lung fields on auscultation    CVS:  Normal rate, regular rhythm    Extremities:  No obvious rashes, absent edema    Neuro:  No focal deficits; No obvious tremor    Psych:  Normal eye contact; normal  affect, normal countenance      Patient's phone number [phone] (home)  was reviewed and confirmed for accuracy. She gives permission for messages regarding results and appointments to be left at that number. Sagar Bingham MD, FAASM  Diplomate American Board of Sleep Medicine  Diplomate in Sleep Medicine - ABP    Electronically signed.  03/31/23

## 2023-03-10 LAB
AMPHETAMINES UR QL SCN: NEGATIVE NG/ML
BARBITURATES UR QL SCN: NEGATIVE NG/ML
BENZODIAZ UR QL: NEGATIVE NG/ML
BZE UR QL: NEGATIVE NG/ML
CANNABINOIDS UR QL SCN: NEGATIVE NG/ML
OPIATES UR QL: NEGATIVE NG/ML
PCP UR QL: NEGATIVE NG/ML

## 2023-03-14 ENCOUNTER — TELEPHONE (OUTPATIENT)
Dept: SLEEP MEDICINE | Age: 38
End: 2023-03-14

## 2023-04-05 ENCOUNTER — TELEPHONE (OUTPATIENT)
Dept: SLEEP MEDICINE | Age: 38
End: 2023-04-05

## 2023-04-05 NOTE — TELEPHONE ENCOUNTER
Charmaine NDIAYE (pharmacy [email or web address]) with [facility] called this morning @ 9:37am to inform us that the patient Greg Found was denied and asked if an appeal will be done. Patient has been prescribed an alternate RX in place of the Greg Found.

## 2023-04-06 ENCOUNTER — TELEPHONE (OUTPATIENT)
Dept: SLEEP MEDICINE | Age: 38
End: 2023-04-06

## 2023-04-06 NOTE — TELEPHONE ENCOUNTER
Patient has tried Armodafinil and Modafinil in the past and has failed both medications. We should proceed with the appeals process.

## 2023-04-07 ENCOUNTER — TELEPHONE (OUTPATIENT)
Dept: SLEEP MEDICINE | Age: 38
End: 2023-04-07

## 2023-04-18 ENCOUNTER — OFFICE VISIT (OUTPATIENT)
Dept: SURGERY | Age: 38
End: 2023-04-18
Payer: COMMERCIAL

## 2023-04-18 VITALS
RESPIRATION RATE: 18 BRPM | HEIGHT: 64 IN | BODY MASS INDEX: 35.03 KG/M2 | OXYGEN SATURATION: 97 % | HEART RATE: 74 BPM | DIASTOLIC BLOOD PRESSURE: 74 MMHG | SYSTOLIC BLOOD PRESSURE: 111 MMHG | WEIGHT: 205.2 LBS | TEMPERATURE: 98.9 F

## 2023-04-18 DIAGNOSIS — K91.2 POSTSURGICAL MALABSORPTION: Primary | ICD-10-CM

## 2023-04-18 DIAGNOSIS — E66.01 SEVERE OBESITY (BMI 35.0-39.9) WITH COMORBIDITY (HCC): ICD-10-CM

## 2023-04-18 PROCEDURE — 99213 OFFICE O/P EST LOW 20 MIN: CPT | Performed by: SURGERY

## 2023-04-18 RX ORDER — CHOLECALCIFEROL (VITAMIN D3) 50 MCG
CAPSULE ORAL
COMMUNITY

## 2023-04-18 NOTE — PROGRESS NOTES
Identified pt with two pt identifiers (name and ). Reviewed chart in preparation for visit and have obtained necessary documentation. Jesús Hermosillo is a 44 y.o. female  Chief Complaint   Patient presents with    Follow-up     19  Laparoscopic Jann-en-Y gastric bypass     Visit Vitals  /74 (BP 1 Location: Right arm, BP Patient Position: Sitting, BP Cuff Size: Adult)   Pulse 74   Temp 98.9 °F (37.2 °C) (Oral)   Resp 18   Ht 5' 4\" (1.626 m)   Wt 205 lb 3.2 oz (93.1 kg)   SpO2 97%   BMI 35.22 kg/m²       1. Have you been to the ER, urgent care clinic since your last visit? Hospitalized since your last visit? No    2. Have you seen or consulted any other health care providers outside of the [address] [place] [address] since your last visit? Include any pap smears or colon screening.  No

## 2023-04-19 LAB
25(OH)D3+25(OH)D2 SERPL-MCNC: 24 NG/ML (ref 30–100)
ALBUMIN SERPL-MCNC: 4.2 G/DL (ref 3.8–4.8)
ALBUMIN/GLOB SERPL: 1.5 {RATIO} (ref 1.2–2.2)
ALP SERPL-CCNC: 88 IU/L (ref 44–121)
ALT SERPL-CCNC: 13 IU/L (ref 0–32)
AST SERPL-CCNC: 15 IU/L (ref 0–40)
BILIRUB SERPL-MCNC: <0.2 MG/DL (ref 0–1.2)
BUN SERPL-MCNC: 16 MG/DL (ref 6–20)
BUN/CREAT SERPL: 30 (ref 9–23)
CALCIUM SERPL-MCNC: 9.5 MG/DL (ref 8.7–10.2)
CHLORIDE SERPL-SCNC: 106 MMOL/L (ref 96–106)
CO2 SERPL-SCNC: 18 MMOL/L (ref 20–29)
CREAT SERPL-MCNC: 0.54 MG/DL (ref 0.57–1)
EGFRCR SERPLBLD CKD-EPI 2021: 121 ML/MIN/1.73
ERYTHROCYTE [DISTWIDTH] IN BLOOD BY AUTOMATED COUNT: 12.5 % (ref 11.7–15.4)
GLOBULIN SER CALC-MCNC: 2.8 G/DL (ref 1.5–4.5)
GLUCOSE SERPL-MCNC: 101 MG/DL (ref 70–99)
HCT VFR BLD AUTO: 41 % (ref 34–46.6)
HGB BLD-MCNC: 14.2 G/DL (ref 11.1–15.9)
IRON SATN MFR SERPL: 14 % (ref 15–55)
IRON SERPL-MCNC: 54 UG/DL (ref 27–159)
MCH RBC QN AUTO: 29.9 PG (ref 26.6–33)
MCHC RBC AUTO-ENTMCNC: 34.6 G/DL (ref 31.5–35.7)
MCV RBC AUTO: 86 FL (ref 79–97)
PLATELET # BLD AUTO: 315 X10E3/UL (ref 150–450)
POTASSIUM SERPL-SCNC: 4.4 MMOL/L (ref 3.5–5.2)
PROT SERPL-MCNC: 7 G/DL (ref 6–8.5)
PTH-INTACT SERPL-MCNC: 78 PG/ML (ref 15–65)
RBC # BLD AUTO: 4.75 X10E6/UL (ref 3.77–5.28)
SODIUM SERPL-SCNC: 141 MMOL/L (ref 134–144)
TIBC SERPL-MCNC: 391 UG/DL (ref 250–450)
UIBC SERPL-MCNC: 337 UG/DL (ref 131–425)
VIT B12 SERPL-MCNC: 754 PG/ML (ref 232–1245)
WBC # BLD AUTO: 9.2 X10E3/UL (ref 3.4–10.8)

## 2023-04-20 NOTE — PROGRESS NOTES
Subjective:      Oneida Ca is a 44 y.o. female presents for postop care 3.5 years following laparoscopic gastric bypass. Appetite is good. Eating a regular diet without difficulty. Reflux symptoms improved with probiotics. Bowel movements are regular. She is compliant with vitamins and high protein intake. She was recently diagnosed with hypersomnia; fatigue continues to be an intermittent problem. She has noticed some weight gain over the last few months as she moved between positions; she is now in stable job and plans to restarted structured exercise program.    Objective:     Visit Vitals  /74 (BP 1 Location: Right arm, BP Patient Position: Sitting, BP Cuff Size: Adult)   Pulse 74   Temp 98.9 °F (37.2 °C) (Oral)   Resp 18   Ht 5' 4\" (1.626 m)   Wt 205 lb 3.2 oz (93.1 kg)   SpO2 97%   BMI 35.22 kg/m²       General:  alert, cooperative, no distress, appears stated age, moderately obese   Abdomen: deferred   Incision:   deferred     Assessment:     Doing well postoperatively. No abdominal pain or signs of recurrent marginal ulcer. Plan:     1. Continue current medications/supplements; continue high protein diet. 2. Nutritional labs today. 3. Restart structured exercise regimen. 4. Follow-up in 6 months.

## 2023-04-27 RX ORDER — PROPRANOLOL HYDROCHLORIDE 40 MG/1
1 TABLET ORAL 3 TIMES DAILY
COMMUNITY
Start: 2023-03-01

## 2023-04-27 RX ORDER — FLUOXETINE HYDROCHLORIDE 40 MG/1
40 CAPSULE ORAL DAILY
COMMUNITY
Start: 2023-03-01

## 2023-04-27 RX ORDER — MODAFINIL 100 MG/1
100 TABLET ORAL DAILY
COMMUNITY
Start: 2023-03-01

## 2023-05-01 ENCOUNTER — TELEPHONE (OUTPATIENT)
Dept: SLEEP MEDICINE | Age: 38
End: 2023-05-01

## 2023-05-01 DIAGNOSIS — G47.11 IDIOPATHIC HYPERSOMNIA: Primary | ICD-10-CM

## 2023-05-01 NOTE — TELEPHONE ENCOUNTER
Orders Placed This Encounter    lisdexamfetamine (VYVANSE) 20 mg capsule     Sig: Take 1 Capsule by mouth daily for 29 days. Max Daily Amount: 20 mg. Dispense:  30 Capsule     Refill:  0    lisdexamfetamine (VYVANSE) 20 mg capsule     Sig: Take 1 Capsule by mouth daily for 29 days. Max Daily Amount: 20 mg. Dispense:  30 Capsule     Refill:  0    lisdexamfetamine (VYVANSE) 20 mg capsule     Sig: Take 1 Capsule by mouth daily for 29 days. Max Daily Amount: 20 mg.      Dispense:  30 Capsule     Refill:  0

## 2023-05-01 NOTE — TELEPHONE ENCOUNTER
Patient called and stated that she will like to discontinue the XYWAV because she was not able to sleep with that RX and will like to reinitiate the Vyvanse 20mg tablets.

## 2023-05-04 ENCOUNTER — DOCUMENTATION ONLY (OUTPATIENT)
Dept: SLEEP MEDICINE | Age: 38
End: 2023-05-04

## 2023-05-04 ENCOUNTER — TELEPHONE (OUTPATIENT)
Dept: SLEEP MEDICINE | Age: 38
End: 2023-05-04

## 2023-05-04 ENCOUNTER — TELEPHONE (OUTPATIENT)
Age: 38
End: 2023-05-04

## 2023-05-04 DIAGNOSIS — G47.11 IDIOPATHIC HYPERSOMNIA: Primary | ICD-10-CM

## 2023-05-04 RX ORDER — MODAFINIL 200 MG/1
200 TABLET ORAL
Qty: 30 TABLET | Refills: 5 | Status: SHIPPED | OUTPATIENT
Start: 2023-05-04

## 2023-05-16 DIAGNOSIS — D50.0 IRON DEFICIENCY ANEMIA SECONDARY TO BLOOD LOSS (CHRONIC): Primary | ICD-10-CM

## 2023-05-16 RX ORDER — ALBUTEROL SULFATE 90 UG/1
4 AEROSOL, METERED RESPIRATORY (INHALATION) PRN
Status: CANCELLED | OUTPATIENT
Start: 2023-05-24

## 2023-05-16 RX ORDER — SODIUM CHLORIDE 9 MG/ML
INJECTION, SOLUTION INTRAVENOUS CONTINUOUS
Status: CANCELLED | OUTPATIENT
Start: 2023-05-24

## 2023-05-16 RX ORDER — ACETAMINOPHEN 325 MG/1
650 TABLET ORAL
Status: CANCELLED | OUTPATIENT
Start: 2023-05-24

## 2023-05-16 RX ORDER — FAMOTIDINE 10 MG/ML
20 INJECTION, SOLUTION INTRAVENOUS
Status: CANCELLED | OUTPATIENT
Start: 2023-05-24

## 2023-05-16 RX ORDER — SODIUM CHLORIDE 9 MG/ML
5-250 INJECTION, SOLUTION INTRAVENOUS PRN
Status: CANCELLED | OUTPATIENT
Start: 2023-05-24

## 2023-05-16 RX ORDER — ONDANSETRON 2 MG/ML
8 INJECTION INTRAMUSCULAR; INTRAVENOUS
Status: CANCELLED | OUTPATIENT
Start: 2023-05-24

## 2023-05-16 RX ORDER — SODIUM CHLORIDE 0.9 % (FLUSH) 0.9 %
5-40 SYRINGE (ML) INJECTION PRN
Status: CANCELLED | OUTPATIENT
Start: 2023-05-24

## 2023-05-16 RX ORDER — DIPHENHYDRAMINE HYDROCHLORIDE 50 MG/ML
50 INJECTION INTRAMUSCULAR; INTRAVENOUS
Status: CANCELLED | OUTPATIENT
Start: 2023-05-24

## 2023-05-16 RX ORDER — HEPARIN SODIUM (PORCINE) LOCK FLUSH IV SOLN 100 UNIT/ML 100 UNIT/ML
500 SOLUTION INTRAVENOUS PRN
Status: CANCELLED | OUTPATIENT
Start: 2023-05-24

## 2023-05-16 RX ORDER — EPINEPHRINE 1 MG/ML
0.3 INJECTION, SOLUTION, CONCENTRATE INTRAVENOUS PRN
Status: CANCELLED | OUTPATIENT
Start: 2023-05-24

## 2023-05-18 RX ORDER — ALBUTEROL SULFATE 90 UG/1
4 AEROSOL, METERED RESPIRATORY (INHALATION) PRN
OUTPATIENT
Start: 2023-05-24

## 2023-05-18 RX ORDER — SODIUM CHLORIDE 9 MG/ML
INJECTION, SOLUTION INTRAVENOUS CONTINUOUS
OUTPATIENT
Start: 2023-05-24

## 2023-05-18 RX ORDER — SODIUM CHLORIDE 0.9 % (FLUSH) 0.9 %
5-40 SYRINGE (ML) INJECTION PRN
OUTPATIENT
Start: 2023-05-24

## 2023-05-18 RX ORDER — ACETAMINOPHEN 325 MG/1
650 TABLET ORAL
OUTPATIENT
Start: 2023-05-24

## 2023-05-18 RX ORDER — HEPARIN SODIUM (PORCINE) LOCK FLUSH IV SOLN 100 UNIT/ML 100 UNIT/ML
500 SOLUTION INTRAVENOUS PRN
OUTPATIENT
Start: 2023-05-24

## 2023-05-18 RX ORDER — ONDANSETRON 2 MG/ML
8 INJECTION INTRAMUSCULAR; INTRAVENOUS
OUTPATIENT
Start: 2023-05-24

## 2023-05-18 RX ORDER — SODIUM CHLORIDE 9 MG/ML
5-250 INJECTION, SOLUTION INTRAVENOUS PRN
OUTPATIENT
Start: 2023-05-24

## 2023-05-18 RX ORDER — EPINEPHRINE 1 MG/ML
0.3 INJECTION, SOLUTION, CONCENTRATE INTRAVENOUS PRN
OUTPATIENT
Start: 2023-05-24

## 2023-05-18 RX ORDER — DIPHENHYDRAMINE HYDROCHLORIDE 50 MG/ML
50 INJECTION INTRAMUSCULAR; INTRAVENOUS
OUTPATIENT
Start: 2023-05-24

## 2023-05-19 ENCOUNTER — TELEPHONE (OUTPATIENT)
Age: 38
End: 2023-05-19

## 2023-05-24 ENCOUNTER — HOSPITAL ENCOUNTER (OUTPATIENT)
Facility: HOSPITAL | Age: 38
End: 2023-05-24

## 2023-05-24 ENCOUNTER — HOSPITAL ENCOUNTER (OUTPATIENT)
Facility: HOSPITAL | Age: 38
Setting detail: INFUSION SERIES
End: 2023-05-24
Payer: COMMERCIAL

## 2023-05-24 ENCOUNTER — HOSPITAL ENCOUNTER (OUTPATIENT)
Facility: HOSPITAL | Age: 38
Setting detail: INFUSION SERIES
End: 2023-05-24

## 2023-05-24 VITALS
DIASTOLIC BLOOD PRESSURE: 64 MMHG | HEART RATE: 70 BPM | TEMPERATURE: 98.5 F | RESPIRATION RATE: 18 BRPM | SYSTOLIC BLOOD PRESSURE: 119 MMHG

## 2023-05-24 DIAGNOSIS — D50.0 IRON DEFICIENCY ANEMIA DUE TO CHRONIC BLOOD LOSS: Primary | ICD-10-CM

## 2023-05-24 PROCEDURE — 96365 THER/PROPH/DIAG IV INF INIT: CPT

## 2023-05-24 PROCEDURE — 2580000003 HC RX 258: Performed by: INTERNAL MEDICINE

## 2023-05-24 PROCEDURE — 6360000002 HC RX W HCPCS: Performed by: INTERNAL MEDICINE

## 2023-05-24 RX ORDER — EPINEPHRINE 1 MG/ML
0.3 INJECTION, SOLUTION, CONCENTRATE INTRAVENOUS PRN
Status: CANCELLED | OUTPATIENT
Start: 2023-05-31

## 2023-05-24 RX ORDER — ALBUTEROL SULFATE 90 UG/1
4 AEROSOL, METERED RESPIRATORY (INHALATION) PRN
Status: CANCELLED | OUTPATIENT
Start: 2023-05-31

## 2023-05-24 RX ORDER — SODIUM CHLORIDE 0.9 % (FLUSH) 0.9 %
5-40 SYRINGE (ML) INJECTION PRN
Status: DISCONTINUED | OUTPATIENT
Start: 2023-05-24 | End: 2023-05-25 | Stop reason: HOSPADM

## 2023-05-24 RX ORDER — SODIUM CHLORIDE 0.9 % (FLUSH) 0.9 %
5-40 SYRINGE (ML) INJECTION PRN
Status: CANCELLED | OUTPATIENT
Start: 2023-05-31

## 2023-05-24 RX ORDER — SODIUM CHLORIDE 9 MG/ML
INJECTION, SOLUTION INTRAVENOUS CONTINUOUS
Status: CANCELLED | OUTPATIENT
Start: 2023-05-31

## 2023-05-24 RX ORDER — DIPHENHYDRAMINE HYDROCHLORIDE 50 MG/ML
50 INJECTION INTRAMUSCULAR; INTRAVENOUS
Status: CANCELLED | OUTPATIENT
Start: 2023-05-31

## 2023-05-24 RX ORDER — ONDANSETRON 2 MG/ML
8 INJECTION INTRAMUSCULAR; INTRAVENOUS
Status: CANCELLED | OUTPATIENT
Start: 2023-05-31

## 2023-05-24 RX ORDER — ACETAMINOPHEN 325 MG/1
650 TABLET ORAL
Status: CANCELLED | OUTPATIENT
Start: 2023-05-31

## 2023-05-24 RX ORDER — SODIUM CHLORIDE 9 MG/ML
5-250 INJECTION, SOLUTION INTRAVENOUS PRN
Status: CANCELLED | OUTPATIENT
Start: 2023-05-31

## 2023-05-24 RX ORDER — SODIUM CHLORIDE 9 MG/ML
5-250 INJECTION, SOLUTION INTRAVENOUS PRN
Status: DISCONTINUED | OUTPATIENT
Start: 2023-05-24 | End: 2023-05-25 | Stop reason: HOSPADM

## 2023-05-24 RX ORDER — HEPARIN SODIUM (PORCINE) LOCK FLUSH IV SOLN 100 UNIT/ML 100 UNIT/ML
500 SOLUTION INTRAVENOUS PRN
Status: CANCELLED | OUTPATIENT
Start: 2023-05-31

## 2023-05-24 RX ORDER — HEPARIN SODIUM (PORCINE) LOCK FLUSH IV SOLN 100 UNIT/ML 100 UNIT/ML
500 SOLUTION INTRAVENOUS PRN
Status: DISCONTINUED | OUTPATIENT
Start: 2023-05-24 | End: 2023-05-25 | Stop reason: HOSPADM

## 2023-05-24 RX ADMIN — SODIUM CHLORIDE, PRESERVATIVE FREE 10 ML: 5 INJECTION INTRAVENOUS at 13:11

## 2023-05-24 RX ADMIN — FERUMOXYTOL 510 MG: 510 INJECTION INTRAVENOUS at 13:56

## 2023-05-24 RX ADMIN — SODIUM CHLORIDE 25 ML/HR: 9 INJECTION, SOLUTION INTRAVENOUS at 13:12

## 2023-05-24 ASSESSMENT — PAIN SCALES - GENERAL
PAINLEVEL_OUTOF10: 0
PAINLEVEL_OUTOF10: 0

## 2023-05-24 NOTE — PROGRESS NOTES
[facility] Peds/Adult Note                       Date: May 24, 2023    Name: Ely Simms    MRN: [id number]         : 1985    1300 Patient arrives for [facility]era[facility] Infusion (1 of 2) without acute problems. Please see Connect [facility] for complete assessment and education provided. Vital signs stable throughout and prior to discharge. Patient tolerated procedure well and was discharged without incident. Patient is aware of next [facility] appointment on 2023. Appointment card give to the Patient. Patient did wait 30 minutes post infusion for observation with no adverse reactions noted @ this time. Ms. Ada Castro vitals were reviewed prior to and after treatment. Patient Vitals for the past 12 hrs:   Temp Pulse Resp BP   23 1447 98.5 °F (36.9 °C) 70 18 119/64   23 1417 -- 68 18 111/69   23 1350 -- 67 18 112/65   23 1300 97.8 °F (36.6 °C) 68 18 123/85       Medications given:   Medications Administered         0.9 % sodium chloride infusion Admin Date  2023 Action  New Bag Dose  25 mL/hr Rate  25 mL/hr Route  IntraVENous Administered By  Rodrigue Rolon RN        ferumoxytol [facility]) 510 mg in sodium chloride 0.9 % 100 mL IVPB Admin Date  2023 Action  New Bag Dose  510 mg Rate  381 mL/hr Route  IntraVENous Administered By  Rodrigue Rolon RN        sodium chloride flush 0.9 % injection 5-40 mL Admin Date  2023 Action  Given Dose  10 mL Rate   Route  IntraVENous Administered By  Rodrigue Rolon RN          Ms. Tyler tolerated the infusion, and had no complaints. Ms. Susan Newell was discharged from [place] [address] in stable condition. Discharge Instructions provided to patient, patient verbalized understanding but denied the request for a copy of d/c instructions.      Future Appointments   Date Time Provider Felix Marquez   2023  2:00 PM A1 PEDS MED [address] [facility]   2023  4:20 PM [facility] BS AMB   2023  8:30 AM Mignon Warren

## 2023-05-31 ENCOUNTER — HOSPITAL ENCOUNTER (OUTPATIENT)
Facility: HOSPITAL | Age: 38
Setting detail: INFUSION SERIES
End: 2023-05-31
Payer: COMMERCIAL

## 2023-05-31 ENCOUNTER — HOSPITAL ENCOUNTER (OUTPATIENT)
Facility: HOSPITAL | Age: 38
End: 2023-05-31

## 2023-05-31 VITALS
DIASTOLIC BLOOD PRESSURE: 80 MMHG | TEMPERATURE: 98.2 F | RESPIRATION RATE: 18 BRPM | SYSTOLIC BLOOD PRESSURE: 125 MMHG | HEART RATE: 64 BPM

## 2023-05-31 DIAGNOSIS — D50.0 IRON DEFICIENCY ANEMIA DUE TO CHRONIC BLOOD LOSS: Primary | ICD-10-CM

## 2023-05-31 PROCEDURE — 6360000002 HC RX W HCPCS: Performed by: INTERNAL MEDICINE

## 2023-05-31 PROCEDURE — 96365 THER/PROPH/DIAG IV INF INIT: CPT

## 2023-05-31 PROCEDURE — 2580000003 HC RX 258: Performed by: INTERNAL MEDICINE

## 2023-05-31 RX ORDER — EPINEPHRINE 1 MG/ML
0.3 INJECTION, SOLUTION, CONCENTRATE INTRAVENOUS PRN
OUTPATIENT
Start: 2023-05-31

## 2023-05-31 RX ORDER — SODIUM CHLORIDE 9 MG/ML
5-250 INJECTION, SOLUTION INTRAVENOUS PRN
Status: DISCONTINUED | OUTPATIENT
Start: 2023-05-31 | End: 2023-06-01 | Stop reason: HOSPADM

## 2023-05-31 RX ORDER — SODIUM CHLORIDE 0.9 % (FLUSH) 0.9 %
5-40 SYRINGE (ML) INJECTION PRN
Status: DISCONTINUED | OUTPATIENT
Start: 2023-05-31 | End: 2023-06-01 | Stop reason: HOSPADM

## 2023-05-31 RX ORDER — DIPHENHYDRAMINE HYDROCHLORIDE 50 MG/ML
50 INJECTION INTRAMUSCULAR; INTRAVENOUS
OUTPATIENT
Start: 2023-05-31

## 2023-05-31 RX ORDER — SODIUM CHLORIDE 0.9 % (FLUSH) 0.9 %
5-40 SYRINGE (ML) INJECTION PRN
OUTPATIENT
Start: 2023-05-31

## 2023-05-31 RX ORDER — SODIUM CHLORIDE 9 MG/ML
5-250 INJECTION, SOLUTION INTRAVENOUS PRN
OUTPATIENT
Start: 2023-05-31

## 2023-05-31 RX ORDER — HEPARIN SODIUM (PORCINE) LOCK FLUSH IV SOLN 100 UNIT/ML 100 UNIT/ML
500 SOLUTION INTRAVENOUS PRN
Status: CANCELLED | OUTPATIENT
Start: 2023-05-31

## 2023-05-31 RX ORDER — SODIUM CHLORIDE 9 MG/ML
5-250 INJECTION, SOLUTION INTRAVENOUS PRN
Status: CANCELLED | OUTPATIENT
Start: 2023-05-31

## 2023-05-31 RX ORDER — ACETAMINOPHEN 325 MG/1
650 TABLET ORAL
OUTPATIENT
Start: 2023-05-31

## 2023-05-31 RX ORDER — ONDANSETRON 2 MG/ML
8 INJECTION INTRAMUSCULAR; INTRAVENOUS
OUTPATIENT
Start: 2023-05-31

## 2023-05-31 RX ORDER — HEPARIN SODIUM (PORCINE) LOCK FLUSH IV SOLN 100 UNIT/ML 100 UNIT/ML
500 SOLUTION INTRAVENOUS PRN
Status: DISCONTINUED | OUTPATIENT
Start: 2023-05-31 | End: 2023-06-01 | Stop reason: HOSPADM

## 2023-05-31 RX ORDER — ALBUTEROL SULFATE 90 UG/1
4 AEROSOL, METERED RESPIRATORY (INHALATION) PRN
OUTPATIENT
Start: 2023-05-31

## 2023-05-31 RX ORDER — SODIUM CHLORIDE 9 MG/ML
INJECTION, SOLUTION INTRAVENOUS CONTINUOUS
OUTPATIENT
Start: 2023-05-31

## 2023-05-31 RX ADMIN — FERUMOXYTOL 510 MG: 510 INJECTION INTRAVENOUS at 15:30

## 2023-05-31 RX ADMIN — SODIUM CHLORIDE 25 ML/HR: 9 INJECTION, SOLUTION INTRAVENOUS at 14:18

## 2023-05-31 NOTE — PROGRESS NOTES
[facility] Peds/Adult Note                       Date: May 31, 2023    Name: Hong Lester    MRN: [id number]         : 1985    1400 Patient arrives for [facility]  without acute problems. Please see Connect [facility] for complete assessment and education provided. Vital signs stable throughout and prior to discharge. Patient tolerated procedure well and was discharged without incident. Patient is aware of no further [facility] appointments and to follow up with referring provider for any questions or concerns. Ms. Ten Osborne vitals were reviewed prior to and after treatment. Patient Vitals for the past 12 hrs:   Temp Pulse Resp BP   23 1545 -- 64 18 125/80   23 1400 98.2 °F (36.8 °C) 73 18 125/80       Lab results:  No results found for this or any previous visit (from the past 12 hour(s)). Medications given:   Medications Administered         0.9 % sodium chloride infusion Admin Date  2023 Action  New Bag Dose  25 mL/hr Rate  25 mL/hr Route  IntraVENous Administered By  Debo Lopez RN        ferumoxytol [facility]) 510 mg in sodium chloride 0.9 % 100 mL IVPB Admin Date  2023 Action  New Bag Dose  510 mg Rate  381 mL/hr Route  IntraVENous Administered By  Debo Lopez RN            Ms. Tyler tolerated the infusion, and had no complaints. Ms. Pepito Benítez was discharged from [place] [address] in stable condition. Discharge Instructions provided to patient, patient verbalized understanding but denied the request for a copy of d/c instructions.      Future Appointments   Date Time Provider Felix Marquez   2023  4:20 PM Richard Bautista [facility] [place] BS AMB   2023  8:30 AM Traci Smith  [facility] BS AMB       Rita Kay RN  May 31, 2023  4:35 PM.

## 2023-06-06 ENCOUNTER — OFFICE VISIT (OUTPATIENT)
Age: 38
End: 2023-06-06
Payer: COMMERCIAL

## 2023-06-06 VITALS
HEIGHT: 64 IN | SYSTOLIC BLOOD PRESSURE: 138 MMHG | WEIGHT: 212.8 LBS | TEMPERATURE: 98.4 F | BODY MASS INDEX: 36.33 KG/M2 | DIASTOLIC BLOOD PRESSURE: 87 MMHG | OXYGEN SATURATION: 98 % | HEART RATE: 77 BPM

## 2023-06-06 DIAGNOSIS — Z79.899 ENCOUNTER FOR DRUG THERAPY: ICD-10-CM

## 2023-06-06 DIAGNOSIS — G47.11 IDIOPATHIC HYPERSOMNIA: Primary | ICD-10-CM

## 2023-06-06 PROCEDURE — 99215 OFFICE O/P EST HI 40 MIN: CPT | Performed by: INTERNAL MEDICINE

## 2023-06-06 RX ORDER — DEXTROAMPHETAMINE SACCHARATE, AMPHETAMINE ASPARTATE, DEXTROAMPHETAMINE SULFATE AND AMPHETAMINE SULFATE 2.5; 2.5; 2.5; 2.5 MG/1; MG/1; MG/1; MG/1
10 TABLET ORAL 2 TIMES DAILY
Qty: 60 TABLET | Refills: 0 | Status: SHIPPED | OUTPATIENT
Start: 2023-07-06 | End: 2023-08-05

## 2023-06-06 RX ORDER — DEXTROAMPHETAMINE SACCHARATE, AMPHETAMINE ASPARTATE, DEXTROAMPHETAMINE SULFATE AND AMPHETAMINE SULFATE 2.5; 2.5; 2.5; 2.5 MG/1; MG/1; MG/1; MG/1
10 TABLET ORAL 2 TIMES DAILY
Qty: 60 TABLET | Refills: 0 | Status: SHIPPED | OUTPATIENT
Start: 2023-06-06 | End: 2023-07-06

## 2023-06-06 RX ORDER — DEXTROAMPHETAMINE SACCHARATE, AMPHETAMINE ASPARTATE, DEXTROAMPHETAMINE SULFATE AND AMPHETAMINE SULFATE 2.5; 2.5; 2.5; 2.5 MG/1; MG/1; MG/1; MG/1
10 TABLET ORAL 2 TIMES DAILY
Qty: 60 TABLET | Refills: 0 | Status: SHIPPED | OUTPATIENT
Start: 2023-08-05 | End: 2023-09-04

## 2023-06-06 ASSESSMENT — SLEEP AND FATIGUE QUESTIONNAIRES
HOW LIKELY ARE YOU TO NOD OFF OR FALL ASLEEP WHILE SITTING AND TALKING TO SOMEONE: 0
HOW LIKELY ARE YOU TO NOD OFF OR FALL ASLEEP WHEN YOU ARE A PASSENGER IN A CAR FOR AN HOUR WITHOUT A BREAK: 3
HOW LIKELY ARE YOU TO NOD OFF OR FALL ASLEEP WHILE SITTING INACTIVE IN A PUBLIC PLACE: 0
ESS TOTAL SCORE: 14
HOW LIKELY ARE YOU TO NOD OFF OR FALL ASLEEP IN A CAR, WHILE STOPPED FOR A FEW MINUTES IN TRAFFIC: 0
HOW LIKELY ARE YOU TO NOD OFF OR FALL ASLEEP WHILE WATCHING TV: 3
HOW LIKELY ARE YOU TO NOD OFF OR FALL ASLEEP WHILE LYING DOWN TO REST IN THE AFTERNOON WHEN CIRCUMSTANCES PERMIT: 3
HOW LIKELY ARE YOU TO NOD OFF OR FALL ASLEEP WHILE SITTING AND READING: 2
HOW LIKELY ARE YOU TO NOD OFF OR FALL ASLEEP WHILE SITTING QUIETLY AFTER LUNCH WITHOUT ALCOHOL: 3

## 2023-06-06 NOTE — PROGRESS NOTES
[facility] [place], [address]  Tel.  [phone]    Fax. [address] [facility],   [place], [address] [facility]  Tel.  [phone]    Fax. [phone] [address] [facility] [place]   Tel.  [phone]    Fax. [phone]       Brayan Tompkins (: 1985) is a 44 y.o. female, Established patient patient, seen for idiopathic hypersomnia follow up and medication management, she was last seen by me on 23. SLEEP TESTING:    PSG(22): Apnea/Hypopnea index of 1.9 which indicates no significant sleep apnea. MSLT(22): Severe daytime sleepiness as evidenced by an average latency of 00:55 minutes, Stage REM was not noted in any of the naps. Urine Drug Screen(22): Negative    Urine Drug Screen (23): Negative    ASSESSMENT/PLAN:     Diagnosis Orders   1. Idiopathic hypersomnia  amphetamine-dextroamphetamine (ADDERALL, 10MG,) 10 MG tablet    amphetamine-dextroamphetamine (ADDERALL, 10MG,) 10 MG tablet    amphetamine-dextroamphetamine (ADDERALL, 10MG,) 10 MG tablet      2. Encounter for drug therapy        3. BMI 36.0-36.9,adult            Patient has a history and examination consistent with the diagnosis of narcolepsy. Return for for follow-up in 3 months or as needed. * Continue on current medications    Orders Placed This Encounter    amphetamine-dextroamphetamine (ADDERALL, 10MG,) 10 MG tablet     Sig: Take 1 tablet by mouth 2 times daily for 30 days. Max Daily Amount: 20 mg     Dispense:  60 tablet     Refill:  0     DO NOT FILL BEFORE START DATE. amphetamine-dextroamphetamine (ADDERALL, 10MG,) 10 MG tablet     Sig: Take 1 tablet by mouth 2 times daily for 30 days. Max Daily Amount: 20 mg     Dispense:  60 tablet     Refill:  0     DO NOT FILL BEFORE START DATE. amphetamine-dextroamphetamine (ADDERALL, 10MG,) 10 MG tablet     Sig: Take 1 tablet by mouth 2 times daily for 30 days.  Max Daily

## 2023-06-07 NOTE — PATIENT INSTRUCTIONS
nodding off   * Missing traffic signs, speeding, or tailgating  Prevention-   Good sleep hygiene, lifestyle and behavioral choices have the most impact on drowsy driving. There is no substitute for sleep and the average person requires 8 hours nightly. If you find yourself driving drowsy, stop and sleep. Consider the sleep hygiene tips provided during your visit as well. Medication Refill Policy: Refills for all medications require 1 week advance notice. Please have your pharmacy fax a refill request. We are unable to fax, or call in \"controled substance\" medications and you will need to pick these prescriptions up from our office.

## 2023-06-26 ENCOUNTER — TELEPHONE (OUTPATIENT)
Age: 38
End: 2023-06-26

## 2023-06-29 ENCOUNTER — TELEPHONE (OUTPATIENT)
Age: 38
End: 2023-06-29

## 2023-07-03 NOTE — TELEPHONE ENCOUNTER
Medication not authorized will need to work on appeals process, patient currently at 3.0 gm twice nightly notified of current status.

## 2023-07-06 ENCOUNTER — TELEPHONE (OUTPATIENT)
Age: 38
End: 2023-07-06

## 2023-07-11 ENCOUNTER — TELEPHONE (OUTPATIENT)
Age: 38
End: 2023-07-11

## 2023-07-13 ENCOUNTER — CLINICAL DOCUMENTATION (OUTPATIENT)
Age: 38
End: 2023-07-13

## 2023-07-13 ENCOUNTER — PATIENT MESSAGE (OUTPATIENT)
Age: 38
End: 2023-07-13

## 2023-07-13 NOTE — PROGRESS NOTES
Faxed Xywav refill request and scanned in media. Complete PA for Covermymeds for Xywav on 7/13/2023.

## 2023-07-14 NOTE — TELEPHONE ENCOUNTER
From: Anuradha Tyler  To: Gloria Reggie  Sent: 7/13/2023 8:32 PM EDT  Subject: Medication    Hi Vinh Reyes,    I hope this message finds you well. I have been on 40 mg of Prozac daily for over 6 months and doing great. I was using the same medication and dosage under your care in the past. It has been difficult to see so many providers with my new work schedule (sleep doctor, hematologist, weight loss clinic doctor, orthodontist, bariatric doctor & PCP!). Would I be able to continue management of Prozac with you rather than seeing a separate provider? I can schedule a follow up with you if needed. I believe it may also be time for my physical. I have 90 days worth of medication so this is not urgent. Please advise. Thank you!

## 2023-08-02 ENCOUNTER — TELEPHONE (OUTPATIENT)
Age: 38
End: 2023-08-02

## 2023-08-02 NOTE — TELEPHONE ENCOUNTER
[facility] from [address] called and needed clarification on the dosage and days for patient. I was transferred to Debra Graf, Pharmacist clarified and filled ordered for patient.

## 2023-08-02 NOTE — TELEPHONE ENCOUNTER
[facility] from [address] called and needed clarification on the dosage and days for patient. I was transferred to Niecy Malone, Pharmacist clarified and filled ordered for patient.

## 2023-08-04 ENCOUNTER — TELEPHONE (OUTPATIENT)
Age: 38
End: 2023-08-04

## 2023-08-04 NOTE — TELEPHONE ENCOUNTER
Bruna Graf liaison with [facility] called stated [facility] needs clarification on the dosage and direction. Also if a Prior Authorization needs to be done, the pharmacy can send a keycode. Informed Patricio Davis, there was a request for a PA already done.   [facility]   Phone # [phone]

## 2023-08-16 DIAGNOSIS — D50.0 IRON DEFICIENCY ANEMIA SECONDARY TO BLOOD LOSS (CHRONIC): ICD-10-CM

## 2023-08-29 ENCOUNTER — CLINICAL DOCUMENTATION (OUTPATIENT)
Age: 38
End: 2023-08-29

## 2023-09-01 ENCOUNTER — TELEPHONE (OUTPATIENT)
Age: 38
End: 2023-09-01

## 2023-09-01 LAB
BASOPHILS # BLD AUTO: 0.1 X10E3/UL (ref 0–0.2)
BASOPHILS NFR BLD AUTO: 1 %
EOSINOPHIL # BLD AUTO: 0.4 X10E3/UL (ref 0–0.4)
EOSINOPHIL NFR BLD AUTO: 5 %
ERYTHROCYTE [DISTWIDTH] IN BLOOD BY AUTOMATED COUNT: 12.2 % (ref 11.7–15.4)
FERRITIN SERPL-MCNC: 240 NG/ML (ref 15–150)
HCT VFR BLD AUTO: 43.4 % (ref 34–46.6)
HGB BLD-MCNC: 14.7 G/DL (ref 11.1–15.9)
IMM GRANULOCYTES # BLD AUTO: 0 X10E3/UL (ref 0–0.1)
IMM GRANULOCYTES NFR BLD AUTO: 0 %
IRON SATN MFR SERPL: 17 % (ref 15–55)
IRON SERPL-MCNC: 61 UG/DL (ref 27–159)
LYMPHOCYTES # BLD AUTO: 2.5 X10E3/UL (ref 0.7–3.1)
LYMPHOCYTES NFR BLD AUTO: 33 %
MCH RBC QN AUTO: 30.9 PG (ref 26.6–33)
MCHC RBC AUTO-ENTMCNC: 33.9 G/DL (ref 31.5–35.7)
MCV RBC AUTO: 91 FL (ref 79–97)
MONOCYTES # BLD AUTO: 0.5 X10E3/UL (ref 0.1–0.9)
MONOCYTES NFR BLD AUTO: 6 %
NEUTROPHILS # BLD AUTO: 4.2 X10E3/UL (ref 1.4–7)
NEUTROPHILS NFR BLD AUTO: 55 %
PLATELET # BLD AUTO: 347 X10E3/UL (ref 150–450)
RBC # BLD AUTO: 4.75 X10E6/UL (ref 3.77–5.28)
TIBC SERPL-MCNC: 360 UG/DL (ref 250–450)
UIBC SERPL-MCNC: 299 UG/DL (ref 131–425)
WBC # BLD AUTO: 7.7 X10E3/UL (ref 3.4–10.8)

## 2023-09-01 NOTE — TELEPHONE ENCOUNTER
Lori Phelps. @ [facility] Called stated they need notes on how the patient will be taking the RX  For the [place] [facility].  Notes need to faxed to them  Phone # [phone]  Fax # [phone]

## 2023-09-01 NOTE — TELEPHONE ENCOUNTER
See prescription written on 07-13-23, scanned in media. Unsure why [facility] is calling for Xywav, this medication usually is dispensed by [facility] pharmacy.

## 2023-09-06 NOTE — TELEPHONE ENCOUNTER
Sal Guerrero @ [address] called stated patient's [place] [facility] prescription was denied.   Phone # [phone] , option# 2 then Option# 5  For an Appeal please call [phone] [facility] insurance)

## 2023-09-20 ENCOUNTER — OFFICE VISIT (OUTPATIENT)
Age: 38
End: 2023-09-20
Payer: COMMERCIAL

## 2023-09-20 VITALS
OXYGEN SATURATION: 98 % | WEIGHT: 214 LBS | RESPIRATION RATE: 18 BRPM | HEART RATE: 67 BPM | SYSTOLIC BLOOD PRESSURE: 119 MMHG | BODY MASS INDEX: 36.73 KG/M2 | DIASTOLIC BLOOD PRESSURE: 78 MMHG | TEMPERATURE: 98.3 F

## 2023-09-20 DIAGNOSIS — D50.0 IRON DEFICIENCY ANEMIA SECONDARY TO BLOOD LOSS (CHRONIC): Primary | ICD-10-CM

## 2023-09-20 DIAGNOSIS — E66.01 MORBID (SEVERE) OBESITY DUE TO EXCESS CALORIES (HCC): ICD-10-CM

## 2023-09-20 PROCEDURE — 99213 OFFICE O/P EST LOW 20 MIN: CPT | Performed by: INTERNAL MEDICINE

## 2023-09-20 RX ORDER — DEXTROAMPHETAMINE SACCHARATE, AMPHETAMINE ASPARTATE MONOHYDRATE, DEXTROAMPHETAMINE SULFATE AND AMPHETAMINE SULFATE 2.5; 2.5; 2.5; 2.5 MG/1; MG/1; MG/1; MG/1
10 CAPSULE, EXTENDED RELEASE ORAL EVERY MORNING
COMMUNITY

## 2023-09-20 RX ORDER — PROPRANOLOL HYDROCHLORIDE 40 MG/1
40 TABLET ORAL 3 TIMES DAILY
COMMUNITY

## 2023-09-20 NOTE — PROGRESS NOTES
Tyler Gardner is a 43 y.o. female    Chief Complaint   Patient presents with    Follow-up      anemia       1. Have you been to the ER, urgent care clinic since your last visit? Hospitalized since your last visit? No    2. Have you seen or consulted any other health care providers outside of the [address] [place] [address] since your last visit? Include any pap smears or colon screening.  Dr. Angella Tomas

## 2023-09-20 NOTE — PROGRESS NOTES
[facility] at [address] , [address]   W: [phone]   F: [phone]          Reason for Visit:     Blanca Cristina is a 39 y.o.  female who is seen for follow up of anemia          History of Present Illness:     Patient is a 39 y.o. female who is s/p gastric bypass who is seen  for anemia. She has had microcytic anemia since June 2020   She was previously normal.    She has not been on regular B12 or iron supplements. Does take a multivitamin   She has menorrhagia for months. Work up showed iron deficiency. Received injectafer, started B12 po. Anemia resolved. Comes with labs. She has idiopathic hypersomnia. Takes B12. She takes VD and a MV   She is tired. Still has fibroids. No FH of anemia      She is an optometrist   .    Review of Systems: A complete review of systems was obtained, negative except as described above. Physical Exam:        Vitals reviewed      General appearance - alert, well appearing, and in no distress   Mental status - oriented to person, place, and time   Mouth - mucous membranes moist, pharynx normal without lesions   Skin - normal coloration and turgor, no new rashes, no suspicious skin lesions noted      ECOG PS: 0                       Results:       Lab Results   Component Value Date/Time    WBC 7.7 08/31/2023 12:58 PM    HGB 14.7 08/31/2023 12:58 PM    HCT 43.4 08/31/2023 12:58 PM     08/31/2023 12:58 PM    MCV 91 08/31/2023 12:58 PM     Lab Results   Component Value Date/Time     04/18/2023 04:24 PM    K 4.4 04/18/2023 04:24 PM     04/18/2023 04:24 PM    CO2 18 04/18/2023 04:24 PM    BUN 16 04/18/2023 04:24 PM    GFRAA >60 10/04/2021 09:23 AM     Lab Results   Component Value Date/Time    ALT 13 04/18/2023 04:24 PM    AST 15 04/18/2023 04:24 PM    GLOB 4.7 12/18/2022 03:39 PM           Records reviewed and summarized above. Pathology report(s) reviewed above.    Radiology

## 2023-10-06 NOTE — TELEPHONE ENCOUNTER
Luis Corbin, the  for [employer] called stating that patient has been getting bridge shipments until an appeal can get done as patients PA was denied. She called to follow up on our appeal.     I do not see where it has been done or where provider was notified of denial.     Calling to see what our options are at this point. Michelle Zachery asks that I keep her informed [phone] and if she misses my call, I can leave a detailed message, VM is safe.

## 2023-10-26 NOTE — TELEPHONE ENCOUNTER
09/21/2023 09/14/2023   1  Dextroamp-Amphetamin 10 Mg Tab 30.00  30  Mi Tomer  [id number]   Vir (1432)  0   [place]  [place]    09/14/2023 07/13/2023   2  Xywav 0.5 Gm/ml Oral Solution 270.00  15  Na Bas  [id number]   Exp (8490)  4  1.80 LME  Other  [place]    08/31/2023 07/13/2023   2  Xywav 0.5 Gm/ml Oral Solution 270.00  15  Na Bas  [id number]   Exp (2852)  3  1.80 LME  Other  [place]    08/17/2023 07/13/2023   2  Xywav 0.5 Gm/ml Oral Solution 180.00  10  Na Bas

## 2023-10-27 NOTE — TELEPHONE ENCOUNTER
Spoke with patient who indicated that she has discontinued use of Xywav (3 gm twice nightly) due to poor quality sleep, waking up with increase in heart rate / palpitations. She is currently taking Adderal 10 mg, addition of Vitamin D and B12 has helped her as well.  She was encouraged to contact us as needed for management of her sleep symptoms

## 2023-10-31 ENCOUNTER — OFFICE VISIT (OUTPATIENT)
Age: 38
End: 2023-10-31
Payer: COMMERCIAL

## 2023-10-31 VITALS
SYSTOLIC BLOOD PRESSURE: 119 MMHG | HEART RATE: 77 BPM | TEMPERATURE: 97.4 F | WEIGHT: 221 LBS | DIASTOLIC BLOOD PRESSURE: 79 MMHG | HEIGHT: 64 IN | BODY MASS INDEX: 37.73 KG/M2 | RESPIRATION RATE: 16 BRPM | OXYGEN SATURATION: 100 %

## 2023-10-31 DIAGNOSIS — R53.82 CHRONIC FATIGUE: ICD-10-CM

## 2023-10-31 DIAGNOSIS — E55.9 VITAMIN D DEFICIENCY: ICD-10-CM

## 2023-10-31 DIAGNOSIS — Z23 NEEDS FLU SHOT: ICD-10-CM

## 2023-10-31 DIAGNOSIS — R79.89 ELEVATED PARATHYROID HORMONE: ICD-10-CM

## 2023-10-31 DIAGNOSIS — Z00.00 ANNUAL PHYSICAL EXAM: Primary | ICD-10-CM

## 2023-10-31 DIAGNOSIS — E53.8 B12 DEFICIENCY: ICD-10-CM

## 2023-10-31 DIAGNOSIS — D50.9 IRON DEFICIENCY ANEMIA, UNSPECIFIED IRON DEFICIENCY ANEMIA TYPE: ICD-10-CM

## 2023-10-31 PROBLEM — K28.9 MARGINAL ULCER: Status: RESOLVED | Noted: 2021-11-30 | Resolved: 2023-10-31

## 2023-10-31 PROBLEM — E66.9 OBESITY (BMI 30-39.9): Status: ACTIVE | Noted: 2019-11-20

## 2023-10-31 PROBLEM — E78.5 DYSLIPIDEMIA: Status: RESOLVED | Noted: 2021-09-30 | Resolved: 2023-10-31

## 2023-10-31 PROCEDURE — 99395 PREV VISIT EST AGE 18-39: CPT | Performed by: PHYSICIAN ASSISTANT

## 2023-10-31 PROCEDURE — 90674 CCIIV4 VAC NO PRSV 0.5 ML IM: CPT | Performed by: PHYSICIAN ASSISTANT

## 2023-10-31 PROCEDURE — 90471 IMMUNIZATION ADMIN: CPT | Performed by: PHYSICIAN ASSISTANT

## 2023-10-31 PROCEDURE — 99214 OFFICE O/P EST MOD 30 MIN: CPT | Performed by: PHYSICIAN ASSISTANT

## 2023-10-31 RX ORDER — CHOLECALCIFEROL (VITAMIN D3) 1250 MCG
10000 CAPSULE ORAL
COMMUNITY

## 2023-10-31 RX ORDER — CHOLECALCIFEROL (VITAMIN D3) 125 MCG
500 CAPSULE ORAL DAILY
COMMUNITY

## 2023-10-31 ASSESSMENT — ENCOUNTER SYMPTOMS
SHORTNESS OF BREATH: 0
COUGH: 0
DIARRHEA: 0
BLOOD IN STOOL: 0
SORE THROAT: 0
VOMITING: 0
CONSTIPATION: 0
SINUS PRESSURE: 0
BACK PAIN: 0
SINUS PAIN: 0
NAUSEA: 0
EYE PAIN: 0
ABDOMINAL PAIN: 0

## 2023-10-31 NOTE — PROGRESS NOTES
Identified pt with two pt identifiers(name and ). Reviewed record in preparation for visit and have obtained necessary documentation. Chief Complaint   Patient presents with    Annual Exam        Health Maintenance Due   Topic    Hepatitis B vaccine (1 of 3 - 3-dose series)    Varicella vaccine (1 of 2 - 2-dose childhood series)    Hepatitis C screen     Cervical cancer screen     Flu vaccine (1)       Coordination of Care Questionnaire:  :   1) Have you been to an emergency room, urgent care, or hospitalized since your last visit? If yes, where when, and reason for visit? no      2. Have seen or consulted any other health care provider since your last visit? If yes, where when, and reason for visit? Yes, Jewell Daniels MD GYN oncologist        Patient is accompanied by self I have received verbal consent from [facility] [address] [facility] to discuss any/all medical information while they are present in the room.

## 2023-12-07 ENCOUNTER — OFFICE VISIT (OUTPATIENT)
Age: 38
End: 2023-12-07

## 2023-12-07 VITALS
OXYGEN SATURATION: 98 % | WEIGHT: 224 LBS | DIASTOLIC BLOOD PRESSURE: 86 MMHG | BODY MASS INDEX: 38.24 KG/M2 | HEART RATE: 75 BPM | SYSTOLIC BLOOD PRESSURE: 127 MMHG | HEIGHT: 64 IN | RESPIRATION RATE: 18 BRPM | TEMPERATURE: 97.9 F

## 2023-12-07 DIAGNOSIS — J06.9 VIRAL UPPER RESPIRATORY TRACT INFECTION: Primary | ICD-10-CM

## 2023-12-07 LAB
Lab: NORMAL
QC PASS/FAIL: NORMAL
SARS-COV-2 RDRP RESP QL NAA+PROBE: NEGATIVE

## 2023-12-07 RX ORDER — FOLLITROPIN 300 [IU]/.5ML
INJECTION, SOLUTION SUBCUTANEOUS
COMMUNITY
Start: 2023-11-28

## 2023-12-07 RX ORDER — MENOTROPINS 75 UNIT
KIT SUBCUTANEOUS
COMMUNITY
Start: 2023-11-27

## 2023-12-07 NOTE — PROGRESS NOTES
Subjective:       History was provided by the patient. Lora Hinton is a 43 y.o. female who presents for evaluation of symptoms of a URI. Symptoms include chest congestion, right ear pain, post nasal drip, and productive cough. Onset of symptoms was 1 day ago, gradually worsening since that time. Associated symptoms include right ear pressure/pain, achiness, congestion, post nasal drip, and productive cough with  clear  colored sputum. She is drinking plenty of fluids. Evaluation to date: none. Treatment to date: Theraflu  Patient's medications, allergies, past medical, surgical, social and family histories were reviewed and updated as appropriate. Review of Systems  Pertinent items are noted in HPI. Objective:      General appearance: alert, appears stated age, and cooperative  Ears: normal TM's and external ear canals both ears  Nose: no discharge, turbinates normal, no sinus tenderness  Throat: normal findings: pink and moist  Lungs: clear to auscultation bilaterally  Heart: S1, S2 normal  Lymph nodes: Cervical adenopathy: nodes normal         Assessment:      viral upper respiratory illness, no cough present on examination, VSS, afebrile, SPO2 98% on room air. -POCT COVID-19 Rapid, NAAT    Results for orders placed or performed in visit on 12/07/23   POCT COVID-19 Rapid, NAAT   Result Value Ref Range    SARS-COV-2, RdRp gene Negative Negative    Lot Number [id number]     QC Pass/Fail pass            Plan:      Discussed dx and tx of URIs  Suggested symptomatic OTC remedies. Nasal saline sprays for congestion. RTC prn.   1. Handout given with care instructions. 2. OTC for symptom management. Increase fluid intake. 3. Follow-up with PCP as needed. 4. Go to ED with development of any acute symptoms. Follow-up     Follow-up immediately with PCP or ER  for any new worsening or changes or if symptoms are not improving over the next 5-7 days.  Patient verbalizes understanding, no questions or

## 2023-12-07 NOTE — PATIENT INSTRUCTIONS
If symptoms worsens or fail to improve follow-up with PCP or ER. Drink plenty of fluids. Take over-the-counter Tylenol for fever and body aches. Over-the-counter Delsym for cough.

## 2023-12-30 ENCOUNTER — OFFICE VISIT (OUTPATIENT)
Age: 38
End: 2023-12-30

## 2023-12-30 VITALS
WEIGHT: 223.4 LBS | RESPIRATION RATE: 18 BRPM | DIASTOLIC BLOOD PRESSURE: 84 MMHG | HEART RATE: 110 BPM | TEMPERATURE: 102.5 F | OXYGEN SATURATION: 96 % | HEIGHT: 64 IN | BODY MASS INDEX: 38.14 KG/M2 | SYSTOLIC BLOOD PRESSURE: 125 MMHG

## 2023-12-30 DIAGNOSIS — R19.7 DIARRHEA IN ADULT PATIENT: ICD-10-CM

## 2023-12-30 DIAGNOSIS — J10.1 INFLUENZA A: Primary | ICD-10-CM

## 2023-12-30 DIAGNOSIS — R50.9 FEVER AND CHILLS: ICD-10-CM

## 2023-12-30 LAB
INFLUENZA A ANTIGEN, POC: POSITIVE
INFLUENZA B ANTIGEN, POC: NEGATIVE
Lab: NORMAL
QC PASS/FAIL: NORMAL
SARS-COV-2 RDRP RESP QL NAA+PROBE: NEGATIVE
VALID INTERNAL CONTROL, POC: ABNORMAL

## 2023-12-30 RX ORDER — OSELTAMIVIR PHOSPHATE 75 MG/1
75 CAPSULE ORAL 2 TIMES DAILY
Qty: 10 CAPSULE | Refills: 0 | Status: SHIPPED | OUTPATIENT
Start: 2023-12-30 | End: 2024-01-04

## 2023-12-30 NOTE — PATIENT INSTRUCTIONS
I have discussed any testing results, diagnosis and treatment plan. If symptoms worsen please present to your local ED for urgent matters. Otherwise, please follow up with your PCP. Thank you for seeing us today at [facility]. I I hope you feel better soon.

## 2023-12-30 NOTE — PROGRESS NOTES
Kurt Tyler (:  1985) is a 38 y.o. female,Established patient, here for evaluation of the following chief complaint(s):  Cough (Patient is having cough, body aches, fever, headache, diarrhea and congestion. Patients  recently tested positive for the flu last week exposing patient.), Headache, Fever, and Congestion      ASSESSMENT/PLAN:  Visit Diagnoses and Associated Orders       Influenza A    -  Primary    oseltamivir (TAMIFLU) 75 MG capsule [99311]           Diarrhea in adult patient        AMB POC INFLUENZA A  AND B REAL-TIME RT-PCR [89926 CPT(R)]      POCT COVID-19 Rapid, NAAT [LRB788 Custom]           Fever and chills             ORDERS WITHOUT AN ASSOCIATED DIAGNOSIS    Desogestrel-Ethinyl Estradiol (APRI PO) [58202]          Positive for Influenza, Covid negative  Fndings consistent with a viral illness, influenza A, with symptoms for past 24 hours. Prescribed Tamiflu. There is no evidence of purulent sinus discharge, adventitious breath sounds or other findings to suggest a bacterial component. Recommendation to treat fever, with appropriate antipyretics along with adequate oral hydration, and rest were reviewed. Patient should monitor symptoms, if development of lethargy or abdominal pain present he should seek re-evaluation. Otherwise, I reviewed acute symptoms, especially fever, usually begin to resolve in 72 hours, though milder symptoms, particularly runny nose, cough, fatigue may last 7 to 10 days.           Follow up in 3 to 5 days if symptoms persist or if symptoms worsen.    SUBJECTIVE/OBJECTIVE:    Cough  Associated symptoms include a fever and headaches.   Headache  Fever   Associated symptoms include coughing and headaches.        38 y.o. female presents with symptoms of fever, cough and headache since last evening. Temp elevation to 102 degrees. Also had one episode of diarrhea yesterday. States ears hurt, head hurts and feels congested. Has had body aches and chills also.

## 2024-02-19 ENCOUNTER — TELEPHONE (OUTPATIENT)
Age: 39
End: 2024-02-19

## 2024-02-19 NOTE — TELEPHONE ENCOUNTER
LM for pt to call and schedule annual bariatric exam. Letter sent. [facility]

## 2024-02-27 PROBLEM — R10.9 ABDOMINAL PAIN: Status: RESOLVED | Noted: 2021-10-04 | Resolved: 2024-02-27

## 2024-03-15 ENCOUNTER — APPOINTMENT (OUTPATIENT)
Facility: HOSPITAL | Age: 39
End: 2024-03-15
Payer: COMMERCIAL

## 2024-03-15 ENCOUNTER — HOSPITAL ENCOUNTER (EMERGENCY)
Facility: HOSPITAL | Age: 39
Discharge: HOME | End: 2024-03-15
Attending: STUDENT IN AN ORGANIZED HEALTH CARE EDUCATION/TRAINING PROGRAM
Payer: COMMERCIAL

## 2024-03-15 VITALS
HEART RATE: 104 BPM | OXYGEN SATURATION: 98 % | TEMPERATURE: 98.6 F | SYSTOLIC BLOOD PRESSURE: 153 MMHG | RESPIRATION RATE: 16 BRPM | DIASTOLIC BLOOD PRESSURE: 101 MMHG

## 2024-03-15 DIAGNOSIS — O20.0 THREATENED MISCARRIAGE: Primary | ICD-10-CM

## 2024-03-15 LAB
ALBUMIN SERPL-MCNC: 3.3 G/DL (ref 3.5–5)
ALBUMIN/GLOB SERPL: 0.6 (ref 1.1–2.2)
ALP SERPL-CCNC: 88 U/L (ref 45–117)
ALT SERPL-CCNC: 22 U/L (ref 12–78)
ANION GAP SERPL CALC-SCNC: 8 MMOL/L (ref 5–15)
AST SERPL-CCNC: 12 U/L (ref 15–37)
BASOPHILS # BLD: 0.1 K/UL (ref 0–0.1)
BASOPHILS NFR BLD: 1 % (ref 0–1)
BILIRUB SERPL-MCNC: 0.2 MG/DL (ref 0.2–1)
BUN SERPL-MCNC: 11 MG/DL (ref 6–20)
BUN/CREAT SERPL: 16 (ref 12–20)
CALCIUM SERPL-MCNC: 9.5 MG/DL (ref 8.5–10.1)
CHLORIDE SERPL-SCNC: 105 MMOL/L (ref 97–108)
CO2 SERPL-SCNC: 22 MMOL/L (ref 21–32)
COMMENT:: NORMAL
CREAT SERPL-MCNC: 0.68 MG/DL (ref 0.55–1.02)
CRL: 1.67 CM
CRL: 1.69 CM
CRL: 1.69 CM
CRL: 1.71 CM
CRL: 1.83 CM
DIFFERENTIAL METHOD BLD: ABNORMAL
EOSINOPHIL # BLD: 0.4 K/UL (ref 0–0.4)
EOSINOPHIL NFR BLD: 3 % (ref 0–7)
ERYTHROCYTE [DISTWIDTH] IN BLOOD BY AUTOMATED COUNT: 13.2 % (ref 11.5–14.5)
GLOBULIN SER CALC-MCNC: 5.1 G/DL (ref 2–4)
GLUCOSE SERPL-MCNC: 99 MG/DL (ref 65–100)
HCG SERPL-ACNC: ABNORMAL MIU/ML (ref 0–6)
HCT VFR BLD AUTO: 40.7 % (ref 35–47)
HGB BLD-MCNC: 13.9 G/DL (ref 11.5–16)
IMM GRANULOCYTES # BLD AUTO: 0 K/UL (ref 0–0.04)
IMM GRANULOCYTES NFR BLD AUTO: 0 % (ref 0–0.5)
LYMPHOCYTES # BLD: 3 K/UL (ref 0.8–3.5)
LYMPHOCYTES NFR BLD: 28 % (ref 12–49)
MCH RBC QN AUTO: 28.6 PG (ref 26–34)
MCHC RBC AUTO-ENTMCNC: 34.2 G/DL (ref 30–36.5)
MCV RBC AUTO: 83.7 FL (ref 80–99)
MONOCYTES # BLD: 0.6 K/UL (ref 0–1)
MONOCYTES NFR BLD: 5 % (ref 5–13)
NEUTS SEG # BLD: 6.9 K/UL (ref 1.8–8)
NEUTS SEG NFR BLD: 63 % (ref 32–75)
NRBC # BLD: 0 K/UL (ref 0–0.01)
NRBC BLD-RTO: 0 PER 100 WBC
PLATELET # BLD AUTO: 408 K/UL (ref 150–400)
PMV BLD AUTO: 10.3 FL (ref 8.9–12.9)
POTASSIUM SERPL-SCNC: 3.5 MMOL/L (ref 3.5–5.1)
PROT SERPL-MCNC: 8.4 G/DL (ref 6.4–8.2)
RBC # BLD AUTO: 4.86 M/UL (ref 3.8–5.2)
SODIUM SERPL-SCNC: 135 MMOL/L (ref 136–145)
SPECIMEN HOLD: NORMAL
WBC # BLD AUTO: 11 K/UL (ref 3.6–11)

## 2024-03-15 PROCEDURE — 84702 CHORIONIC GONADOTROPIN TEST: CPT

## 2024-03-15 PROCEDURE — 76817 TRANSVAGINAL US OBSTETRIC: CPT

## 2024-03-15 PROCEDURE — 80053 COMPREHEN METABOLIC PANEL: CPT

## 2024-03-15 PROCEDURE — 99284 EMERGENCY DEPT VISIT MOD MDM: CPT

## 2024-03-15 PROCEDURE — 76801 OB US < 14 WKS SINGLE FETUS: CPT

## 2024-03-15 PROCEDURE — 85025 COMPLETE CBC W/AUTO DIFF WBC: CPT

## 2024-03-15 PROCEDURE — 36415 COLL VENOUS BLD VENIPUNCTURE: CPT

## 2024-03-15 ASSESSMENT — PAIN DESCRIPTION - DESCRIPTORS: DESCRIPTORS: CRAMPING

## 2024-03-15 ASSESSMENT — ENCOUNTER SYMPTOMS
ABDOMINAL PAIN: 0
EYE DISCHARGE: 0
SHORTNESS OF BREATH: 0

## 2024-03-15 ASSESSMENT — PAIN DESCRIPTION - PAIN TYPE: TYPE: ACUTE PAIN

## 2024-03-15 ASSESSMENT — PAIN DESCRIPTION - FREQUENCY: FREQUENCY: CONTINUOUS

## 2024-03-15 ASSESSMENT — PAIN SCALES - GENERAL: PAINLEVEL_OUTOF10: 1

## 2024-03-15 ASSESSMENT — PAIN - FUNCTIONAL ASSESSMENT: PAIN_FUNCTIONAL_ASSESSMENT: 0-10

## 2024-03-15 ASSESSMENT — PAIN DESCRIPTION - LOCATION: LOCATION: ABDOMEN

## 2024-03-15 NOTE — ED TRIAGE NOTES
She reports 8 weeks pregnant . 1/2 hour prior to arrival she had a sudden gush of blood and passed a large clot. She says she is still bleeding heavily. She says she has minimal pain. Seen by provider in triage.This pregnancy was IVF. She is a physician.

## 2024-03-16 NOTE — ED PROVIDER NOTES
[facility] EMERGENCY DEP  EMERGENCY DEPARTMENT ENCOUNTER      Pt Name: Kurt Tyler  MRN: [id number]  Birthdate 1985  Date of evaluation: 3/15/2024  Provider: Lucy Verde PA-C    CHIEF COMPLAINT       Chief Complaint   Patient presents with    Vaginal Bleeding         HISTORY OF PRESENT ILLNESS    HPI  Patient is a 38 y.o. female who presents today with complaints of vaginal bleeding.  She did undergo IVF about 5 and half weeks ago, no issues with the pregnancy thus far.  G1, P0 states she started with some vaginal bleeding earlier today.  Has some mild cramping but no pain.    Nursing Notes were reviewed.      REVIEW OF SYSTEMS       Review of Systems   Constitutional:  Negative for fever.   HENT:  Negative for congestion.    Eyes:  Negative for discharge.   Respiratory:  Negative for shortness of breath.    Cardiovascular:  Negative for chest pain.   Gastrointestinal:  Negative for abdominal pain.   Genitourinary:  Positive for vaginal bleeding. Negative for dysuria.   Skin:  Negative for wound.   Neurological:  Negative for seizures.   Psychiatric/Behavioral:  Negative for suicidal ideas.        Except as noted above the remainder of the review of systems was reviewed and negative.       PAST MEDICAL HISTORY     Past Medical History:   Diagnosis Date    Anxiety 2011    Kacey Marshall LCSW (Telehealth)    Back pain     Bilateral knee pain     due to OA.      Gastric ulcer 11/23/2021    Hemorrhoids     High cholesterol 2016    History of uterine fibroid summer 2020    29 fibroids.  Dr. Adrian Manrique childhood (5-13 yo)    Txd w lots steroids.  Severe allergies to everything.  Allergist.    Hypertension 2009    Txd w 3 bp meds.  off after weight loss.    Idiopathic hypersomnia     Iron deficiency anemia due to chronic blood loss 2020    Txd w Iron Infusions x 5.  Dr. Mignon Warren    Menorrhagia     Morbid obesity (HCC) 11/20/2019    Gastric Bypass 2019    PCOS (polycystic ovarian syndrome) 2015    Vitamin D

## 2024-04-03 ENCOUNTER — TELEPHONE (OUTPATIENT)
Age: 39
End: 2024-04-03

## 2024-04-03 DIAGNOSIS — D50.0 IRON DEFICIENCY ANEMIA SECONDARY TO BLOOD LOSS (CHRONIC): ICD-10-CM

## 2024-04-03 DIAGNOSIS — D50.0 IRON DEFICIENCY ANEMIA SECONDARY TO BLOOD LOSS (CHRONIC): Primary | ICD-10-CM

## 2024-04-03 NOTE — TELEPHONE ENCOUNTER
12:05pm: pt verified x2, informed pt we wanted to get updated labs and then would schedule OV according to labs. She stated she already had labs done 3/20, asked her to send us lab results via [email or web address] and then will call to have appt scheduled pending results. She also wanted team to be aware that she is currently expecting and that she was diagnosed with a subchorionic hemorrhage. Pt stated that she is actively bleeding. Informed her I would update team.

## 2024-04-23 ENCOUNTER — TELEPHONE (OUTPATIENT)
Age: 39
End: 2024-04-23

## 2024-04-23 NOTE — TELEPHONE ENCOUNTER
I called the patient after speaking to Dr Stephenson and he said that it is okay for her to take the 81 mg Aspirin daily. Pt in agreement.

## 2024-04-23 NOTE — TELEPHONE ENCOUNTER
Patient called with a question she is currently expecting an her OB recommended her to take a baby aspirin and she wanted to make sure that was cleared to do

## 2024-04-29 SDOH — ECONOMIC STABILITY: INCOME INSECURITY: HOW HARD IS IT FOR YOU TO PAY FOR THE VERY BASICS LIKE FOOD, HOUSING, MEDICAL CARE, AND HEATING?: NOT HARD AT ALL

## 2024-04-29 SDOH — ECONOMIC STABILITY: HOUSING INSECURITY
IN THE LAST 12 MONTHS, WAS THERE A TIME WHEN YOU DID NOT HAVE A STEADY PLACE TO SLEEP OR SLEPT IN A SHELTER (INCLUDING NOW)?: NO

## 2024-04-29 SDOH — ECONOMIC STABILITY: TRANSPORTATION INSECURITY
IN THE PAST 12 MONTHS, HAS LACK OF TRANSPORTATION KEPT YOU FROM MEETINGS, WORK, OR FROM GETTING THINGS NEEDED FOR DAILY LIVING?: NO

## 2024-04-29 SDOH — ECONOMIC STABILITY: FOOD INSECURITY: WITHIN THE PAST 12 MONTHS, THE FOOD YOU BOUGHT JUST DIDN'T LAST AND YOU DIDN'T HAVE MONEY TO GET MORE.: NEVER TRUE

## 2024-04-29 SDOH — ECONOMIC STABILITY: FOOD INSECURITY: WITHIN THE PAST 12 MONTHS, YOU WORRIED THAT YOUR FOOD WOULD RUN OUT BEFORE YOU GOT MONEY TO BUY MORE.: NEVER TRUE

## 2024-05-01 ENCOUNTER — OFFICE VISIT (OUTPATIENT)
Age: 39
End: 2024-05-01
Payer: COMMERCIAL

## 2024-05-01 VITALS
WEIGHT: 228 LBS | DIASTOLIC BLOOD PRESSURE: 80 MMHG | TEMPERATURE: 98.1 F | OXYGEN SATURATION: 98 % | HEART RATE: 82 BPM | SYSTOLIC BLOOD PRESSURE: 128 MMHG | HEIGHT: 64 IN | RESPIRATION RATE: 18 BRPM | BODY MASS INDEX: 38.93 KG/M2

## 2024-05-01 DIAGNOSIS — F33.0 MILD EPISODE OF RECURRENT MAJOR DEPRESSIVE DISORDER (HCC): Primary | ICD-10-CM

## 2024-05-01 DIAGNOSIS — Z34.90 PREGNANCY, UNSPECIFIED GESTATIONAL AGE: ICD-10-CM

## 2024-05-01 PROCEDURE — 99214 OFFICE O/P EST MOD 30 MIN: CPT | Performed by: PHYSICIAN ASSISTANT

## 2024-05-01 RX ORDER — SERTRALINE HYDROCHLORIDE 100 MG/1
100 TABLET, FILM COATED ORAL DAILY
Qty: 90 TABLET | Refills: 1 | Status: SHIPPED | OUTPATIENT
Start: 2024-05-01 | End: 2024-05-01 | Stop reason: SDUPTHER

## 2024-05-01 RX ORDER — SERTRALINE HYDROCHLORIDE 100 MG/1
100 TABLET, FILM COATED ORAL DAILY
Qty: 90 TABLET | Refills: 2 | Status: SHIPPED | OUTPATIENT
Start: 2024-05-01

## 2024-05-01 ASSESSMENT — PATIENT HEALTH QUESTIONNAIRE - PHQ9
8. MOVING OR SPEAKING SO SLOWLY THAT OTHER PEOPLE COULD HAVE NOTICED. OR THE OPPOSITE, BEING SO FIGETY OR RESTLESS THAT YOU HAVE BEEN MOVING AROUND A LOT MORE THAN USUAL: NOT AT ALL
6. FEELING BAD ABOUT YOURSELF - OR THAT YOU ARE A FAILURE OR HAVE LET YOURSELF OR YOUR FAMILY DOWN: NOT AT ALL
SUM OF ALL RESPONSES TO PHQ QUESTIONS 1-9: 0
9. THOUGHTS THAT YOU WOULD BE BETTER OFF DEAD, OR OF HURTING YOURSELF: NOT AT ALL
SUM OF ALL RESPONSES TO PHQ QUESTIONS 1-9: 0
5. POOR APPETITE OR OVEREATING: NOT AT ALL
1. LITTLE INTEREST OR PLEASURE IN DOING THINGS: NOT AT ALL
3. TROUBLE FALLING OR STAYING ASLEEP: NOT AT ALL
SUM OF ALL RESPONSES TO PHQ QUESTIONS 1-9: 0
2. FEELING DOWN, DEPRESSED OR HOPELESS: NOT AT ALL
4. FEELING TIRED OR HAVING LITTLE ENERGY: NOT AT ALL
SUM OF ALL RESPONSES TO PHQ9 QUESTIONS 1 & 2: 0
10. IF YOU CHECKED OFF ANY PROBLEMS, HOW DIFFICULT HAVE THESE PROBLEMS MADE IT FOR YOU TO DO YOUR WORK, TAKE CARE OF THINGS AT HOME, OR GET ALONG WITH OTHER PEOPLE: NOT DIFFICULT AT ALL
SUM OF ALL RESPONSES TO PHQ QUESTIONS 1-9: 0
7. TROUBLE CONCENTRATING ON THINGS, SUCH AS READING THE NEWSPAPER OR WATCHING TELEVISION: NOT AT ALL

## 2024-05-01 ASSESSMENT — ENCOUNTER SYMPTOMS
SHORTNESS OF BREATH: 0
COUGH: 0

## 2024-05-01 NOTE — PROGRESS NOTES
Kurt Tyler is a 38 y.o. female  Chief Complaint   Patient presents with    6 Month Follow-Up     OBG wants to change fluoxetine-      Vitals:    05/01/24 1446   BP: 128/80   Pulse: 82   Resp: 18   Temp: 98.1 °F (36.7 °C)   SpO2: 98%      Wt Readings from Last 3 Encounters:   05/01/24 103.4 kg (228 lb)   12/30/23 101.3 kg (223 lb 6.4 oz)   12/07/23 101.6 kg (224 lb)     BMI Readings from Last 3 Encounters:   05/01/24 39.14 kg/m²   12/30/23 38.35 kg/m²   12/07/23 38.45 kg/m²     Health Maintenance Due   Topic Date Due    Cervical cancer screen  Never done    COVID-19 Vaccine (5 - 2023-24 season) 09/01/2023    Depression Monitoring  04/18/2024     HPI  15 weeks pregnant with baby boy Darío via IVF. Doing well!     Hx Depression: OB would like pt to change to Zoloft. Pt did well with this in the past.     ROS  Review of Systems   Constitutional:  Negative for fever.   Respiratory:  Negative for cough and shortness of breath.    Cardiovascular:  Negative for chest pain and palpitations.   Neurological:  Negative for headaches.   Psychiatric/Behavioral:  Negative for dysphoric mood.      EXAM  Physical Exam  Vitals and nursing note reviewed.   Constitutional:       Appearance: Normal appearance.   HENT:      Head: Normocephalic and atraumatic.   Neck:      Vascular: No carotid bruit.   Cardiovascular:      Rate and Rhythm: Normal rate and regular rhythm.      Heart sounds: Normal heart sounds.   Pulmonary:      Effort: Pulmonary effort is normal. No respiratory distress.      Breath sounds: Normal breath sounds.   Musculoskeletal:      Cervical back: Neck supple.   Skin:     General: Skin is warm and dry.   Neurological:      General: No focal deficit present.      Mental Status: She is alert and oriented to person, place, and time.   Psychiatric:         Mood and Affect: Mood normal.         Behavior: Behavior normal.         Thought Content: Thought content normal.         Judgment: Judgment normal.

## 2024-05-01 NOTE — PROGRESS NOTES
I have reviewed all needed documentation in preparation for visit. Verified patient by name and date of birth  Chief Complaint   Patient presents with    6 Month Follow-Up     OBG wants to change fluoxetine-        Vitals:    05/01/24 1446   BP: 128/80   Site: Right Upper Arm   Position: Sitting   Cuff Size: Medium Adult   Pulse: 82   Resp: 18   Temp: 98.1 °F (36.7 °C)   TempSrc: Temporal   SpO2: 98%   Weight: 103.4 kg (228 lb)   Height: 1.626 m (5' 4\")       Health Maintenance Due   Topic Date Due    Cervical cancer screen  Never done    COVID-19 Vaccine (5 - 2023-24 season) 09/01/2023    Depression Monitoring  04/18/2024     \"Have you been to the ER, urgent care clinic since your last visit?  Hospitalized since your last visit?\"    YES    “Have you seen or consulted any other [facility] care providers [facility] visit?”    YES     “Have you had a pap smear?”    YES    No cervical cancer screening on file             Click Here for Release of Records Request         Lianet mattson CMA

## 2024-11-11 ENCOUNTER — OFFICE VISIT (OUTPATIENT)
Age: 39
End: 2024-11-11
Payer: COMMERCIAL

## 2024-11-11 ENCOUNTER — HOSPITAL ENCOUNTER (OUTPATIENT)
Facility: HOSPITAL | Age: 39
Discharge: HOME | End: 2024-11-14
Attending: SURGERY
Payer: COMMERCIAL

## 2024-11-11 ENCOUNTER — TELEPHONE (OUTPATIENT)
Age: 39
End: 2024-11-11

## 2024-11-11 VITALS
WEIGHT: 222.4 LBS | RESPIRATION RATE: 20 BRPM | TEMPERATURE: 98.1 F | HEART RATE: 94 BPM | OXYGEN SATURATION: 98 % | BODY MASS INDEX: 37.97 KG/M2 | SYSTOLIC BLOOD PRESSURE: 139 MMHG | DIASTOLIC BLOOD PRESSURE: 87 MMHG | HEIGHT: 64 IN

## 2024-11-11 DIAGNOSIS — R19.7 DIARRHEA OF PRESUMED INFECTIOUS ORIGIN: ICD-10-CM

## 2024-11-11 DIAGNOSIS — B99.9 INTRA-ABDOMINAL INFECTION: ICD-10-CM

## 2024-11-11 DIAGNOSIS — B99.9 INTRA-ABDOMINAL INFECTION: Primary | ICD-10-CM

## 2024-11-11 PROCEDURE — 99214 OFFICE O/P EST MOD 30 MIN: CPT | Performed by: SURGERY

## 2024-11-11 PROCEDURE — 74177 CT ABD & PELVIS W/CONTRAST: CPT

## 2024-11-11 PROCEDURE — 6360000004 HC RX CONTRAST MEDICATION: Performed by: SURGERY

## 2024-11-11 RX ORDER — IOPAMIDOL 755 MG/ML
100 INJECTION, SOLUTION INTRAVASCULAR
Status: COMPLETED | OUTPATIENT
Start: 2024-11-11 | End: 2024-11-11

## 2024-11-11 RX ADMIN — IOPAMIDOL 100 ML: 755 INJECTION, SOLUTION INTRAVENOUS at 17:38

## 2024-11-11 ASSESSMENT — PATIENT HEALTH QUESTIONNAIRE - PHQ9
SUM OF ALL RESPONSES TO PHQ QUESTIONS 1-9: 0
SUM OF ALL RESPONSES TO PHQ9 QUESTIONS 1 & 2: 0
SUM OF ALL RESPONSES TO PHQ QUESTIONS 1-9: 0
10. IF YOU CHECKED OFF ANY PROBLEMS, HOW DIFFICULT HAVE THESE PROBLEMS MADE IT FOR YOU TO DO YOUR WORK, TAKE CARE OF THINGS AT HOME, OR GET ALONG WITH OTHER PEOPLE: NOT DIFFICULT AT ALL
SUM OF ALL RESPONSES TO PHQ QUESTIONS 1-9: 0
SUM OF ALL RESPONSES TO PHQ QUESTIONS 1-9: 0
8. MOVING OR SPEAKING SO SLOWLY THAT OTHER PEOPLE COULD HAVE NOTICED. OR THE OPPOSITE, BEING SO FIGETY OR RESTLESS THAT YOU HAVE BEEN MOVING AROUND A LOT MORE THAN USUAL: NOT AT ALL
3. TROUBLE FALLING OR STAYING ASLEEP: NOT AT ALL
4. FEELING TIRED OR HAVING LITTLE ENERGY: NOT AT ALL
9. THOUGHTS THAT YOU WOULD BE BETTER OFF DEAD, OR OF HURTING YOURSELF: NOT AT ALL
SUM OF ALL RESPONSES TO PHQ QUESTIONS 1-9: 0
7. TROUBLE CONCENTRATING ON THINGS, SUCH AS READING THE NEWSPAPER OR WATCHING TELEVISION: NOT AT ALL
2. FEELING DOWN, DEPRESSED OR HOPELESS: NOT AT ALL
1. LITTLE INTEREST OR PLEASURE IN DOING THINGS: NOT AT ALL
SUM OF ALL RESPONSES TO PHQ QUESTIONS 1-9: 0
6. FEELING BAD ABOUT YOURSELF - OR THAT YOU ARE A FAILURE OR HAVE LET YOURSELF OR YOUR FAMILY DOWN: NOT AT ALL
SUM OF ALL RESPONSES TO PHQ QUESTIONS 1-9: 0
1. LITTLE INTEREST OR PLEASURE IN DOING THINGS: NOT AT ALL
5. POOR APPETITE OR OVEREATING: NOT AT ALL
SUM OF ALL RESPONSES TO PHQ QUESTIONS 1-9: 0

## 2024-11-11 NOTE — TELEPHONE ENCOUNTER
Pt thinks she may have a bowel leak and would like to know what to do. She currently scheduled for 11/18

## 2024-11-11 NOTE — PROGRESS NOTES
Surgery Progress Note    2024    CC: Diarrhea and bloating    Subjective:     Patient presents today as an established patient in our practice.  History of gastric bypass.  Had a  at [facility] .  Complicated by large postoperative seroma and possible dehiscence.  This was closed with an absorbable mesh.  Presents now with 3-4 BMs a day with a whitish tinge.  She reports coming off the antibiotics as of Tuesday.  Was on them for the infected seroma.  It is growing back gram-negative bacteria she said.  She is worried about a bowel injury.  Reports bloating and generalized discomfort.  Dull pain around a 3 or 4 out of 10.  Diffuse radiation.  No provoking or alleviating factors in particular.    Constitutional: No fever or chills  Neurologic: No headache  Eyes: No scleral icterus or irritated eyes  Nose: No nasal pain or drainage  Mouth: No oral lesions or sore throat  Cardiac: No palpations or chest pain  Pulmonary: No cough or shortness of breath  Gastrointestinal: Bloating, diarrhea, no nausea, emesis, or constipation  Genitourinary: No dysuria  Musculoskeletal: No muscle or joint tenderness  Skin: No rashes or lesions  Psychiatric: No anxiety or depressed mood    Objective:     Vitals:    24 1315   BP: 139/87   Pulse: 94   Resp: 20   Temp: 98.1 °F (36.7 °C)   SpO2: 98%     Body mass index is 38.17 kg/m².  Wt 222 lbs    General: No acute distress, conversant  Eyes: PERRLA, no scleral icterus  HENT: Normocephalic without oral lesions  Neck: Trachea midline without LAD  Cardiac: Normal pulse rate and rhythm  Pulmonary: Symmetric chest rise with normal effort  GI: Soft, morbidly obese, NT, ND, no hernia, no splenomegaly,  incision all healed, some calcified fat in the area, no obvious residual seroma or abscess  Skin: Warm without rash  Extremities: No edema or joint stiffness  Psych: Appropriate mood and affect    Assessment:     39-year-old female status post  2 months

## 2024-11-11 NOTE — TELEPHONE ENCOUNTER
I called the patient back and she said she had a c section with complications, she said she had a fascial wound dehiscence and she had mesh placed and she has been on antibiotics for a week and she said every time she calls her her MD they tell her to go to the ED and she is placed on more antibiotics, she said she has some blood In her stool and she is worried she may have a bowel leak and wanted to see Dr Stephenson, I told her he was out of town at a conference and she asked if she could see another provider in our practice. I discussed with Dr Barros and he will see her this afternoon. Pt in agreement.

## 2024-11-12 LAB — C DIFF TOX GENS STL QL NAA+PROBE: ABNORMAL

## 2024-11-13 ENCOUNTER — TELEPHONE (OUTPATIENT)
Age: 39
End: 2024-11-13

## 2024-11-13 RX ORDER — VANCOMYCIN HYDROCHLORIDE 125 MG/1
125 CAPSULE ORAL 4 TIMES DAILY
Qty: 40 CAPSULE | Refills: 0 | Status: SHIPPED | OUTPATIENT
Start: 2024-11-13 | End: 2024-11-23

## 2024-11-13 RX ORDER — VANCOMYCIN HYDROCHLORIDE 125 MG/1
125 CAPSULE ORAL 4 TIMES DAILY
Qty: 40 CAPSULE | Refills: 0 | Status: SHIPPED | OUTPATIENT
Start: 2024-11-13 | End: 2024-11-13 | Stop reason: ALTCHOICE

## 2024-11-13 NOTE — PROGRESS NOTES
C. difficile positive.  Vancomycin ordered.  Patient called.  CT scan reviewed.  Appears to have a noninfected seroma in the subcutaneous space.  This should reabsorb.  Patient told to follow-up in 2 weeks if not improved.    Barry Barros MD, FACS, [facility]  Bariatric and General Surgeon  [facility]

## 2024-11-13 NOTE — TELEPHONE ENCOUNTER
Pt said she saw her test results on Mychart and wanted to know if Dr. Barros has had time to look a them yet.

## 2024-12-01 LAB
C DIFF TOX A+B STL QL IA: POSITIVE
SPECIMEN STATUS REPORT: NORMAL

## 2024-12-02 LAB
C DIFF TOX A+B STL QL IA: POSITIVE
SPECIMEN STATUS REPORT: NORMAL

## 2024-12-24 ENCOUNTER — OFFICE VISIT (OUTPATIENT)
Age: 39
End: 2024-12-24

## 2024-12-24 VITALS
BODY MASS INDEX: 39.31 KG/M2 | OXYGEN SATURATION: 98 % | TEMPERATURE: 98.3 F | DIASTOLIC BLOOD PRESSURE: 77 MMHG | WEIGHT: 229 LBS | SYSTOLIC BLOOD PRESSURE: 113 MMHG | HEART RATE: 88 BPM

## 2024-12-24 DIAGNOSIS — J02.9 SORE THROAT: ICD-10-CM

## 2024-12-24 DIAGNOSIS — J00 ACUTE NASOPHARYNGITIS (COMMON COLD): Primary | ICD-10-CM

## 2024-12-24 LAB
GROUP A STREP ANTIGEN, POC: NEGATIVE
Lab: NORMAL
PERFORMING INSTRUMENT: NORMAL
QC PASS/FAIL: NORMAL
SARS-COV-2, POC: NORMAL
VALID INTERNAL CONTROL, POC: NORMAL

## 2024-12-24 NOTE — PROGRESS NOTES
(09420 UT) CAPS Take 10,000 Units by mouth   Yes ProviderRoderick MD   vitamin B-12 (CYANOCOBALAMIN) 500 MCG tablet Take 1 tablet by mouth daily   Yes Provider, MD Roderick        Objective   Physical Exam  Vitals and nursing note reviewed.   Constitutional:       Appearance: Normal appearance.   HENT:      Head: Normocephalic and atraumatic.      Right Ear: Ear canal and external ear normal. A middle ear effusion (small, clear) is present. Tympanic membrane is not injected, scarred, perforated, erythematous, retracted or bulging.      Left Ear: Ear canal and external ear normal. A middle ear effusion (small, clear) is present. Tympanic membrane is not injected, scarred, perforated, erythematous, retracted or bulging.      Nose: Mucosal edema and congestion present. No nasal tenderness or rhinorrhea.      Mouth/Throat:      Lips: Pink.      Mouth: Mucous membranes are moist.      Pharynx: Oropharynx is clear. Uvula midline. Posterior oropharyngeal erythema and postnasal drip present. No pharyngeal swelling, oropharyngeal exudate or uvula swelling.      Comments: Absent tonsils  Eyes:      Conjunctiva/sclera: Conjunctivae normal.   Cardiovascular:      Rate and Rhythm: Normal rate and regular rhythm.      Heart sounds: Normal heart sounds. No murmur heard.  Pulmonary:      Effort: Pulmonary effort is normal. No respiratory distress.      Breath sounds: Normal breath sounds. No stridor. No wheezing, rhonchi or rales.   Musculoskeletal:      Cervical back: Normal range of motion and neck supple.   Lymphadenopathy:      Cervical: No cervical adenopathy.   Skin:     General: Skin is warm and dry.   Neurological:      Mental Status: She is alert and oriented to person, place, and time.   Psychiatric:         Mood and Affect: Mood normal.         Behavior: Behavior normal.          Results for orders placed or performed in visit on 12/24/24   AMB POC RAPID STREP A   Result Value Ref Range    Valid Internal Control,

## 2024-12-27 LAB — S PYO THROAT QL CULT: NEGATIVE

## 2025-01-20 DIAGNOSIS — A04.72 C. DIFFICILE COLITIS: Primary | ICD-10-CM

## 2025-01-27 DIAGNOSIS — A04.72 C. DIFFICILE DIARRHEA: Primary | ICD-10-CM

## 2025-01-27 LAB — C DIFF TOX A+B STL QL IA: POSITIVE

## 2025-02-25 ENCOUNTER — TELEPHONE (OUTPATIENT)
Age: 40
End: 2025-02-25

## 2025-04-16 ENCOUNTER — OFFICE VISIT (OUTPATIENT)
Age: 40
End: 2025-04-16
Payer: COMMERCIAL

## 2025-04-16 VITALS
SYSTOLIC BLOOD PRESSURE: 134 MMHG | HEIGHT: 64 IN | HEART RATE: 79 BPM | DIASTOLIC BLOOD PRESSURE: 83 MMHG | BODY MASS INDEX: 39.95 KG/M2 | OXYGEN SATURATION: 100 % | WEIGHT: 234 LBS | TEMPERATURE: 97.5 F | RESPIRATION RATE: 18 BRPM

## 2025-04-16 DIAGNOSIS — F41.9 ANXIETY: ICD-10-CM

## 2025-04-16 DIAGNOSIS — F33.0 MILD EPISODE OF RECURRENT MAJOR DEPRESSIVE DISORDER: ICD-10-CM

## 2025-04-16 DIAGNOSIS — N92.0 MENORRHAGIA WITH REGULAR CYCLE: ICD-10-CM

## 2025-04-16 DIAGNOSIS — Z00.00 ANNUAL PHYSICAL EXAM: Primary | ICD-10-CM

## 2025-04-16 PROCEDURE — 99396 PREV VISIT EST AGE 40-64: CPT | Performed by: PHYSICIAN ASSISTANT

## 2025-04-16 PROCEDURE — 99214 OFFICE O/P EST MOD 30 MIN: CPT | Performed by: PHYSICIAN ASSISTANT

## 2025-04-16 RX ORDER — SERTRALINE HYDROCHLORIDE 100 MG/1
100 TABLET, FILM COATED ORAL DAILY
Qty: 90 TABLET | Refills: 1 | Status: SHIPPED | OUTPATIENT
Start: 2025-04-16

## 2025-04-16 RX ORDER — MULTIVITAMIN
1 TABLET ORAL DAILY
COMMUNITY

## 2025-04-16 RX ORDER — HYDROXYZINE HYDROCHLORIDE 50 MG/1
50 TABLET, FILM COATED ORAL 4 TIMES DAILY PRN
Qty: 120 TABLET | Refills: 1 | Status: SHIPPED | OUTPATIENT
Start: 2025-04-16

## 2025-04-16 SDOH — ECONOMIC STABILITY: FOOD INSECURITY: WITHIN THE PAST 12 MONTHS, YOU WORRIED THAT YOUR FOOD WOULD RUN OUT BEFORE YOU GOT MONEY TO BUY MORE.: NEVER TRUE

## 2025-04-16 SDOH — ECONOMIC STABILITY: FOOD INSECURITY: WITHIN THE PAST 12 MONTHS, THE FOOD YOU BOUGHT JUST DIDN'T LAST AND YOU DIDN'T HAVE MONEY TO GET MORE.: NEVER TRUE

## 2025-04-16 ASSESSMENT — PATIENT HEALTH QUESTIONNAIRE - PHQ9
SUM OF ALL RESPONSES TO PHQ QUESTIONS 1-9: 0
SUM OF ALL RESPONSES TO PHQ QUESTIONS 1-9: 0
3. TROUBLE FALLING OR STAYING ASLEEP: NOT AT ALL
SUM OF ALL RESPONSES TO PHQ QUESTIONS 1-9: 0
SUM OF ALL RESPONSES TO PHQ QUESTIONS 1-9: 0
7. TROUBLE CONCENTRATING ON THINGS, SUCH AS READING THE NEWSPAPER OR WATCHING TELEVISION: NOT AT ALL
9. THOUGHTS THAT YOU WOULD BE BETTER OFF DEAD, OR OF HURTING YOURSELF: NOT AT ALL
5. POOR APPETITE OR OVEREATING: NOT AT ALL
2. FEELING DOWN, DEPRESSED OR HOPELESS: NOT AT ALL
1. LITTLE INTEREST OR PLEASURE IN DOING THINGS: NOT AT ALL
8. MOVING OR SPEAKING SO SLOWLY THAT OTHER PEOPLE COULD HAVE NOTICED. OR THE OPPOSITE, BEING SO FIGETY OR RESTLESS THAT YOU HAVE BEEN MOVING AROUND A LOT MORE THAN USUAL: NOT AT ALL
6. FEELING BAD ABOUT YOURSELF - OR THAT YOU ARE A FAILURE OR HAVE LET YOURSELF OR YOUR FAMILY DOWN: NOT AT ALL
4. FEELING TIRED OR HAVING LITTLE ENERGY: NOT AT ALL
10. IF YOU CHECKED OFF ANY PROBLEMS, HOW DIFFICULT HAVE THESE PROBLEMS MADE IT FOR YOU TO DO YOUR WORK, TAKE CARE OF THINGS AT HOME, OR GET ALONG WITH OTHER PEOPLE: NOT DIFFICULT AT ALL

## 2025-04-16 ASSESSMENT — ENCOUNTER SYMPTOMS
BACK PAIN: 0
SINUS PAIN: 0
ABDOMINAL PAIN: 0
DIARRHEA: 0
SHORTNESS OF BREATH: 0
NAUSEA: 0
VOMITING: 0
CONSTIPATION: 0
SORE THROAT: 0
BLOOD IN STOOL: 0
COUGH: 0
EYE PAIN: 0
SINUS PRESSURE: 0

## 2025-04-16 NOTE — PROGRESS NOTES
Kurt Tyler is a 39 y.o. female  Chief Complaint   Patient presents with    Annual Exam     No concerns     Vitals:    25 1018   BP: 134/83   Pulse: 79   Resp: 18   Temp: 97.5 °F (36.4 °C)   SpO2: 100%      Wt Readings from Last 3 Encounters:   25 106.1 kg (234 lb)   24 103.9 kg (229 lb)   24 100.9 kg (222 lb 6.4 oz)     BMI Readings from Last 3 Encounters:   25 40.17 kg/m²   24 39.31 kg/m²   24 38.17 kg/m²     Health Maintenance Due   Topic Date Due    Cervical cancer screen  Never done    Breast cancer screen  Never done     Social History     Tobacco Use    Smoking status: Former     Current packs/day: 0.00     Types: Cigarettes     Quit date: 2018     Years since quittin.7    Smokeless tobacco: Never   Substance Use Topics    Alcohol use: Not Currently      Family History   Problem Relation Age of Onset    Hypertension Mother     High Cholesterol Mother     Diabetes Mother     Obesity Mother     Arthritis Mother     Miscarriages / Stillbirths Mother     Diabetes Father     Heart Disease Father 56        cardiomyopathy, heart transplant    Hypertension Father     Heart Surgery Father     High Cholesterol Father     Obesity Father     Gout Father     Sleep Apnea Father     Alcohol Abuse Father     Arrhythmia Father     Obesity Sister     Diabetes Maternal Grandmother     Diabetes Maternal Grandfather     Diabetes Paternal Grandmother     Colon Cancer Neg Hx     Anesth Problems Neg Hx     Breast Cancer Neg Hx       HPI  Pt presents for an Annual Physical.     Wt Readings from Last 3 Encounters:   25 106.1 kg (234 lb)   24 103.9 kg (229 lb)   24 100.9 kg (222 lb 6.4 oz)     Pre-eclamsia during pregnancy. Son delivered via C Section at 34 weeks. Now 7 mo old and doing well. Not breastfeeding.  was complicated by adhesions and had subsequent seroma requiring further surgery + now C-Diff. Periods have been heavy and painful. Has stool

## 2025-04-16 NOTE — PROGRESS NOTES
I have reviewed all needed documentation in preparation for visit. Verified patient by name and date of birth  Chief Complaint   Patient presents with    Annual Exam     No concerns       Vitals:    04/16/25 1018   BP: 134/83   BP Site: Left Upper Arm   Patient Position: Sitting   BP Cuff Size: Medium Adult   Pulse: 79   Resp: 18   Temp: 97.5 °F (36.4 °C)   TempSrc: Temporal   SpO2: 100%   Weight: 106.1 kg (234 lb)   Height: 1.626 m (5' 4\")       Health Maintenance Due   Topic Date Due    Cervical cancer screen  Never done    Breast cancer screen  Never done     \"Have you been to the ER, urgent care clinic since your last visit?  Hospitalized since your last visit?\"    YES    “Have you seen or consulted any other health care providers outside of [facility] since your last visit?”    YES    Have you had a mammogram?”   NO    No breast cancer screening on file           Click Here for Release of Records Request         Lianet mattson CMA

## 2025-04-30 ENCOUNTER — RESULTS FOLLOW-UP (OUTPATIENT)
Age: 40
End: 2025-04-30

## 2025-04-30 LAB
ALBUMIN SERPL-MCNC: 3.9 G/DL (ref 3.9–4.9)
ALP SERPL-CCNC: 110 IU/L (ref 44–121)
ALT SERPL-CCNC: 11 IU/L (ref 0–32)
AST SERPL-CCNC: 19 IU/L (ref 0–40)
BASOPHILS # BLD AUTO: 0 X10E3/UL (ref 0–0.2)
BASOPHILS NFR BLD AUTO: 1 %
BILIRUB SERPL-MCNC: 0.2 MG/DL (ref 0–1.2)
BUN SERPL-MCNC: 18 MG/DL (ref 6–24)
BUN/CREAT SERPL: 38 (ref 9–23)
CALCIUM SERPL-MCNC: 9.5 MG/DL (ref 8.7–10.2)
CHLORIDE SERPL-SCNC: 100 MMOL/L (ref 96–106)
CO2 SERPL-SCNC: 22 MMOL/L (ref 20–29)
CREAT SERPL-MCNC: 0.48 MG/DL (ref 0.57–1)
EGFRCR SERPLBLD CKD-EPI 2021: 123 ML/MIN/1.73
EOSINOPHIL # BLD AUTO: 0.3 X10E3/UL (ref 0–0.4)
EOSINOPHIL NFR BLD AUTO: 4 %
ERYTHROCYTE [DISTWIDTH] IN BLOOD BY AUTOMATED COUNT: 13.4 % (ref 11.7–15.4)
FERRITIN SERPL-MCNC: 26 NG/ML (ref 15–150)
GLOBULIN SER CALC-MCNC: 3.1 G/DL (ref 1.5–4.5)
GLUCOSE SERPL-MCNC: 88 MG/DL (ref 70–99)
HBA1C MFR BLD: 4.6 % (ref 4.8–5.6)
HCT VFR BLD AUTO: 41.6 % (ref 34–46.6)
HGB BLD-MCNC: 13.2 G/DL (ref 11.1–15.9)
IMM GRANULOCYTES # BLD AUTO: 0.1 X10E3/UL (ref 0–0.1)
IMM GRANULOCYTES NFR BLD AUTO: 1 %
IRON SATN MFR SERPL: 10 % (ref 15–55)
IRON SERPL-MCNC: 39 UG/DL (ref 27–159)
LYMPHOCYTES # BLD AUTO: 2.3 X10E3/UL (ref 0.7–3.1)
LYMPHOCYTES NFR BLD AUTO: 28 %
MCH RBC QN AUTO: 26.9 PG (ref 26.6–33)
MCHC RBC AUTO-ENTMCNC: 31.7 G/DL (ref 31.5–35.7)
MCV RBC AUTO: 85 FL (ref 79–97)
MONOCYTES # BLD AUTO: 0.5 X10E3/UL (ref 0.1–0.9)
MONOCYTES NFR BLD AUTO: 6 %
NEUTROPHILS # BLD AUTO: 5.2 X10E3/UL (ref 1.4–7)
NEUTROPHILS NFR BLD AUTO: 60 %
PLATELET # BLD AUTO: 371 X10E3/UL (ref 150–450)
POTASSIUM SERPL-SCNC: 4.5 MMOL/L (ref 3.5–5.2)
PROT SERPL-MCNC: 7 G/DL (ref 6–8.5)
RBC # BLD AUTO: 4.91 X10E6/UL (ref 3.77–5.28)
SODIUM SERPL-SCNC: 137 MMOL/L (ref 134–144)
TIBC SERPL-MCNC: 405 UG/DL (ref 250–450)
TSH SERPL DL<=0.005 MIU/L-ACNC: 1.19 UIU/ML (ref 0.45–4.5)
UIBC SERPL-MCNC: 366 UG/DL (ref 131–425)
WBC # BLD AUTO: 8.4 X10E3/UL (ref 3.4–10.8)

## 2025-05-09 DIAGNOSIS — F41.9 ANXIETY: ICD-10-CM

## 2025-05-09 NOTE — TELEPHONE ENCOUNTER
Refill request received from [facility]    for   Requested Prescriptions     Pending Prescriptions Disp Refills    hydrOXYzine HCl (ATARAX) 50 MG tablet [Pharmacy Med Name: HYDROXYZINE HCL 50 MG TABLET] 360 tablet 1     Sig: TAKE 1 TABLET BY MOUTH 4 TIMES DAILY AS NEEDED FOR ANXIETY.     Last office visit: 4/16/2025   Next office visit: 10/15/2025     Routed to MARIA DE JESUS Cummins for review.     Bella Rodriguez LPN

## 2025-05-10 RX ORDER — HYDROXYZINE HYDROCHLORIDE 50 MG/1
50 TABLET, FILM COATED ORAL 4 TIMES DAILY PRN
Qty: 360 TABLET | Refills: 1 | Status: SHIPPED | OUTPATIENT
Start: 2025-05-10

## 2025-07-25 ENCOUNTER — TELEPHONE (OUTPATIENT)
Age: 40
End: 2025-07-25

## 2025-07-25 NOTE — TELEPHONE ENCOUNTER
Spoke with patient regarding her 10/15 appointment. She confirmed she received the letter notifying patients that Nohelia will be leaving our practice. Patient states she will cancel for now as she believes she will be looking for an office closer to her.

## 2025-08-14 ENCOUNTER — OFFICE VISIT (OUTPATIENT)
Age: 40
End: 2025-08-14

## 2025-08-14 VITALS
HEART RATE: 104 BPM | WEIGHT: 235 LBS | OXYGEN SATURATION: 98 % | TEMPERATURE: 98.3 F | SYSTOLIC BLOOD PRESSURE: 130 MMHG | RESPIRATION RATE: 16 BRPM | BODY MASS INDEX: 40.34 KG/M2 | DIASTOLIC BLOOD PRESSURE: 86 MMHG

## 2025-08-14 DIAGNOSIS — R05.1 ACUTE COUGH: Primary | ICD-10-CM

## 2025-08-14 DIAGNOSIS — R03.0 ELEVATED BLOOD PRESSURE READING: ICD-10-CM

## 2025-08-14 LAB
INFLUENZA A ANTIGEN, POC: NEGATIVE
INFLUENZA B ANTIGEN, POC: NEGATIVE
Lab: NORMAL
PERFORMING INSTRUMENT: NORMAL
QC PASS/FAIL: NORMAL
SARS-COV-2, POC: NORMAL

## 2025-08-14 ASSESSMENT — ENCOUNTER SYMPTOMS
GASTROINTESTINAL NEGATIVE: 1
ALLERGIC/IMMUNOLOGIC NEGATIVE: 1
EYES NEGATIVE: 1
COUGH: 1

## 2025-08-20 ENCOUNTER — APPOINTMENT (OUTPATIENT)
Facility: HOSPITAL | Age: 40
End: 2025-08-20
Payer: COMMERCIAL

## 2025-08-20 ENCOUNTER — HOSPITAL ENCOUNTER (EMERGENCY)
Facility: HOSPITAL | Age: 40
Discharge: HOME | End: 2025-08-20
Attending: EMERGENCY MEDICINE
Payer: COMMERCIAL

## 2025-08-20 VITALS
HEART RATE: 75 BPM | TEMPERATURE: 97.4 F | BODY MASS INDEX: 41.52 KG/M2 | DIASTOLIC BLOOD PRESSURE: 77 MMHG | SYSTOLIC BLOOD PRESSURE: 111 MMHG | RESPIRATION RATE: 18 BRPM | HEIGHT: 63 IN | WEIGHT: 234.35 LBS | OXYGEN SATURATION: 97 %

## 2025-08-20 DIAGNOSIS — J06.9 ACUTE UPPER RESPIRATORY INFECTION: Primary | ICD-10-CM

## 2025-08-20 PROCEDURE — 71046 X-RAY EXAM CHEST 2 VIEWS: CPT

## 2025-08-20 PROCEDURE — 99283 EMERGENCY DEPT VISIT LOW MDM: CPT

## 2025-08-20 RX ORDER — MULTIVIT-MIN/IRON/FOLIC ACID/K 18-600-40
2000 CAPSULE ORAL DAILY
COMMUNITY

## 2025-08-20 RX ORDER — CODEINE PHOSPHATE AND GUAIFENESIN 10; 100 MG/5ML; MG/5ML
5 SOLUTION ORAL 3 TIMES DAILY PRN
Qty: 47 ML | Refills: 0 | Status: SHIPPED | OUTPATIENT
Start: 2025-08-20 | End: 2025-08-23

## 2025-08-20 RX ORDER — UBIDECARENONE 75 MG
50 CAPSULE ORAL DAILY
COMMUNITY

## 2025-08-20 ASSESSMENT — PAIN SCALES - GENERAL: PAINLEVEL_OUTOF10: 0

## 2025-08-29 ENCOUNTER — OFFICE VISIT (OUTPATIENT)
Age: 40
End: 2025-08-29

## 2025-08-29 VITALS
BODY MASS INDEX: 41.29 KG/M2 | HEIGHT: 63 IN | RESPIRATION RATE: 16 BRPM | DIASTOLIC BLOOD PRESSURE: 86 MMHG | WEIGHT: 233 LBS | OXYGEN SATURATION: 97 % | SYSTOLIC BLOOD PRESSURE: 127 MMHG | TEMPERATURE: 98.6 F | HEART RATE: 93 BPM

## 2025-08-29 DIAGNOSIS — U07.1 COVID: Primary | ICD-10-CM

## 2025-08-29 PROBLEM — D64.9 ANEMIA: Status: ACTIVE | Noted: 2024-08-05

## 2025-08-29 PROBLEM — L02.211 ABSCESS OF ABDOMINAL WALL: Status: ACTIVE | Noted: 2024-10-14

## 2025-08-29 PROBLEM — Z3A.24 24 WEEKS GESTATION OF PREGNANCY: Status: ACTIVE | Noted: 2024-08-05

## 2025-08-29 PROBLEM — O36.8190 DECREASED FETAL MOVEMENT DURING PREGNANCY: Status: ACTIVE | Noted: 2024-08-05

## 2025-08-29 PROBLEM — L03.90 CELLULITIS: Status: ACTIVE | Noted: 2024-09-29

## 2025-08-29 PROBLEM — K52.9 COLITIS: Status: ACTIVE | Noted: 2024-10-14

## 2025-08-29 PROBLEM — Z3A.28 28 WEEKS GESTATION OF PREGNANCY: Status: ACTIVE | Noted: 2024-08-05

## 2025-08-29 PROBLEM — O09.519 ELDERLY PRIMIGRAVIDA: Status: ACTIVE | Noted: 2024-04-16

## 2025-08-29 PROBLEM — N93.9 ABNORMAL UTERINE BLEEDING: Status: ACTIVE | Noted: 2025-08-29

## 2025-08-29 LAB
Lab: ABNORMAL
PERFORMING INSTRUMENT: ABNORMAL
QC PASS/FAIL: ABNORMAL
SARS-COV-2, POC: DETECTED

## 2025-08-29 RX ORDER — DEXTROMETHORPHAN HYDROBROMIDE AND PROMETHAZINE HYDROCHLORIDE 15; 6.25 MG/5ML; MG/5ML
5 SYRUP ORAL 4 TIMES DAILY PRN
Qty: 140 ML | Refills: 0 | Status: SHIPPED | OUTPATIENT
Start: 2025-08-29 | End: 2025-09-05

## (undated) DEVICE — APPLICATOR BNDG 1MM ADH PREMIERPRO EXOFIN

## (undated) DEVICE — STAPLER INT W25MM WHT TRNSOR CIR ANVIL W/ ADVANCING PROX

## (undated) DEVICE — SURGICAL PROCEDURE KIT GEN LAPAROSCOPY LF

## (undated) DEVICE — RELOAD STPL L60MM H1.5-3.6MM REG TISS BLU GRIPPING SURF B

## (undated) DEVICE — SUT ETHLN 2-0 18IN FS BLK --

## (undated) DEVICE — RELOAD STPL L45MM H2-4.1MM THCK TISS GRN GRIPPING SURF B

## (undated) DEVICE — Device

## (undated) DEVICE — STERILE POLYISOPRENE POWDER-FREE SURGICAL GLOVES WITH EMOLLIENT COATING: Brand: PROTEXIS

## (undated) DEVICE — TROCAR: Brand: KII® SLEEVE

## (undated) DEVICE — DRAIN SURG 19FR 100% SIL RADPQ RND CHN FULL FLUT

## (undated) DEVICE — SUTURING DEVICE: Brand: ENDO STITCH

## (undated) DEVICE — Z INACTIVE USE 2240337 DRAPE SURG PT TRANSFER TRAWAY SHT

## (undated) DEVICE — INFECTION CONTROL KIT SYS

## (undated) DEVICE — ELECTRODE ES 36CM LAP FLAT L HK COAT DISP CLEANCOAT

## (undated) DEVICE — REM POLYHESIVE ADULT PATIENT RETURN ELECTRODE: Brand: VALLEYLAB

## (undated) DEVICE — ACCESS PLATFORM FOR MINIMALLY INVASIVE SURGERY: Brand: GELPOINT®  MINI ADVANCED ACCESS PLATFORM

## (undated) DEVICE — RESERVOIR,SUCTION,100CC,SILICONE: Brand: MEDLINE

## (undated) DEVICE — CLICKLINE SCISSORS INSERT: Brand: CLICKLINE

## (undated) DEVICE — SOLUTION IRRIG 1000ML H2O STRL BLT

## (undated) DEVICE — AIR SHEET,LAT,COMFORT GLIDE, BLEND 40X80: Brand: MEDLINE

## (undated) DEVICE — GOWN,SIRUS,FABRNF,XL,20/CS: Brand: MEDLINE

## (undated) DEVICE — SUTURE MCRYL SZ 4-0 L27IN ABSRB UD L19MM PS-2 1/2 CIR PRIM Y426H

## (undated) DEVICE — STAPLER INT L37CM STPL 25MM CIR ENDOSCP CRV INTLUMN B FRM

## (undated) DEVICE — DEVICE SUT VLT PRELD W/ POLYSRB 2-0 L48IN SUT DISP FOR LAP

## (undated) DEVICE — TROCAR: Brand: KII® OPTICAL ACCESS SYSTEM

## (undated) DEVICE — INTRODUCER DEVICE: Brand: EEA

## (undated) DEVICE — RELOAD STPL L60MM H1-2.6MM MESENTERY THN TISS WHT 6 ROW

## (undated) DEVICE — BAG SPEC REM 224ML W4XL6IN DIA10MM 1 HND GYN DISP ENDOPCH

## (undated) DEVICE — STAPLER INT 60 UNIV PUR W/ TRI-STAPLE TECHNOLOGY ULT FOR

## (undated) DEVICE — NEEDLE HYPO 22GA L1.5IN BLK S STL HUB POLYPR SHLD REG BVL

## (undated) DEVICE — FILTER SMK EVAC FLO CLR MEGADYNE

## (undated) DEVICE — SUTURE VCRL SZ 3-0 L27IN ABSRB VLT L26MM SH 1/2 CIR J316H

## (undated) DEVICE — (D)PREP SKN CHLRAPRP APPL 26ML -- CONVERT TO ITEM 371833

## (undated) DEVICE — SOLUTION IV 1000ML 0.9% SOD CHL

## (undated) DEVICE — LAPAROSCOPIC TROCAR SLEEVE/SINGLE USE: Brand: KII® OPTICAL ACCESS SYSTEM

## (undated) DEVICE — DEVICE SUT 0 L48IN GRN POLY BRAID LD UNIT DISP SURGDAC

## (undated) DEVICE — DISSECTOR CRV JAW 48CM CRDLS -- SONICISION

## (undated) DEVICE — DRAPE,UTILTY,TAPE,15X26, 4EA/PK: Brand: MEDLINE

## (undated) DEVICE — SUTURE SZ 0 27IN 5/8 CIR UR-6  TAPER PT VIOLET ABSRB VICRYL J603H

## (undated) DEVICE — LAP-BAND SYSTEM CALIBRATION TUBE: Brand: LAP-BAND

## (undated) DEVICE — STRAP,POSITIONING,KNEE/BODY,FOAM,4X60": Brand: MEDLINE

## (undated) DEVICE — GARMENT,MEDLINE,DVT,INT,CALF,MED, GEN2: Brand: MEDLINE

## (undated) DEVICE — TUBING, SUCTION, 1/4" X 12', STRAIGHT: Brand: MEDLINE

## (undated) DEVICE — RELOAD STPL H4.1X2MM DIA60MM THCK TISS GRN 6 ROW PWR GST B

## (undated) DEVICE — TROCARS: Brand: KII® OPTICAL ACCESS SYSTEM

## (undated) DEVICE — CANISTER, RIGID, 3000CC: Brand: MEDLINE INDUSTRIES, INC.

## (undated) DEVICE — TUBING INSUF 0.3UM FLTR W/ LUERLOCK CONN

## (undated) DEVICE — ENDO CARRY-ON PROCEDURE KIT INCLUDES ENZYMATIC SPONGE, GAUZE, BIOHAZARD LABEL, TRAY, LUBRICANT, DIRTY SCOPE LABEL, WATER LABEL, TRAY, DRAWSTRING PAD, AND DEFENDO 4-PIECE KIT.: Brand: ENDO CARRY-ON PROCEDURE KIT

## (undated) DEVICE — VISUALIZATION SYSTEM: Brand: CLEARIFY